# Patient Record
Sex: FEMALE | Race: WHITE | NOT HISPANIC OR LATINO | Employment: OTHER | URBAN - METROPOLITAN AREA
[De-identification: names, ages, dates, MRNs, and addresses within clinical notes are randomized per-mention and may not be internally consistent; named-entity substitution may affect disease eponyms.]

---

## 2018-02-12 ENCOUNTER — TRANSFERRED RECORDS (OUTPATIENT)
Dept: HEALTH INFORMATION MANAGEMENT | Facility: CLINIC | Age: 53
End: 2018-02-12

## 2018-02-19 ENCOUNTER — OFFICE VISIT (OUTPATIENT)
Dept: ORTHOPEDICS | Facility: CLINIC | Age: 53
End: 2018-02-19
Payer: COMMERCIAL

## 2018-02-19 VITALS
WEIGHT: 183.3 LBS | SYSTOLIC BLOOD PRESSURE: 152 MMHG | OXYGEN SATURATION: 99 % | DIASTOLIC BLOOD PRESSURE: 86 MMHG | HEIGHT: 68 IN | HEART RATE: 79 BPM | BODY MASS INDEX: 27.78 KG/M2

## 2018-02-19 DIAGNOSIS — M75.111 INCOMPLETE TEAR OF RIGHT ROTATOR CUFF: Primary | ICD-10-CM

## 2018-02-19 PROCEDURE — 99203 OFFICE O/P NEW LOW 30 MIN: CPT | Performed by: FAMILY MEDICINE

## 2018-02-19 ASSESSMENT — PAIN SCALES - GENERAL: PAINLEVEL: NO PAIN (0)

## 2018-02-19 NOTE — MR AVS SNAPSHOT
After Visit Summary   2018    aPmella Jaimes    MRN: [id number]           Patient Information     Date Of Birth          1965        Visit Information        Provider Department      2018 10:00 AM Nino Norris,  [facility]        Today's Diagnoses     Incomplete tear of right rotator cuff    -  1      Care Instructions    Thanks for coming today.  Ortho/Sports Medicine Clinic  [address] [place] [address]    To schedule future appointments in Ortho Clinic, you may call [phone].    To schedule ordered imaging by your provider:   Call Central Imaging [phone]    To schedule an injection ordered by your provider:  Call Central Imaging Injection scheduling line: [phone]  [employer] available online at:  [email or web address]    Please call if any further questions or concerns ([phone]).  Clinic hours 8 am to 5 pm.    Return to clinic (call) if symptoms worsen or fail to improve.            Follow-ups after your visit        Additional Services     PHYSICAL THERAPY REFERRAL       Please eval and treat for right rotator cuff tear                  Who to contact     If you have questions or need follow up information about today's clinic visit or your schedule please contact [facility] directly at [phone].  Normal or non-critical lab and imaging results will be communicated to you by MyChart, letter or phone within 4 business days after the clinic has received the results. If you do not hear from us within 7 days, please contact the clinic through "[employer]"hart or phone. If you have a critical or abnormal lab result, we will notify you by phone as soon as possible.  Submit refill requests through [employer] or call your pharmacy and they will forward the refill request to us. Please allow 3 business days for your refill to be completed.          Additional Information About Your Visit        MyChart Information   "   [employer] is an electronic gateway that provides easy, online access to your medical records. With [employer], you can request a clinic appointment, read your test results, renew a prescription or communicate with your care team.     To sign up for [employer] visit the website at [email or web address]   You will be asked to enter the access code listed below, as well as some personal information. Please follow the directions to create your username and password.     Your access code is: [email or web address]  Expires: 2018 10:31 AM     Your access code will  in 90 days. If you need help or a new code, please contact your [facility] or call [phone] for assistance.        Care EveryWhere ID     This is your Care EveryWhere ID. This could be used by other organizations to access your [place] medical records  [id number]        Your Vitals Were     Pulse Height Pulse Oximetry BMI (Body Mass Index)          79 1.72 m (5' 7.72\") 99% 28.1 kg/m2         Blood Pressure from Last 3 Encounters:   18 152/86    Weight from Last 3 Encounters:   18 83.1 kg (183 lb 4.8 oz)              We Performed the Following     PHYSICAL THERAPY REFERRAL        Primary Care Provider Office Phone # Fax #    Nino RISHABH Leal [phone]        [facility] [address]        Equal Access to Services     AZEB [facility] HospitalRENETTA : Hadii aad ku hadasho Soomaali, waaxda luqadaha, qaybta kaalmada adeegyada, micki medina haybipin brownarazelda saini . So [facility] [phone].    ATENCIÓN: Si habla español, tiene a garces disposición servicios gratuitos de asistencia lingüística. Jaciel al [phone].    We comply with applicable federal civil rights laws and [place] laws. We do not discriminate on the basis of race, color, national origin, age, disability, sex, sexual orientation, or gender identity.            Thank you!     Thank you for choosing [facility]  for your care. Our goal is always to provide you with excellent care. Hearing back from our patients is one way we can continue to improve our services. Please take a few minutes to complete the written survey that you may receive in the mail after your visit with us. Thank you!             Your Updated Medication List - Protect others around you: Learn how to safely use, store and throw away your medicines at www.disposemymeds.org.      Notice  As of 2/19/2018 10:31 AM    You have not been prescribed any medications.

## 2018-02-19 NOTE — LETTER
2/19/2018         RE: Pamella Jaimes  [address] [place] [address]        Dear Colleague,    Thank you for referring your patient, Pamella Jaimes, to the [facility]. Please see a copy of my visit note below.    CHIEF COMPLAINT:  Consult (right shoulder pain [id number]. pt states she was catching her grandson off the top bunk. )       HISTORY OF PRESENT ILLNESS  Ms. Jaimes is a pleasant 53 year old year old female who presents to clinic today with a right shoulder injury.    Pamella explains that she injured her shoulder in July while catching her grandson when he was jumping off of the top bunk.  Immediate right shoulder pain, starts at her right distal and lateral arm, radiates up to her anterior superior shoulder.  Over the past few months her pain has subsided quite a bit.  She does have persistent dull pain in her distal lateral and anterior arm, this is now tolerable to a good degree.    She was seen at [facility] by Dr. Mai, she had an MRI last week.  In follow up surgery was advised.  She would like to pursue other options.      Additional history: as documented    MEDICAL HISTORY  There is no problem list on file for this patient.      No current outpatient prescriptions on file.       Allergies   Allergen Reactions     Morphine      Penicillins Hives       History reviewed. No pertinent family history.    Additional medical/Social/Surgical histories reviewed in [facility] and updated as appropriate.     REVIEW OF SYSTEMS (2/19/2018)  CONSTITUTIONAL: Denies fever and weight loss  EYES: Denies acute vision changes  ENT: Denies hearing changes or difficulty swallowing  CARDIAC: Denies chest pain or edema  RESPIRATORY: Denies dyspnea, cough or wheeze  GASTROINTESTINAL: Denies abdominal pain, nausea, vomiting  MUSCULOSKELETAL: See HPI  SKIN: Denies any recent rash or lesion  NEUROLOGICAL: Denies numbness or focal weakness  PSYCHIATRIC: No history of psychiatric symptoms or  "problems  ENDOCRINE: Denies current diagnosis of diabetes  HEMATOLOGY: Denies episodes of easy bleeding      PHYSICAL EXAM  Vitals:    02/19/18 1007   BP: 152/86   Pulse: 79   SpO2: 99%   Weight: 83.1 kg (183 lb 4.8 oz)   Height: 1.72 m (5' 7.72\")     General  - normal appearance, in no obvious distress  CV  - normal radial pulse  Pulm  - normal respiratory pattern, non-labored  Musculoskeletal - right shoulder  - inspection: normal bone and joint alignment, no obvious deformity, no scapular winging, no AC step-off  - palpation: normal clavicle, non-tender AC, no tenderness over proximal biceps, RC insertion  - ROM: no pain with ROM in all directions  - strength: 5/5  strength, 5/5 in all shoulder planes  - special tests:  (-) Speed's  (-) Neer  (+) Hawkin's  (-) Stephanie's  (-) subscap lift-off  Neuro  - no sensory or motor deficit, grossly normal coordination, normal muscle tone  Skin  - no ecchymosis, erythema, warmth, or induration, no obvious rash  Psych  - interactive, appropriate, normal mood and affect           ASSESSMENT & PLAN  Ms. Jaimes is a 53 year old year old female who presents to clinic today with right shoulder pain.    I reviewed her records from [facility], including her MRI from February 12.  Her MR does reveal a full-thickness supraspinatus tear that is 2 x 2.5 cm and a medially dislocated biceps tendon.    I did discuss options with her, both surgical and non-surgical.  She's very interested in non-surgical options.  Given her level of improvement I do think it's reasonable to pursue these.    I'm referring her to physical therapy.    We should follow up in 6 weeks to reassess.    It was a pleasure seeing Pamella today.        Nino Norris DO, [facility]  Primary Care Sports Medicine      Again, thank you for allowing me to participate in the care of your patient.        Sincerely,        Nino Norris DO    "

## 2018-02-19 NOTE — PROGRESS NOTES
CHIEF COMPLAINT:  Consult (right shoulder pain [id number]. pt states she was catching her grandson off the top bunk. )       HISTORY OF PRESENT ILLNESS  Ms. Jaimes is a pleasant 53 year old year old female who presents to clinic today with a right shoulder injury.    Pamella explains that she injured her shoulder in July while catching her grandson when he was jumping off of the top bunk.  Immediate right shoulder pain, starts at her right distal and lateral arm, radiates up to her anterior superior shoulder.  Over the past few months her pain has subsided quite a bit.  She does have persistent dull pain in her distal lateral and anterior arm, this is now tolerable to a good degree.    She was seen at [facility] by Dr. Mai, she had an MRI last week.  In follow up surgery was advised.  She would like to pursue other options.      Additional history: as documented    MEDICAL HISTORY  There is no problem list on file for this patient.      No current outpatient prescriptions on file.       Allergies   Allergen Reactions     Morphine      Penicillins Hives       History reviewed. No pertinent family history.    Additional medical/Social/Surgical histories reviewed in [facility] and updated as appropriate.     REVIEW OF SYSTEMS (2/19/2018)  CONSTITUTIONAL: Denies fever and weight loss  EYES: Denies acute vision changes  ENT: Denies hearing changes or difficulty swallowing  CARDIAC: Denies chest pain or edema  RESPIRATORY: Denies dyspnea, cough or wheeze  GASTROINTESTINAL: Denies abdominal pain, nausea, vomiting  MUSCULOSKELETAL: See HPI  SKIN: Denies any recent rash or lesion  NEUROLOGICAL: Denies numbness or focal weakness  PSYCHIATRIC: No history of psychiatric symptoms or problems  ENDOCRINE: Denies current diagnosis of diabetes  HEMATOLOGY: Denies episodes of easy bleeding      PHYSICAL EXAM  Vitals:    02/19/18 1007   BP: 152/86   Pulse: 79   SpO2: 99%   Weight: 83.1 kg (183 lb 4.8 oz)   Height: 1.72 m (5'  "7.72\")     General  - normal appearance, in no obvious distress  CV  - normal radial pulse  Pulm  - normal respiratory pattern, non-labored  Musculoskeletal - right shoulder  - inspection: normal bone and joint alignment, no obvious deformity, no scapular winging, no AC step-off  - palpation: normal clavicle, non-tender AC, no tenderness over proximal biceps, RC insertion  - ROM: no pain with ROM in all directions  - strength: 5/5  strength, 5/5 in all shoulder planes  - special tests:  (-) Speed's  (-) Neer  (+) Hawkin's  (-) Stephanie's  (-) subscap lift-off  Neuro  - no sensory or motor deficit, grossly normal coordination, normal muscle tone  Skin  - no ecchymosis, erythema, warmth, or induration, no obvious rash  Psych  - interactive, appropriate, normal mood and affect           ASSESSMENT & PLAN  Ms. Jaimes is a 53 year old year old female who presents to clinic today with right shoulder pain.    I reviewed her records from [facility], including her MRI from February 12.  Her MR does reveal a full-thickness supraspinatus tear that is 2 x 2.5 cm and a medially dislocated biceps tendon.    I did discuss options with her, both surgical and non-surgical.  She's very interested in non-surgical options.  Given her level of improvement I do think it's reasonable to pursue these.    I'm referring her to physical therapy.    We should follow up in 6 weeks to reassess.    It was a pleasure seeing Pamella today.        Nino Norris DO, [facility]  Primary Care Sports Medicine    "

## 2018-02-19 NOTE — NURSING NOTE
"Pamella Jaimes's goals for this visit include: evaluate right shoulder pain and right wrist pain  She requests these members of her care team be copied on today's visit information: yes    PCP: Nino Leal    Referring Provider:  No referring provider defined for this encounter.    Chief Complaint   Patient presents with     Consult     right shoulder pain I5yiheix. pt states she was catching her grandson off the top bunk.        Initial /86  Pulse 79  Ht 1.72 m (5' 7.72\")  Wt 83.1 kg (183 lb 4.8 oz)  SpO2 99%  BMI 28.1 kg/m2 Estimated body mass index is 28.1 kg/(m^2) as calculated from the following:    Height as of this encounter: 1.72 m (5' 7.72\").    Weight as of this encounter: 83.1 kg (183 lb 4.8 oz).  Medication Reconciliation: complete    "

## 2018-02-19 NOTE — PATIENT INSTRUCTIONS
Thanks for coming today.  Ortho/Sports Medicine Clinic  [address] [place] [address]    To schedule future appointments in Ortho Clinic, you may call [phone].    To schedule ordered imaging by your provider:   Call Central Imaging [phone]    To schedule an injection ordered by your provider:  Call Central Imaging Injection scheduling line: [phone]  [employer] available online at:  [email or web address]    Please call if any further questions or concerns ([phone]).  Clinic hours 8 am to 5 pm.    Return to clinic (call) if symptoms worsen or fail to improve.

## 2018-02-26 ENCOUNTER — THERAPY VISIT (OUTPATIENT)
Dept: PHYSICAL THERAPY | Facility: CLINIC | Age: 53
End: 2018-02-26
Payer: COMMERCIAL

## 2018-02-26 DIAGNOSIS — G89.29 CHRONIC RIGHT SHOULDER PAIN: Primary | Chronic | ICD-10-CM

## 2018-02-26 DIAGNOSIS — M25.511 CHRONIC RIGHT SHOULDER PAIN: Primary | Chronic | ICD-10-CM

## 2018-02-26 PROCEDURE — 97112 NEUROMUSCULAR REEDUCATION: CPT | Mod: GP | Performed by: PHYSICAL THERAPIST

## 2018-02-26 PROCEDURE — 97161 PT EVAL LOW COMPLEX 20 MIN: CPT | Mod: GP | Performed by: PHYSICAL THERAPIST

## 2018-02-26 PROCEDURE — 97110 THERAPEUTIC EXERCISES: CPT | Mod: GP | Performed by: PHYSICAL THERAPIST

## 2018-02-26 NOTE — MR AVS SNAPSHOT
After Visit Summary   2/26/2018    Pamella Jaimes    MRN: [id number]           Patient Information     Date Of Birth          1965        Visit Information        Provider Department      2/26/2018 5:10 PM Rafi Joseph, PT [facility]        Today's Diagnoses     Chronic right shoulder pain    -  1       Follow-ups after your visit        Your next 10 appointments already scheduled     Mar 05, 2018  4:00 PM CST   LIAT Extremity with Sanjay Mcnulty PT   [facility] ([facility]  )    [address]  [place] [address]   [phone]            Mar 12, 2018  3:50 PM CDT   LIAT Extremity with Rafi Joseph PT   [facility] ([facility]  )    [address]  [place] [address]   [phone]            Apr 02, 2018 10:00 AM CDT   Return Visit with Nino Norris DO   [facility] ([facility])    [address] [place] [id number] [phone]              Who to contact     If you have questions or need follow up information about today's clinic visit or your schedule please contact [facility] directly at [phone].  Normal or non-critical lab and imaging results will be communicated to you by MyChart, letter or phone within 4 business days after the clinic has received the results. If you do not hear from us within 7 days, please contact the clinic through MyChart or phone. If you have a critical or abnormal lab result, we will notify you by phone as soon as possible.  Submit refill requests through [employer] or call your pharmacy and they will forward the refill request to us. Please allow 3 business days for your refill to be completed.          Additional Information About Your Visit        MyChart Information      "[employer] lets you send messages to your doctor, view your test results, renew your prescriptions, schedule appointments and more. To sign up, go to [email or web address] . Click on \"Log in\" on the left side of the screen, which will take you to the Welcome page. Then click on \"Sign up Now\" on the right side of the page.     You will be asked to enter the access code listed below, as well as some personal information. Please follow the directions to create your username and password.     Your access code is: [email or web address]  Expires: 2018 10:31 AM     Your access code will  in 90 days. If you need help or a new code, please call your [place] clinic or [phone].        Care EveryWhere ID     This is your Care EveryWhere ID. This could be used by other organizations to access your [place] medical records  [id number]         Blood Pressure from Last 3 Encounters:   18 152/86    Weight from Last 3 Encounters:   18 83.1 kg (183 lb 4.8 oz)              We Performed the Following     HC PT EVAL, LOW COMPLEXITY     LIAT INITIAL EVAL REPORT     NEUROMUSCULAR RE-EDUCATION     THERAPEUTIC EXERCISES        Primary Care Provider Office Phone # Fax #    Nino KEATING Elvis [phone]        [facility] [address] [place] [id number]        Equal Access to Services     LAURA RUSH : Hadii aad ku hadasho Soomaali, waaxda luqadaha, qaybta kaalmada adeegyada, micki saini . So [facility] [phone].    ATENCIÓN: Si habla español, tiene a garces disposición servicios gratuitos de asistencia lingüística. Jaciel al [phone].    We comply with applicable federal civil rights laws and [place] laws. We do not discriminate on the basis of race, color, national origin, age, disability, sex, sexual orientation, or gender identity.            Thank you!     Thank you for choosing [facility]  for your care. Our goal is " always to provide you with excellent care. Hearing back from our patients is one way we can continue to improve our services. Please take a few minutes to complete the written survey that you may receive in the mail after your visit with us. Thank you!             Your Updated Medication List - Protect others around you: Learn how to safely use, store and throw away your medicines at www.disposemymeds.org.      Notice  As of 2/26/2018  6:00 PM    You have not been prescribed any medications.

## 2018-02-26 NOTE — LETTER
[facility] ATHLETIC [facility] PHYSICAL THERAPY  [address]  [facility] [id number] [phone]    2018    Re: Pamella Jaimes   :   1965  MRN:  [id number]   REFERRING PHYSICIAN:   Nino Norris    [facility] ATHLETIC [facility] [place] PHYSICAL [facility]    Date of Initial Evaluation:  ***  Visits:  Rxs Used: 1  Reason for Referral:  Chronic right shoulder pain    EVALUATION SUMMARY    [facility] Athletic [facility] Initial Evaluation  Subjective:  Patient is a 53 year old female presenting with rehab right shoulder hpi. The history is provided by the patient. No  was used.   Pamella Jaimes is a 53 year old female with a right shoulder condition.  Condition occurred with:  Contact with another person (Caught her grandson).  Condition occurred: during recreation/sport.  This is a chronic condition  Pt with primary complaint of R shoulder ryan, states she was in a camper and her 2 year grandson was in the top bunk. She reached up to lift him down and he jumped down into her arms. Injury occurred in 2017. She waited to follow up with physician, followed up about 6 weeks ago and found out she had a complete tear of the supraspinatus tendon. Surgery was recommended to her and she desires to trial conservative treatment first. Pain is rated at 2/10 level. She notes she tends to tighten up and guard with the the upper traps and has upper trap and lower cervical pain. Pt presents with referral dated 18 for PT for evaluate and treat. .    Patient reports pain:  Anterior and lateral.  Radiates to:  Shoulder, cervical and upper arm.  Pain is described as aching (throbbing) and is intermittent and reported as 2/10.  Associated symptoms:  Loss of motion/stiffness and loss of strength. Pain is worse in the P.M..  Symptoms are exacerbated by using arm behind back and certain positions (Writing, computer work ) and relieved by nothing (Reaching  arm overhead helps).  Since onset symptoms are gradually improving.  Special tests:  MRI (supraspinatus tear, medial biceps tendon subluxation ).  Previous treatment includes other (Injecton into shoulder 6 weeks ago ).  There was significant improvement following previous treatment.  General health as reported by patient is excellent.  Pertinent medical history includes:  None.  Medical allergies: yes (see  EPIC).  Other surgeries include:  None reported.  Current medications:  None as reported by the patient.  Current occupation is Self employed computer work .  Patient is working in normal job without restrictions.  Primary job tasks include:  Prolonged sitting (computer work ).    Barriers include:  None as reported by the patient.    Red flags:  None as reported by the patient.                        Objective:  Standing Alignment:    Cervical/Thoracic:  Forward head  Shoulder/UE:  Rounded shoulders                                       Shoulder Evaluation:  ROM:  AROM:    Flexion:  Left:  157    Right:  160    Abduction:  Left: 162   Right:  160      External Rotation:  Left:  84    Right:  85            Extension/Internal Rotation:  Left:  T7    Right:  T9          Strength:    Flexion: Left:5/5   Pain:    Right: 5/5     Pain:     Abduction:  Left: 5/5  Pain:    Right: 4+/5     Pain:    Internal Rotation:  Left:5/5     Pain:    Right: 5/5     Pain:  External Rotation:   Left:5/5     Pain:   Right:4+/5     Pain:            Stability Testing:        Right shoulder stability negative testing:  Apprehension; Load and shift anterior and Load and shift posterior  Special Tests:  Special tests assessed shoulder: Cervical ROM full, repeated extension periphrealizes pain to forearm, SB R centralizing pain       Right shoulder negative for the following special tests:Labral; Impingement; Rotator cuff tear and Acrimioclavicular  Palpation:      Right shoulder tenderness present at: Levator; Rhomboids and Upper  Trap  Right shoulder tenderness not present at:Clavicle; Sternoclavicular; Acrimioclavicular; Biceps; Triceps; Supraspinatus; Infraspinatus; Teres Minor; Subscapularis; Deltoid or Bicipital Groove  Mobility Tests:  Mobility wnl shoulder: Medial scapular winging   Glenohumeral anterior right:  Normal  Glenohumeral posterior right:  Normal  Glenohumeral inferior right:  Normal      Scapulothoracic right:  Normal    Scapulohumeral rhythm right:  Hypermobile                                   General     ROS    Assessment/Plan:    Patient is a 53 year old female with right side shoulder complaints.    Patient has the following significant findings with corresponding treatment plan.                Diagnosis 1:  R shoulder pain,   Pain -  hot/cold therapy, manual therapy, self management, education, directional preference exercise and home program  Decreased strength - therapeutic exercise, therapeutic activities and home program  Inflammation - cold therapy, US and self management/home program  Impaired muscle performance - neuro re-education and home program  Decreased function - therapeutic activities and home program  Impaired posture - neuro re-education and home program    Therapy Evaluation Codes:   1) History comprised of:   Personal factors that impact the plan of care:      Profession.    Comorbidity factors that impact the plan of care are:      None.     Medications impacting care: None.  2) Examination of Body Systems comprised of:   Body structures and functions that impact the plan of care:      Cervical spine and Shoulder.   Activity limitations that impact the plan of care are:      Reading/Computer work and Working.  3) Clinical presentation characteristics are:   Stable/Uncomplicated.  4) Decision-Making    Low complexity using standardized patient assessment instrument and/or measureable assessment of functional outcome.  Cumulative Therapy Evaluation is: Low complexity.    Previous and current  functional limitations:  (See Goal Flow Sheet for this information)    Short term and Long term goals: (See Goal Flow Sheet for this information)     Communication ability:  Patient appears to be able to clearly communicate and understand verbal and written communication and follow directions correctly.  Treatment Explanation - The following has been discussed with the patient:   RX ordered/plan of care  Anticipated outcomes  Possible risks and side effects  This patient would benefit from PT intervention to resume normal activities.   Rehab potential is good.    Frequency:  2 X week, once daily  Duration:  for 2 weeks tapering to 1 X a week over 6 weeks  Discharge Plan:  Achieve all LTG.  Independent in home treatment program.  Reach maximal therapeutic benefit.    Please refer to the daily flowsheet for treatment today, total treatment time and time spent performing 1:1 timed codes.         Thank you for your referral.    INQUIRIES  Therapist:   [facility] [place] [facility]ICAL [facility]ERAPY  [address]  [facility] [address]  Phone: [phone]  Fax: [phone]
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
## 2018-03-12 ENCOUNTER — THERAPY VISIT (OUTPATIENT)
Dept: PHYSICAL THERAPY | Facility: CLINIC | Age: 53
End: 2018-03-12
Payer: COMMERCIAL

## 2018-03-12 DIAGNOSIS — M25.511 CHRONIC RIGHT SHOULDER PAIN: ICD-10-CM

## 2018-03-12 DIAGNOSIS — G89.29 CHRONIC RIGHT SHOULDER PAIN: ICD-10-CM

## 2018-03-12 PROCEDURE — 97110 THERAPEUTIC EXERCISES: CPT | Mod: GP | Performed by: PHYSICAL THERAPIST

## 2018-03-12 PROCEDURE — 97112 NEUROMUSCULAR REEDUCATION: CPT | Mod: GP | Performed by: PHYSICAL THERAPIST

## 2018-03-12 NOTE — MR AVS SNAPSHOT
"              After Visit Summary   3/12/2018    Pamella Jaimes    MRN: [id number]           Patient Information     Date Of Birth          1965        Visit Information        Provider Department      3/12/2018 3:50 PM Rafi Joseph PT [facility] Athletic [facility] Physical Therapy        Today's Diagnoses     Chronic right shoulder pain           Follow-ups after your visit        Your next 10 appointments already scheduled     Apr 02, 2018 10:00 AM CDT   Return Visit with Nino Norris DO   [facility]    [address] [place] [id number] [phone]              Who to contact     If you have questions or need follow up information about today's clinic visit or your schedule please contact [facility] ATHLETIC [facility] PHYSICAL THERAPY directly at [phone].  Normal or non-critical lab and imaging results will be communicated to you by [employer], letter or phone within 4 business days after the clinic has received the results. If you do not hear from us within 7 days, please contact the clinic through [employer] or phone. If you have a critical or abnormal lab result, we will notify you by phone as soon as possible.  Submit refill requests through [email or web address] or call your pharmacy and they will forward the refill request to us. Please allow 3 business days for your refill to be completed.          Additional Information About Your Visit        MyChart Information     [email or web address] lets you send messages to your doctor, view your test results, renew your prescriptions, schedule appointments and more. To sign up, go to [email or web address] . Click on \"Log in\" on the left side of the screen, which will take you to the Welcome page. Then click on \"Sign up Now\" on the right side of the page.     You will be asked to enter the access code listed below, as well as some personal information. Please follow the directions to create " your username and password.     Your access code is: [email or web address]  Expires: 2018 11:31 AM     Your access code will  in 90 days. If you need help or a new code, please call your [facility] or [phone].        Care EveryWhere ID     This is your Care EveryWhere ID. This could be used by other organizations to access your [facility] medical records  [id number]         Blood Pressure from Last 3 Encounters:   18 152/86    Weight from Last 3 Encounters:   18 83.1 kg (183 lb 4.8 oz)              We Performed the Following     NEUROMUSCULAR RE-EDUCATION     THERAPEUTIC EXERCISES        Primary Care Provider Office Phone # Fax #    Nino KEATING Elvis [phone]        [facility] [address] [facility] [address]        Equal Access to Services     [facility] Center[facility] : Hadii anat schwartz hadasho Soomaali, waaxda luqadaha, qaybta kaalmada adeegyada, micki saini . So [facility] [phone].    ATENCIÓN: Si habla español, tiene a garces disposición servicios gratuitos de asistencia lingüística. Jaciel al [phone].    We comply with applicable federal civil rights laws and [place] laws. We do not discriminate on the basis of race, color, national origin, age, disability, sex, sexual orientation, or gender identity.            Thank you!     Thank you for choosing [facility] PHYSICAL [facility]  for your care. Our goal is always to provide you with excellent care. Hearing back from our patients is one way we can continue to improve our services. Please take a few minutes to complete the written survey that you may receive in the mail after your visit with us. Thank you!             Your Updated Medication List - Protect others around you: Learn how to safely use, store and throw away your medicines at www.disposemymeds.org.      Notice  As of 3/12/2018  4:31 PM    You have not been prescribed any medications.

## 2018-10-30 PROBLEM — M25.511 CHRONIC RIGHT SHOULDER PAIN: Status: RESOLVED | Noted: 2018-02-26 | Resolved: 2018-10-30

## 2018-10-30 PROBLEM — G89.29 CHRONIC RIGHT SHOULDER PAIN: Status: RESOLVED | Noted: 2018-02-26 | Resolved: 2018-10-30

## 2018-10-30 NOTE — PROGRESS NOTES
Discharge Note    Progress reporting period is from initial eval to Mar 12, 2018.     Pamella failed to return for next follow up visit and current status is unknown.  Please see information below for last relevant information on current status.  Patient seen for 2 visits.  SUBJECTIVE  Subjective changes noted by patient:  Pt reports she is much improved, states she has no pain shooting down her arm any more, no pain in the upper back She is improved to the point she was able to paint her bathroom. She only had one incident of shoulder pain with reaching for her briefcase where she felt a twinge in the shoulder.   .  Current pain level is 0/10.     Previous pain level was   (2-6/10).   Changes in function:  Yes (See Goal flowsheet attached for changes in current functional level)  Adverse reaction to treatment or activity: None    OBJECTIVE  Changes noted in objective findings: Reviewed concept of centralization and preiphrealization, re instructed in proper posture.      ASSESSMENT/PLAN  Diagnosis: R shoulder pain, cuff tear    DIAGP:  The encounter diagnosis was Chronic right shoulder pain.  Updated problem list and treatment plan:   Pain - HEP  Impaired posture - HEP  STG/LTGs have been met or progress has been made towards goals:  Yes, please see goal flowsheet for most current information  Assessment of Progress: current status is unknown.    Last current status: Pt is progressing well   Self Management Plans:  HEP  I have re-evaluated this patient and find that the nature, scope, duration and intensity of the therapy is appropriate for the medical condition of the patient.  Pamella continues to require the following intervention to meet STG and LTG's:  HEP.    Recommendations:  Discharge with current home program.  Patient to follow up with MD as needed.    Please refer to the daily flowsheet for treatment today, total treatment time and time spent performing 1:1 timed codes.

## 2024-06-24 ENCOUNTER — PATIENT OUTREACH (OUTPATIENT)
Dept: ONCOLOGY | Facility: CLINIC | Age: 59
End: 2024-06-24
Payer: COMMERCIAL

## 2024-06-24 ENCOUNTER — TRANSCRIBE ORDERS (OUTPATIENT)
Dept: OTHER | Age: 59
End: 2024-06-24

## 2024-06-24 ENCOUNTER — PRE VISIT (OUTPATIENT)
Dept: ONCOLOGY | Facility: CLINIC | Age: 59
End: 2024-06-24
Payer: COMMERCIAL

## 2024-06-24 DIAGNOSIS — C71.9: Primary | ICD-10-CM

## 2024-06-24 NOTE — PROGRESS NOTES
New Patient Hematology / Oncology Nurse Navigator Note     Referral Date: 6/24/24    Referring provider: Self-referred, call from daughter Leanne     Referring Clinic/Organization: [facility] (Self Referred)     Referred to: Neuro Oncology    Requested provider (if applicable): First available - did not specify     Evaluation for : Glioblastoma, 2nd opinion from [facility]      Clinical History (per Nurse review of records provided):    6/21/24 Office Visit with Oncology at [facility] -- BOOKMARKED  Relevant brain imaging -- BOOKMARKED  5/28/24 Path:  Addendum    The purpose of this addendum is to incorporate the findings of an external consult report.  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       Please see the attached consultation report for additional details.     Addendum electronically signed by Wm Herndon MD on 6/10/2024 at  8:07 AM   Final Diagnosis    A, B.  Brain, right occipital parietal tumor, resection - Pending external consultation, addendum to follow.   5/28/24 Op Note: NAVIGATED RIGHT PARIETAL CRANIOTOMY FOR TUMOR  -- BOOKMARKED    Clinical Assessment / Barriers to Care (Per Nurse):  Pt lives in [place], [place]    Records Location: Care Everywhere     Records Needed:   Images from [facility] needs to be requested for consult     Additional testing needed prior to consult:   Of note, pt is scheduled with Neurology at [facility] 6/28, Neuro Onc/RadOnc at [facility] 7/19 (Dr. Burciaga)      Referral updates and Plan:   OUTGOING CALL to pt:  Introduced my role as nurse navigator with [facility] Hematology/Oncology dept and that we have recd the referral for 2nd opinion from [facility] from her daughter Leanne.   Pt confirms they are aware of the referral but would prefer I speak to her daughter. Provided verbal consent to discuss referral with her daughter Leanne who is helping with  her appointments.     OUTGOING CALL to pt's daughter Leanne:  Introduced my role as nurse navigator with [facility] Hematology/Oncology dept and that we have recd the self-referral to Neuro Oncology for 2nd opinion.   Pt confirms they are aware of the referral and ready to schedule  Provided contact information if future questions arise.     -Transferred pt to NPS line [phone] to schedule appt per scheduling instructions.        Arlene Lindsey, BSN, RN, PHN, OCN  Hematology/Oncology Nurse Navigator  [facility]  [phone]

## 2024-06-24 NOTE — TELEPHONE ENCOUNTER
Action    Action Taken 6/24/24  WT from NPS, spoke w/ pt's daughter, Leanne. Writer was advised:     Recent diagnosis, at [facility].  [facility], writer provided verbal authorization for [facility] records pull.  Pt recently met w/ Oncologist Dr. Neville Del Rosario 6/21/24, is set to have future appt at [facility].  Advised no history of Chemotherapy, Radiation treatments before.  Advised no Genetic Testing conducted.  9:34 AM      RECORDS STATUS - ALL OTHER DIAGNOSIS      RECORDS RECEIVED FROM: [facility]   DATE RECEIVED:    NOTES STATUS DETAILS   OFFICE NOTE from medical oncologist  - [facility] Dr. Neville Del Rosario: 6/21/24   DISCHARGE SUMMARY from hospital  - [facility] 5/27/24   DISCHARGE REPORT from the ER [facility]  6/18/24    [facility]  5/26/24   OPERATIVE REPORT  - [facility] 5/28/24: Craniotomy   MEDICATION LIST [facility]   LABS     PATHOLOGY REPORTS [facility], Reports in [facility], slides requested 6/24 5/28/24: [id number]   ANYTHING RELATED TO DIAGNOSIS [facility]/ 6/18/24   PATHOLOGY [employer] TRACKING   TRACKING #: [id number]    GENONOMIC TESTING     TYPE:     IMAGING (NEED IMAGES & REPORT)     CT SCANS PACS [facility]  6/21/24, 5/29/24, 5/26/24, 12/21/23, 4/29/22    [facility]  6/18/24   MRI PACS [facility]  5/28/24, 5/27/24, 4/14/23, 3/15/23   XRAYS PACS [facility]  4/12/24

## 2024-06-25 ENCOUNTER — ONCOLOGY VISIT (OUTPATIENT)
Dept: ONCOLOGY | Facility: CLINIC | Age: 59
End: 2024-06-25
Attending: PSYCHIATRY & NEUROLOGY
Payer: COMMERCIAL

## 2024-06-25 ENCOUNTER — PATIENT OUTREACH (OUTPATIENT)
Dept: ONCOLOGY | Facility: CLINIC | Age: 59
End: 2024-06-25

## 2024-06-25 VITALS
HEIGHT: 66 IN | WEIGHT: 206.6 LBS | BODY MASS INDEX: 33.2 KG/M2 | DIASTOLIC BLOOD PRESSURE: 74 MMHG | SYSTOLIC BLOOD PRESSURE: 121 MMHG | TEMPERATURE: 98.3 F | RESPIRATION RATE: 18 BRPM | HEART RATE: 82 BPM

## 2024-06-25 DIAGNOSIS — I82.452 DEEP VENOUS THROMBOSIS (DVT) OF LEFT PERONEAL VEIN, UNSPECIFIED CHRONICITY (H): ICD-10-CM

## 2024-06-25 DIAGNOSIS — M79.89 LEG SWELLING: ICD-10-CM

## 2024-06-25 DIAGNOSIS — C71.9 GLIOBLASTOMA (H): Primary | ICD-10-CM

## 2024-06-25 DIAGNOSIS — D61.810 ANTINEOPLASTIC CHEMOTHERAPY INDUCED PANCYTOPENIA (H): ICD-10-CM

## 2024-06-25 DIAGNOSIS — R45.89 DIFFICULTY COPING: ICD-10-CM

## 2024-06-25 DIAGNOSIS — H53.462 HOMONYMOUS HEMIANOPIA, LEFT: ICD-10-CM

## 2024-06-25 DIAGNOSIS — T45.1X5A CHEMOTHERAPY-INDUCED NAUSEA AND VOMITING: ICD-10-CM

## 2024-06-25 DIAGNOSIS — R11.2 CHEMOTHERAPY-INDUCED NAUSEA AND VOMITING: ICD-10-CM

## 2024-06-25 DIAGNOSIS — Z11.59 SCREENING FOR VIRAL DISEASE: ICD-10-CM

## 2024-06-25 DIAGNOSIS — Z91.89 HIGH RISK FOR CHEMOTHERAPY-INDUCED INFECTIOUS COMPLICATION: ICD-10-CM

## 2024-06-25 DIAGNOSIS — T45.1X5A ANTINEOPLASTIC CHEMOTHERAPY INDUCED PANCYTOPENIA (H): ICD-10-CM

## 2024-06-25 DIAGNOSIS — R45.89 DEPRESSED MOOD: ICD-10-CM

## 2024-06-25 LAB
ALBUMIN SERPL BCG-MCNC: 4.4 G/DL (ref 3.5–5.2)
ALP SERPL-CCNC: 86 U/L (ref 40–150)
ALT SERPL W P-5'-P-CCNC: 25 U/L (ref 0–50)
ANION GAP SERPL CALCULATED.3IONS-SCNC: 12 MMOL/L (ref 7–15)
AST SERPL W P-5'-P-CCNC: 30 U/L (ref 0–45)
BASOPHILS # BLD AUTO: 0 10E3/UL (ref 0–0.2)
BASOPHILS NFR BLD AUTO: 0 %
BILIRUB SERPL-MCNC: 0.3 MG/DL
BUN SERPL-MCNC: 21.5 MG/DL (ref 8–23)
CALCIUM SERPL-MCNC: 9.7 MG/DL (ref 8.8–10.2)
CHLORIDE SERPL-SCNC: 105 MMOL/L (ref 98–107)
CREAT SERPL-MCNC: 0.89 MG/DL (ref 0.51–0.95)
DEPRECATED HCO3 PLAS-SCNC: 24 MMOL/L (ref 22–29)
EGFRCR SERPLBLD CKD-EPI 2021: 73 ML/MIN/1.73M2
EOSINOPHIL # BLD AUTO: 0 10E3/UL (ref 0–0.7)
EOSINOPHIL NFR BLD AUTO: 0 %
ERYTHROCYTE [DISTWIDTH] IN BLOOD BY AUTOMATED COUNT: 13.9 % (ref 10–15)
GLUCOSE SERPL-MCNC: 119 MG/DL (ref 70–99)
HCT VFR BLD AUTO: 41.6 % (ref 35–47)
HGB BLD-MCNC: 13.8 G/DL (ref 11.7–15.7)
IMM GRANULOCYTES # BLD: 0 10E3/UL
IMM GRANULOCYTES NFR BLD: 0 %
LYMPHOCYTES # BLD AUTO: 1.6 10E3/UL (ref 0.8–5.3)
LYMPHOCYTES NFR BLD AUTO: 21 %
MCH RBC QN AUTO: 30.9 PG (ref 26.5–33)
MCHC RBC AUTO-ENTMCNC: 33.2 G/DL (ref 31.5–36.5)
MCV RBC AUTO: 93 FL (ref 78–100)
MONOCYTES # BLD AUTO: 0.4 10E3/UL (ref 0–1.3)
MONOCYTES NFR BLD AUTO: 6 %
NEUTROPHILS # BLD AUTO: 5.4 10E3/UL (ref 1.6–8.3)
NEUTROPHILS NFR BLD AUTO: 72 %
NRBC # BLD AUTO: 0 10E3/UL
NRBC BLD AUTO-RTO: 0 /100
PLATELET # BLD AUTO: 210 10E3/UL (ref 150–450)
POTASSIUM SERPL-SCNC: 4.6 MMOL/L (ref 3.4–5.3)
PROT SERPL-MCNC: 7 G/DL (ref 6.4–8.3)
RBC # BLD AUTO: 4.47 10E6/UL (ref 3.8–5.2)
SODIUM SERPL-SCNC: 141 MMOL/L (ref 135–145)
WBC # BLD AUTO: 7.4 10E3/UL (ref 4–11)

## 2024-06-25 PROCEDURE — 99417 PROLNG OP E/M EACH 15 MIN: CPT | Performed by: PSYCHIATRY & NEUROLOGY

## 2024-06-25 PROCEDURE — 82247 BILIRUBIN TOTAL: CPT | Performed by: PSYCHIATRY & NEUROLOGY

## 2024-06-25 PROCEDURE — 36415 COLL VENOUS BLD VENIPUNCTURE: CPT | Performed by: PSYCHIATRY & NEUROLOGY

## 2024-06-25 PROCEDURE — G2211 COMPLEX E/M VISIT ADD ON: HCPCS | Performed by: PSYCHIATRY & NEUROLOGY

## 2024-06-25 PROCEDURE — 99205 OFFICE O/P NEW HI 60 MIN: CPT | Performed by: PSYCHIATRY & NEUROLOGY

## 2024-06-25 PROCEDURE — G0463 HOSPITAL OUTPT CLINIC VISIT: HCPCS | Performed by: PSYCHIATRY & NEUROLOGY

## 2024-06-25 PROCEDURE — 86704 HEP B CORE ANTIBODY TOTAL: CPT | Performed by: PSYCHIATRY & NEUROLOGY

## 2024-06-25 PROCEDURE — 87340 HEPATITIS B SURFACE AG IA: CPT | Performed by: PSYCHIATRY & NEUROLOGY

## 2024-06-25 PROCEDURE — 85025 COMPLETE CBC W/AUTO DIFF WBC: CPT | Performed by: PSYCHIATRY & NEUROLOGY

## 2024-06-25 RX ORDER — DEXAMETHASONE 2 MG/1
TABLET ORAL
COMMUNITY
Start: 2024-05-30 | End: 2024-10-01

## 2024-06-25 ASSESSMENT — PAIN SCALES - GENERAL: PAINLEVEL: NO PAIN (0)

## 2024-06-25 NOTE — PROGRESS NOTES
NEURO-ONCOLOGY INITIAL VISIT  Jun 25, 2024    CHIEF COMPLAINT: Ms. 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 She is presenting to this initial clinic visit for evaluation and recommendations on treatment.     Accompanying her to this visit is Leanne (daughter).     HISTORY OF PRESENT ILLNESS  A summary of the patient s oncologic history is as follows;   -PRESENTATION: Neck pain, headache and head tremor.  -3/15/2023 MRI brain imaging with a mild asymmetric T2 hyperintense signal along right hippocampus and adjacent parahippocampal gyrus.    -PRESENTATION: Dizziness and nausea; semiology is concerning for temporal lobe auras. Slurred speech.  -5/27/2024 MR brain imaging with a right posterior temporo-occipital mass. Associated nodular peripheral enhancement and surrounding confluent T2 signal hyperintensity extending along the medial aspect of the right temporal lobe. Mass effect results in regional sulcal effacement, leftward midline shift, partial effacement the right lateral ventricle and mild right uncal herniation.   -5/28/2024 SURGERY: Craniotomy for mass resection at [facility].   No post-operative MR brain imaging performed.   Post-operative CT head imaging from 5/29 status post a right parietal craniotomy with excision of previously demonstrated paramedian parieto-occipital tumor. Decreased focal mass effect and slightly decreased mild midline shift. No significant hemorrhage or additional complicating feature.   PATHOLOGY: Glioblastoma, IDH1-R132H wildtype (CNS WHO Grade 4).    MGMT promoter unmethylated.   -6/25/2024 NEURO-ONC: Recommending chemoradiotherapy with concurrent temozolomide. U/S + for DVT; started anticoagulation.     Today in clinic;   -Pamella notes a visual field cut on the left since surgery.    Numbness about the scalp at the  "incision site. No other sensation changes.    No weakness.    No significant headaches.   -Denies any impairment in cognitive ability/ memory or difficulty in word finding.  -Noted infrequent periods of pending doom associated with nausea that resolved within 30 seconds.   -Periodic head tremors.   -On dexamethasone 2mg daily with plans to decrease to 1mg daily for 5 days.       MEDICATIONS   Current Outpatient Medications   Medication Sig Dispense Refill    dexAMETHasone (DECADRON) 2 MG tablet Take 1 tablet (2 mg) by mouth three times a day for 3 days, THEN 1 tablet (2 mg) twice a day for 30 days.       DRUG ALLERGIES   Allergies   Allergen Reactions    Morphine     Penicillins Hives       IMMUNIZATIONS   Immunization History   Administered Date(s) Administered    COVID-19 Monovalent 18+ (Moderna) 05/04/2021, 06/01/2021, 01/28/2022       PHYSICAL EXAMINATION  /74 (BP Location: Right arm, Patient Position: Sitting, Cuff Size: Adult Large)   Pulse 82   Temp 98.3  F (36.8  C) (Oral)   Resp 18   Ht 1.676 m (5' 6\")   Wt 93.7 kg (206 lb 9.6 oz)   BMI 33.35 kg/m    Wt Readings from Last 2 Encounters:   06/25/24 93.7 kg (206 lb 9.6 oz)   02/19/18 83.1 kg (183 lb 4.8 oz)      Ht Readings from Last 2 Encounters:   06/25/24 1.676 m (5' 6\")   02/19/18 1.72 m (5' 7.72\")     KPS: 90    -Generally well appearing.  -Respiratory: Normal breath sounds, no audible wheezing.   -Skin: No rashes. Healed head incision.  -Hematologic/ lymphatic: No abnormal bruising. No leg swelling.  -Psychiatric: Normal mood and affect. Pleasant, talkative.  -Neurologic:   MENTAL STATUS:     Alert, oriented to date; June 28th, 2024.    Recall: Intact    Speech fluent.   Comprehension intact to multi-step commands.   Good right-left orientation.     CRANIAL NERVES:     Pupils are equal, round.     Extraocular movements full, denies diplopia.     Homonymous hemianopsia, left-side.    Facial sensation intact to light touch.   Symmetric facial " movements.   Hearing intact.   No dysarthria.   MOTOR:    No pronation or drift.   Able to rise from a chair without use of arms.    Benign positional tremor of the head.   SENSATION: Intact to light touch throughout.  COORDINATION: Intact finger-nose with eyes closed.   GAIT:  Walks without assistance.   Good speed. Normal stride length and heel strike. Normal turns. Normal arm swing.   Able to toe walk. Able to tandem walk.       MEDICAL RECORDS  Personally reviewed hospitalization records from [facility], indicated for surgery.    LABS  Personally reviewed all available lab results; CBC, CMP, pathology.      IMAGING  Personally reviewed MR brain imaging from 2023 and 2024.    Imaging was shown to and results were reviewed with Pamella and Leanne.       IMPRESSION  On date of service, 96 minutes was spent in clinic and 34 minutes was spent preparing for the visit through extensive chart review and coordinating care for this high complexity visit. The following is in explanation for the recommendations used to define the plan.       Prior to Pamella's appointment today, I reviewed her imaging and pathology results.    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 Therefore, cancer-directed treatment strives to slow further growth and increase the time interval to recurrence. (With regard to Pamella's final diagnosis, pathology has been requested for our review. Important to note is that MR brain imaging from 3/2023 demonstrated a mild asymmetric T2 hyperintense signal in the right temporal lobe. Her cancer's IDH status was determined by IHC. As a result, this does raise question as to whether her cancer has a less common IDH mutation and in fact, her diagnosis could be that of a WHO grade 4 astrocytoma, IDH mutated. I have alerted neuro-pathology to this concern.)  Regardless, with regard to cancer-directed therapy, a multimodal treatment approach first involves attempting a maximum safe surgical resection. There was no post-operative MR brain imaging performed, so the extent of surgery has yet to be accurately determined.     Following surgery, standard of care is chemoradiotherapy with temozolomide dosed at 75 mg/m2 daily concomitantly with radiation therapy. The risks/ benefits of temozolomide were reviewed and the following common, anticipated side effects of this treatment were discussed as including, but not limited to, fatigue, nausea, and constipation. Concomitant radiation can result in increased cerebral edema and therefore, symptoms of malaise and fatigue generally worsen, but do eventually improve. The combination therapy can result in bone marrow suppression; leukopenia and thrombocytopenia. I placed a referral to Dr. Sanchez in radiation oncology and I have personally discussed Pamella's case with her.     Next, Pamella is in agreement with a referral to neuro-ophtho for thorough eye examination and baseline visual field testing to determine if there are any driving restrictions.      To evaluate leg swelling, will place order for U/S of the leg to be performed at [facility] in the near future. Pamella has a history of leg/ ankle trauma, but will conservatively rule out a DVT.     For depressed mood, which is completely understandable given the gravity of her recent cancer diagnosis, we discussed the risks/ side effects and benefits of starting Zoloft. In the setting of brain surgery and brain cancer, this can lead to an imbalance in neurotransmitters and thus, depress. Starting a selective serotonin reuptake inhibitor, like Zoloft, can restore the chemical balance and improve mood and energy. This class of medications can have side effects such as weight gain, sexual dysfunction, insomnia, headache, and nausea, however, these side effects are not experienced by  everyone. Pamella is open to starting this  medication. In addition, Pamella is open to seeing Dr. Mercedes for further assessment and management of her low mood plus to assist with coping and other family social matters.     PROBLEM LIST  Glioblastoma  Headaches  Homonymous hemianopsia, left-side  Benign essential tremor  Temporal lobe auras    PLAN  -CANCER-DIRECTED THERAPY-  Will initiate chemoradiotherapy with temozolomide 75mg/m2 (160mg).   -Additional temozolomide teaching performed by RN/ pharmacy staff.     -Initial labs ordered; CBC with differential, CMP, Hepatitis B serologies (If hepatitis B serologies reveal an active/ prior infection, will start prophylaxis with entecavir 0.5mg daily.)  -Will continue weekly CBC and repeat CMP monthly.    -Medications prescribed;        -Zofran 4mg (1 to 2 tabs qHS 30 minutes prior to chemotherapy and then PRN nausea).       -For lymphopenia, prophylactic antibiotics are recommended during concurrent treatment. Will start Sulfamethoxazole-trimethoprim (Bactrim) 800 mg-160 mg; 1 tab orally Monday, Wednesday, and Friday for pneumocystis prophylaxis. If thrombocytopenia becomes an issue, can change to Dapsone, Atovaquone, or Pentamidine.        -Docusate 100 mg; 1 cap orally 3 times a day (stool softener for constipation)       -Senna 8.6 m tabs orally at bedtime (laxative as needed for constipation)    -Radiation oncology referral to Dr. Sanchez.    -STEROIDS-  -Tapering off dexamethasone.    -SEIZURE MANAGEMENT-  -While this patient is at increased risk of having seizures, given the lack of seizure history, there is no indication to prescribe an antiepileptic at this time.   -However, there is concern for stereotyped events that seem consistent with a history of non-dominant temporal lobe auras.    Low threshold to start an antiepileptic medication.    -Quality of life/ MOOD/ FATIGUE-  -Depression.   -Starting Zoloft; 50mg daily for 2 weeks, then increase to 100mg daily.  Can further increase as needed.   -Referral to Dr. Mercedes in palliative care for further assessment and management of her low mood plus to assist with coping and other family social matters.     -VISUAL FIELD CUT-  -Left Homonymous hemianopsia.  -Referral to neuro-ophtho for thorough eye examination and baseline visual field testing to determine if there are any driving restrictions.      -LEG SWELLING-  -U/S of the legs to rule out DVT.    ADDENDUM: Testing + for DVT.    Needing life-long anticoagulation.    Starting Xarelto; 15 mg twice daily with food for 21 days followed by 20 mg once daily with food.     Return to clinic at midpoint of chemoradiotherapy with RADHA in about 5 weeks. At this appointment, can discuss the option of adding the Optune device to adjuvant chemotherapy.     In the meantime, Pamella knows to call the clinic with any concerns and she can be seen sooner if needed.     The longitudinal plan of care for the diagnosis(es)/condition(s) as documented were addressed during this visit. Due to the added complexity in care, I will continue to support Pamella in the subsequent management and with ongoing continuity of care.     Maribell Irizarry MD  Neuro-oncology

## 2024-06-25 NOTE — LETTER
"6/25/2024      Pamella Jaimes  [address] [address]      Dear Colleague,    Thank you for referring your patient, Pamella Jaimes, to the [facility]. Please see a copy of my visit note below.    Oncology Rooming Note    June 25, 2024 3:27 PM   Pamella Jaimes is a 59 year old female who presents for:    Chief Complaint   Patient presents with     Oncology Clinic Visit     Initial Vitals: /74 (BP Location: Right arm, Patient Position: Sitting, Cuff Size: Adult Large)   Pulse 82   Temp 98.3  F (36.8  C) (Oral)   Resp 18   Ht 1.676 m (5' 6\")   Wt 93.7 kg (206 lb 9.6 oz)   BMI 33.35 kg/m   Estimated body mass index is 33.35 kg/m  as calculated from the following:    Height as of this encounter: 1.676 m (5' 6\").    Weight as of this encounter: 93.7 kg (206 lb 9.6 oz). Body surface area is 2.09 meters squared.  No Pain (0) Comment: Data Unavailable   No LMP recorded.  Allergies reviewed: Yes  Medications reviewed: Yes    Medications: Medication refills not needed today.  Pharmacy name entered into [facility]: [id number] - [place], [place] - [address] [place] [address]    Frailty Screening:   Is the patient here for a new oncology consult visit in cancer care? 1. Yes. Over the past month, have you experienced difficulty or required a caregiver to assist with:   1. Balance, walking or general mobility (including any falls)? NO  2. Completion of self-care tasks such as bathing, dressing, toileting, grooming/hygiene?  NO  3. Concentration or memory that affects your daily life?  NO       Clinical concerns: no       Daria Sullivan CMA                NEURO-ONCOLOGY INITIAL VISIT  Jun 25, 2024    CHIEF COMPLAINT: Ms. 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 She is presenting to this initial clinic visit for evaluation and " recommendations on treatment.     Accompanying her to this visit is Leanne (daughter).     HISTORY OF PRESENT ILLNESS  A summary of the patient s oncologic history is as follows;   -PRESENTATION: Neck pain, headache and head tremor.  -3/15/2023 MRI brain imaging with a mild asymmetric T2 hyperintense signal along right hippocampus and adjacent parahippocampal gyrus.    -PRESENTATION: Dizziness and nausea; semiology is concerning for temporal lobe auras. Slurred speech.  -5/27/2024 MR brain imaging with a right posterior temporo-occipital mass. Associated nodular peripheral enhancement and surrounding confluent T2 signal hyperintensity extending along the medial aspect of the right temporal lobe. Mass effect results in regional sulcal effacement, leftward midline shift, partial effacement the right lateral ventricle and mild right uncal herniation.   -5/28/2024 SURGERY: Craniotomy for mass resection at [facility].   No post-operative MR brain imaging performed.   Post-operative CT head imaging from 5/29 status post a right parietal craniotomy with excision of previously demonstrated paramedian parieto-occipital tumor. Decreased focal mass effect and slightly decreased mild midline shift. No significant hemorrhage or additional complicating feature.   PATHOLOGY: Glioblastoma, IDH1-R132H wildtype (CNS WHO Grade 4).    MGMT promoter unmethylated.   -6/25/2024 NEURO-ONC: Recommending chemoradiotherapy with concurrent temozolomide.    Today in clinic;   -Pamella notes a visual field cut on the left since surgery.    Numbness about the scalp at the incision site. No other sensation changes.    No weakness.    No significant headaches.   -Denies any impairment in cognitive ability/ memory or difficulty in word finding.  -Noted infrequent periods of pending doom associated with nausea that resolved within 30 seconds.   -Periodic head tremors.   -On dexamethasone 2mg daily with plans to decrease to 1mg daily for 5  "days.       MEDICATIONS   Current Outpatient Medications   Medication Sig Dispense Refill     dexAMETHasone (DECADRON) 2 MG tablet Take 1 tablet (2 mg) by mouth three times a day for 3 days, THEN 1 tablet (2 mg) twice a day for 30 days.       DRUG ALLERGIES   Allergies   Allergen Reactions     Morphine      Penicillins Hives       IMMUNIZATIONS   Immunization History   Administered Date(s) Administered     COVID-19 Monovalent 18+ (Moderna) 05/04/2021, 06/01/2021, 01/28/2022       PHYSICAL EXAMINATION  /74 (BP Location: Right arm, Patient Position: Sitting, Cuff Size: Adult Large)   Pulse 82   Temp 98.3  F (36.8  C) (Oral)   Resp 18   Ht 1.676 m (5' 6\")   Wt 93.7 kg (206 lb 9.6 oz)   BMI 33.35 kg/m    Wt Readings from Last 2 Encounters:   06/25/24 93.7 kg (206 lb 9.6 oz)   02/19/18 83.1 kg (183 lb 4.8 oz)      Ht Readings from Last 2 Encounters:   06/25/24 1.676 m (5' 6\")   02/19/18 1.72 m (5' 7.72\")     KPS: 90    -Generally well appearing.  -Respiratory: Normal breath sounds, no audible wheezing.   -Skin: No rashes. Healed head incision.  -Hematologic/ lymphatic: No abnormal bruising. No leg swelling.  -Psychiatric: Normal mood and affect. Pleasant, talkative.  -Neurologic:   MENTAL STATUS:     Alert, oriented to date; June 28th, 2024.    Recall: Intact    Speech fluent.   Comprehension intact to multi-step commands.   Good right-left orientation.     CRANIAL NERVES:     Pupils are equal, round.     Extraocular movements full, denies diplopia.     Homonymous hemianopsia, left-side.    Facial sensation intact to light touch.   Symmetric facial movements.   Hearing intact.   No dysarthria.   MOTOR:    No pronation or drift.   Able to rise from a chair without use of arms.    Benign positional tremor of the head.   SENSATION: Intact to light touch throughout.  COORDINATION: Intact finger-nose with eyes closed.   GAIT:  Walks without assistance.   Good speed. Normal stride length and heel strike. Normal " turns. Normal arm swing.   Able to toe walk. Able to tandem walk.       MEDICAL RECORDS  Personally reviewed hospitalization records from [facility], indicated for surgery.    LABS  Personally reviewed all available lab results; CBC, CMP, pathology.      IMAGING  Personally reviewed MR brain imaging from 2023 and 2024.    Imaging was shown to and results were reviewed with Pamella and Leanne.       IMPRESSION  On date of service, 96 minutes was spent in clinic and 34 minutes was spent preparing for the visit through extensive chart review and coordinating care for this high complexity visit. The following is in explanation for the recommendations used to define the plan.       Prior to Pamella's appointment today, I reviewed her imaging and pathology results.    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 Therefore, cancer-directed treatment strives to slow further growth and increase the time interval to recurrence. (With regard to Pamella's final diagnosis, pathology has been requested for our review. Important to note is that MR brain imaging from 3/2023 demonstrated a mild asymmetric T2 hyperintense signal in the right temporal lobe. Her cancer's IDH status was determined by IHC. As a result, this does raise question as to whether her cancer has a less common IDH mutation and in fact, her diagnosis could be that of a WHO grade 4 astrocytoma, IDH mutated. I have alerted neuro-pathology to this concern.) Regardless, with regard to cancer-directed therapy, a multimodal treatment approach first involves attempting a maximum safe surgical resection. There was no post-operative MR brain imaging performed, so the extent of surgery has yet to be accurately determined.     Following surgery, standard of care is chemoradiotherapy with temozolomide dosed at 75 mg/m2 daily  concomitantly with radiation therapy. The risks/ benefits of temozolomide were reviewed and the following common, anticipated side effects of this treatment were discussed as including, but not limited to, fatigue, nausea, and constipation. Concomitant radiation can result in increased cerebral edema and therefore, symptoms of malaise and fatigue generally worsen, but do eventually improve. The combination therapy can result in bone marrow suppression; leukopenia and thrombocytopenia. I placed a referral to Dr. Sanchez in radiation oncology and I have personally discussed Pamella's case with her.     Next, Pamella is in agreement with a referral to neuro-ophtho for thorough eye examination and baseline visual field testing to determine if there are any driving restrictions.      To evaluate leg swelling, will place order for U/S of the leg to be performed at [facility] in the near future. Pamella has a history of leg/ ankle trauma, but will conservatively rule out a DVT.     For depressed mood, which is completely understandable given the gravity of her recent cancer diagnosis, we discussed the risks/ side effects and benefits of starting Zoloft. In the setting of brain surgery and brain cancer, this can lead to an imbalance in neurotransmitters and thus, depress. Starting a selective serotonin reuptake inhibitor, like Zoloft, can restore the chemical balance and improve mood and energy. This class of medications can have side effects such as weight gain, sexual dysfunction, insomnia, headache, and nausea, however, these side effects are not experienced by everyone. Pamella is open to starting this  medication. In addition, Pamella is open to seeing Dr. Mercedes for further assessment and management of her low mood plus to assist with coping and other family social matters.     PROBLEM LIST  Glioblastoma  Headaches  Homonymous hemianopsia, left-side  Benign essential tremor  Temporal lobe auras    PLAN  -CANCER-DIRECTED  THERAPY-  Will initiate chemoradiotherapy with temozolomide 75mg/m2 (160mg).   -Additional temozolomide teaching performed by RN/ pharmacy staff.     -Initial labs ordered; CBC with differential, CMP, Hepatitis B serologies (If hepatitis B serologies reveal an active/ prior infection, will start prophylaxis with entecavir 0.5mg daily.)  -Will continue weekly CBC and repeat CMP monthly.    -Medications prescribed;        -Zofran 4mg (1 to 2 tabs qHS 30 minutes prior to chemotherapy and then PRN nausea).       -For lymphopenia, prophylactic antibiotics are recommended during concurrent treatment. Will start Sulfamethoxazole-trimethoprim (Bactrim) 800 mg-160 mg; 1 tab orally Monday, Wednesday, and Friday for pneumocystis prophylaxis. If thrombocytopenia becomes an issue, can change to Dapsone, Atovaquone, or Pentamidine.        -Docusate 100 mg; 1 cap orally 3 times a day (stool softener for constipation)       -Senna 8.6 m tabs orally at bedtime (laxative as needed for constipation)    -Radiation oncology referral to Dr. Sanchez.    -STEROIDS-  -Tapering off dexamethasone.    -SEIZURE MANAGEMENT-  -While this patient is at increased risk of having seizures, given the lack of seizure history, there is no indication to prescribe an antiepileptic at this time.   -However, there is concern for stereotyped events that seem consistent with a history of non-dominant temporal lobe auras.    Low threshold to start an antiepileptic medication.    -Quality of life/ MOOD/ FATIGUE-  -Depression.   -Starting Zoloft; 50mg daily for 2 weeks, then increase to 100mg daily. Can further increase as needed.   -Referral to Dr. Mercedes in palliative care for further assessment and management of her low mood plus to assist with coping and other family social matters.     -VISUAL FIELD CUT-  -Left Homonymous hemianopsia.  -Referral to neuro-ophtho for thorough eye examination and baseline visual field testing to determine if there are any  driving restrictions.      -LEG SWELLING-  -U/S of the legs to rule out DVT.     Return to clinic at midpoint of chemoradiotherapy with RADHA in about 5 weeks. At this appointment, can discuss the option of adding the Optune device to adjuvant chemotherapy.     In the meantime, Pamella knows to call the clinic with any concerns and she can be seen sooner if needed.     The longitudinal plan of care for the diagnosis(es)/condition(s) as documented were addressed during this visit. Due to the added complexity in care, I will continue to support Pamella in the subsequent management and with ongoing continuity of care.     Maribell Irizarry MD  Neuro-oncology       Again, thank you for allowing me to participate in the care of your patient.        Sincerely,        Maribell Irizarry MD

## 2024-06-25 NOTE — PROGRESS NOTES
"Oncology Rooming Note    June 25, 2024 3:27 PM   Pamella Jaimes is a 59 year old female who presents for:    Chief Complaint   Patient presents with    Oncology Clinic Visit     Initial Vitals: /74 (BP Location: Right arm, Patient Position: Sitting, Cuff Size: Adult Large)   Pulse 82   Temp 98.3  F (36.8  C) (Oral)   Resp 18   Ht 1.676 m (5' 6\")   Wt 93.7 kg (206 lb 9.6 oz)   BMI 33.35 kg/m   Estimated body mass index is 33.35 kg/m  as calculated from the following:    Height as of this encounter: 1.676 m (5' 6\").    Weight as of this encounter: 93.7 kg (206 lb 9.6 oz). Body surface area is 2.09 meters squared.  No Pain (0) Comment: Data Unavailable   No LMP recorded.  Allergies reviewed: Yes  Medications reviewed: Yes    Medications: Medication refills not needed today.  Pharmacy name entered into [facility]: [id number] - [place], [place] - [address] [place] [address]    Frailty Screening:   Is the patient here for a new oncology consult visit in cancer care? 1. Yes. Over the past month, have you experienced difficulty or required a caregiver to assist with:   1. Balance, walking or general mobility (including any falls)? NO  2. Completion of self-care tasks such as bathing, dressing, toileting, grooming/hygiene?  NO  3. Concentration or memory that affects your daily life?  NO       Clinical concerns: no       Daria Sullivan CMA              "

## 2024-06-26 ENCOUNTER — TELEPHONE (OUTPATIENT)
Dept: PHARMACY | Facility: CLINIC | Age: 59
End: 2024-06-26

## 2024-06-26 DIAGNOSIS — C71.9 GLIOBLASTOMA (H): Primary | ICD-10-CM

## 2024-06-26 LAB
HBV CORE AB SERPL QL IA: NONREACTIVE
HBV SURFACE AG SERPL QL IA: NONREACTIVE

## 2024-06-26 RX ORDER — SENNA AND DOCUSATE SODIUM 50; 8.6 MG/1; MG/1
1 TABLET, FILM COATED ORAL 2 TIMES DAILY
Qty: 84 TABLET | Refills: 0 | Status: SHIPPED | OUTPATIENT
Start: 2024-07-07 | End: 2024-08-18

## 2024-06-26 RX ORDER — SULFAMETHOXAZOLE/TRIMETHOPRIM 800-160 MG
1 TABLET ORAL
Qty: 18 TABLET | Refills: 0 | Status: SHIPPED | OUTPATIENT
Start: 2024-07-08 | End: 2024-08-20

## 2024-06-26 RX ORDER — ONDANSETRON 4 MG/1
4 TABLET, FILM COATED ORAL AT BEDTIME
Qty: 30 TABLET | Refills: 3 | Status: SHIPPED | OUTPATIENT
Start: 2024-07-07 | End: 2024-07-30

## 2024-06-26 NOTE — ORAL ONC MGMT
"Oral Chemotherapy Monitoring Program    Lab Monitoring Plan  CBC weekly and CMP every 4 weeks.  Subjective/Objective:  Pamella Jaimes is a 59 year old female contacted by phone for an initial visit for oral chemotherapy education. I contacted Pamella's daughter Leanne to review Temozolomide dosing, possible side effects, and the need for lab monitoring. Discussed specifically about empty stomach administration (2hr after eating) at bedtime and to tae Ondansetron 30-60 mins prior to TMZ. Discussed the dosing and role of the supportive medications (Ondansetron, Bactrim, and Senna/Docusate).  Supportive medications sent to local [facility] pharmacy. Discussed lab monitoring weekly. Discussed to start TMZ the evening before the first RT treatment and to continue dosing every evening even if radiation is not happening that day like on weekends or holidays. RT consult on 7/11. All questions answered.       6/26/2024     2:00 PM   ORAL CHEMOTHERAPY   Assessment Type Chart Review;New Teach;Lab Monitoring;Initial Work up   Diagnosis Code Brain Cancer - Glioblastoma   Providers Dr. Irizarry   Clinic Name/Location [facility]   Drug Name Temodar (temozolomide)   Dose 160 mg   Current Schedule Daily   Cycle Details Continuous for 42 days during XRT   Any new drug interactions? No   Is the dose as ordered appropriate for the patient? Yes       Last PHQ-2 Score on record:       2/19/2018    10:08 AM   PHQ-2 ( 1999 [employer])   Q1: Little interest or pleasure in doing things 0   Q2: Feeling down, depressed or hopeless 0   PHQ-2 Score 0       Vitals:  BP:   BP Readings from Last 1 Encounters:   06/25/24 121/74     Wt Readings from Last 1 Encounters:   06/25/24 93.7 kg (206 lb 9.6 oz)     Estimated body surface area is 2.09 meters squared as calculated from the following:    Height as of 6/25/24: 1.676 m (5' 6\").    Weight as of 6/25/24: 93.7 kg (206 lb 9.6 oz).    Labs:  _  Result Component Current Result Ref Range   Sodium 141 (6/25/2024) " 135 - 145 mmol/L     _  Result Component Current Result Ref Range   Potassium 4.6 (6/25/2024) 3.4 - 5.3 mmol/L     _  Result Component Current Result Ref Range   Calcium 9.7 (6/25/2024) 8.8 - 10.2 mg/dL     No results found for Mag within last 30 days.     No results found for Phos within last 30 days.     _  Result Component Current Result Ref Range   Albumin 4.4 (6/25/2024) 3.5 - 5.2 g/dL     _  Result Component Current Result Ref Range   Urea Nitrogen 21.5 (6/25/2024) 8.0 - 23.0 mg/dL     _  Result Component Current Result Ref Range   Creatinine 0.89 (6/25/2024) 0.51 - 0.95 mg/dL     _  Result Component Current Result Ref Range   AST 30 (6/25/2024) 0 - 45 U/L     _  Result Component Current Result Ref Range   ALT 25 (6/25/2024) 0 - 50 U/L     _  Result Component Current Result Ref Range   Bilirubin Total 0.3 (6/25/2024) <=1.2 mg/dL     _  Result Component Current Result Ref Range   WBC Count 7.4 (6/25/2024) 4.0 - 11.0 10e3/uL     _  Result Component Current Result Ref Range   Hemoglobin 13.8 (6/25/2024) 11.7 - 15.7 g/dL     _  Result Component Current Result Ref Range   Platelet Count 210 (6/25/2024) 150 - 450 10e3/uL     No results found for ANC within last 30 days.     _  Result Component Current Result Ref Range   Absolute Neutrophils 5.4 (6/25/2024) 1.6 - 8.3 10e3/uL        Assessment:  Patient is appropriate to start therapy. Baseline labs and Hep B studies reviewed and ok to start TMZ + RT as planned.     Plan:  Basic chemotherapy teaching was reviewed with the patient's family including indication, start date of therapy, dose, administration, adverse effects, missed doses, food and drug interactions, monitoring, side effect management, office contact information, and safe handling. Written materials were provided and all questions answered.    Follow-Up:  6/27: check on access and release TMZ if able  7/11: RT consult (awaiting start date of regimen).      Juan C Flores PharmD  [facility] and  Oral Chemotherapy  [phone]  June 26, 2024

## 2024-06-26 NOTE — TELEPHONE ENCOUNTER
PA Initiation    Medication: TEMOZOLOMIDE 140 MG PO CAPS  Insurance Company: [employer] Clinical Review - Phone [phone] Fax [phone]  Pharmacy Filling the Rx: [facility] - [place], [place] - [facility] [address]  Filling Pharmacy Phone:    Filling Pharmacy Fax:    Start Date: 6/26/2024

## 2024-06-27 ENCOUNTER — TELEPHONE (OUTPATIENT)
Dept: PHARMACY | Facility: CLINIC | Age: 59
End: 2024-06-27
Payer: COMMERCIAL

## 2024-06-27 RX ORDER — TEMOZOLOMIDE 140 MG/1
140 CAPSULE ORAL AT BEDTIME
Qty: 42 CAPSULE | Refills: 0 | Status: SHIPPED | OUTPATIENT
Start: 2024-07-15 | End: 2024-10-01

## 2024-06-27 RX ORDER — TEMOZOLOMIDE 20 MG/1
20 CAPSULE ORAL AT BEDTIME
Qty: 42 CAPSULE | Refills: 0 | Status: SHIPPED | OUTPATIENT
Start: 2024-07-15 | End: 2024-10-01

## 2024-06-27 NOTE — TELEPHONE ENCOUNTER
PA Initiation    Medication: TEMOZOLOMIDE 20 MG PO CAPS  Insurance Company: [employer] - Phone [phone] Fax [phone]  Pharmacy Filling the Rx: [facility] - [place], [place] - [facility] [address]  Filling Pharmacy Phone:    Filling Pharmacy Fax:    Start Date: 6/27/2024

## 2024-06-27 NOTE — TELEPHONE ENCOUNTER
Prior Authorization Approval    Medication: TEMOZOLOMIDE 140 MG PO CAPS  Authorization Effective Date: 3/29/2024  Authorization Expiration Date: 6/27/2025  Approved Dose/Quantity: 30/30  Reference #:     Insurance Company: [employer] Clinical Review - Phone [phone] Fax [phone]  Expected CoPay: $ 0  CoPay Card Available:      Financial Assistance Needed: no  Which Pharmacy is filling the prescription: [facility] - [place] [address] [address]  Pharmacy Notified: yes   Patient Notified: yes

## 2024-06-27 NOTE — PROGRESS NOTES
" [facility]: Cancer Care Initial Note                                    Discussion with Patient:                                                      Intro to rncc role     Education concerns: cancer care service line, GBM standard of care     Goals          General     Other (pt-stated)      Notes - Note created  6/27/2024 12:33 PM by Irene Clayton RN     Goal Statement: I want to work toward achieving the life goals that are important to me during my cancer journey.  Date Goal set: 6/27/2024  Barriers: disease burden  Strengths: support, coping, motivation, health awareness, and involvement with care team  Date to Achieve By:  5+ years  Patient expressed understanding of goal: Yes  Action steps to achieve this goal:  I will attend a major life event. \"I want to see my babies graduate\"               Assessment:                                                      Initial  Current living arrangement:: I live in a private home with family  Advanced Care Plans/Directives on file:: No  Patient Reported Pain?: No    Plan of Care Education Review:   Assessment completed with:: Patient    Plan of Care Education   Yearly learning assessment completed?: Yes (see Education tab)  Diagnosis:: gbm  Does patient understand diagnosis?: Yes  Tx plan/regimen:: chemo radiation  Does patient understand treatment plan/regimen?: Yes  Preparing for treatment:: Reviewed treatment preparation information with patient (vascular access, day of chemo, visitor policy, what to bring, etc.)  Vascular access education provided for:: Peripheral IV  Side effect education:: Fatigue;Nausea/Vomiting;Lab value monitoring (anemia, neutropenia, thrombocytopenia)  Safety/self care at home reviewed with patient:: Yes  Coping - concerns/fears reviewed with patient:: Yes  Plan of Care:: Lab appointment;Treatment schedule;RADHA follow-up appointment  When to call provider:: Bleeding;Uncontrolled nausea/vomiting;Increased shortness of " breath;New/worsening pain;Shaking chills;Temperature >100.4F;Uncontrolled diarrhea/constipation  Reasons for deferring treatment reviewed with patient:: Yes    Evaluation of Learning  Patient Education Provided: Yes  Readiness:: Acceptance  Method:: Booklet/Handout  Response:: Demonstrates understanding           Intervention/Education provided during outreach:                                                       PHI form signed   Patient is set for radiation onc appt   Baseline labs are completed.  She is working on setting up appts and mychart with daughter later today  Follow up call in 1-2 weeks  Patient to follow up as scheduled at next appt  Patient to call/MyChart message with updates  Confirmed patient has clinic and triage numbers    Irene Clayton, RN, BSN  Specialty Care Coordinator  [facility]  [phone]

## 2024-06-27 NOTE — ORAL ONC MGMT
Oral Chemotherapy Monitoring Program     Placed call to patient's daughter Leanne in follow up of oral chemotherapy (Temozolomide). Informed Leanne that TMZ will come from [facility] pharmacy. Gave Leanne the [facility] phone number as well. Still waiting on RT start date with RT consult on 7/11. Leanne confirmed that they picked up Pamella's supportive medications (Senna/Docusate, Bactrim, and Ondansetron). Leanne mentioned that Pamella started the Sertraline as well. Leanne aware to not start TMZ or supportive medications until the evening prior to first RT treatment. Leanne requested another check in call on 7/11 to recap everything.     Follow Up:  7/11: RT consult and mini teach with Leanne (look for updates or ask for updates on RT start date)    Juan C Flores PharmD  [facility] and Oral Chemotherapy  [phone]  June 27, 2024

## 2024-06-28 ENCOUNTER — PATIENT OUTREACH (OUTPATIENT)
Dept: ONCOLOGY | Facility: CLINIC | Age: 59
End: 2024-06-28

## 2024-06-28 ENCOUNTER — ANCILLARY PROCEDURE (OUTPATIENT)
Dept: ULTRASOUND IMAGING | Facility: OTHER | Age: 59
End: 2024-06-28
Attending: PSYCHIATRY & NEUROLOGY
Payer: COMMERCIAL

## 2024-06-28 DIAGNOSIS — M79.89 LEG SWELLING: ICD-10-CM

## 2024-06-28 DIAGNOSIS — C71.9 GLIOBLASTOMA (H): Primary | ICD-10-CM

## 2024-06-28 DIAGNOSIS — C71.9 GLIOBLASTOMA (H): ICD-10-CM

## 2024-06-28 PROCEDURE — 93970 EXTREMITY STUDY: CPT | Mod: TC | Performed by: RADIOLOGY

## 2024-06-28 NOTE — TELEPHONE ENCOUNTER
Prior Authorization Approval    Medication: TEMOZOLOMIDE 20 MG PO CAPS  Authorization Effective Date: 3/30/2024  Authorization Expiration Date: 6/28/2025  Approved Dose/Quantity: 30/30  Reference #:     Insurance Company: [employer] - Phone [phone] Fax [phone]  Expected CoPay: $ 0  CoPay Card Available:      Financial Assistance Needed: no  Which Pharmacy is filling the prescription: [facility] - [place], [place] - [address]  Pharmacy Notified: yes  Patient Notified: yes

## 2024-06-28 NOTE — PROGRESS NOTES
Discussed results of BLE US + DVT left ext   Patient is aware of the medication dosing, side effects, and re: clot, when to go to the ER     Daughter and patient verbalize understanding.     Irene Clayton, RN, BSN  Specialty Care Coordinator  [facility]  [phone]

## 2024-07-01 ENCOUNTER — PATIENT OUTREACH (OUTPATIENT)
Dept: ONCOLOGY | Facility: CLINIC | Age: 59
End: 2024-07-01
Payer: COMMERCIAL

## 2024-07-01 DIAGNOSIS — C71.9 GLIOBLASTOMA (H): ICD-10-CM

## 2024-07-01 DIAGNOSIS — C71.9 GLIOBLASTOMA (H): Primary | ICD-10-CM

## 2024-07-01 RX ORDER — LEVETIRACETAM 500 MG/1
500 TABLET ORAL 2 TIMES DAILY
Qty: 60 TABLET | Refills: 1 | Status: SHIPPED | OUTPATIENT
Start: 2024-07-01 | End: 2024-07-26 | Stop reason: ALTCHOICE

## 2024-07-01 NOTE — PROGRESS NOTES
"[facility]: Cancer Care                                                                                          Patient reports having the \"weird feelings\" again this weekend. She mentioned these during her visit with Dr. Irizarry   They occurred yesterday 3x at the [place] parade and throughout the weekend. She notices they come on after she eats.   Bad taste, weird/sick feeling, shaky, dizzy. Need to sit down and it will resolve after a couple minutes.   Took last dose of 1mg dex yesterday  Discussed some causes of seizures  [facility]  Routing to provider       Irene Clayton, RN, BSN  Specialty Care Coordinator  [facility]  [phone]    "

## 2024-07-10 NOTE — TELEPHONE ENCOUNTER
MEDICAL RECORDS REQUEST   Radiation Oncology  [address] [place] [address]  Fax: [phone]        FUTURE VISIT INFORMATION                                                   Pamella Jaimes, : 1965 scheduled for future visit at [facility]    RECORDS REQUESTED FOR VISIT                                                     BRAIN STATUS DETAILS   OFFICE NOTE from neuro oncologist Epic 24: Dr. Maribell Irizarry   OFFICE NOTE from neuro/neuro surg [facility] 24: Dr. Brandan Connelly   ED/HOSP ADMISSION NOTES [facility] 24: [facility] ED  24: [facility]  24: [facility] ED   OPERATIVE/BIOPSY REPORTS [facility] 24: NAVIGATED RIGHT PARIETAL CRANIOTOMY FOR TUMOR    MEDICATION LIST Psychiatric    LABS     PATHOLOGY REPORTS Report in [facility] 24: [id number]   ANYTHING RELATED TO DIAGNOSIS Epic Most recent 24   IMAGING (NEED IMAGES & REPORT)     CT BRAIN/HEAD PACS 24-24   MRI BRAIN/HEAD PACS 24-03/15/23

## 2024-07-10 NOTE — PROGRESS NOTES
[facility]   RADIATION ONCOLOGY INITIAL CONSULTATION NOTE    Patient Name: Pamella Jaimes  MRN: [id number]  : 1965  Neuro-oncologist: Dr. Maribell Irizarry  Neurosurgeon: Dr. 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     Patient-identified Health Care Team:  Teammate/Relationship - Daughter Leanne     History Of Present Illness:     In 2023 Pamella Jaimes developed neck pain which progressed into right sided jaw pain and headaches. She also experienced episodic right eye blurry vision as well as intermittent numbness and weakness in her right arm which she attributed to a history of two torn rotator cuffs.     Pamella presented to the ED 3.15.23 due to a worsening headache on her right side which radiated down her lateral neck, accompanied by dizziness, blurry vision, and hand tremors. An MRI demonstrated a mild asymmetric T2 hyperintense signal along right hippocampus and adjacent parahippocampal gyrus, with post ictal edema or evolving hippocampal sclerosis.     On 23 Pamella attended a neurology visit due to a history of headaches, neck pain and head tremor. She was referred to PT and diagnosed with neck pain, tremor, and migraines.     Pamella presented to the ED 24 with dizziness, nausea, and concerns for slurred speech and tremor. A head CT showed a heterogeneous hypoattenuating intra-axial mass in the right temporo-occipital lobe measuring approximately 4.7 x 4.0 x 4.1 cm with associated mass effect with effacement of adjacent cerebral sulci and effacement of the occipital  and temporal horns of the right lateral ventricle. In addition to a 6 mm leftward midline shift at the septum pellucidum with possible mild uncal herniation. CTA angiogram demonstrated reduced perfusion associated with the right posterior temporo-occipital mass lesion, with no perfusion abnormalities elsewhere in the brain.     MRI's done 5.27.24 and 5.28.24 confirmed the right posterior temporo-occipital mass concerning for high-grade neoplasm such as glioblastoma, with mass effect resulting in regional sulcal effacement, 9 mm leftward midline shift, partial effacement the right lateral ventricle and mild right uncal herniation.      Surgical resection of the suspected glioblastoma was completed 5.28.24 via a navigated right parietal craniotomy. Post operatively she experienced a sensation of dimming of her left vision, as well as back pain, headaches, and pain behind her right eye.    Her first visit to an oncologist was 6.21.24 with Dr. Neville Del Rosario, who discussed with Pamella her prognosis and that further treatment would be with palliative intent, in addition to how the standard therapy would consist of adjuvant radiation with concurrent temozolomide followed by temozolomide with or without the use of alternating electric fields. They discussed a referral to [place] for a second opinion.     A CT on 6.24.24 showed resolution of postoperative extra-axial pneumocephalus in the interim, mild residual right occipital malacia, no recurrent mass or mass effect, and a 4 mm right to left midline shift (previously 6 mm).    Pamella attended a second oncology consultation, this time at the [facility] on 6.25.24 with Dr. Maribell Irizarry. They planned to start her on chemoradiotherapy with temozolomide 160 mg. Dr. 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 Pamella was placed on Zoloft for  depressed mood.     Today Ms. Jaimes joins us in clinic to discuss the role of radiation therapy in her care, she was joined by her daughter Leanne. Today she is experiencing right ear pain, eye pain, changes to vision in the left eye, and tremors. She has not seen a neuroophthalmologist for her symptoms, however, she did see an optometrist in June. She was diagnosed with a left calf DVT on 6.25.24 and was started on Xarelto. She began to experience pain, which has continued through till today, one week after starting the Xarelto. 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     Current systemic therapy: Adjuvant temozolomide beginning 7.15.24  Anti-epileptic: Keppra  Steroids: N/A  Dominant hand: right    Brain ROS:  Headaches- No, does experience a throbbing sensation around her incision with a tender scalp  Seizures- Denies  Nausea/vomiting-  Denies  Changes in cognition/behavior-  Denies  Speech changes-  Denies  Vision/hearing changes- left eye dimmed vision, when further questioned believes its the lateral portion of her left vision field   Gait symptoms or imbalance-  occasional issues with balance  Other focal neuro deficits-  Denies    Spine ROS:    Numbness/tingling-  just left calf pain from the DVT    Prior malignancy or chemotherapy: No    Radiation Oncology Pre-Treatment Evaluation:   Prior RT: No  Pacemaker: No  History of lupus, scleroderma, connective tissue disorders and collagen vascular disease: No  Child-bearing potential (Yes/No): No, menopause 2021  Intent of treatment: (currative/palliative):  curative  Side Effects of Radiation: We discussed in detail the management of treatment side effects    Past Medical History: Headaches, glioblastoma, DVT     Past Surgical History:   Past Surgical History:   Procedure Laterality Date    APPENDECTOMY      20 years ago    C/SECTION, LOW TRANSVERSE      x2    HEAD & NECK SURGERY  05/28/2024    head surgery at [facility] [place]       Allergies:   Allergies   Allergen Reactions    Morphine     Penicillins Hives       Medications:   Current Outpatient Medications   Medication Sig Dispense Refill    acetaminophen (TYLENOL) 500 MG tablet Take 500-1,000 mg by mouth      levETIRAcetam (KEPPRA) 500 MG tablet Take 1 tablet (500 mg) by mouth 2 times daily 60 tablet 1    Rivaroxaban ANTICOAGULANT 15 & 20 MG TBPK Starter Therapy Pack Take 15 mg by mouth 2 times daily (with meals) for 21 days, THEN 20 mg daily with food for 9 days. 51 each 0    dexAMETHasone (DECADRON) 2 MG tablet Take 1 tablet (2 mg) by mouth three times a day for 3 days, THEN 1 tablet (2 mg) twice a day for 30 days.      diclofenac (VOLTAREN) 75 MG EC tablet Take 75 mg by mouth (Patient not taking: Reported on 7/11/2024)      docusate sodium (COLACE) 100 MG capsule Take 100 mg by mouth (Patient not taking: Reported on 7/11/2024)      HYDROcodone-acetaminophen (NORCO) 5-325 MG tablet Take 1-2 tablets by mouth every 6 hours as needed for pain (Patient not taking: Reported on 7/11/2024)      methocarbamol (ROBAXIN) 500 MG tablet Take 500 mg by mouth (Patient not taking: Reported on 7/11/2024)      ondansetron (ZOFRAN) 4 MG tablet Take 1 tablet (4 mg) by mouth at bedtime Take 30-60 mins before each dose of temozolomide. Can take every 8 hours if needed. (Patient not taking: Reported on 7/11/2024) 30 tablet 3    oxyCODONE (ROXICODONE) 5 MG tablet Take 2.5-5 mg by mouth (Patient not taking: Reported on 7/11/2024)      senna-docusate (SENOKOT-S/PERICOLACE) 8.6-50 MG tablet Take 1 tablet by mouth 2 times daily as needed (Patient not taking: Reported on 7/11/2024)      SENNA-docusate sodium (SENNA S) 8.6-50 MG tablet Take 1 tablet by mouth 2 times daily for 42 days . May increase to 2 tablets twice daily. Hold this medication if any diarrhea occurs. (Patient not taking: Reported on 7/11/2024) 84 tablet 0    sertraline (ZOLOFT) 100 MG tablet Take 100 mg by mouth daily (Patient  not taking: Reported on 7/11/2024)      sertraline (ZOLOFT) 50 MG tablet Take 1 tablet (50 mg) by mouth daily for 14 days, THEN 2 tablets (100 mg) daily for 21 days. Please call for a refill. (Patient not taking: Reported on 7/11/2024) 56 tablet 0    sulfamethoxazole-trimethoprim (BACTRIM DS) 800-160 MG tablet Take 1 tablet by mouth Every Mon, Wed, Fri Morning Begin with the start of radiation therapy. (Patient not taking: Reported on 7/11/2024) 18 tablet 0    [START ON 7/15/2024] temozolomide (TEMODAR) 140 MG capsule Take 1 capsule (140 mg) by mouth at bedtime with 1 other temozolomide prescription for 160 mg total Daily during radiation. Take a dose of ondansetron 30-60 min before temozolomide. Take on empty stomach. (Patient not taking: Reported on 7/11/2024) 42 capsule 0    [START ON 7/15/2024] temozolomide (TEMODAR) 20 MG capsule Take 1 capsule (20 mg) by mouth at bedtime with 1 other temozolomide prescription for 160 mg total Daily during radiation. Take a dose of ondansetron 30-60 min before temozolomide. Take on empty stomach. (Patient not taking: Reported on 7/11/2024) 42 capsule 0     No current facility-administered medications for this visit.       Family History:   Family History   Problem Relation Age of Onset    Breast Cancer Mother     Bladder Cancer Brother         primary was prostate, possibly spread to bladder    Prostate Cancer Brother     Breast Cancer Maternal Grandmother     Brain Cancer Maternal Aunt        Social History:   Social History     Socioeconomic History    Marital status:      Spouse name: Not on file    Number of children: 2    Years of education: Not on file    Highest education level: Not on file   Occupational History    Not on file   Tobacco Use    Smoking status: Light Smoker     Types: Cigarettes    Smokeless tobacco: Never    Tobacco comments:     Haven't smoked for 3 months   Vaping Use    Vaping status: Never Used   Substance and Sexual Activity    Alcohol use:  Not Currently    Drug use: Not Currently    Sexual activity: Not on file   Other Topics Concern    Not on file   Social History Narrative    Not on file     Social Determinants of Health     Financial Resource Strain: Not on file   Food Insecurity: No Food Insecurity (5/27/2024)    Received from [facility]     Hunger Vital Sign     Worried About Running Out of Food in the Last Year: Never true     Ran Out of Food in the Last Year: Never true   Transportation Needs: No Transportation Needs (5/27/2024)    Received from [facility]     PRAPARE - Transportation     Lack of Transportation (Medical): No     Lack of Transportation (Non-Medical): No   Physical Activity: Not on file   Stress: Not on file   Social Connections: Not on file   Interpersonal Safety: Not At Risk (5/27/2024)    Received from [facility]     Humiliation, Afraid, Rape, and Kick questionnaire     Fear of Current or Ex-Partner: No     Emotionally Abused: No     Physically Abused: No     Sexually Abused: No   Housing Stability: Low Risk  (5/27/2024)    Received from [facility]     Housing Stability Vital Sign     Unable to Pay for Housing in the Last Year: No     Number of Times Moved in the Last Year: 1     Homeless in the Last Year: No       Review of Systems: A complete 12 system review was negative except as noted in the HPI above.     Karnofsky Performance Status: 80    ECOG Performance Status: 1    Vital Signs:  /65 (Patient Position: Sitting, Cuff Size: Adult Regular)   Pulse 96   Resp 18   Wt 95.1 kg (209 lb 9.6 oz)   SpO2 96%   BMI 33.83 kg/m  .    Physical Exam:    General: patient well appearing  Extremities: No acute findings, slight swelling of left calf from DVT, no erythema, pain to palpation of left calf and posterior knee  Neuro:   MS: appropriately interactive, normal affect, speech fluent  CN: II,III -- right pupil reactive to light, left pupil less reactive, vision field cut on  left hand side of patient   III,IV,VI -- EOMI, no ptosis;   V -- sensation intact to LT/PP; masseter function intact  VII -- no facial weakness/droop;   IX,X -- voice normal   XI -- trapezii 5/5 strength bilaterally;   XII -- tongue protrudes midline, no atrophy or fasciculation.  Motor: Normal tone, no tremor.   0-No movement. 1-Muscle contrac. 2-Moves on bed. 3-Antigrav. 4-Mildly weak. 5-Full.               Delt     Bicep   Tricep   Shoulder Abduction  Ilopso  Hams  Quad   Ankle Flexion    R  5  5  5  4 - pain   5  5  5  5    L  5  5  5  5  5  5  5  5        Coordination: Rapid alternating movements intact.   Gait: Natural gait intact. Difficulty with tandem gait.        Pathology:   Glioblastoma, IDH-wildtype (CNS WHO Grade 4) & MGMT promoter methylation negative; see comment.     COMMENT   The resection specimen demonstrates a hypercellular and   infiltrating astrocytoma with brisk mitotic activity (6   mitoses in a single high-power field), microvascular   proliferation, and pseudopalisading necrosis, supporting a   morphologic glioblastoma. This glioblastoma is positive for   OLIG2, negative for IDH1-R132H, essentially excluding the   most common IDH1 mutation, shows retained ATRX expression,   and has focal overexpression of p53 (all   immunohistochemical studies performed on block A1 at [facility]). Although other, less common IDH1/ IDH2 mutations   are not entirely excluded, the likelihood that these may be   present in patients older than 54 years has been found to   be very low. Therefore, tissue is not being submitted for   IDH1/ IDH2 sequencing at this time.       Last CBC with Diff:  WBC Count   Date Value Ref Range Status   06/25/2024 7.4 4.0 - 11.0 10e3/uL Final     RBC Count   Date Value Ref Range Status   06/25/2024 4.47 3.80 - 5.20 10e6/uL Final     Hemoglobin   Date Value Ref Range Status   06/25/2024 13.8 11.7 - 15.7 g/dL Final     Hematocrit   Date Value Ref Range Status   06/25/2024 41.6  35.0 - 47.0 % Final     MCV   Date Value Ref Range Status   06/25/2024 93 78 - 100 fL Final     MCH   Date Value Ref Range Status   06/25/2024 30.9 26.5 - 33.0 pg Final     MCHC   Date Value Ref Range Status   06/25/2024 33.2 31.5 - 36.5 g/dL Final     RDW   Date Value Ref Range Status   06/25/2024 13.9 10.0 - 15.0 % Final     Platelet Count   Date Value Ref Range Status   06/25/2024 210 150 - 450 10e3/uL Final     % Neutrophils   Date Value Ref Range Status   06/25/2024 72 % Final     % Lymphocytes   Date Value Ref Range Status   06/25/2024 21 % Final     % Monocytes   Date Value Ref Range Status   06/25/2024 6 % Final     % Eosinophils   Date Value Ref Range Status   06/25/2024 0 % Final     % Basophils   Date Value Ref Range Status   06/25/2024 0 % Final        Last Comprehensive Metabolic Panel:  Sodium   Date Value Ref Range Status   06/25/2024 141 135 - 145 mmol/L Final     Comment:     Reference intervals for this test were updated on 09/26/2023 to more accurately reflect our healthy population. There may be differences in the flagging of prior results with similar values performed with this method. Interpretation of those prior results can be made in the context of the updated reference intervals.      Potassium   Date Value Ref Range Status   06/25/2024 4.6 3.4 - 5.3 mmol/L Final     Comment:     Specimen slightly hemolyzed. The reported potassium value may be falsely elevated. Analysis of a non-hemolyzed specimen (i.e. re-draw) may result in a lower potassium value.     Chloride   Date Value Ref Range Status   06/25/2024 105 98 - 107 mmol/L Final     Carbon Dioxide (CO2)   Date Value Ref Range Status   06/25/2024 24 22 - 29 mmol/L Final     Anion Gap   Date Value Ref Range Status   06/25/2024 12 7 - 15 mmol/L Final     Glucose   Date Value Ref Range Status   06/25/2024 119 (H) 70 - 99 mg/dL Final     Urea Nitrogen   Date Value Ref Range Status   06/25/2024 21.5 8.0 - 23.0 mg/dL Final     Creatinine   Date  Value Ref Range Status   06/25/2024 0.89 0.51 - 0.95 mg/dL Final     GFR Estimate   Date Value Ref Range Status   06/25/2024 73 >60 mL/min/1.73m2 Final     Comment:     eGFR calculated using 2021 CKD-EPI equation.     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     Plan:   We recommend treatment with radiation therapy. Pamella met with neuro-oncology to discuss systemic therapy, and we will defer further discussion of details of this aspect of their management to the Neuro-oncology team.     We had a detailed discussion with Pamella Jaimes regarding the role of radiation therapy for the treatment of glioblastoma.We recommend partial brain radiation to a total dose of approximately 6000 cGy in 30 fractions over 6 weeks. We discussed the risk, benefits, goals and alternatives of radiotherapy.     Logistics and timing of the radiation therapy were also discussed. Following CT simulation and planning the treatments will occur.  Pamella Jaimes would like to proceed with radiotherapy at [facility]. We have scheduled them for CT-based simulation today.     Referrals to neurophthalmology and genetic counseling were made. We will also connect with Dr. Irizarry's team about her leg pain since starting anticoagulation.     All details of this visit were reviewed by Dr. Sanchez.    ------------------------------------------------------------------------------    KATILN Franz  4th Year Medical Student  [employer]     A medical student participated in the care of this patient and in the preparation of this note. I have verified and edited this note. I personally performed key elements of the  physical exam and medical decision making for this patient.  I agree with the assessment and plan of care as documented in the note above.    The overall time I spent on direct patient care including self review of records, labs, and radiographic studies, as well as, direct face-to-face interaction with the patient and coordination of care with other providers was 90 minutes on the day of the encounter.     Erna Sanchez MD, PhD     Department of Radiation Oncology  [facility]

## 2024-07-11 ENCOUNTER — OFFICE VISIT (OUTPATIENT)
Dept: RADIATION ONCOLOGY | Facility: CLINIC | Age: 59
End: 2024-07-11
Attending: PSYCHIATRY & NEUROLOGY
Payer: COMMERCIAL

## 2024-07-11 ENCOUNTER — PRE VISIT (OUTPATIENT)
Dept: RADIATION ONCOLOGY | Facility: CLINIC | Age: 59
End: 2024-07-11
Payer: COMMERCIAL

## 2024-07-11 VITALS
WEIGHT: 209.6 LBS | SYSTOLIC BLOOD PRESSURE: 126 MMHG | RESPIRATION RATE: 18 BRPM | DIASTOLIC BLOOD PRESSURE: 65 MMHG | BODY MASS INDEX: 33.83 KG/M2 | HEART RATE: 96 BPM | OXYGEN SATURATION: 96 %

## 2024-07-11 DIAGNOSIS — C71.9 GLIOBLASTOMA (H): ICD-10-CM

## 2024-07-11 DIAGNOSIS — C71.9 GLIOBLASTOMA (H): Primary | ICD-10-CM

## 2024-07-11 PROCEDURE — 77470 SPECIAL RADIATION TREATMENT: CPT | Mod: 26 | Performed by: STUDENT IN AN ORGANIZED HEALTH CARE EDUCATION/TRAINING PROGRAM

## 2024-07-11 PROCEDURE — 77334 RADIATION TREATMENT AID(S): CPT | Mod: 26 | Performed by: STUDENT IN AN ORGANIZED HEALTH CARE EDUCATION/TRAINING PROGRAM

## 2024-07-11 PROCEDURE — 99417 PROLNG OP E/M EACH 15 MIN: CPT | Performed by: STUDENT IN AN ORGANIZED HEALTH CARE EDUCATION/TRAINING PROGRAM

## 2024-07-11 PROCEDURE — G0463 HOSPITAL OUTPT CLINIC VISIT: HCPCS | Mod: 25 | Performed by: STUDENT IN AN ORGANIZED HEALTH CARE EDUCATION/TRAINING PROGRAM

## 2024-07-11 PROCEDURE — 77334 RADIATION TREATMENT AID(S): CPT | Performed by: STUDENT IN AN ORGANIZED HEALTH CARE EDUCATION/TRAINING PROGRAM

## 2024-07-11 PROCEDURE — 77470 SPECIAL RADIATION TREATMENT: CPT | Performed by: STUDENT IN AN ORGANIZED HEALTH CARE EDUCATION/TRAINING PROGRAM

## 2024-07-11 PROCEDURE — 99205 OFFICE O/P NEW HI 60 MIN: CPT | Mod: 25 | Performed by: STUDENT IN AN ORGANIZED HEALTH CARE EDUCATION/TRAINING PROGRAM

## 2024-07-11 RX ORDER — ACETAMINOPHEN 500 MG
500-1000 TABLET ORAL
COMMUNITY

## 2024-07-11 RX ORDER — METHOCARBAMOL 500 MG/1
500 TABLET, FILM COATED ORAL
COMMUNITY
Start: 2024-05-30 | End: 2024-10-01

## 2024-07-11 RX ORDER — HYDROCODONE BITARTRATE AND ACETAMINOPHEN 5; 325 MG/1; MG/1
1-2 TABLET ORAL EVERY 6 HOURS PRN
COMMUNITY
Start: 2023-12-22 | End: 2024-08-22

## 2024-07-11 RX ORDER — AMOXICILLIN 250 MG
1 CAPSULE ORAL 2 TIMES DAILY PRN
COMMUNITY
Start: 2024-05-30 | End: 2024-10-01

## 2024-07-11 RX ORDER — DICLOFENAC SODIUM 75 MG/1
75 TABLET, DELAYED RELEASE ORAL
COMMUNITY
Start: 2024-06-22 | End: 2024-10-01

## 2024-07-11 RX ORDER — SERTRALINE HYDROCHLORIDE 100 MG/1
100 TABLET, FILM COATED ORAL DAILY
COMMUNITY
Start: 2024-06-26 | End: 2024-10-01

## 2024-07-11 RX ORDER — OXYCODONE HYDROCHLORIDE 5 MG/1
2.5-5 TABLET ORAL
COMMUNITY
Start: 2024-05-30 | End: 2024-08-22

## 2024-07-11 RX ORDER — DOCUSATE SODIUM 100 MG/1
100 CAPSULE, LIQUID FILLED ORAL
COMMUNITY
Start: 2023-12-22 | End: 2024-10-01

## 2024-07-11 ASSESSMENT — PAIN SCALES - GENERAL: PAINLEVEL: NO PAIN (0)

## 2024-07-11 NOTE — LETTER
7/11/2024      Pamella Jaimes  [address] [place] [address]      Dear Colleague,    Thank you for referring your patient, Pamella Jaimes, to the [facility] RADIATION ONCOLOGY. Please see a copy of my visit note below.    Radiation Therapy Patient Education    Person involved with teaching: Daughter    Patient educational needs for self management of treatment-related side effects assessment completed.  EPIC Patient Ed tab contains Patient Learning Assessment    Education Materials Given  Simulation hand out    Educational Topics Discussed  Side effects expected, Skin care, Activity, and When to call MD/RN    Response To Teaching  Verbalizes understanding    GYN Only  Vaginal Dilator-given and educated: N/A    Referrals sent: None    Chemotherapy?  Yes: [facility] with Juan C, team updated on start date      Again, thank you for allowing me to participate in the care of your patient.        Sincerely,        Erna Sanchez MD PhD   Reordered Zithromax.

## 2024-07-11 NOTE — PROGRESS NOTES
"Considerations for radiation treatment   Pregnancy status: Female with documented history of menopause    Implanted Cardiac Devices: No   Any previous radiation therapy: No    Oncology Rooming Note    July 11, 2024 2:05 PM   Pamella Jaimes is a 59 year old female who presents for:    Chief Complaint   Patient presents with    Oncology Clinic Visit     New Radiation Therapy Consult     Initial Vitals: /65 (Patient Position: Sitting, Cuff Size: Adult Regular)   Pulse 96   Resp 18   Wt 95.1 kg (209 lb 9.6 oz)   SpO2 96%   BMI 33.83 kg/m   Estimated body mass index is 33.83 kg/m  as calculated from the following:    Height as of 6/25/24: 1.676 m (5' 6\").    Weight as of this encounter: 95.1 kg (209 lb 9.6 oz). Body surface area is 2.1 meters squared.  No Pain (0) Comment: Data Unavailable   No LMP recorded.  Allergies reviewed: Yes  Medications reviewed: Yes    Medications: Medication refills not needed today.  Pharmacy name entered into [facility]:    [id number] - [place] - [address] [place] [address] [facility] [id number]  [place] - [address]  [facility] - [place], [place] - [address]    Frailty Screening:   Is the patient here for a new oncology consult visit in cancer care? 2. No      Clinical concerns: Problems with L leg. Some head pain, eyes hurt, L eye problems and R ear problems. Post surgery \"head throbs at times\". Experienced drowsiness with sertraline and didn't take last night, encouraged to reach out to ordering provider to let them know and see about any dose adjustment.  Dr Sanchez was notified.      Maribell Almeida RN            "
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
## 2024-07-11 NOTE — LETTER
2024      Pamella Jaimes  [address] [facility] [address]      Dear Colleague,    Thank you for referring your patient, Pamella Jaimes, to the [facility] RADIATION ONCOLOGY. Please see a copy of my visit note below.        [facility]   RADIATION ONCOLOGY INITIAL CONSULTATION NOTE    Patient Name: Pamella Jaimes  MRN: [id number]  : 1965  Neuro-oncologist: Dr. Maribell Irizarry  Neurosurgeon: Dr. 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     Patient-identified Health Care Team:  Teammate/Relationship - Daughter Leanne     History Of Present Illness:     In 2023 Pamella Jamies developed neck pain which progressed into right sided jaw pain and headaches. She also experienced episodic right eye blurry vision as well as intermittent numbness and weakness in her right arm which she attributed to a history of two torn rotator cuffs.     Pamella presented to the ED 3.15.23 due to a worsening headache on her right side which radiated down her lateral neck, accompanied by dizziness, blurry vision, and hand tremors. An MRI demonstrated a mild asymmetric T2 hyperintense signal along right hippocampus and adjacent parahippocampal gyrus, with post ictal edema or evolving hippocampal sclerosis.     On 23 Pamella attended a neurology visit due to a history of headaches, neck pain and head tremor. She was referred to PT and diagnosed with neck pain, tremor, and migraines.     Pamella presented to the ED 24 with dizziness, nausea, and concerns for slurred speech and tremor. A  head CT showed a heterogeneous hypoattenuating intra-axial mass in the right temporo-occipital lobe measuring approximately 4.7 x 4.0 x 4.1 cm with associated mass effect with effacement of adjacent cerebral sulci and effacement of the occipital and temporal horns of the right lateral ventricle. In addition to a 6 mm leftward midline shift at the septum pellucidum with possible mild uncal herniation. CTA angiogram demonstrated reduced perfusion associated with the right posterior temporo-occipital mass lesion, with no perfusion abnormalities elsewhere in the brain.     MRI's done 5.27.24 and 5.28.24 confirmed the right posterior temporo-occipital mass concerning for high-grade neoplasm such as glioblastoma, with mass effect resulting in regional sulcal effacement, 9 mm leftward midline shift, partial effacement the right lateral ventricle and mild right uncal herniation.      Surgical resection of the suspected glioblastoma was completed 5.28.24 via a navigated right parietal craniotomy. Post operatively she experienced a sensation of dimming of her left vision, as well as back pain, headaches, and pain behind her right eye.    Her first visit to an oncologist was 6.21.24 with Dr. Neville Del Rosario, who discussed with Pamella her prognosis and that further treatment would be with palliative intent, in addition to how the standard therapy would consist of adjuvant radiation with concurrent temozolomide followed by temozolomide with or without the use of alternating electric fields. They discussed a referral to [facility] for a second opinion.     A CT on 6.24.24 showed resolution of postoperative extra-axial pneumocephalus in the interim, mild residual right occipital malacia, no recurrent mass or mass effect, and a 4 mm right to left midline shift (previously 6 mm).    Pamella attended a second oncology consultation, this time at the [facility] on 6.25.24 with Dr. Maribell Irizarry. They planned to start her on  chemoradiotherapy with temozolomide 160 mg. Dr. 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 Pamella was placed on Zoloft for depressed mood.     Today Ms. Jaimes joins us in clinic to discuss the role of radiation therapy in her care, she was joined by her daughter Leanne. Today she is experiencing right ear pain, eye pain, changes to vision in the left eye, and tremors. She has not seen a neuroophthalmologist for her symptoms, however, she did see an optometrist in June. She was diagnosed with a left calf DVT on 6.25.24 and was started on Xarelto. She began to experience pain, which has continued through till today, one week after starting the Xarelto. 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     Current systemic therapy: Adjuvant temozolomide beginning 7.15.24  Anti-epileptic: Keppra  Steroids: N/A  Dominant hand: right    Brain ROS:  Headaches- No, does experience a throbbing sensation around her incision with a tender scalp  Seizures- Denies  Nausea/vomiting-  Denies  Changes in cognition/behavior-  Denies  Speech changes-  Denies  Vision/hearing changes- left eye dimmed vision, when further questioned believes its the lateral portion of her left vision field   Gait symptoms or imbalance-  occasional issues with balance  Other focal neuro deficits-  Denies    Spine ROS:    Numbness/tingling-  just left calf pain from the DVT    Prior malignancy or chemotherapy: No    Radiation Oncology Pre-Treatment Evaluation:   Prior RT: No  Pacemaker: No  History of lupus, scleroderma, connective tissue disorders and collagen vascular disease: No  Child-bearing potential (Yes/No): No, menopause 2021  Intent of treatment: (currative/palliative):  curative  Side Effects of Radiation: We discussed in detail the management of treatment side effects    Past Medical History:  Headaches, glioblastoma, DVT     Past Surgical History:   Past Surgical History:   Procedure Laterality Date     APPENDECTOMY      20 years ago     C/SECTION, LOW TRANSVERSE      x2     HEAD & NECK SURGERY  05/28/2024    head surgery at [facility] in [facility]       Allergies:   Allergies   Allergen Reactions     Morphine      Penicillins Hives       Medications:   Current Outpatient Medications   Medication Sig Dispense Refill     acetaminophen (TYLENOL) 500 MG tablet Take 500-1,000 mg by mouth       levETIRAcetam (KEPPRA) 500 MG tablet Take 1 tablet (500 mg) by mouth 2 times daily 60 tablet 1     Rivaroxaban ANTICOAGULANT 15 & 20 MG TBPK Starter Therapy Pack Take 15 mg by mouth 2 times daily (with meals) for 21 days, THEN 20 mg daily with food for 9 days. 51 each 0     dexAMETHasone (DECADRON) 2 MG tablet Take 1 tablet (2 mg) by mouth three times a day for 3 days, THEN 1 tablet (2 mg) twice a day for 30 days.       diclofenac (VOLTAREN) 75 MG EC tablet Take 75 mg by mouth (Patient not taking: Reported on 7/11/2024)       docusate sodium (COLACE) 100 MG capsule Take 100 mg by mouth (Patient not taking: Reported on 7/11/2024)       HYDROcodone-acetaminophen (NORCO) 5-325 MG tablet Take 1-2 tablets by mouth every 6 hours as needed for pain (Patient not taking: Reported on 7/11/2024)       methocarbamol (ROBAXIN) 500 MG tablet Take 500 mg by mouth (Patient not taking: Reported on 7/11/2024)       ondansetron (ZOFRAN) 4 MG tablet Take 1 tablet (4 mg) by mouth at bedtime Take 30-60 mins before each dose of temozolomide. Can take every 8 hours if needed. (Patient not taking: Reported on 7/11/2024) 30 tablet 3     oxyCODONE (ROXICODONE) 5 MG tablet Take 2.5-5 mg by mouth (Patient not taking: Reported on 7/11/2024)       senna-docusate (SENOKOT-S/PERICOLACE) 8.6-50 MG tablet Take 1 tablet by mouth 2 times daily as needed (Patient not taking: Reported on 7/11/2024)       SENNA-docusate sodium (SENNA S) 8.6-50 MG  tablet Take 1 tablet by mouth 2 times daily for 42 days . May increase to 2 tablets twice daily. Hold this medication if any diarrhea occurs. (Patient not taking: Reported on 7/11/2024) 84 tablet 0     sertraline (ZOLOFT) 100 MG tablet Take 100 mg by mouth daily (Patient not taking: Reported on 7/11/2024)       sertraline (ZOLOFT) 50 MG tablet Take 1 tablet (50 mg) by mouth daily for 14 days, THEN 2 tablets (100 mg) daily for 21 days. Please call for a refill. (Patient not taking: Reported on 7/11/2024) 56 tablet 0     sulfamethoxazole-trimethoprim (BACTRIM DS) 800-160 MG tablet Take 1 tablet by mouth Every Mon, Wed, Fri Morning Begin with the start of radiation therapy. (Patient not taking: Reported on 7/11/2024) 18 tablet 0     [START ON 7/15/2024] temozolomide (TEMODAR) 140 MG capsule Take 1 capsule (140 mg) by mouth at bedtime with 1 other temozolomide prescription for 160 mg total Daily during radiation. Take a dose of ondansetron 30-60 min before temozolomide. Take on empty stomach. (Patient not taking: Reported on 7/11/2024) 42 capsule 0     [START ON 7/15/2024] temozolomide (TEMODAR) 20 MG capsule Take 1 capsule (20 mg) by mouth at bedtime with 1 other temozolomide prescription for 160 mg total Daily during radiation. Take a dose of ondansetron 30-60 min before temozolomide. Take on empty stomach. (Patient not taking: Reported on 7/11/2024) 42 capsule 0     No current facility-administered medications for this visit.       Family History:   Family History   Problem Relation Age of Onset     Breast Cancer Mother      Bladder Cancer Brother         primary was prostate, possibly spread to bladder     Prostate Cancer Brother      Breast Cancer Maternal Grandmother      Brain Cancer Maternal Aunt        Social History:   Social History     Socioeconomic History     Marital status:      Spouse name: Not on file     Number of children: 2     Years of education: Not on file     Highest education level: Not  on file   Occupational History     Not on file   Tobacco Use     Smoking status: Light Smoker     Types: Cigarettes     Smokeless tobacco: Never     Tobacco comments:     Haven't smoked for 3 months   Vaping Use     Vaping status: Never Used   Substance and Sexual Activity     Alcohol use: Not Currently     Drug use: Not Currently     Sexual activity: Not on file   Other Topics Concern     Not on file   Social History Narrative     Not on file     Social Determinants of Health     Financial Resource Strain: Not on file   Food Insecurity: No Food Insecurity (5/27/2024)    Received from [facility]     Hunger Vital Sign      Worried About Running Out of Food in the Last Year: Never true      Ran Out of Food in the Last Year: Never true   Transportation Needs: No Transportation Needs (5/27/2024)    Received from [facility]     PRAPARE - Transportation      Lack of Transportation (Medical): No      Lack of Transportation (Non-Medical): No   Physical Activity: Not on file   Stress: Not on file   Social Connections: Not on file   Interpersonal Safety: Not At Risk (5/27/2024)    Received from [facility]     Humiliation, Afraid, Rape, and Kick questionnaire      Fear of Current or Ex-Partner: No      Emotionally Abused: No      Physically Abused: No      Sexually Abused: No   Housing Stability: Low Risk  (5/27/2024)    Received from [facility]     Housing Stability Vital Sign      Unable to Pay for Housing in the Last Year: No      Number of Times Moved in the Last Year: 1      Homeless in the Last Year: No       Review of Systems: A complete 12 system review was negative except as noted in the HPI above.     Karnofsky Performance Status: 80    ECOG Performance Status: 1    Vital Signs:  /65 (Patient Position: Sitting, Cuff Size: Adult Regular)   Pulse 96   Resp 18   Wt 95.1 kg (209 lb 9.6 oz)   SpO2 96%   BMI 33.83 kg/m  .    Physical Exam:    General: patient well  appearing  Extremities: No acute findings, slight swelling of left calf from DVT, no erythema, pain to palpation of left calf and posterior knee  Neuro:   MS: appropriately interactive, normal affect, speech fluent  CN: II,III -- right pupil reactive to light, left pupil less reactive, vision field cut on left hand side of patient   III,IV,VI -- EOMI, no ptosis;   V -- sensation intact to LT/PP; masseter function intact  VII -- no facial weakness/droop;   IX,X -- voice normal   XI -- trapezii 5/5 strength bilaterally;   XII -- tongue protrudes midline, no atrophy or fasciculation.  Motor: Normal tone, no tremor.   0-No movement. 1-Muscle contrac. 2-Moves on bed. 3-Antigrav. 4-Mildly weak. 5-Full.               Delt     Bicep   Tricep   Shoulder Abduction  Ilopso  Hams  Quad   Ankle Flexion    R  5  5  5  4 - pain   5  5  5  5    L  5  5  5  5  5  5  5  5        Coordination: Rapid alternating movements intact.   Gait: Natural gait intact. Difficulty with tandem gait.        Pathology:   Glioblastoma, IDH-wildtype (CNS WHO Grade 4) & MGMT promoter methylation negative; see comment.     COMMENT   The resection specimen demonstrates a hypercellular and   infiltrating astrocytoma with brisk mitotic activity (6   mitoses in a single high-power field), microvascular   proliferation, and pseudopalisading necrosis, supporting a   morphologic glioblastoma. This glioblastoma is positive for   OLIG2, negative for IDH1-R132H, essentially excluding the   most common IDH1 mutation, shows retained ATRX expression,   and has focal overexpression of p53 (all   immunohistochemical studies performed on block A1 at [facility]). Although other, less common IDH1/ IDH2 mutations   are not entirely excluded, the likelihood that these may be   present in patients older than 54 years has been found to   be very low. Therefore, tissue is not being submitted for   IDH1/ IDH2 sequencing at this time.       Last CBC with Diff:  WBC Count    Date Value Ref Range Status   06/25/2024 7.4 4.0 - 11.0 10e3/uL Final     RBC Count   Date Value Ref Range Status   06/25/2024 4.47 3.80 - 5.20 10e6/uL Final     Hemoglobin   Date Value Ref Range Status   06/25/2024 13.8 11.7 - 15.7 g/dL Final     Hematocrit   Date Value Ref Range Status   06/25/2024 41.6 35.0 - 47.0 % Final     MCV   Date Value Ref Range Status   06/25/2024 93 78 - 100 fL Final     MCH   Date Value Ref Range Status   06/25/2024 30.9 26.5 - 33.0 pg Final     MCHC   Date Value Ref Range Status   06/25/2024 33.2 31.5 - 36.5 g/dL Final     RDW   Date Value Ref Range Status   06/25/2024 13.9 10.0 - 15.0 % Final     Platelet Count   Date Value Ref Range Status   06/25/2024 210 150 - 450 10e3/uL Final     % Neutrophils   Date Value Ref Range Status   06/25/2024 72 % Final     % Lymphocytes   Date Value Ref Range Status   06/25/2024 21 % Final     % Monocytes   Date Value Ref Range Status   06/25/2024 6 % Final     % Eosinophils   Date Value Ref Range Status   06/25/2024 0 % Final     % Basophils   Date Value Ref Range Status   06/25/2024 0 % Final        Last Comprehensive Metabolic Panel:  Sodium   Date Value Ref Range Status   06/25/2024 141 135 - 145 mmol/L Final     Comment:     Reference intervals for this test were updated on 09/26/2023 to more accurately reflect our healthy population. There may be differences in the flagging of prior results with similar values performed with this method. Interpretation of those prior results can be made in the context of the updated reference intervals.      Potassium   Date Value Ref Range Status   06/25/2024 4.6 3.4 - 5.3 mmol/L Final     Comment:     Specimen slightly hemolyzed. The reported potassium value may be falsely elevated. Analysis of a non-hemolyzed specimen (i.e. re-draw) may result in a lower potassium value.     Chloride   Date Value Ref Range Status   06/25/2024 105 98 - 107 mmol/L Final     Carbon Dioxide (CO2)   Date Value Ref Range Status    06/25/2024 24 22 - 29 mmol/L Final     Anion Gap   Date Value Ref Range Status   06/25/2024 12 7 - 15 mmol/L Final     Glucose   Date Value Ref Range Status   06/25/2024 119 (H) 70 - 99 mg/dL Final     Urea Nitrogen   Date Value Ref Range Status   06/25/2024 21.5 8.0 - 23.0 mg/dL Final     Creatinine   Date Value Ref Range Status   06/25/2024 0.89 0.51 - 0.95 mg/dL Final     GFR Estimate   Date Value Ref Range Status   06/25/2024 73 >60 mL/min/1.73m2 Final     Comment:     eGFR calculated using 2021 CKD-EPI equation.     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     Plan:   We recommend treatment with radiation therapy. Pamella met with neuro-oncology to discuss systemic therapy, and we will defer further discussion of details of this aspect of their management to the Neuro-oncology team.     We had a detailed discussion with Pamella Jaimes regarding the role of radiation therapy for the treatment of glioblastoma.We recommend partial brain radiation to a total dose of approximately 6000 cGy in 30 fractions over 6 weeks. We discussed the risk, benefits, goals and alternatives of radiotherapy.     Logistics and timing of the radiation therapy were also discussed. Following CT simulation and planning the treatments will occur.  Pamella Jaimes would like to proceed with radiotherapy at [facility]. We have scheduled them for CT-based simulation today.     Referrals to neurophthalmology and genetic counseling were made. We will also connect with Dr. Irizarry's team about her leg pain since starting anticoagulation.     All details of this visit were reviewed by   "Daniel.    ------------------------------------------------------------------------------    Hugo Velazquez [facility]  4th Year Medical Student  [employer]     A medical student participated in the care of this patient and in the preparation of this note. I have verified and edited this note. I personally performed key elements of the physical exam and medical decision making for this patient.  I agree with the assessment and plan of care as documented in the note above.    The overall time I spent on direct patient care including self review of records, labs, and radiographic studies, as well as, direct face-to-face interaction with the patient and coordination of care with other providers was 90 minutes on the day of the encounter.     Erna Sanchez MD, PhD     Department of Radiation Oncology  [facility]            Considerations for radiation treatment   Pregnancy status: Female with documented history of menopause    Implanted Cardiac Devices: No   Any previous radiation therapy: No    Oncology Rooming Note    July 11, 2024 2:05 PM   Pamella Jaimes is a 59 year old female who presents for:    Chief Complaint   Patient presents with     Oncology Clinic Visit     New Radiation Therapy Consult     Initial Vitals: /65 (Patient Position: Sitting, Cuff Size: Adult Regular)   Pulse 96   Resp 18   Wt 95.1 kg (209 lb 9.6 oz)   SpO2 96%   BMI 33.83 kg/m   Estimated body mass index is 33.83 kg/m  as calculated from the following:    Height as of 6/25/24: 1.676 m (5' 6\").    Weight as of this encounter: 95.1 kg (209 lb 9.6 oz). Body surface area is 2.1 meters squared.  No Pain (0) Comment: Data Unavailable   No LMP recorded.  Allergies reviewed: Yes  Medications reviewed: Yes    Medications: Medication refills not needed today.  Pharmacy name entered into [employer] (Management & Data Services):    [id number] - [place], [place] - [address] [place] [address] " "STORE #[id number] - [place], [place] - [address]  [facility] - [place], [place] - [address]    Frailty Screening:   Is the patient here for a new oncology consult visit in cancer care? 2. No      Clinical concerns: Problems with L leg. Some head pain, eyes hurt, L eye problems and R ear problems. Post surgery \"head throbs at times\". Experienced drowsiness with sertraline and didn't take last night, encouraged to reach out to ordering provider to let them know and see about any dose adjustment.  Dr Sanchez was notified.      Maribell Almeida RN              Again, thank you for allowing me to participate in the care of your patient.        Sincerely,        Erna Sanchez MD PhD  "

## 2024-07-11 NOTE — PROGRESS NOTES
Radiation Therapy Patient Education    Person involved with teaching: Daughter    Patient educational needs for self management of treatment-related side effects assessment completed.  [facility] Patient Ed tab contains Patient Learning Assessment    Education Materials Given  Simulation hand out    Educational Topics Discussed  Side effects expected, Skin care, Activity, and When to call MD/RN    Response To Teaching  Verbalizes understanding    GYN Only  Vaginal Dilator-given and educated: N/A    Referrals sent: None    Chemotherapy?  Yes: TMZ with Juan C, team updated on start date

## 2024-07-12 ENCOUNTER — HOSPITAL ENCOUNTER (OUTPATIENT)
Dept: MRI IMAGING | Facility: CLINIC | Age: 59
Discharge: HOME | End: 2024-07-12
Attending: STUDENT IN AN ORGANIZED HEALTH CARE EDUCATION/TRAINING PROGRAM | Admitting: STUDENT IN AN ORGANIZED HEALTH CARE EDUCATION/TRAINING PROGRAM
Payer: COMMERCIAL

## 2024-07-12 DIAGNOSIS — C71.9 GLIOBLASTOMA (H): ICD-10-CM

## 2024-07-12 PROCEDURE — 70553 MRI BRAIN STEM W/O & W/DYE: CPT | Mod: 26 | Performed by: RADIOLOGY

## 2024-07-12 PROCEDURE — 999N000128 HC STATISTIC PERIPHERAL IV START W/O US GUIDANCE

## 2024-07-12 PROCEDURE — 70553 MRI BRAIN STEM W/O & W/DYE: CPT

## 2024-07-12 PROCEDURE — A9585 GADOBUTROL INJECTION: HCPCS | Performed by: STUDENT IN AN ORGANIZED HEALTH CARE EDUCATION/TRAINING PROGRAM

## 2024-07-12 PROCEDURE — 255N000002 HC RX 255 OP 636: Performed by: STUDENT IN AN ORGANIZED HEALTH CARE EDUCATION/TRAINING PROGRAM

## 2024-07-12 RX ORDER — GADOBUTROL 604.72 MG/ML
10 INJECTION INTRAVENOUS ONCE
Status: COMPLETED | OUTPATIENT
Start: 2024-07-12 | End: 2024-07-12

## 2024-07-12 RX ORDER — LEVETIRACETAM 500 MG/1
500 TABLET ORAL 2 TIMES DAILY
Qty: 180 TABLET | OUTPATIENT
Start: 2024-07-12

## 2024-07-12 RX ADMIN — GADOBUTROL 10 ML: 604.72 INJECTION INTRAVENOUS at 12:28

## 2024-07-15 DIAGNOSIS — C71.9 GLIOBLASTOMA (H): Primary | ICD-10-CM

## 2024-07-18 DIAGNOSIS — C71.9 GLIOBLASTOMA (H): Primary | ICD-10-CM

## 2024-07-18 RX ORDER — TEMOZOLOMIDE 20 MG/1
20 CAPSULE ORAL AT BEDTIME
Qty: 13 CAPSULE | Refills: 0 | Status: SHIPPED | OUTPATIENT
Start: 2024-07-29 | End: 2024-10-01

## 2024-07-18 RX ORDER — TEMOZOLOMIDE 140 MG/1
140 CAPSULE ORAL AT BEDTIME
Qty: 13 CAPSULE | Refills: 0 | Status: SHIPPED | OUTPATIENT
Start: 2024-07-29 | End: 2024-10-01

## 2024-07-18 NOTE — ORAL ONC MGMT
Oral Chemotherapy Monitoring Program     Placed call to patient's daughter Leanne in follow up of oral chemotherapy (Temozolomide). We recapped the TMZ + RT regimen and discussed dosing of supportive medications (Senna/Docusate, Bactrim, and Ondansetron) as well. All questions answered. TMZ starting 7/22 and RT starting 7/23. Last RT on 9/3, last TMZ on 9/2 evening. Weekly labs CBC and CMP every 4 weeks. Also released remainder refill of TMZ to [facility]. Phone numbers given to Leanne for us in clinic and to [facility] pharmacy.     Follow Up:  7/29: 1 week assessment and review for weekly labs    Juan C Flores PharmD  [facility] Oral Chemotherapy  [phone]  July 18, 2024

## 2024-07-19 ENCOUNTER — TELEPHONE (OUTPATIENT)
Dept: ONCOLOGY | Facility: CLINIC | Age: 59
End: 2024-07-19
Payer: COMMERCIAL

## 2024-07-19 DIAGNOSIS — I82.5Y2 CHRONIC DEEP VEIN THROMBOSIS (DVT) OF PROXIMAL VEIN OF LEFT LOWER EXTREMITY (H): Primary | ICD-10-CM

## 2024-07-19 NOTE — TELEPHONE ENCOUNTER
Oncology Nurse Triage    Situation:   Pamella reporting the following symptoms: increasing pain in left leg     Background:   Treating Provider:   Dr Irizarry     Date of last office visit: 6/25/2024    Recent Treatments:TMZ starting 7/22 and RT starting 7/23     Assessment:     Pt is calling with concerns regarding increasing pain in her left leg. She was diagnosed with DVT in her left leg on 6/28/2024 and has been taking xarelto as directed. She just completed BID dosing yesterday and has transitioned to xarelto 20 mg daily dosing today. Pt states that she will need refill sent to [facility] for the xarelto 20 mg daily as she only has 8 tablets left.     States that she was at the [facility] yesterday walking around and when she returned home she noticed increasing pain in her left leg on her shin, ankle, and all around her knee. Describes this as a throbbing and aching pain. Rates pain at 5/10. Denies any redness, warmth, or swelling on the left leg.    When pt got home from the [facility] last night, she elevated her leg and took tylenol 1000 mg. She also used heating pad.   Pt denies any pain this morning.  Also denies any chest pain, shortness of breath, wheezing, fever, weakness, or other urgent symptoms.  Pt is concerned that the pain was worse yesterday and would like Dr Irizarry to review and advise.    Pt also reports that she was never contacted to schedule appt with neuro-ophthalmology as was discussed at her last appt. Writer provided pt with phone number to call to schedule this appt.    Recommendations:     Will route to Dr Irizarry for recommendations and refill on xarelto.    Reviewed emergent symptoms that would warrant evaluation in  ER with pt.    Patient verbalized understanding and agreement with plan.  Patient was instructed to call the clinic with any questions, concerns, or worsening symptoms.  Ok to leave message if unable to reach pt.   Kim Kraft RN on 7/19/2024 at 10:27 AM

## 2024-07-19 NOTE — TELEPHONE ENCOUNTER
"Juan C, Maribell Sidhu MD  You; Ratna Hernandez APRN CNP; Erna Sanchez MD PhD; Irene Clayton RN1 hour ago (1:38 PM)       Uriel Alvarez,    Her pain sounds consistent with medial tibial stress syndrome or \"shin splints\".  Please discuss with her the recommendations in the up to date article;  https://www.K[employer].NileGuide/contents/shin-splints-the-basics?search=shin%20splints&source=search_result&selectedTitle=2%7E16&usage_type=default&display_rank=2    Please have her report back if supportive measures are not helpful.    Refill sent.    Thank you,  Maribell Irizarry MD     Writer attempted to reach pt, but she did not answer.  Per pt request, left detailed message for pt with recommendations per Dr Irizarry.   Advised pt to contact the clinic if symptoms do not improve or if she develops any urgent symptoms that would warrant further evaluation.    Kim Kraft RN on 7/19/2024 at 3:12 PM   "

## 2024-07-22 ENCOUNTER — NURSE TRIAGE (OUTPATIENT)
Dept: FAMILY MEDICINE | Facility: CLINIC | Age: 59
End: 2024-07-22

## 2024-07-22 ENCOUNTER — TELEPHONE (OUTPATIENT)
Dept: ONCOLOGY | Facility: CLINIC | Age: 59
End: 2024-07-22
Payer: COMMERCIAL

## 2024-07-22 NOTE — TELEPHONE ENCOUNTER
Nurse Triage SBAR    Is this a 2nd Level Triage? NO    Situation: Patient reports she has had L leg pain the past few days. Recent blood clot in June and being treated with Xarelto, has not missed any doses. She denies and redness or warmth but reports her calf, knee and ankle hurt. She denies SOB or chest pain.     Background: On Xarelto, Hx of blood clots to L leg    Assessment: She is asking if she should wear compression stockings to help with pain? She states the pain worsens at night when she tried to elevate it or get comfortable. No increased edema. She is active during the day and says walking up stairs is hard.    Protocol Recommended Disposition:   See in Office Within 3 Days    Recommendation: Sending to PCP for review.     Routed to provider    Does the patient meet one of the following criteria for ADS visit consideration? No    VESTA Pierre, RN        Reason for Disposition   MODERATE pain (e.g., interferes with normal activities, limping) and present > 3 days    Additional Information   Negative: Looks like a broken bone or dislocated joint (e.g., crooked or deformed)   Negative: Sounds like a life-threatening emergency to the triager   Negative: Followed a hip injury   Negative: Followed a knee injury   Negative: Followed an ankle or foot injury   Negative: Back pain radiating (shooting) into leg(s)   Negative: Foot pain is main symptom   Negative: Ankle pain is main symptom   Negative: Knee pain is main symptom   Negative: Leg swelling is main symptom   Negative: Chest pain   Negative: Difficulty breathing   Negative: Entire foot is cool or blue in comparison to other side   Negative: Unable to walk   Negative: Fever and red area (or area very tender to touch)   Negative: Swollen joint and fever   Negative: Thigh or calf pain in only one leg and present > 1 hour   Negative: Thigh, calf, or ankle swelling in only one leg   Negative: Thigh, calf, or ankle swelling in both legs, but one side is  definitely more swollen   Negative: History of prior 'blood clot' in leg or lungs (i.e., deep vein thrombosis, pulmonary embolism)   Negative: History of inherited increased risk of blood clots (e.g., factor 5 Leiden, antithrombin 3, protein C or protein S deficiency, prothrombin mutation)   Negative: Major surgery in past month   Negative: Hip or leg fracture (broken bone) in past month (or had cast on leg or ankle in past month)   Negative: Illness requiring prolonged bedrest in past month (e.g., immobilization, long hospital stay)   Negative: Long-distance travel in past month (e.g., car, bus, train, plane; with trip lasting 6 or more hours)   Negative: Cancer treatment in past six months (or has cancer now)   Negative: Patient sounds very sick or weak to the triager   Negative: SEVERE pain (e.g., excruciating, unable to do any normal activities)   Negative: Cast on leg or ankle and now has increasing pain   Negative: Red area or streak > 2 inches (or 5 cm)   Negative: Painful rash with multiple small blisters grouped together (i.e., dermatomal distribution or 'band' or 'stripe')   Negative: Looks like a boil, infected sore, deep ulcer, or other infected rash (spreading redness, pus)   Negative: Localized rash is very painful (no fever)   Negative: Numbness in a leg or foot (i.e., loss of sensation)   Negative: Localized pain, redness or hard lump along vein   Negative: Patient wants to be seen    Protocols used: Leg Pain-A-OH

## 2024-07-22 NOTE — TELEPHONE ENCOUNTER
Oncology Nurse Triage    Situation:   Pamella reporting the following symptoms: pain, left lower leg    Background:   Treating Provider:   Dr. Irizarry    Date of last office visit: 6/25/24    Recent Treatments: dx with DVT on 6/28/24. On Xarelto    Assessment:     Onset: For the past few days, patient has noticed increased discomfort to her left lower leg. Feels and achy, throbbing pain on her knee, back of the leg and ankle area. Pain is currently rated at 8/10. It improves with ice, tylenol (1000 mg, usually takes once at bedtime), and rest. Sx are better in the morning and worst at night.     Denies any CP, SOB, swelling, redness, or fever. Area is not hot to touch. She also has occasional tingling to her left foot. No trouble with walking. She has not missed any of her Xarelto doses. No other sx or concerns at this time.    Patient would like to know if this is to be expected or if she needs further eval?    Recommendations:     Discussed alarm sx that would require immediate care, understanding verbalized. She will proceed to the ED if she develops any CP, SOB, severe swelling, or severe pain. She will closely monitor sx and call back with any changes.     Routing to care team for further review and recommendations.     Alina Zazueta RN, BSN, PHN, OCN  [facility]

## 2024-07-23 ENCOUNTER — HOSPITAL ENCOUNTER (OUTPATIENT)
Dept: RESEARCH | Facility: CLINIC | Age: 59
Discharge: HOME | End: 2024-07-23
Attending: STUDENT IN AN ORGANIZED HEALTH CARE EDUCATION/TRAINING PROGRAM | Admitting: STUDENT IN AN ORGANIZED HEALTH CARE EDUCATION/TRAINING PROGRAM
Payer: COMMERCIAL

## 2024-07-23 ENCOUNTER — APPOINTMENT (OUTPATIENT)
Dept: RADIATION ONCOLOGY | Facility: CLINIC | Age: 59
End: 2024-07-23
Attending: PSYCHIATRY & NEUROLOGY
Payer: COMMERCIAL

## 2024-07-23 DIAGNOSIS — R11.2 CHEMOTHERAPY-INDUCED NAUSEA AND VOMITING: ICD-10-CM

## 2024-07-23 DIAGNOSIS — T45.1X5A CHEMOTHERAPY-INDUCED NAUSEA AND VOMITING: ICD-10-CM

## 2024-07-23 DIAGNOSIS — T45.1X5A ANTINEOPLASTIC CHEMOTHERAPY INDUCED PANCYTOPENIA (H): ICD-10-CM

## 2024-07-23 DIAGNOSIS — D61.810 ANTINEOPLASTIC CHEMOTHERAPY INDUCED PANCYTOPENIA (H): ICD-10-CM

## 2024-07-23 LAB
ALBUMIN SERPL BCG-MCNC: 4.3 G/DL (ref 3.5–5.2)
ALP SERPL-CCNC: 84 U/L (ref 40–150)
ALT SERPL W P-5'-P-CCNC: 24 U/L (ref 0–50)
ANION GAP SERPL CALCULATED.3IONS-SCNC: 10 MMOL/L (ref 7–15)
AST SERPL W P-5'-P-CCNC: 25 U/L (ref 0–45)
BASOPHILS # BLD AUTO: 0 10E3/UL (ref 0–0.2)
BASOPHILS NFR BLD AUTO: 1 %
BILIRUB SERPL-MCNC: 0.3 MG/DL
BUN SERPL-MCNC: 11.1 MG/DL (ref 8–23)
CALCIUM SERPL-MCNC: 9.2 MG/DL (ref 8.8–10.4)
CHLORIDE SERPL-SCNC: 108 MMOL/L (ref 98–107)
CREAT SERPL-MCNC: 0.75 MG/DL (ref 0.51–0.95)
EGFRCR SERPLBLD CKD-EPI 2021: 90 ML/MIN/1.73M2
EOSINOPHIL # BLD AUTO: 0.1 10E3/UL (ref 0–0.7)
EOSINOPHIL NFR BLD AUTO: 2 %
ERYTHROCYTE [DISTWIDTH] IN BLOOD BY AUTOMATED COUNT: 13.2 % (ref 10–15)
GLUCOSE SERPL-MCNC: 108 MG/DL (ref 70–99)
HCO3 SERPL-SCNC: 23 MMOL/L (ref 22–29)
HCT VFR BLD AUTO: 40.4 % (ref 35–47)
HGB BLD-MCNC: 13.5 G/DL (ref 11.7–15.7)
IMM GRANULOCYTES # BLD: 0 10E3/UL
IMM GRANULOCYTES NFR BLD: 0 %
LYMPHOCYTES # BLD AUTO: 2.3 10E3/UL (ref 0.8–5.3)
LYMPHOCYTES NFR BLD AUTO: 46 %
MCH RBC QN AUTO: 30.9 PG (ref 26.5–33)
MCHC RBC AUTO-ENTMCNC: 33.4 G/DL (ref 31.5–36.5)
MCV RBC AUTO: 92 FL (ref 78–100)
MONOCYTES # BLD AUTO: 0.5 10E3/UL (ref 0–1.3)
MONOCYTES NFR BLD AUTO: 9 %
NEUTROPHILS # BLD AUTO: 2.1 10E3/UL (ref 1.6–8.3)
NEUTROPHILS NFR BLD AUTO: 42 %
NRBC # BLD AUTO: 0 10E3/UL
NRBC BLD AUTO-RTO: 0 /100
PLATELET # BLD AUTO: 228 10E3/UL (ref 150–450)
POTASSIUM SERPL-SCNC: 4.1 MMOL/L (ref 3.4–5.3)
PROT SERPL-MCNC: 6.7 G/DL (ref 6.4–8.3)
RBC # BLD AUTO: 4.37 10E6/UL (ref 3.8–5.2)
SODIUM SERPL-SCNC: 141 MMOL/L (ref 135–145)
WBC # BLD AUTO: 5 10E3/UL (ref 4–11)

## 2024-07-23 PROCEDURE — 77386 HC IMRT TREATMENT DELIVERY, COMPLEX: CPT | Performed by: RADIOLOGY

## 2024-07-23 PROCEDURE — 80053 COMPREHEN METABOLIC PANEL: CPT

## 2024-07-23 PROCEDURE — 85025 COMPLETE CBC W/AUTO DIFF WBC: CPT

## 2024-07-23 PROCEDURE — 510N000009 HC RESEARCH FACILITY, PER 15 MIN

## 2024-07-23 PROCEDURE — 36415 COLL VENOUS BLD VENIPUNCTURE: CPT

## 2024-07-23 PROCEDURE — 510N000017 HC CRU PATIENT CARE, PER 15 MIN

## 2024-07-23 NOTE — ADDENDUM NOTE
Encounter addended by: Fabi Amanda RN on: 7/23/2024 10:39 AM   Actions taken: Charge Capture section accepted

## 2024-07-24 ENCOUNTER — DOCUMENTATION ONLY (OUTPATIENT)
Dept: ANTICOAGULATION | Facility: CLINIC | Age: 59
End: 2024-07-24
Payer: COMMERCIAL

## 2024-07-24 ENCOUNTER — TELEPHONE (OUTPATIENT)
Dept: ONCOLOGY | Facility: CLINIC | Age: 59
End: 2024-07-24
Payer: COMMERCIAL

## 2024-07-24 ENCOUNTER — APPOINTMENT (OUTPATIENT)
Dept: RADIATION ONCOLOGY | Facility: CLINIC | Age: 59
End: 2024-07-24
Attending: STUDENT IN AN ORGANIZED HEALTH CARE EDUCATION/TRAINING PROGRAM
Payer: COMMERCIAL

## 2024-07-24 PROCEDURE — 77014 PR CT GUIDE FOR PLACEMENT RADIATION THERAPY FIELDS: CPT | Mod: 26 | Performed by: STUDENT IN AN ORGANIZED HEALTH CARE EDUCATION/TRAINING PROGRAM

## 2024-07-24 PROCEDURE — 77386 HC IMRT TREATMENT DELIVERY, COMPLEX: CPT | Performed by: STUDENT IN AN ORGANIZED HEALTH CARE EDUCATION/TRAINING PROGRAM

## 2024-07-24 NOTE — TELEPHONE ENCOUNTER
Called patient and discussed message below, understanding verbalized. She will monitor sx and call back if she develops any new or worsening sx.     Alina Zazueta RN, BSN, PHN, OCN  [facility]    She has a virtual visit available tomorrow.  Melatonin is fine, I would recommend starting at 3mg by mouth taken 2 HOURS prior to bedtime, then increase by 3mg weekly as needed for insomnia/ sleep disturbance to a max dose of 12mg.    Thanks,  Maribell Irizarry MD  Neuro-oncology  7/24/2024

## 2024-07-24 NOTE — TELEPHONE ENCOUNTER
"Oncology Nurse Triage    Situation:   Pamella reporting the following symptoms: stomach uneasiness and chills.    Background:   Treating Provider:   Dr. Irizarry    Date of last office visit: 6/25/24    Recent Treatments: Keppra, Temozolomide, radiation therapy.    Assessment:     Onset: For the last two days she has been feeling some \"uneasiness\" in her stomach and chills. Sx are mild and intermittent. They last for a few seconds then resolve. Patient states sx feel similar to precursor sx prior to seizures but much milder.  Denies any fever, n/v/c/d. No pain. Per patient, has not had seizure activity. Has been taking Keppra 500 mg BID. No missed doses. No missed doses on Temozolomide. She has been having some trouble sleeping.    She also reports she developed all over itching a few nights ago. This self resolved. No breathing difficulties or swelling. She wonders if this may be related to the Bactrim? Has not had another episode since.      Wondering if ok to take Melatonin 3 mg at bedtime if she is having a hard time sleeping? She would also like to take a multivitamin.      Recommendations:     Discussed alarm sx with patient that would require immediate evaluation. Patient verbalized understanding. She will monitor sx closely and call back if she develops any new or worsening sx.     Routing to care team for further review and recommendations.     Alina Zazueta, RN, BSN, PHN, OCN  [facility]          "

## 2024-07-24 NOTE — PROGRESS NOTES
Anticoagulant Therapeutic Duplication    Duplicate orders identified: same medication but different dose, form, frequency or route    Duplicate orders appropriate for Rxs on file for rivaroxaban start therapy pack and rivaroxaban maintenance dose to follow     Active anticoagulant: rivaroxaban (Xarelto)    Plan made per [facility] anticoagulation protocol.    Errol Goodwin RN  7/24/2024

## 2024-07-25 ENCOUNTER — OFFICE VISIT (OUTPATIENT)
Dept: RADIATION ONCOLOGY | Facility: CLINIC | Age: 59
End: 2024-07-25
Attending: PSYCHIATRY & NEUROLOGY
Payer: COMMERCIAL

## 2024-07-25 ENCOUNTER — PATIENT OUTREACH (OUTPATIENT)
Dept: ONCOLOGY | Facility: CLINIC | Age: 59
End: 2024-07-25

## 2024-07-25 ENCOUNTER — APPOINTMENT (OUTPATIENT)
Dept: RADIATION ONCOLOGY | Facility: CLINIC | Age: 59
End: 2024-07-25
Attending: STUDENT IN AN ORGANIZED HEALTH CARE EDUCATION/TRAINING PROGRAM
Payer: COMMERCIAL

## 2024-07-25 VITALS
WEIGHT: 210.6 LBS | DIASTOLIC BLOOD PRESSURE: 77 MMHG | TEMPERATURE: 98.8 F | BODY MASS INDEX: 33.99 KG/M2 | HEART RATE: 79 BPM | SYSTOLIC BLOOD PRESSURE: 119 MMHG | OXYGEN SATURATION: 99 %

## 2024-07-25 DIAGNOSIS — C71.9 GLIOBLASTOMA (H): Primary | ICD-10-CM

## 2024-07-25 PROCEDURE — 77386 HC IMRT TREATMENT DELIVERY, COMPLEX: CPT | Performed by: RADIOLOGY

## 2024-07-25 RX ORDER — LANOLIN ALCOHOL/MO/W.PET/CERES
3 CREAM (GRAM) TOPICAL
COMMUNITY
End: 2024-10-03

## 2024-07-25 ASSESSMENT — PAIN SCALES - GENERAL: PAINLEVEL: NO PAIN (0)

## 2024-07-25 NOTE — PROGRESS NOTES
"During OTV visit pt had complaints of brief \"goosebumps, chills, stomach\" that affects the R side mainly, but also the left side at times. She reports that it had initially gone away with the keppra, but has \"come back once radiation started\". Pt is on the Temodar and Bactrim DS. She reports taking a zofran this am before coming in and she still had an episode in clinic with my Nurse visit portion. Per the notes, it sounded like maybe the NP from Dr Lopes team was going to see her virtually today.    Dr Alvarez notified who will see pt in office today. Nurse for Dr Lopes team contacted,    Pt scheduled for follow up with Nuro Onc NP for tomorrow. Updated Dr Alvarez.    Maribell Almeida, RN  Radiation Oncology  "

## 2024-07-25 NOTE — PROGRESS NOTES
[facility] PHYSICIANS  SPECIALIZING IN BREAKTHROUGHS  Radiation Oncology    On Treatment Visit Note      Pamella Jaimes      Date: 2024   MRN: [id number]   : 1965  Diagnosis: Glioblastoma      Reason for Visit:  On Radiation Treatment Visit     Treatment Summary to Date  Treatment Site: RtOcc Current Dose: 600 cGy Fractions: 3/30           ED Visit/Hospital Admission: none    Treatment Breaks: none      Subjective:   Continues to experience multiple seizure-like/auras on right side of body, feels like goosebumps, nausea, discomfort.  No headaches.  Had a single reaction to Bactrim with body itching without rash, this was not present on subsequent doses of Bactrim.    Objective:   /77 (Patient Position: Sitting, Cuff Size: Adult Regular)   Pulse 79   Temp 98.8  F (37.1  C) (Oral)   Wt 95.5 kg (210 lb 9.6 oz)   SpO2 99%   BMI 33.99 kg/m    Gen: Appears well, in no acute distress, noticeable head tremor  Skin: No erythema    Labs:  CBC RESULTS:   Recent Labs   Lab Test 24  0824   WBC 5.0   RBC 4.37   HGB 13.5   HCT 40.4   MCV 92   MCH 30.9   MCHC 33.4   RDW 13.2        ELECTROLYTES:  Recent Labs   Lab Test 24  0824      POTASSIUM 4.1   CHLORIDE 108*   SUHAIL 9.2   CO2 23   BUN 11.1   CR 0.75   *       Assessment:    Tolerating radiation therapy well.  All questions and concerns addressed.  We discussed that her radiation plan puts her at high risk for late hearing complications because of the extension of the tumor to the temporal lobe, her cochlea just on the right side is close to the area we are treating and she will likely have hearing issues related to RT in the future ~months to years. Patient understanding.   Regarding intermittent nausea/aura, nausea is well-controlled with Zofran.  Our nursing team has reached out to Dr. Portillo's team regarding Bactrim and ongoing auras despite continued Keppra.    Toxicities:  Alopecia: Grade 0: No  toxicity  Fatigue: Grade 0: No toxicity  Headache: Grade 0: No toxicity  Nausea: Grade 0: No toxicity  Pain: Grade 0: No toxicity  Dermatitis: Grade 0: No toxicity    Plan:   Continue current therapy.    2.  Contacted Dr. Portillo's team, who will have video visit follow-up tomorrow morning.    Mosaiq chart and setup information reviewed  MVCT/IGRT images checked     Dillan Alvarez MD  PGY-4 Radiation Oncology  Department of Radiation Oncology  [facility] [place]  Phone: [phone]    I saw the patient with the resident.  I agree with the resident's note and plan of care.      AJ Mendoza M.D.  Department of Radiation Oncology  [facility]     Please do not send letter to referring physician.

## 2024-07-25 NOTE — PROGRESS NOTES
Writer received referral to Cancer Risk Management/Genetic Counseling.    Referred for:   Personal history of glioblastoma and family history of multiple cancers     Referred By    Provider Department Location Phone   Erna Sanchez MD PhD p Radiation Oncology [facility] [phone]       Referral reviewed for appropriate plan, and sent to New Patient Scheduling ([phone]) for completion.    Chapis Rasheed RN, BSN  Oncology New Patient Nurse Navigator   [facility]

## 2024-07-25 NOTE — LETTER
2024      Pamella Jaimes  [address] [place] [address]      Dear Colleague,    Thank you for referring your patient, Pamella Jaimes, to the [facility] RADIATION ONCOLOGY. Please see a copy of my visit note below.    [facility] PHYSICIANS  SPECIALIZING IN BREAKTHROUGHS  Radiation Oncology    On Treatment Visit Note      Pamella Jaimes      Date: 2024   MRN: [id number]   : 1965  Diagnosis: Glioblastoma      Reason for Visit:  On Radiation Treatment Visit     Treatment Summary to Date  Treatment Site: RtOcc Current Dose: 600 cGy Fractions: 3/30           ED Visit/Hospital Admission: none    Treatment Breaks: none      Subjective:   Continues to experience multiple seizure-like/auras on right side of body, feels like goosebumps, nausea, discomfort.  No headaches.  Had a single reaction to Bactrim with body itching without rash, this was not present on subsequent doses of Bactrim.    Objective:   /77 (Patient Position: Sitting, Cuff Size: Adult Regular)   Pulse 79   Temp 98.8  F (37.1  C) (Oral)   Wt 95.5 kg (210 lb 9.6 oz)   SpO2 99%   BMI 33.99 kg/m    Gen: Appears well, in no acute distress, noticeable head tremor  Skin: No erythema    Labs:  CBC RESULTS:   Recent Labs   Lab Test 24  0824   WBC 5.0   RBC 4.37   HGB 13.5   HCT 40.4   MCV 92   MCH 30.9   MCHC 33.4   RDW 13.2        ELECTROLYTES:  Recent Labs   Lab Test 24  0824      POTASSIUM 4.1   CHLORIDE 108*   SUHAIL 9.2   CO2 23   BUN 11.1   CR 0.75   *       Assessment:    Tolerating radiation therapy well.  All questions and concerns addressed.  We discussed that her radiation plan puts her at high risk for late hearing complications because of the extension of the tumor to the temporal lobe, her cochlea just on the right side is close to the area we are treating and she will likely have hearing issues related to RT in the future ~months to years. Patient  "understanding.   Regarding intermittent nausea/aura, nausea is well-controlled with Zofran.  Our nursing team has reached out to Dr. Portillo's team regarding Bactrim and ongoing auras despite continued Keppra.    Toxicities:  Alopecia: Grade 0: No toxicity  Fatigue: Grade 0: No toxicity  Headache: Grade 0: No toxicity  Nausea: Grade 0: No toxicity  Pain: Grade 0: No toxicity  Dermatitis: Grade 0: No toxicity    Plan:   Continue current therapy.    2.  Contacted Dr. Portillo's team, who will have video visit follow-up tomorrow morning.    Mosaiq chart and setup information reviewed  MVCT/IGRT images checked     Dillan Alvarez MD  PGY-4 Radiation Oncology  Department of Radiation Oncology  [facility]  Phone: [phone]      Please do not send letter to referring physician.        During OTV visit pt had complaints of brief \"goosebumps, chills, stomach\" that affects the R side mainly, but also the left side at times. She reports that it had initially gone away with the keppra, but has \"come back once radiation started\". Pt is on the Temodar and Bactrim DS. She reports taking a zofran this am before coming in and she still had an episode in clinic with my Nurse visit portion. Per the notes, it sounded like maybe the NP from Dr Lopes team was going to see her virtually today.    Dr Alvarez notified who will see pt in office today. Nurse for Dr Lopes team contacted,    Pt scheduled for follow up with Nuro Onc NP for tomorrow. Updated Dr Alvarez.    Maribell Almeida RN  Radiation Oncology      Again, thank you for allowing me to participate in the care of your patient.        Sincerely,        Martir Mendoza MD  "

## 2024-07-26 ENCOUNTER — APPOINTMENT (OUTPATIENT)
Dept: RADIATION ONCOLOGY | Facility: CLINIC | Age: 59
End: 2024-07-26
Attending: STUDENT IN AN ORGANIZED HEALTH CARE EDUCATION/TRAINING PROGRAM
Payer: COMMERCIAL

## 2024-07-26 ENCOUNTER — VIRTUAL VISIT (OUTPATIENT)
Dept: ONCOLOGY | Facility: CLINIC | Age: 59
End: 2024-07-26
Attending: NURSE PRACTITIONER
Payer: COMMERCIAL

## 2024-07-26 ENCOUNTER — PATIENT OUTREACH (OUTPATIENT)
Dept: ONCOLOGY | Facility: CLINIC | Age: 59
End: 2024-07-26
Payer: COMMERCIAL

## 2024-07-26 VITALS — WEIGHT: 203 LBS | BODY MASS INDEX: 31.86 KG/M2 | HEIGHT: 67 IN

## 2024-07-26 DIAGNOSIS — C71.9 GLIOBLASTOMA (H): Primary | ICD-10-CM

## 2024-07-26 PROCEDURE — G2211 COMPLEX E/M VISIT ADD ON: HCPCS | Mod: 95 | Performed by: NURSE PRACTITIONER

## 2024-07-26 PROCEDURE — 77386 HC IMRT TREATMENT DELIVERY, COMPLEX: CPT | Performed by: STUDENT IN AN ORGANIZED HEALTH CARE EDUCATION/TRAINING PROGRAM

## 2024-07-26 PROCEDURE — 99214 OFFICE O/P EST MOD 30 MIN: CPT | Mod: 95 | Performed by: NURSE PRACTITIONER

## 2024-07-26 PROCEDURE — 77014 PR CT GUIDE FOR PLACEMENT RADIATION THERAPY FIELDS: CPT | Mod: 26 | Performed by: RADIOLOGY

## 2024-07-26 RX ORDER — LEVETIRACETAM 750 MG/1
750 TABLET ORAL 2 TIMES DAILY
Qty: 60 TABLET | Refills: 1 | Status: SHIPPED | OUTPATIENT
Start: 2024-07-26 | End: 2024-08-19

## 2024-07-26 ASSESSMENT — PAIN SCALES - GENERAL: PAINLEVEL: NO PAIN (0)

## 2024-07-26 NOTE — LETTER
7/26/2024      Pamella Jaimes  [address] [facility] [address]      Dear Colleague,    Thank you for referring your patient, Pamella Jaimes, to the [facility]. Please see a copy of my visit note below.    Virtual Visit Details    Type of service:  Video Visit   Video Start Time: 11:11 AM  Video End Time: 11:30 AM    Originating Location (pt. Location): Home  Distant Location (provider location):  On-site  Platform used for Video Visit: [facility]        NEURO-ONCOLOGY VISIT  Jul 26, 2024    CHIEF COMPLAINT: Ms. 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 She is presenting to this initial clinic visit for evaluation and recommendations on treatment.     She is unaccompanied on this virtual visit.     HISTORY OF PRESENT ILLNESS  A summary of the patient s oncologic history is as follows;   -PRESENTATION: Neck pain, headache and head tremor.  -3/15/2023 MRI brain imaging with a mild asymmetric T2 hyperintense signal along right hippocampus and adjacent parahippocampal gyrus.    -PRESENTATION: Dizziness and nausea; semiology is concerning for temporal lobe auras. Slurred speech.  -5/27/2024 MR brain imaging with a right posterior temporo-occipital mass. Associated nodular peripheral enhancement and surrounding confluent T2 signal hyperintensity extending along the medial aspect of the right temporal lobe. Mass effect results in regional sulcal effacement, leftward midline shift, partial effacement the right lateral ventricle and mild right uncal herniation.   -5/28/2024 SURGERY: Craniotomy for mass resection at [facility].   No post-operative MR brain imaging performed.   Post-operative CT head imaging from 5/29 status post a right parietal craniotomy with excision of previously demonstrated paramedian parieto-occipital tumor.  Decreased focal mass effect and slightly decreased mild midline shift. No significant hemorrhage or additional complicating feature.   PATHOLOGY: Glioblastoma, IDH1-R132H wildtype (CNS WHO Grade 4).    MGMT promoter unmethylated.   -6/25/2024 NEURO-ONC: Recommending chemoradiotherapy with concurrent temozolomide. U/S + for DVT; started anticoagulation.   -7/26/2024 NEURO-ONC: She has started chemoradiotherapy with concurrent temozolomide. Acute visit for concerns; increased Keppra to 750 mg BID.     Today in clinic;   -Goose bumps up her arms, legs and neck and stomach gurgling/ nausea - back now all day long,  Not as bad anymore since starting Keppra but more frequent since starting radiation.  No falls, no LOC, no other neuro changes.  -Dr Irizarry had told her that these were seizures  -She is tolerating the Keppra without any issues.  -First day she took Bactrim she was itchy but that went away when she took it by itself and she has not had any further issues.  No rashes or skin changes.   -Denies nausea or constipation with temozolomide.   -Denies fatigue right now, although she was really tired the first night but she has not slept the day before. Staying busy with her grandchildren.  -No unusual bleeding or bruising.        MEDICATIONS   Current Outpatient Medications   Medication Sig Dispense Refill     acetaminophen (TYLENOL) 500 MG tablet Take 500-1,000 mg by mouth       dexAMETHasone (DECADRON) 2 MG tablet Take 1 tablet (2 mg) by mouth three times a day for 3 days, THEN 1 tablet (2 mg) twice a day for 30 days.       diclofenac (VOLTAREN) 75 MG EC tablet Take 75 mg by mouth (Patient not taking: Reported on 7/11/2024)       docusate sodium (COLACE) 100 MG capsule Take 100 mg by mouth (Patient not taking: Reported on 7/11/2024)       HYDROcodone-acetaminophen (NORCO) 5-325 MG tablet Take 1-2 tablets by mouth every 6 hours as needed for pain (Patient not taking: Reported on 7/11/2024)       levETIRAcetam (KEPPRA)  500 MG tablet Take 1 tablet (500 mg) by mouth 2 times daily 60 tablet 1     melatonin 3 MG tablet Take 3 mg by mouth nightly as needed for sleep       methocarbamol (ROBAXIN) 500 MG tablet Take 500 mg by mouth (Patient not taking: Reported on 7/11/2024)       ondansetron (ZOFRAN) 4 MG tablet Take 1 tablet (4 mg) by mouth at bedtime Take 30-60 mins before each dose of temozolomide. Can take every 8 hours if needed. 30 tablet 3     oxyCODONE (ROXICODONE) 5 MG tablet Take 2.5-5 mg by mouth (Patient not taking: Reported on 7/11/2024)       rivaroxaban ANTICOAGULANT (XARELTO) 20 MG TABS tablet Take 1 tablet (20 mg) by mouth daily (with dinner) 30 tablet 11     senna-docusate (SENOKOT-S/PERICOLACE) 8.6-50 MG tablet Take 1 tablet by mouth 2 times daily as needed (Patient not taking: Reported on 7/11/2024)       SENNA-docusate sodium (SENNA S) 8.6-50 MG tablet Take 1 tablet by mouth 2 times daily for 42 days . May increase to 2 tablets twice daily. Hold this medication if any diarrhea occurs. (Patient not taking: Reported on 7/11/2024) 84 tablet 0     sertraline (ZOLOFT) 100 MG tablet Take 100 mg by mouth daily (Patient not taking: Reported on 7/11/2024)       sertraline (ZOLOFT) 50 MG tablet Take 1 tablet (50 mg) by mouth daily for 14 days, THEN 2 tablets (100 mg) daily for 21 days. Please call for a refill. (Patient not taking: Reported on 7/11/2024) 56 tablet 0     sulfamethoxazole-trimethoprim (BACTRIM DS) 800-160 MG tablet Take 1 tablet by mouth Every Mon, Wed, Fri Morning Begin with the start of radiation therapy. 18 tablet 0     [START ON 7/29/2024] temozolomide (TEMODAR) 140 MG capsule Take 1 capsule (140 mg) by mouth at bedtime for 13 days with 1 other temozolomide prescription for 160 mg total Daily during radiation. Take a dose of ondansetron 30-60 min before temozolomide. Take on empty stomach. 13 capsule 0     temozolomide (TEMODAR) 140 MG capsule Take 1 capsule (140 mg) by mouth at bedtime with 1 other  "temozolomide prescription for 160 mg total Daily during radiation. Take a dose of ondansetron 30-60 min before temozolomide. Take on empty stomach. (Patient not taking: Reported on 7/11/2024) 42 capsule 0     [START ON 7/29/2024] temozolomide (TEMODAR) 20 MG capsule Take 1 capsule (20 mg) by mouth at bedtime for 13 days with 1 other temozolomide prescription for 160 mg total Daily during radiation. Take a dose of ondansetron 30-60 min before temozolomide. Take on empty stomach. 13 capsule 0     temozolomide (TEMODAR) 20 MG capsule Take 1 capsule (20 mg) by mouth at bedtime with 1 other temozolomide prescription for 160 mg total Daily during radiation. Take a dose of ondansetron 30-60 min before temozolomide. Take on empty stomach. (Patient not taking: Reported on 7/11/2024) 42 capsule 0     DRUG ALLERGIES   Allergies   Allergen Reactions     Morphine      Penicillins Hives       IMMUNIZATIONS   Immunization History   Administered Date(s) Administered     COVID-19 Monovalent 18+ (Moderna) 05/04/2021, 06/01/2021, 01/28/2022       PHYSICAL EXAMINATION  There were no vitals taken for this visit.  Wt Readings from Last 2 Encounters:   07/25/24 95.5 kg (210 lb 9.6 oz)   07/11/24 95.1 kg (209 lb 9.6 oz)      Ht Readings from Last 2 Encounters:   06/25/24 1.676 m (5' 6\")   02/19/18 1.72 m (5' 7.72\")     KPS: 90    -Generally well appearing.      MEDICAL RECORDS  Personally reviewed oncology notes, rad onc notes, [employer] messages.     LABS  No new labs for this acute visit.     IMAGING  No new imaging to review today.      IMPRESSION  As noted above, this visit today was an acute visit for symptom management.  She has been having an increased amount of seizure activity, and we discussed increasing her Keppra.  We will start with an increase to 750 mg twice daily to make sure she is tolerating this well.  She understands that we could increase further if needed.  She does already have follow-up scheduled with me on 8/8 at her " midpoint of chemoradiotherapy, but we can certainly see or talk to her sooner if needed.    Her leg pain has improved, and we discussed that this should gradually improve as her body slowly absorbs the clot.  We discussed that the anticoagulant will keep her from building up any more clot and she states understanding.    PROBLEM LIST  Glioblastoma  Headaches  Homonymous hemianopsia, left-side  Benign essential tremor  Temporal lobe auras    PLAN  -CANCER-DIRECTED THERAPY-  Continue chemoradiotherapy with temozolomide 75mg/m2 (160mg).   Continue weekly labs.    -Medications prescribed;        -Zofran 4mg (1 to 2 tabs qHS 30 minutes prior to chemotherapy and then PRN nausea).       -For lymphopenia, prophylactic antibiotics are recommended during concurrent treatment. Will start Sulfamethoxazole-trimethoprim (Bactrim) 800 mg-160 mg; 1 tab orally Monday, Wednesday, and Friday for pneumocystis prophylaxis. If thrombocytopenia becomes an issue, can change to Dapsone, Atovaquone, or Pentamidine.        -Docusate 100 mg; 1 cap orally 3 times a day (stool softener for constipation)       -Senna 8.6 m tabs orally at bedtime (laxative as needed for constipation)    -STEROIDS-  -Tapering off dexamethasone.    -SEIZURE MANAGEMENT-  -As above, she has been having stereotyped events that seem consistent with a history of non-dominant temporal lobe auras seizures and she was started on Keppra 500 mg twice daily at her previous visit.  We will increase this today to 750 mg twice daily.  She has follow-up with me as above on , but will call sooner with any concerns.    -Quality of life/ MOOD/ FATIGUE-  -Depression.   -Continue Zoloft.  -Referral to Dr. Mercedes in palliative care for further assessment and management of her low mood plus to assist with coping and other family social matters.     -VISUAL FIELD CUT-  -Left Homonymous hemianopsia.  -Referral to neuro-ophtho for thorough eye examination and baseline visual field  testing to determine if there are any driving restrictions.      -LE DVT-  - Xarelto; 15 mg twice daily with food for 21 days followed by 20 mg once daily with food.     Return to clinic at midpoint of chemoradiotherapy as scheduled with me in about 2 weeks. At this appointment, can discuss the option of adding the Optune device to adjuvant chemotherapy.     In the meantime, Pamella knows to call the clinic with any concerns and she can be seen sooner if needed.     The longitudinal plan of care for the diagnosis(es)/condition(s) as documented were addressed during this visit. Due to the added complexity in care, I will continue to support Pamella in the subsequent management and with ongoing continuity of care.     FATEMEH Silvestre  Neuro-oncology       Again, thank you for allowing me to participate in the care of your patient.        Sincerely,        FATEMEH Silvestre CNP

## 2024-07-26 NOTE — PROGRESS NOTES
Virtual Visit Details    Type of service:  Video Visit   Video Start Time: 11:11 AM  Video End Time: 11:30 AM    Originating Location (pt. Location): Home  Distant Location (provider location):  On-site  Platform used for Video Visit: [facility]        NEURO-ONCOLOGY VISIT  Jul 26, 2024    CHIEF COMPLAINT: Ms. 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 She is presenting to this initial clinic visit for evaluation and recommendations on treatment.     She is unaccompanied on this virtual visit.     HISTORY OF PRESENT ILLNESS  A summary of the patient s oncologic history is as follows;   -PRESENTATION: Neck pain, headache and head tremor.  -3/15/2023 MRI brain imaging with a mild asymmetric T2 hyperintense signal along right hippocampus and adjacent parahippocampal gyrus.    -PRESENTATION: Dizziness and nausea; semiology is concerning for temporal lobe auras. Slurred speech.  -5/27/2024 MR brain imaging with a right posterior temporo-occipital mass. Associated nodular peripheral enhancement and surrounding confluent T2 signal hyperintensity extending along the medial aspect of the right temporal lobe. Mass effect results in regional sulcal effacement, leftward midline shift, partial effacement the right lateral ventricle and mild right uncal herniation.   -5/28/2024 SURGERY: Craniotomy for mass resection at [facility].   No post-operative MR brain imaging performed.   Post-operative CT head imaging from 5/29 status post a right parietal craniotomy with excision of previously demonstrated paramedian parieto-occipital tumor. Decreased focal mass effect and slightly decreased mild midline shift. No significant hemorrhage or additional complicating feature.   PATHOLOGY: Glioblastoma, IDH1-R132H wildtype (CNS WHO Grade 4).    MGMT promoter unmethylated.   -6/25/2024  NEURO-ONC: Recommending chemoradiotherapy with concurrent temozolomide. U/S + for DVT; started anticoagulation.   -7/26/2024 NEURO-ONC: She has started chemoradiotherapy with concurrent temozolomide. Acute visit for concerns; increased Keppra to 750 mg BID.     Today in clinic;   -Goose bumps up her arms, legs and neck and stomach gurgling/ nausea - back now all day long,  Not as bad anymore since starting Keppra but more frequent since starting radiation.  No falls, no LOC, no other neuro changes.  -Dr Irizarry had told her that these were seizures  -She is tolerating the Keppra without any issues.  -First day she took Bactrim she was itchy but that went away when she took it by itself and she has not had any further issues.  No rashes or skin changes.   -Denies nausea or constipation with temozolomide.   -Denies fatigue right now, although she was really tired the first night but she has not slept the day before. Staying busy with her grandchildren.  -No unusual bleeding or bruising.        MEDICATIONS   Current Outpatient Medications   Medication Sig Dispense Refill    acetaminophen (TYLENOL) 500 MG tablet Take 500-1,000 mg by mouth      dexAMETHasone (DECADRON) 2 MG tablet Take 1 tablet (2 mg) by mouth three times a day for 3 days, THEN 1 tablet (2 mg) twice a day for 30 days.      diclofenac (VOLTAREN) 75 MG EC tablet Take 75 mg by mouth (Patient not taking: Reported on 7/11/2024)      docusate sodium (COLACE) 100 MG capsule Take 100 mg by mouth (Patient not taking: Reported on 7/11/2024)      HYDROcodone-acetaminophen (NORCO) 5-325 MG tablet Take 1-2 tablets by mouth every 6 hours as needed for pain (Patient not taking: Reported on 7/11/2024)      levETIRAcetam (KEPPRA) 500 MG tablet Take 1 tablet (500 mg) by mouth 2 times daily 60 tablet 1    melatonin 3 MG tablet Take 3 mg by mouth nightly as needed for sleep      methocarbamol (ROBAXIN) 500 MG tablet Take 500 mg by mouth (Patient not taking: Reported on  7/11/2024)      ondansetron (ZOFRAN) 4 MG tablet Take 1 tablet (4 mg) by mouth at bedtime Take 30-60 mins before each dose of temozolomide. Can take every 8 hours if needed. 30 tablet 3    oxyCODONE (ROXICODONE) 5 MG tablet Take 2.5-5 mg by mouth (Patient not taking: Reported on 7/11/2024)      rivaroxaban ANTICOAGULANT (XARELTO) 20 MG TABS tablet Take 1 tablet (20 mg) by mouth daily (with dinner) 30 tablet 11    senna-docusate (SENOKOT-S/PERICOLACE) 8.6-50 MG tablet Take 1 tablet by mouth 2 times daily as needed (Patient not taking: Reported on 7/11/2024)      SENNA-docusate sodium (SENNA S) 8.6-50 MG tablet Take 1 tablet by mouth 2 times daily for 42 days . May increase to 2 tablets twice daily. Hold this medication if any diarrhea occurs. (Patient not taking: Reported on 7/11/2024) 84 tablet 0    sertraline (ZOLOFT) 100 MG tablet Take 100 mg by mouth daily (Patient not taking: Reported on 7/11/2024)      sertraline (ZOLOFT) 50 MG tablet Take 1 tablet (50 mg) by mouth daily for 14 days, THEN 2 tablets (100 mg) daily for 21 days. Please call for a refill. (Patient not taking: Reported on 7/11/2024) 56 tablet 0    sulfamethoxazole-trimethoprim (BACTRIM DS) 800-160 MG tablet Take 1 tablet by mouth Every Mon, Wed, Fri Morning Begin with the start of radiation therapy. 18 tablet 0    [START ON 7/29/2024] temozolomide (TEMODAR) 140 MG capsule Take 1 capsule (140 mg) by mouth at bedtime for 13 days with 1 other temozolomide prescription for 160 mg total Daily during radiation. Take a dose of ondansetron 30-60 min before temozolomide. Take on empty stomach. 13 capsule 0    temozolomide (TEMODAR) 140 MG capsule Take 1 capsule (140 mg) by mouth at bedtime with 1 other temozolomide prescription for 160 mg total Daily during radiation. Take a dose of ondansetron 30-60 min before temozolomide. Take on empty stomach. (Patient not taking: Reported on 7/11/2024) 42 capsule 0    [START ON 7/29/2024] temozolomide (TEMODAR) 20 MG  "capsule Take 1 capsule (20 mg) by mouth at bedtime for 13 days with 1 other temozolomide prescription for 160 mg total Daily during radiation. Take a dose of ondansetron 30-60 min before temozolomide. Take on empty stomach. 13 capsule 0    temozolomide (TEMODAR) 20 MG capsule Take 1 capsule (20 mg) by mouth at bedtime with 1 other temozolomide prescription for 160 mg total Daily during radiation. Take a dose of ondansetron 30-60 min before temozolomide. Take on empty stomach. (Patient not taking: Reported on 7/11/2024) 42 capsule 0     DRUG ALLERGIES   Allergies   Allergen Reactions    Morphine     Penicillins Hives       IMMUNIZATIONS   Immunization History   Administered Date(s) Administered    COVID-19 Monovalent 18+ (Moderna) 05/04/2021, 06/01/2021, 01/28/2022       PHYSICAL EXAMINATION  There were no vitals taken for this visit.  Wt Readings from Last 2 Encounters:   07/25/24 95.5 kg (210 lb 9.6 oz)   07/11/24 95.1 kg (209 lb 9.6 oz)      Ht Readings from Last 2 Encounters:   06/25/24 1.676 m (5' 6\")   02/19/18 1.72 m (5' 7.72\")     KPS: 90    -Generally well appearing.      MEDICAL RECORDS  Personally reviewed oncology notes, rad onc notes, [employer] messages.     LABS  No new labs for this acute visit.     IMAGING  No new imaging to review today.      IMPRESSION  As noted above, this visit today was an acute visit for symptom management.  She has been having an increased amount of seizure activity, and we discussed increasing her Keppra.  We will start with an increase to 750 mg twice daily to make sure she is tolerating this well.  She understands that we could increase further if needed.  She does already have follow-up scheduled with me on 8/8 at her midpoint of chemoradiotherapy, but we can certainly see or talk to her sooner if needed.    Her leg pain has improved, and we discussed that this should gradually improve as her body slowly absorbs the clot.  We discussed that the anticoagulant will keep her " from building up any more clot and she states understanding.    PROBLEM LIST  Glioblastoma  Headaches  Homonymous hemianopsia, left-side  Benign essential tremor  Temporal lobe auras    PLAN  -CANCER-DIRECTED THERAPY-  Continue chemoradiotherapy with temozolomide 75mg/m2 (160mg).   Continue weekly labs.    -Medications prescribed;        -Zofran 4mg (1 to 2 tabs qHS 30 minutes prior to chemotherapy and then PRN nausea).       -For lymphopenia, prophylactic antibiotics are recommended during concurrent treatment. Will start Sulfamethoxazole-trimethoprim (Bactrim) 800 mg-160 mg; 1 tab orally Monday, Wednesday, and Friday for pneumocystis prophylaxis. If thrombocytopenia becomes an issue, can change to Dapsone, Atovaquone, or Pentamidine.        -Docusate 100 mg; 1 cap orally 3 times a day (stool softener for constipation)       -Senna 8.6 m tabs orally at bedtime (laxative as needed for constipation)    -STEROIDS-  -Tapering off dexamethasone.    -SEIZURE MANAGEMENT-  -As above, she has been having stereotyped events that seem consistent with a history of non-dominant temporal lobe auras seizures and she was started on Keppra 500 mg twice daily at her previous visit.  We will increase this today to 750 mg twice daily.  She has follow-up with me as above on , but will call sooner with any concerns.    -Quality of life/ MOOD/ FATIGUE-  -Depression.   -Continue Zoloft.  -Referral to Dr. Mercedes in palliative care for further assessment and management of her low mood plus to assist with coping and other family social matters.     -VISUAL FIELD CUT-  -Left Homonymous hemianopsia.  -Referral to neuro-ophtho for thorough eye examination and baseline visual field testing to determine if there are any driving restrictions.      -LE DVT-  - Xarelto; 15 mg twice daily with food for 21 days followed by 20 mg once daily with food.     Return to clinic at midpoint of chemoradiotherapy as scheduled with me in about 2 weeks. At  this appointment, can discuss the option of adding the Optune device to adjuvant chemotherapy.     In the meantime, Pamella knows to call the clinic with any concerns and she can be seen sooner if needed.     The longitudinal plan of care for the diagnosis(es)/condition(s) as documented were addressed during this visit. Due to the added complexity in care, I will continue to support Pamella in the subsequent management and with ongoing continuity of care.     Ratna Hernandez, APRN  Neuro-oncology

## 2024-07-26 NOTE — PROGRESS NOTES
Call made to Pamella about recent med change recommendation for Ratna and Dr. Irizarry   Increase Keppra to 750mg BID - slow increase d/t intolerability when she started prior   She will get her rx filled when she is low on her current rx. She will take 1.5 tabs tonight   Instructed her to call if she has any side effects from the drug, including personality changes, anxiety, anger. . Etc.     Irene Clayton, RN, BSN  Specialty Care Coordinator  [facility]  [phone]

## 2024-07-26 NOTE — NURSING NOTE
Current patient location: [address] [place] [id number]    Is the patient currently in the state of [place]? YES    Visit mode:VIDEO    If the visit is dropped, the patient can be reconnected by: VIDEO VISIT: Text to cell phone:   Telephone Information:   Mobile [phone]       Will anyone else be joining the visit? NO  (If patient encounters technical issues they should call [phone])    How would you like to obtain your AVS? MyChart    Are changes needed to the allergy or medication list? Pt stated no changes to allergies and Pt stated no med changes    Are refills needed on medications prescribed by this physician? NO    Reason for visit: JUDE CONWAY

## 2024-07-26 NOTE — LETTER
7/26/2024      Pamella Jaimes  [address] [facility] [place] [address]      Dear Colleague,    Thank you for referring your patient, Pamella Jaimes, to the [facility]. Please see a copy of my visit note below.    Virtual Visit Details    Type of service:  Video Visit   Video Start Time: 11:11 AM  Video End Time: 11:30 AM    Originating Location (pt. Location): Home  Distant Location (provider location):  On-site  Platform used for Video Visit: [facility]        NEURO-ONCOLOGY VISIT  Jul 26, 2024    CHIEF COMPLAINT: Ms. 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 She is presenting to this initial clinic visit for evaluation and recommendations on treatment.     She is unaccompanied on this virtual visit.     HISTORY OF PRESENT ILLNESS  A summary of the patient s oncologic history is as follows;   -PRESENTATION: Neck pain, headache and head tremor.  -3/15/2023 MRI brain imaging with a mild asymmetric T2 hyperintense signal along right hippocampus and adjacent parahippocampal gyrus.    -PRESENTATION: Dizziness and nausea; semiology is concerning for temporal lobe auras. Slurred speech.  -5/27/2024 MR brain imaging with a right posterior temporo-occipital mass. Associated nodular peripheral enhancement and surrounding confluent T2 signal hyperintensity extending along the medial aspect of the right temporal lobe. Mass effect results in regional sulcal effacement, leftward midline shift, partial effacement the right lateral ventricle and mild right uncal herniation.   -5/28/2024 SURGERY: Craniotomy for mass resection at [facility].   No post-operative MR brain imaging performed.   Post-operative CT head imaging from 5/29 status post a right parietal craniotomy with excision of previously demonstrated paramedian parieto-occipital tumor.  Decreased focal mass effect and slightly decreased mild midline shift. No significant hemorrhage or additional complicating feature.   PATHOLOGY: Glioblastoma, IDH1-R132H wildtype (CNS WHO Grade 4).    MGMT promoter unmethylated.   -6/25/2024 NEURO-ONC: Recommending chemoradiotherapy with concurrent temozolomide. U/S + for DVT; started anticoagulation.   -7/26/2024 NEURO-ONC: She has started chemoradiotherapy with concurrent temozolomide. Acute visit for concerns; increased Keppra to 750 mg BID.     Today in clinic;   -Goose bumps up her arms, legs and neck and stomach gurgling/ nausea - back now all day long,  Not as bad anymore since starting Keppra but more frequent since starting radiation.  No falls, no LOC, no other neuro changes.  -Dr Irizarry had told her that these were seizures  -She is tolerating the Keppra without any issues.  -First day she took Bactrim she was itchy but that went away when she took it by itself and she has not had any further issues.  No rashes or skin changes.   -Denies nausea or constipation with temozolomide.   -Denies fatigue right now, although she was really tired the first night but she has not slept the day before. Staying busy with her grandchildren.  -No unusual bleeding or bruising.        MEDICATIONS   Current Outpatient Medications   Medication Sig Dispense Refill     acetaminophen (TYLENOL) 500 MG tablet Take 500-1,000 mg by mouth       dexAMETHasone (DECADRON) 2 MG tablet Take 1 tablet (2 mg) by mouth three times a day for 3 days, THEN 1 tablet (2 mg) twice a day for 30 days.       diclofenac (VOLTAREN) 75 MG EC tablet Take 75 mg by mouth (Patient not taking: Reported on 7/11/2024)       docusate sodium (COLACE) 100 MG capsule Take 100 mg by mouth (Patient not taking: Reported on 7/11/2024)       HYDROcodone-acetaminophen (NORCO) 5-325 MG tablet Take 1-2 tablets by mouth every 6 hours as needed for pain (Patient not taking: Reported on 7/11/2024)       levETIRAcetam (KEPPRA)  500 MG tablet Take 1 tablet (500 mg) by mouth 2 times daily 60 tablet 1     melatonin 3 MG tablet Take 3 mg by mouth nightly as needed for sleep       methocarbamol (ROBAXIN) 500 MG tablet Take 500 mg by mouth (Patient not taking: Reported on 7/11/2024)       ondansetron (ZOFRAN) 4 MG tablet Take 1 tablet (4 mg) by mouth at bedtime Take 30-60 mins before each dose of temozolomide. Can take every 8 hours if needed. 30 tablet 3     oxyCODONE (ROXICODONE) 5 MG tablet Take 2.5-5 mg by mouth (Patient not taking: Reported on 7/11/2024)       rivaroxaban ANTICOAGULANT (XARELTO) 20 MG TABS tablet Take 1 tablet (20 mg) by mouth daily (with dinner) 30 tablet 11     senna-docusate (SENOKOT-S/PERICOLACE) 8.6-50 MG tablet Take 1 tablet by mouth 2 times daily as needed (Patient not taking: Reported on 7/11/2024)       SENNA-docusate sodium (SENNA S) 8.6-50 MG tablet Take 1 tablet by mouth 2 times daily for 42 days . May increase to 2 tablets twice daily. Hold this medication if any diarrhea occurs. (Patient not taking: Reported on 7/11/2024) 84 tablet 0     sertraline (ZOLOFT) 100 MG tablet Take 100 mg by mouth daily (Patient not taking: Reported on 7/11/2024)       sertraline (ZOLOFT) 50 MG tablet Take 1 tablet (50 mg) by mouth daily for 14 days, THEN 2 tablets (100 mg) daily for 21 days. Please call for a refill. (Patient not taking: Reported on 7/11/2024) 56 tablet 0     sulfamethoxazole-trimethoprim (BACTRIM DS) 800-160 MG tablet Take 1 tablet by mouth Every Mon, Wed, Fri Morning Begin with the start of radiation therapy. 18 tablet 0     [START ON 7/29/2024] temozolomide (TEMODAR) 140 MG capsule Take 1 capsule (140 mg) by mouth at bedtime for 13 days with 1 other temozolomide prescription for 160 mg total Daily during radiation. Take a dose of ondansetron 30-60 min before temozolomide. Take on empty stomach. 13 capsule 0     temozolomide (TEMODAR) 140 MG capsule Take 1 capsule (140 mg) by mouth at bedtime with 1 other  "temozolomide prescription for 160 mg total Daily during radiation. Take a dose of ondansetron 30-60 min before temozolomide. Take on empty stomach. (Patient not taking: Reported on 7/11/2024) 42 capsule 0     [START ON 7/29/2024] temozolomide (TEMODAR) 20 MG capsule Take 1 capsule (20 mg) by mouth at bedtime for 13 days with 1 other temozolomide prescription for 160 mg total Daily during radiation. Take a dose of ondansetron 30-60 min before temozolomide. Take on empty stomach. 13 capsule 0     temozolomide (TEMODAR) 20 MG capsule Take 1 capsule (20 mg) by mouth at bedtime with 1 other temozolomide prescription for 160 mg total Daily during radiation. Take a dose of ondansetron 30-60 min before temozolomide. Take on empty stomach. (Patient not taking: Reported on 7/11/2024) 42 capsule 0     DRUG ALLERGIES   Allergies   Allergen Reactions     Morphine      Penicillins Hives       IMMUNIZATIONS   Immunization History   Administered Date(s) Administered     COVID-19 Monovalent 18+ (Moderna) 05/04/2021, 06/01/2021, 01/28/2022       PHYSICAL EXAMINATION  There were no vitals taken for this visit.  Wt Readings from Last 2 Encounters:   07/25/24 95.5 kg (210 lb 9.6 oz)   07/11/24 95.1 kg (209 lb 9.6 oz)      Ht Readings from Last 2 Encounters:   06/25/24 1.676 m (5' 6\")   02/19/18 1.72 m (5' 7.72\")     KPS: 90    -Generally well appearing.      MEDICAL RECORDS  Personally reviewed oncology notes, rad onc notes, [employer] messages.     LABS  No new labs for this acute visit.     IMAGING  No new imaging to review today.      IMPRESSION  As noted above, this visit today was an acute visit for symptom management.  She has been having an increased amount of seizure activity, and we discussed increasing her Keppra.  We will start with an increase to 750 mg twice daily to make sure she is tolerating this well.  She understands that we could increase further if needed.  She does already have follow-up scheduled with me on 8/8 at her " midpoint of chemoradiotherapy, but we can certainly see or talk to her sooner if needed.    Her leg pain has improved, and we discussed that this should gradually improve as her body slowly absorbs the clot.  We discussed that the anticoagulant will keep her from building up any more clot and she states understanding.    PROBLEM LIST  Glioblastoma  Headaches  Homonymous hemianopsia, left-side  Benign essential tremor  Temporal lobe auras    PLAN  -CANCER-DIRECTED THERAPY-  Continue chemoradiotherapy with temozolomide 75mg/m2 (160mg).   Continue weekly labs.    -Medications prescribed;        -Zofran 4mg (1 to 2 tabs qHS 30 minutes prior to chemotherapy and then PRN nausea).       -For lymphopenia, prophylactic antibiotics are recommended during concurrent treatment. Will start Sulfamethoxazole-trimethoprim (Bactrim) 800 mg-160 mg; 1 tab orally Monday, Wednesday, and Friday for pneumocystis prophylaxis. If thrombocytopenia becomes an issue, can change to Dapsone, Atovaquone, or Pentamidine.        -Docusate 100 mg; 1 cap orally 3 times a day (stool softener for constipation)       -Senna 8.6 m tabs orally at bedtime (laxative as needed for constipation)    -STEROIDS-  -Tapering off dexamethasone.    -SEIZURE MANAGEMENT-  -As above, she has been having stereotyped events that seem consistent with a history of non-dominant temporal lobe auras seizures and she was started on Keppra 500 mg twice daily at her previous visit.  We will increase this today to 750 mg twice daily.  She has follow-up with me as above on , but will call sooner with any concerns.    -Quality of life/ MOOD/ FATIGUE-  -Depression.   -Continue Zoloft.  -Referral to Dr. Mercedes in palliative care for further assessment and management of her low mood plus to assist with coping and other family social matters.     -VISUAL FIELD CUT-  -Left Homonymous hemianopsia.  -Referral to neuro-ophtho for thorough eye examination and baseline visual field  testing to determine if there are any driving restrictions.      -LE DVT-  - Xarelto; 15 mg twice daily with food for 21 days followed by 20 mg once daily with food.     Return to clinic at midpoint of chemoradiotherapy as scheduled with me in about 2 weeks. At this appointment, can discuss the option of adding the Optune device to adjuvant chemotherapy.     In the meantime, Pamella knows to call the clinic with any concerns and she can be seen sooner if needed.     The longitudinal plan of care for the diagnosis(es)/condition(s) as documented were addressed during this visit. Due to the added complexity in care, I will continue to support Pamella in the subsequent management and with ongoing continuity of care.     FATEMEH Silvestre  Neuro-oncology       Again, thank you for allowing me to participate in the care of your patient.        Sincerely,        FATEMEH Silvestre CNP

## 2024-07-29 ENCOUNTER — APPOINTMENT (OUTPATIENT)
Dept: RADIATION ONCOLOGY | Facility: CLINIC | Age: 59
End: 2024-07-29
Attending: STUDENT IN AN ORGANIZED HEALTH CARE EDUCATION/TRAINING PROGRAM
Payer: COMMERCIAL

## 2024-07-29 ENCOUNTER — DOCUMENTATION ONLY (OUTPATIENT)
Dept: PHARMACY | Facility: CLINIC | Age: 59
End: 2024-07-29
Payer: COMMERCIAL

## 2024-07-29 ENCOUNTER — PATIENT OUTREACH (OUTPATIENT)
Dept: CARE COORDINATION | Facility: CLINIC | Age: 59
End: 2024-07-29
Payer: COMMERCIAL

## 2024-07-29 PROCEDURE — 77336 RADIATION PHYSICS CONSULT: CPT | Performed by: STUDENT IN AN ORGANIZED HEALTH CARE EDUCATION/TRAINING PROGRAM

## 2024-07-29 PROCEDURE — 77386 HC IMRT TREATMENT DELIVERY, COMPLEX: CPT | Performed by: STUDENT IN AN ORGANIZED HEALTH CARE EDUCATION/TRAINING PROGRAM

## 2024-07-29 PROCEDURE — 77014 PR CT GUIDE FOR PLACEMENT RADIATION THERAPY FIELDS: CPT | Mod: 26 | Performed by: STUDENT IN AN ORGANIZED HEALTH CARE EDUCATION/TRAINING PROGRAM

## 2024-07-29 PROCEDURE — 77427 RADIATION TX MANAGEMENT X5: CPT | Mod: GC | Performed by: RADIOLOGY

## 2024-07-29 NOTE — PROGRESS NOTES
Social Work - Distress Screen Intervention  [facility]    Identified Concern and Score from Distress Screenin. How concerned are you about your ability to eat? 3     2. How concerned are you about unintended weight loss or your current weight? 1     3. How concerned are you about feeling depressed or very sad?  (!) 8     4. How concerned are you about feeling anxious or very scared?  7     5. Do you struggle with the loss of meaning and anirudh in your life?  Not at all     6. How concerned are you about work and home life issues that may be affected by your cancer?  5     7. How concerned are you about knowing what resources are available to help you?  1     8. Do you currently have what you would describe as Nondenominational or spiritual struggles? Not at all     9. If you want to be contacted by one of our professionals, I can send a message to them right now.  No data recorded     Date of Distress Screen: 24  Data: At time of last visit, patient scored positive on distress screening.  outreached to patient today to follow up on elevated distress and introduce psychosocial services and support.  Intervention/Education provided:  contacted patient by phone to discuss distress screening results.     Family:   Pamella reports that she is  (  1yr ago from cancer) and she resides with her eldest daughter (Leanne) and her 3 grandsons (by her youngest daughter) ages 3, 5 & 8. She has 50-50 custody with the oldest's grandparents on the other side, and 100% custody of the younger two.    Pamella reports that her grandsons are aware that she is receiving treatment, but they do not know that she has cancer. Pamella was receptive to conversation with this clinician about support for children including [facility] [place] and [facility].     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 Pamella with informed questions today about eligibility of accessing her own SSDI support.     Pamella receptive to this clinician sending [email or web address] message with mono support opportunities as well as available connection through [employer]'s Christina Ozuna for assistance with SSDI process.     Emotional Health:   Pamella acknowledges that she misses driving the most, and being able to support the kids getting out of the house during the summer. Due to caregiving responsibilities, she has a harder time prioritizing care appointments for herself. Pamella is receptive to a connection with palliative care physician and considering additional medication as zoloft made her \"super sleepy\" and unable to provide care to her grandchildren, so she reported that she stopped this medication in full.     Pamella endorses concerns about her daughter's coping with Pamella's increasing care needs (having been a caregiver for her  at end-of-life), and feels strongly about not wanting Leanne to be in this role.    Follow-up Required:   1) This clinician will follow-up with Pamella by phone in 2 weeks for emotional check-in and support. Goal: to engage in more discussion surrounding future planning for grandchildren and formalizing plan for mental health support.   2) Ongoing collaboration with multidisciplinary care team. This clinician to outreach to pall care team and explore assistance with scheduling.     MEHDI Gonzalez, LICSW, OSW-C  Clinical - Adult Oncology  Phone: [phone]  She/Her/Hers  [facility]: Nohemy FERRO  8am-4:30pm  [facility]: TRISH Kellogg F 8am-4:30pm   Support Groups at [facility]: Social Work Services for Cancer Patients ([email or web address])      "

## 2024-07-29 NOTE — PROGRESS NOTES
Oral Chemotherapy Monitoring Program    Subjective/Objective:  Pamella Jaimes is a 59 year old female contacted by phone for a follow-up visit for oral chemotherapy. Pamella said that Temodar therapy is going very well. She stated a start date of 7/22, is adherent to the medication regiment, and has not missed any doses in the past week. She is not experiencing any symptoms of constipation, fatigue, or N/V. Pamella did state a concern for fatigue during second week of the cycle. I talked about how she can try and stay active during the day to fight fatigue, as well as said it was OK to take vitamin B12 and vitamin C if she feels like it.         6/26/2024     2:00 PM 6/27/2024     3:00 PM 7/18/2024     9:00 AM 7/29/2024    11:00 AM   ORAL CHEMOTHERAPY   Assessment Type Chart Review;New Teach;Lab Monitoring;Initial Work up Chart Review;Refill;Incoming phone call Chart Review;Incoming phone call;New Teach;Refill Initial Follow up   Diagnosis Code Brain Cancer - Glioblastoma Brain Cancer - Glioblastoma Brain Cancer - Glioblastoma Brain Cancer - Glioblastoma   Providers Dr. Juan C Irizarry   Clinic Name/Location [facility]   Drug Name Temodar (temozolomide) Temodar (temozolomide) Temodar (temozolomide) Temodar (temozolomide)   Dose 160 mg 160 mg 160 mg 160 mg   Current Schedule Daily Daily Daily Daily   Cycle Details Continuous for 42 days during XRT Continuous for 42 days during XRT Continuous for 42 days during XRT Continuous for 42 days during XRT   Start Date of Last Cycle   7/22/2024 7/22/2024   Adherence Assessment    Adherent   Adverse Effects    No AE identified during assessment   Any new drug interactions? No No No No   Is the dose as ordered appropriate for the patient? Yes Yes Yes Yes       Last PHQ-2 Score on record:       2/19/2018    10:08 AM   PHQ-2 ( 1999 Pf[employer])   Q1: Little interest or pleasure in doing things 0   Q2: Feeling down, depressed or hopeless 0  "  PHQ-2 Score 0       Vitals:  BP:   BP Readings from Last 1 Encounters:   07/25/24 119/77     Wt Readings from Last 1 Encounters:   07/26/24 92.1 kg (203 lb)     Estimated body surface area is 2.09 meters squared as calculated from the following:    Height as of 7/26/24: 1.702 m (5' 7\").    Weight as of 7/26/24: 92.1 kg (203 lb).    Labs:  _  Result Component Current Result Ref Range   Sodium 141 (7/23/2024) 135 - 145 mmol/L     _  Result Component Current Result Ref Range   Potassium 4.1 (7/23/2024) 3.4 - 5.3 mmol/L     _  Result Component Current Result Ref Range   Calcium 9.2 (7/23/2024) 8.8 - 10.4 mg/dL     No results found for Mag within last 30 days.     No results found for Phos within last 30 days.     _  Result Component Current Result Ref Range   Albumin 4.3 (7/23/2024) 3.5 - 5.2 g/dL     _  Result Component Current Result Ref Range   Urea Nitrogen 11.1 (7/23/2024) 8.0 - 23.0 mg/dL     _  Result Component Current Result Ref Range   Creatinine 0.75 (7/23/2024) 0.51 - 0.95 mg/dL     _  Result Component Current Result Ref Range   AST 25 (7/23/2024) 0 - 45 U/L     _  Result Component Current Result Ref Range   ALT 24 (7/23/2024) 0 - 50 U/L     _  Result Component Current Result Ref Range   Bilirubin Total 0.3 (7/23/2024) <=1.2 mg/dL     _  Result Component Current Result Ref Range   WBC Count 5.0 (7/23/2024) 4.0 - 11.0 10e3/uL     _  Result Component Current Result Ref Range   Hemoglobin 13.5 (7/23/2024) 11.7 - 15.7 g/dL     _  Result Component Current Result Ref Range   Platelet Count 228 (7/23/2024) 150 - 450 10e3/uL     No results found for ANC within last 30 days.     _  Result Component Current Result Ref Range   Absolute Neutrophils 2.1 (7/23/2024) 1.6 - 8.3 10e3/uL          Assessment/Plan:  OK to continue Temodar therapy. Continue to monitor with weekly CBC and CMP monthly.     Follow-Up:  7/31: Labs  8/8: Ratna    Refill Due:  9/2    Rafi Triana  Pharmacy Intern   [facility] "   [phone]

## 2024-07-30 ENCOUNTER — MYC REFILL (OUTPATIENT)
Dept: ONCOLOGY | Facility: CLINIC | Age: 59
End: 2024-07-30

## 2024-07-30 ENCOUNTER — APPOINTMENT (OUTPATIENT)
Dept: RADIATION ONCOLOGY | Facility: CLINIC | Age: 59
End: 2024-07-30
Attending: STUDENT IN AN ORGANIZED HEALTH CARE EDUCATION/TRAINING PROGRAM
Payer: COMMERCIAL

## 2024-07-30 DIAGNOSIS — C71.9 GLIOBLASTOMA (H): ICD-10-CM

## 2024-07-30 PROCEDURE — 77386 HC IMRT TREATMENT DELIVERY, COMPLEX: CPT | Performed by: STUDENT IN AN ORGANIZED HEALTH CARE EDUCATION/TRAINING PROGRAM

## 2024-07-30 PROCEDURE — 77014 PR CT GUIDE FOR PLACEMENT RADIATION THERAPY FIELDS: CPT | Mod: 26 | Performed by: STUDENT IN AN ORGANIZED HEALTH CARE EDUCATION/TRAINING PROGRAM

## 2024-07-31 ENCOUNTER — ALLIED HEALTH/NURSE VISIT (OUTPATIENT)
Dept: RADIATION ONCOLOGY | Facility: CLINIC | Age: 59
End: 2024-07-31
Attending: STUDENT IN AN ORGANIZED HEALTH CARE EDUCATION/TRAINING PROGRAM
Payer: COMMERCIAL

## 2024-07-31 ENCOUNTER — DOCUMENTATION ONLY (OUTPATIENT)
Dept: PHARMACY | Facility: CLINIC | Age: 59
End: 2024-07-31

## 2024-07-31 ENCOUNTER — APPOINTMENT (OUTPATIENT)
Dept: LAB | Facility: CLINIC | Age: 59
End: 2024-07-31
Payer: COMMERCIAL

## 2024-07-31 DIAGNOSIS — D61.810 ANTINEOPLASTIC CHEMOTHERAPY INDUCED PANCYTOPENIA (H): ICD-10-CM

## 2024-07-31 DIAGNOSIS — T45.1X5A CHEMOTHERAPY-INDUCED NAUSEA AND VOMITING: ICD-10-CM

## 2024-07-31 DIAGNOSIS — T45.1X5A ANTINEOPLASTIC CHEMOTHERAPY INDUCED PANCYTOPENIA (H): ICD-10-CM

## 2024-07-31 DIAGNOSIS — R11.2 CHEMOTHERAPY-INDUCED NAUSEA AND VOMITING: ICD-10-CM

## 2024-07-31 LAB
ALBUMIN SERPL BCG-MCNC: 4.3 G/DL (ref 3.5–5.2)
ALP SERPL-CCNC: 82 U/L (ref 40–150)
ALT SERPL W P-5'-P-CCNC: 13 U/L (ref 0–50)
ANION GAP SERPL CALCULATED.3IONS-SCNC: 11 MMOL/L (ref 7–15)
AST SERPL W P-5'-P-CCNC: 18 U/L (ref 0–45)
BASOPHILS # BLD AUTO: 0.1 10E3/UL (ref 0–0.2)
BASOPHILS NFR BLD AUTO: 1 %
BILIRUB SERPL-MCNC: 0.3 MG/DL
BUN SERPL-MCNC: 14.7 MG/DL (ref 8–23)
CALCIUM SERPL-MCNC: 9.5 MG/DL (ref 8.8–10.4)
CHLORIDE SERPL-SCNC: 107 MMOL/L (ref 98–107)
CREAT SERPL-MCNC: 0.8 MG/DL (ref 0.51–0.95)
EGFRCR SERPLBLD CKD-EPI 2021: 83 ML/MIN/1.73M2
EOSINOPHIL # BLD AUTO: 0.3 10E3/UL (ref 0–0.7)
EOSINOPHIL NFR BLD AUTO: 5 %
ERYTHROCYTE [DISTWIDTH] IN BLOOD BY AUTOMATED COUNT: 13.4 % (ref 10–15)
GLUCOSE SERPL-MCNC: 93 MG/DL (ref 70–99)
HCO3 SERPL-SCNC: 24 MMOL/L (ref 22–29)
HCT VFR BLD AUTO: 41.2 % (ref 35–47)
HGB BLD-MCNC: 13.2 G/DL (ref 11.7–15.7)
IMM GRANULOCYTES # BLD: 0 10E3/UL
IMM GRANULOCYTES NFR BLD: 0 %
LYMPHOCYTES # BLD AUTO: 2.3 10E3/UL (ref 0.8–5.3)
LYMPHOCYTES NFR BLD AUTO: 40 %
MCH RBC QN AUTO: 30.5 PG (ref 26.5–33)
MCHC RBC AUTO-ENTMCNC: 32 G/DL (ref 31.5–36.5)
MCV RBC AUTO: 95 FL (ref 78–100)
MONOCYTES # BLD AUTO: 0.5 10E3/UL (ref 0–1.3)
MONOCYTES NFR BLD AUTO: 9 %
NEUTROPHILS # BLD AUTO: 2.5 10E3/UL (ref 1.6–8.3)
NEUTROPHILS NFR BLD AUTO: 45 %
NRBC # BLD AUTO: 0 10E3/UL
NRBC BLD AUTO-RTO: 0 /100
PLATELET # BLD AUTO: 268 10E3/UL (ref 150–450)
POTASSIUM SERPL-SCNC: 4.6 MMOL/L (ref 3.4–5.3)
PROT SERPL-MCNC: 6.6 G/DL (ref 6.4–8.3)
RBC # BLD AUTO: 4.33 10E6/UL (ref 3.8–5.2)
SODIUM SERPL-SCNC: 142 MMOL/L (ref 135–145)
WBC # BLD AUTO: 5.6 10E3/UL (ref 4–11)

## 2024-07-31 PROCEDURE — 36415 COLL VENOUS BLD VENIPUNCTURE: CPT

## 2024-07-31 PROCEDURE — 85025 COMPLETE CBC W/AUTO DIFF WBC: CPT

## 2024-07-31 PROCEDURE — 80053 COMPREHEN METABOLIC PANEL: CPT

## 2024-07-31 PROCEDURE — 77014 PR CT GUIDE FOR PLACEMENT RADIATION THERAPY FIELDS: CPT | Mod: 26 | Performed by: STUDENT IN AN ORGANIZED HEALTH CARE EDUCATION/TRAINING PROGRAM

## 2024-07-31 PROCEDURE — 77386 HC IMRT TREATMENT DELIVERY, COMPLEX: CPT | Performed by: STUDENT IN AN ORGANIZED HEALTH CARE EDUCATION/TRAINING PROGRAM

## 2024-07-31 NOTE — PROGRESS NOTES
Oral Chemotherapy Monitoring Program  Lab Follow Up    Reviewed lab results from 7/31/24.        6/26/2024     2:00 PM 6/27/2024     3:00 PM 7/18/2024     9:00 AM 7/29/2024    11:00 AM 7/31/2024     9:00 AM   ORAL CHEMOTHERAPY   Assessment Type Chart Review;New Teach;Lab Monitoring;Initial Work up Chart Review;Refill;Incoming phone call Chart Review;Incoming phone call;New Teach;Refill Initial Follow up Lab Monitoring   Diagnosis Code Brain Cancer - Glioblastoma Brain Cancer - Glioblastoma Brain Cancer - Glioblastoma Brain Cancer - Glioblastoma Brain Cancer - Glioblastoma   Providers Dr. Juan C Irizarry   Clinic Name/Location [facility]   Drug Name Temodar (temozolomide) Temodar (temozolomide) Temodar (temozolomide) Temodar (temozolomide) Temodar (temozolomide)   Dose 160 mg 160 mg 160 mg 160 mg 160 mg   Current Schedule Daily Daily Daily Daily Daily   Cycle Details Continuous for 42 days during XRT Continuous for 42 days during XRT Continuous for 42 days during XRT Continuous for 42 days during XRT Continuous for 42 days during XRT   Start Date of Last Cycle   7/22/2024 7/22/2024 7/22/2024   Adherence Assessment    Adherent    Adverse Effects    No AE identified during assessment No AE identified during assessment   Any new drug interactions? No No No No    Is the dose as ordered appropriate for the patient? Yes Yes Yes Yes Yes       Labs:  _  Result Component Current Result Ref Range   Sodium 142 (7/31/2024) 135 - 145 mmol/L     _  Result Component Current Result Ref Range   Potassium 4.6 (7/31/2024) 3.4 - 5.3 mmol/L     _  Result Component Current Result Ref Range   Calcium 9.5 (7/31/2024) 8.8 - 10.4 mg/dL     No results found for Mag within last 30 days.     No results found for Phos within last 30 days.     _  Result Component Current Result Ref Range   Albumin 4.3 (7/31/2024) 3.5 - 5.2 g/dL     _  Result Component Current Result Ref Range   Urea  Nitrogen 14.7 (7/31/2024) 8.0 - 23.0 mg/dL     _  Result Component Current Result Ref Range   Creatinine 0.80 (7/31/2024) 0.51 - 0.95 mg/dL     _  Result Component Current Result Ref Range   AST 18 (7/31/2024) 0 - 45 U/L     _  Result Component Current Result Ref Range   ALT 13 (7/31/2024) 0 - 50 U/L     _  Result Component Current Result Ref Range   Bilirubin Total 0.3 (7/31/2024) <=1.2 mg/dL     _  Result Component Current Result Ref Range   WBC Count 5.6 (7/31/2024) 4.0 - 11.0 10e3/uL     _  Result Component Current Result Ref Range   Hemoglobin 13.2 (7/31/2024) 11.7 - 15.7 g/dL     _  Result Component Current Result Ref Range   Platelet Count 268 (7/31/2024) 150 - 450 10e3/uL     No results found for ANC within last 30 days.     _  Result Component Current Result Ref Range   Absolute Neutrophils 2.5 (7/31/2024) 1.6 - 8.3 10e3/uL        Assessment  No concerning lab values today.     Plan   Continue Temodar   Weekly labs during radiation     Dickson Atwood  Pharmacy Intern   [facility]   [phone]

## 2024-08-01 ENCOUNTER — OFFICE VISIT (OUTPATIENT)
Dept: RADIATION ONCOLOGY | Facility: CLINIC | Age: 59
End: 2024-08-01
Attending: STUDENT IN AN ORGANIZED HEALTH CARE EDUCATION/TRAINING PROGRAM
Payer: COMMERCIAL

## 2024-08-01 VITALS
HEART RATE: 65 BPM | WEIGHT: 214.5 LBS | OXYGEN SATURATION: 99 % | TEMPERATURE: 97.7 F | BODY MASS INDEX: 33.6 KG/M2 | SYSTOLIC BLOOD PRESSURE: 109 MMHG | DIASTOLIC BLOOD PRESSURE: 67 MMHG

## 2024-08-01 DIAGNOSIS — C71.9 GLIOBLASTOMA (H): Primary | ICD-10-CM

## 2024-08-01 PROCEDURE — 77386 HC IMRT TREATMENT DELIVERY, COMPLEX: CPT | Performed by: STUDENT IN AN ORGANIZED HEALTH CARE EDUCATION/TRAINING PROGRAM

## 2024-08-01 RX ORDER — ONDANSETRON 4 MG/1
4 TABLET, FILM COATED ORAL AT BEDTIME
Qty: 30 TABLET | Refills: 3 | Status: SHIPPED | OUTPATIENT
Start: 2024-08-01

## 2024-08-01 ASSESSMENT — PAIN SCALES - GENERAL: PAINLEVEL: NO PAIN (0)

## 2024-08-01 NOTE — LETTER
2024      Pamella Jaimes  [address] [place] [facility] [address]      Dear Colleague,    Thank you for referring your patient, Pamella Jaimes, to the [facility] RADIATION ONCOLOGY. Please see a copy of my visit note below.    [facility]  SPECIALIZING IN BREAKTHROUGHS  Radiation Oncology    On Treatment Visit Note      Pamella Jaimes      Date: Aug 1, 2024   MRN: [id number]   : 1965  Diagnosis: Glioblastoma      Reason for Visit:  On Radiation Treatment Visit     Treatment Summary to Date  Treatment Site: RtOcc Current Dose: 1600/6000 cGy Fractions:          Subjective:   Pamella feels well without concerns today.     Objective:   /67 (BP Location: Left arm, Patient Position: Sitting, Cuff Size: Adult Regular)   Pulse 65   Temp 97.7  F (36.5  C) (Oral)   Wt 97.3 kg (214 lb 8 oz)   SpO2 99%   BMI 33.60 kg/m    Gen: Appears well, in no acute distress  Skin: No erythema    Labs:  CBC RESULTS:   Recent Labs   Lab Test 24  0831   WBC 5.6   RBC 4.33   HGB 13.2   HCT 41.2   MCV 95   MCH 30.5   MCHC 32.0   RDW 13.4        ELECTROLYTES:  Recent Labs   Lab Test 24  0831      POTASSIUM 4.6   CHLORIDE 107   SUHAIL 9.5   CO2 24   BUN 14.7   CR 0.80   GLC 93       Assessment:    Tolerating radiation therapy well.  All questions and concerns addressed.      Plan:   Continue current therapy.        Mosaiq chart and setup information reviewed  Daily imaging  reviewed    Medication Review  Med list reviewed with patient?: Yes  Medication changes: keppra increased last week      Erna Sanchez MD, PhD     Department of Radiation Oncology  [facility]        Again, thank you for allowing me to participate in the care of your patient.        Sincerely,        Erna Sanchez MD PhD

## 2024-08-02 ENCOUNTER — APPOINTMENT (OUTPATIENT)
Dept: RADIATION ONCOLOGY | Facility: CLINIC | Age: 59
End: 2024-08-02
Attending: STUDENT IN AN ORGANIZED HEALTH CARE EDUCATION/TRAINING PROGRAM
Payer: COMMERCIAL

## 2024-08-02 PROCEDURE — 77014 PR CT GUIDE FOR PLACEMENT RADIATION THERAPY FIELDS: CPT | Mod: 26 | Performed by: STUDENT IN AN ORGANIZED HEALTH CARE EDUCATION/TRAINING PROGRAM

## 2024-08-02 PROCEDURE — 77386 HC IMRT TREATMENT DELIVERY, COMPLEX: CPT | Performed by: STUDENT IN AN ORGANIZED HEALTH CARE EDUCATION/TRAINING PROGRAM

## 2024-08-05 ENCOUNTER — APPOINTMENT (OUTPATIENT)
Dept: RADIATION ONCOLOGY | Facility: CLINIC | Age: 59
End: 2024-08-05
Attending: STUDENT IN AN ORGANIZED HEALTH CARE EDUCATION/TRAINING PROGRAM
Payer: COMMERCIAL

## 2024-08-05 PROCEDURE — 77386 HC IMRT TREATMENT DELIVERY, COMPLEX: CPT | Performed by: STUDENT IN AN ORGANIZED HEALTH CARE EDUCATION/TRAINING PROGRAM

## 2024-08-05 PROCEDURE — 77014 PR CT GUIDE FOR PLACEMENT RADIATION THERAPY FIELDS: CPT | Mod: 26 | Performed by: STUDENT IN AN ORGANIZED HEALTH CARE EDUCATION/TRAINING PROGRAM

## 2024-08-05 PROCEDURE — 77427 RADIATION TX MANAGEMENT X5: CPT | Performed by: STUDENT IN AN ORGANIZED HEALTH CARE EDUCATION/TRAINING PROGRAM

## 2024-08-05 PROCEDURE — 77336 RADIATION PHYSICS CONSULT: CPT | Performed by: STUDENT IN AN ORGANIZED HEALTH CARE EDUCATION/TRAINING PROGRAM

## 2024-08-06 ENCOUNTER — APPOINTMENT (OUTPATIENT)
Dept: RADIATION ONCOLOGY | Facility: CLINIC | Age: 59
End: 2024-08-06
Attending: STUDENT IN AN ORGANIZED HEALTH CARE EDUCATION/TRAINING PROGRAM
Payer: COMMERCIAL

## 2024-08-06 PROCEDURE — 77014 PR CT GUIDE FOR PLACEMENT RADIATION THERAPY FIELDS: CPT | Mod: 26 | Performed by: STUDENT IN AN ORGANIZED HEALTH CARE EDUCATION/TRAINING PROGRAM

## 2024-08-06 PROCEDURE — 77386 HC IMRT TREATMENT DELIVERY, COMPLEX: CPT | Performed by: STUDENT IN AN ORGANIZED HEALTH CARE EDUCATION/TRAINING PROGRAM

## 2024-08-07 ENCOUNTER — APPOINTMENT (OUTPATIENT)
Dept: LAB | Facility: CLINIC | Age: 59
End: 2024-08-07
Attending: STUDENT IN AN ORGANIZED HEALTH CARE EDUCATION/TRAINING PROGRAM
Payer: COMMERCIAL

## 2024-08-07 ENCOUNTER — ALLIED HEALTH/NURSE VISIT (OUTPATIENT)
Dept: RADIATION ONCOLOGY | Facility: CLINIC | Age: 59
End: 2024-08-07
Attending: STUDENT IN AN ORGANIZED HEALTH CARE EDUCATION/TRAINING PROGRAM
Payer: COMMERCIAL

## 2024-08-07 DIAGNOSIS — T45.1X5A ANTINEOPLASTIC CHEMOTHERAPY INDUCED PANCYTOPENIA (H): ICD-10-CM

## 2024-08-07 DIAGNOSIS — T45.1X5A CHEMOTHERAPY-INDUCED NAUSEA AND VOMITING: ICD-10-CM

## 2024-08-07 DIAGNOSIS — R11.2 CHEMOTHERAPY-INDUCED NAUSEA AND VOMITING: ICD-10-CM

## 2024-08-07 DIAGNOSIS — D61.810 ANTINEOPLASTIC CHEMOTHERAPY INDUCED PANCYTOPENIA (H): ICD-10-CM

## 2024-08-07 LAB
ALBUMIN SERPL BCG-MCNC: 4.1 G/DL (ref 3.5–5.2)
ALP SERPL-CCNC: 71 U/L (ref 40–150)
ALT SERPL W P-5'-P-CCNC: 17 U/L (ref 0–50)
ANION GAP SERPL CALCULATED.3IONS-SCNC: 9 MMOL/L (ref 7–15)
AST SERPL W P-5'-P-CCNC: 22 U/L (ref 0–45)
BASOPHILS # BLD AUTO: 0.1 10E3/UL (ref 0–0.2)
BASOPHILS NFR BLD AUTO: 1 %
BILIRUB SERPL-MCNC: 0.4 MG/DL
BUN SERPL-MCNC: 12.2 MG/DL (ref 8–23)
CALCIUM SERPL-MCNC: 9.2 MG/DL (ref 8.8–10.4)
CHLORIDE SERPL-SCNC: 108 MMOL/L (ref 98–107)
CREAT SERPL-MCNC: 0.75 MG/DL (ref 0.51–0.95)
EGFRCR SERPLBLD CKD-EPI 2021: 90 ML/MIN/1.73M2
EOSINOPHIL # BLD AUTO: 0.3 10E3/UL (ref 0–0.7)
EOSINOPHIL NFR BLD AUTO: 7 %
ERYTHROCYTE [DISTWIDTH] IN BLOOD BY AUTOMATED COUNT: 13.3 % (ref 10–15)
GLUCOSE SERPL-MCNC: 95 MG/DL (ref 70–99)
HCO3 SERPL-SCNC: 24 MMOL/L (ref 22–29)
HCT VFR BLD AUTO: 41.2 % (ref 35–47)
HGB BLD-MCNC: 13.4 G/DL (ref 11.7–15.7)
IMM GRANULOCYTES # BLD: 0 10E3/UL
IMM GRANULOCYTES NFR BLD: 0 %
LYMPHOCYTES # BLD AUTO: 1.6 10E3/UL (ref 0.8–5.3)
LYMPHOCYTES NFR BLD AUTO: 38 %
MCH RBC QN AUTO: 30.9 PG (ref 26.5–33)
MCHC RBC AUTO-ENTMCNC: 32.5 G/DL (ref 31.5–36.5)
MCV RBC AUTO: 95 FL (ref 78–100)
MONOCYTES # BLD AUTO: 0.4 10E3/UL (ref 0–1.3)
MONOCYTES NFR BLD AUTO: 10 %
NEUTROPHILS # BLD AUTO: 1.9 10E3/UL (ref 1.6–8.3)
NEUTROPHILS NFR BLD AUTO: 44 %
NRBC # BLD AUTO: 0 10E3/UL
NRBC BLD AUTO-RTO: 0 /100
PLATELET # BLD AUTO: 256 10E3/UL (ref 150–450)
POTASSIUM SERPL-SCNC: 4 MMOL/L (ref 3.4–5.3)
PROT SERPL-MCNC: 6.5 G/DL (ref 6.4–8.3)
RBC # BLD AUTO: 4.33 10E6/UL (ref 3.8–5.2)
SODIUM SERPL-SCNC: 141 MMOL/L (ref 135–145)
WBC # BLD AUTO: 4.3 10E3/UL (ref 4–11)

## 2024-08-07 PROCEDURE — 36415 COLL VENOUS BLD VENIPUNCTURE: CPT

## 2024-08-07 PROCEDURE — 85041 AUTOMATED RBC COUNT: CPT

## 2024-08-07 PROCEDURE — 77014 PR CT GUIDE FOR PLACEMENT RADIATION THERAPY FIELDS: CPT | Mod: 26 | Performed by: STUDENT IN AN ORGANIZED HEALTH CARE EDUCATION/TRAINING PROGRAM

## 2024-08-07 PROCEDURE — 80053 COMPREHEN METABOLIC PANEL: CPT

## 2024-08-07 PROCEDURE — 77386 HC IMRT TREATMENT DELIVERY, COMPLEX: CPT | Performed by: STUDENT IN AN ORGANIZED HEALTH CARE EDUCATION/TRAINING PROGRAM

## 2024-08-07 NOTE — PROGRESS NOTES
[facility] PHYSICIANS  SPECIALIZING IN BREAKTHROUGHS  Radiation Oncology    On Treatment Visit Note      Pamella Jaimes      Date: Aug 1, 2024   MRN: [id number]   : 1965  Diagnosis: Glioblastoma      Reason for Visit:  On Radiation Treatment Visit     Treatment Summary to Date  Treatment Site: RtOcc Current Dose: 1600/6000 cGy Fractions:          Subjective:   Pamella feels well without concerns today.     Objective:   /67 (BP Location: Left arm, Patient Position: Sitting, Cuff Size: Adult Regular)   Pulse 65   Temp 97.7  F (36.5  C) (Oral)   Wt 97.3 kg (214 lb 8 oz)   SpO2 99%   BMI 33.60 kg/m    Gen: Appears well, in no acute distress  Skin: No erythema    Labs:  CBC RESULTS:   Recent Labs   Lab Test 24  0831   WBC 5.6   RBC 4.33   HGB 13.2   HCT 41.2   MCV 95   MCH 30.5   MCHC 32.0   RDW 13.4        ELECTROLYTES:  Recent Labs   Lab Test 24  0831      POTASSIUM 4.6   CHLORIDE 107   SUHAIL 9.5   CO2 24   BUN 14.7   CR 0.80   GLC 93       Assessment:    Tolerating radiation therapy well.  All questions and concerns addressed.      Plan:   Continue current therapy.        Mosaiq chart and setup information reviewed  Daily imaging  reviewed    Medication Review  Med list reviewed with patient?: Yes  Medication changes: keppra increased last week      Erna Sanchez MD, PhD     Department of Radiation Oncology  [facility]

## 2024-08-08 ENCOUNTER — OFFICE VISIT (OUTPATIENT)
Dept: RADIATION ONCOLOGY | Facility: CLINIC | Age: 59
End: 2024-08-08
Attending: STUDENT IN AN ORGANIZED HEALTH CARE EDUCATION/TRAINING PROGRAM
Payer: COMMERCIAL

## 2024-08-08 ENCOUNTER — DOCUMENTATION ONLY (OUTPATIENT)
Dept: PHARMACY | Facility: CLINIC | Age: 59
End: 2024-08-08
Payer: COMMERCIAL

## 2024-08-08 VITALS
BODY MASS INDEX: 34 KG/M2 | OXYGEN SATURATION: 99 % | HEART RATE: 69 BPM | DIASTOLIC BLOOD PRESSURE: 70 MMHG | WEIGHT: 217.1 LBS | SYSTOLIC BLOOD PRESSURE: 114 MMHG

## 2024-08-08 DIAGNOSIS — C71.9 GLIOBLASTOMA (H): Primary | ICD-10-CM

## 2024-08-08 PROCEDURE — 77386 HC IMRT TREATMENT DELIVERY, COMPLEX: CPT | Performed by: STUDENT IN AN ORGANIZED HEALTH CARE EDUCATION/TRAINING PROGRAM

## 2024-08-08 ASSESSMENT — PAIN SCALES - GENERAL: PAINLEVEL: NO PAIN (0)

## 2024-08-08 NOTE — PROGRESS NOTES
Oral Chemotherapy Monitoring Program  Lab Follow Up    Reviewed lab results from 8/7/24.        6/26/2024     2:00 PM 6/27/2024     3:00 PM 7/18/2024     9:00 AM 7/29/2024    11:00 AM 7/31/2024     9:00 AM 8/8/2024     3:00 PM   ORAL CHEMOTHERAPY   Assessment Type Chart Review;New Teach;Lab Monitoring;Initial Work up Chart Review;Refill;Incoming phone call Chart Review;Incoming phone call;New Teach;Refill Initial Follow up Lab Monitoring Lab Monitoring   Diagnosis Code Brain Cancer - Glioblastoma Brain Cancer - Glioblastoma Brain Cancer - Glioblastoma Brain Cancer - Glioblastoma Brain Cancer - Glioblastoma Brain Cancer - Glioblastoma   Providers Dr. Juan C Irizarry   Clinic Name/Location [facility]   Drug Name Temodar (temozolomide) Temodar (temozolomide) Temodar (temozolomide) Temodar (temozolomide) Temodar (temozolomide) Temodar (temozolomide)   Dose 160 mg 160 mg 160 mg 160 mg 160 mg 160 mg   Current Schedule Daily Daily Daily Daily Daily Daily   Cycle Details Continuous for 42 days during XRT Continuous for 42 days during XRT Continuous for 42 days during XRT Continuous for 42 days during XRT Continuous for 42 days during XRT Continuous for 42 days during XRT   Start Date of Last Cycle   7/22/2024 7/22/2024 7/22/2024 7/22/2024   Adherence Assessment    Adherent     Adverse Effects    No AE identified during assessment No AE identified during assessment    Any new drug interactions? No No No No     Is the dose as ordered appropriate for the patient? Yes Yes Yes Yes Yes        Labs:  _  Result Component Current Result Ref Range   Sodium 141 (8/7/2024) 135 - 145 mmol/L     _  Result Component Current Result Ref Range   Potassium 4.0 (8/7/2024) 3.4 - 5.3 mmol/L     _  Result Component Current Result Ref Range   Calcium 9.2 (8/7/2024) 8.8 - 10.4 mg/dL     No results found for Mag within last 30 days.     No results found for Phos  within last 30 days.     _  Result Component Current Result Ref Range   Albumin 4.1 (8/7/2024) 3.5 - 5.2 g/dL     _  Result Component Current Result Ref Range   Urea Nitrogen 12.2 (8/7/2024) 8.0 - 23.0 mg/dL     _  Result Component Current Result Ref Range   Creatinine 0.75 (8/7/2024) 0.51 - 0.95 mg/dL     _  Result Component Current Result Ref Range   AST 22 (8/7/2024) 0 - 45 U/L     _  Result Component Current Result Ref Range   ALT 17 (8/7/2024) 0 - 50 U/L     _  Result Component Current Result Ref Range   Bilirubin Total 0.4 (8/7/2024) <=1.2 mg/dL     _  Result Component Current Result Ref Range   WBC Count 4.3 (8/7/2024) 4.0 - 11.0 10e3/uL     _  Result Component Current Result Ref Range   Hemoglobin 13.4 (8/7/2024) 11.7 - 15.7 g/dL     _  Result Component Current Result Ref Range   Platelet Count 256 (8/7/2024) 150 - 450 10e3/uL     No results found for ANC within last 30 days.     _  Result Component Current Result Ref Range   Absolute Neutrophils 1.9 (8/7/2024) 1.6 - 8.3 10e3/uL        Assessment & Plan:  No concerning abnormalities. Proceed with chemoradiation as planned.     Questions answered to patient's satisfaction.    Follow-Up:  1 week with labs and look for provider follow-up midway (was cancelled 8/8 due to conflict)    Venessa Yarbrough PharmD  August 8, 2024

## 2024-08-08 NOTE — PROGRESS NOTES
[facility] PHYSICIANS  SPECIALIZING IN BREAKTHROUGHS  Radiation Oncology    On Treatment Visit Note      Pamella Jaimes      Date: Aug 8, 2024   MRN: [id number]   : 1965  Diagnosis: Glioblastoma      Reason for Visit:  On Radiation Treatment Visit     Treatment Summary to Date  Treatment Site: RtOcc Current Dose: 2600/6000 cGy Fractions:          Subjective:   Pamella reports feeling pain in her forehead, similar to sinus pressure, associated with light sensitivity worse in the evenings.  She thinks that more screen time at night worsens this feeling.  She was started on sertraline by Dr. Irizarry, which she self discontinued, stating that she felt really fatigued and did not like the feeling.    Objective:   /70 (BP Location: Left arm, Patient Position: Sitting, Cuff Size: Adult Large)   Pulse 69   Wt 98.5 kg (217 lb 1.6 oz)   SpO2 99%   BMI 34.00 kg/m    Gen: Appears well, in no acute distress  Skin: No erythema      Labs:  CBC RESULTS:   Recent Labs   Lab Test 24  0804   WBC 4.3   RBC 4.33   HGB 13.4   HCT 41.2   MCV 95   MCH 30.9   MCHC 32.5   RDW 13.3        ELECTROLYTES:  Recent Labs   Lab Test 24  0804      POTASSIUM 4.0   CHLORIDE 108*   SUHAIL 9.2   CO2 24   BUN 12.2   CR 0.75   GLC 95         Assessment:    Tolerating radiation therapy well.  All questions and concerns addressed.    Toxicity: None    Plan:   Continue current therapy.        Mosaiq chart and setup information reviewed  Daily imaging  reviewed    Medication Review  Med list reviewed with patient?: Yes  Medication changes: same keppra dose     Dillan Alvarez MD  PGY-4 Radiation Oncology  Department of Radiation Oncology  [facility]  Phone: [phone]    A medical resident participated in the care of this patient and in the preparation of this note. I have verified and edited this note. I personally performed key elements of the physical exam and medical decision making  for this patient.  I agree with the assessment and plan of care as documented in the note above.    Erna Sanchez MD, PhD     Department of Radiation Oncology  [facility]

## 2024-08-08 NOTE — LETTER
2024      Pamella Jaimes  [address] [address]      Dear Colleague,    Thank you for referring your patient, Pamella Jaimes, to the [facility] RADIATION ONCOLOGY. Please see a copy of my visit note below.    [facility] PHYSICIANS  SPECIALIZING IN BREAKTHROUGHS  Radiation Oncology    On Treatment Visit Note      Pamella Jaimes      Date: Aug 8, 2024   MRN: [id number]   : 1965  Diagnosis: Glioblastoma      Reason for Visit:  On Radiation Treatment Visit     Treatment Summary to Date  Treatment Site: RtOcc Current Dose: 2600/6000 cGy Fractions:          Subjective:   Pamella reports feeling pain in her forehead, similar to sinus pressure, associated with light sensitivity worse in the evenings.  She thinks that more screen time at night worsens this feeling.  She was started on sertraline by Dr. Irizarry, which she self discontinued, stating that she felt really fatigued and did not like the feeling.    Objective:   /70 (BP Location: Left arm, Patient Position: Sitting, Cuff Size: Adult Large)   Pulse 69   Wt 98.5 kg (217 lb 1.6 oz)   SpO2 99%   BMI 34.00 kg/m    Gen: Appears well, in no acute distress  Skin: No erythema      Labs:  CBC RESULTS:   Recent Labs   Lab Test 24  0804   WBC 4.3   RBC 4.33   HGB 13.4   HCT 41.2   MCV 95   MCH 30.9   MCHC 32.5   RDW 13.3        ELECTROLYTES:  Recent Labs   Lab Test 24  0804      POTASSIUM 4.0   CHLORIDE 108*   SUHAIL 9.2   CO2 24   BUN 12.2   CR 0.75   GLC 95         Assessment:    Tolerating radiation therapy well.  All questions and concerns addressed.    Toxicity: None    Plan:   Continue current therapy.        Mosaiq chart and setup information reviewed  Daily imaging  reviewed    Medication Review  Med list reviewed with patient?: Yes  Medication changes: same keppra dose     Dillan Alvarez MD  PGY-4 Radiation Oncology  Department of Radiation Oncology  [facility]  Phone: [phone]    A medical resident participated in the care of this patient and in the preparation of this note. I have verified and edited this note. I personally performed key elements of the physical exam and medical decision making for this patient.  I agree with the assessment and plan of care as documented in the note above.    Erna Sanchez MD, PhD     Department of Radiation Oncology  [facility]        Again, thank you for allowing me to participate in the care of your patient.        Sincerely,        Erna Sanchez MD PhD

## 2024-08-09 ENCOUNTER — APPOINTMENT (OUTPATIENT)
Dept: RADIATION ONCOLOGY | Facility: CLINIC | Age: 59
End: 2024-08-09
Attending: STUDENT IN AN ORGANIZED HEALTH CARE EDUCATION/TRAINING PROGRAM
Payer: COMMERCIAL

## 2024-08-09 PROCEDURE — 77386 HC IMRT TREATMENT DELIVERY, COMPLEX: CPT | Performed by: STUDENT IN AN ORGANIZED HEALTH CARE EDUCATION/TRAINING PROGRAM

## 2024-08-09 PROCEDURE — 77014 PR CT GUIDE FOR PLACEMENT RADIATION THERAPY FIELDS: CPT | Mod: 26 | Performed by: STUDENT IN AN ORGANIZED HEALTH CARE EDUCATION/TRAINING PROGRAM

## 2024-08-10 ENCOUNTER — HEALTH MAINTENANCE LETTER (OUTPATIENT)
Age: 59
End: 2024-08-10

## 2024-08-12 ENCOUNTER — APPOINTMENT (OUTPATIENT)
Dept: RADIATION ONCOLOGY | Facility: CLINIC | Age: 59
End: 2024-08-12
Attending: STUDENT IN AN ORGANIZED HEALTH CARE EDUCATION/TRAINING PROGRAM
Payer: COMMERCIAL

## 2024-08-12 PROCEDURE — 77386 HC IMRT TREATMENT DELIVERY, COMPLEX: CPT | Performed by: STUDENT IN AN ORGANIZED HEALTH CARE EDUCATION/TRAINING PROGRAM

## 2024-08-12 PROCEDURE — 77336 RADIATION PHYSICS CONSULT: CPT | Performed by: STUDENT IN AN ORGANIZED HEALTH CARE EDUCATION/TRAINING PROGRAM

## 2024-08-12 PROCEDURE — 77014 PR CT GUIDE FOR PLACEMENT RADIATION THERAPY FIELDS: CPT | Mod: 26 | Performed by: STUDENT IN AN ORGANIZED HEALTH CARE EDUCATION/TRAINING PROGRAM

## 2024-08-12 PROCEDURE — 77427 RADIATION TX MANAGEMENT X5: CPT | Mod: GC | Performed by: STUDENT IN AN ORGANIZED HEALTH CARE EDUCATION/TRAINING PROGRAM

## 2024-08-13 ENCOUNTER — APPOINTMENT (OUTPATIENT)
Dept: RADIATION ONCOLOGY | Facility: CLINIC | Age: 59
End: 2024-08-13
Attending: STUDENT IN AN ORGANIZED HEALTH CARE EDUCATION/TRAINING PROGRAM
Payer: COMMERCIAL

## 2024-08-13 ENCOUNTER — MYC REFILL (OUTPATIENT)
Dept: ONCOLOGY | Facility: CLINIC | Age: 59
End: 2024-08-13

## 2024-08-13 DIAGNOSIS — C71.9 GLIOBLASTOMA (H): ICD-10-CM

## 2024-08-13 PROCEDURE — 77386 HC IMRT TREATMENT DELIVERY, COMPLEX: CPT | Performed by: STUDENT IN AN ORGANIZED HEALTH CARE EDUCATION/TRAINING PROGRAM

## 2024-08-13 PROCEDURE — 77014 PR CT GUIDE FOR PLACEMENT RADIATION THERAPY FIELDS: CPT | Mod: 26 | Performed by: STUDENT IN AN ORGANIZED HEALTH CARE EDUCATION/TRAINING PROGRAM

## 2024-08-14 DIAGNOSIS — C71.9 GLIOBLASTOMA (H): Primary | ICD-10-CM

## 2024-08-14 RX ORDER — ONDANSETRON 4 MG/1
4 TABLET, FILM COATED ORAL AT BEDTIME
Qty: 30 TABLET | Refills: 3 | OUTPATIENT
Start: 2024-08-14

## 2024-08-14 NOTE — TELEPHONE ENCOUNTER
Medication:ondansetron  Last prescribing provider:Dr Irizarry  Last clinic visit date: 7/26/2024 Ratna Hernandez  Recommendations for requested medication:for  nausea/vomiting  Any other pertinent information:  Last filled on 8/1/2024 with 3 refills.   REFUSED pt should have refills on file.

## 2024-08-16 ENCOUNTER — APPOINTMENT (OUTPATIENT)
Dept: RADIATION ONCOLOGY | Facility: CLINIC | Age: 59
End: 2024-08-16
Attending: STUDENT IN AN ORGANIZED HEALTH CARE EDUCATION/TRAINING PROGRAM
Payer: COMMERCIAL

## 2024-08-16 ENCOUNTER — TELEPHONE (OUTPATIENT)
Dept: PHARMACY | Facility: CLINIC | Age: 59
End: 2024-08-16

## 2024-08-16 ENCOUNTER — HOSPITAL ENCOUNTER (OUTPATIENT)
Dept: RESEARCH | Facility: CLINIC | Age: 59
Discharge: HOME | End: 2024-08-16
Attending: STUDENT IN AN ORGANIZED HEALTH CARE EDUCATION/TRAINING PROGRAM | Admitting: STUDENT IN AN ORGANIZED HEALTH CARE EDUCATION/TRAINING PROGRAM
Payer: COMMERCIAL

## 2024-08-16 ENCOUNTER — DOCUMENTATION ONLY (OUTPATIENT)
Dept: PHARMACY | Facility: CLINIC | Age: 59
End: 2024-08-16

## 2024-08-16 DIAGNOSIS — D61.810 ANTINEOPLASTIC CHEMOTHERAPY INDUCED PANCYTOPENIA (H): ICD-10-CM

## 2024-08-16 DIAGNOSIS — R11.2 CHEMOTHERAPY-INDUCED NAUSEA AND VOMITING: ICD-10-CM

## 2024-08-16 DIAGNOSIS — T45.1X5A CHEMOTHERAPY-INDUCED NAUSEA AND VOMITING: ICD-10-CM

## 2024-08-16 DIAGNOSIS — T45.1X5A ANTINEOPLASTIC CHEMOTHERAPY INDUCED PANCYTOPENIA (H): ICD-10-CM

## 2024-08-16 LAB
ALBUMIN SERPL BCG-MCNC: 4.3 G/DL (ref 3.5–5.2)
ALP SERPL-CCNC: 73 U/L (ref 40–150)
ALT SERPL W P-5'-P-CCNC: 24 U/L (ref 0–50)
ANION GAP SERPL CALCULATED.3IONS-SCNC: 11 MMOL/L (ref 7–15)
AST SERPL W P-5'-P-CCNC: 26 U/L (ref 0–45)
BASOPHILS # BLD AUTO: 0.1 10E3/UL (ref 0–0.2)
BASOPHILS NFR BLD AUTO: 1 %
BILIRUB SERPL-MCNC: 0.5 MG/DL
BUN SERPL-MCNC: 8.8 MG/DL (ref 8–23)
CALCIUM SERPL-MCNC: 9.1 MG/DL (ref 8.8–10.4)
CHLORIDE SERPL-SCNC: 106 MMOL/L (ref 98–107)
CREAT SERPL-MCNC: 0.76 MG/DL (ref 0.51–0.95)
EGFRCR SERPLBLD CKD-EPI 2021: 89 ML/MIN/1.73M2
EOSINOPHIL # BLD AUTO: 0.3 10E3/UL (ref 0–0.7)
EOSINOPHIL NFR BLD AUTO: 9 %
ERYTHROCYTE [DISTWIDTH] IN BLOOD BY AUTOMATED COUNT: 13.1 % (ref 10–15)
GLUCOSE SERPL-MCNC: 85 MG/DL (ref 70–99)
HCO3 SERPL-SCNC: 23 MMOL/L (ref 22–29)
HCT VFR BLD AUTO: 40.8 % (ref 35–47)
HGB BLD-MCNC: 13.4 G/DL (ref 11.7–15.7)
IMM GRANULOCYTES # BLD: 0 10E3/UL
IMM GRANULOCYTES NFR BLD: 0 %
LYMPHOCYTES # BLD AUTO: 1.6 10E3/UL (ref 0.8–5.3)
LYMPHOCYTES NFR BLD AUTO: 41 %
MCH RBC QN AUTO: 30.9 PG (ref 26.5–33)
MCHC RBC AUTO-ENTMCNC: 32.8 G/DL (ref 31.5–36.5)
MCV RBC AUTO: 94 FL (ref 78–100)
MONOCYTES # BLD AUTO: 0.4 10E3/UL (ref 0–1.3)
MONOCYTES NFR BLD AUTO: 10 %
NEUTROPHILS # BLD AUTO: 1.5 10E3/UL (ref 1.6–8.3)
NEUTROPHILS NFR BLD AUTO: 39 %
NRBC # BLD AUTO: 0 10E3/UL
NRBC BLD AUTO-RTO: 0 /100
PLATELET # BLD AUTO: 288 10E3/UL (ref 150–450)
POTASSIUM SERPL-SCNC: 3.8 MMOL/L (ref 3.4–5.3)
PROT SERPL-MCNC: 6.8 G/DL (ref 6.4–8.3)
RBC # BLD AUTO: 4.33 10E6/UL (ref 3.8–5.2)
SODIUM SERPL-SCNC: 140 MMOL/L (ref 135–145)
WBC # BLD AUTO: 3.8 10E3/UL (ref 4–11)

## 2024-08-16 PROCEDURE — 80053 COMPREHEN METABOLIC PANEL: CPT

## 2024-08-16 PROCEDURE — 36415 COLL VENOUS BLD VENIPUNCTURE: CPT

## 2024-08-16 PROCEDURE — 999N000129 HC STATISTIC PICC/MID LINE PROC CANCELLED SUBQ WORKUP

## 2024-08-16 PROCEDURE — 85025 COMPLETE CBC W/AUTO DIFF WBC: CPT

## 2024-08-16 PROCEDURE — 510N000009 HC RESEARCH FACILITY, PER 15 MIN

## 2024-08-16 PROCEDURE — 510N000017 HC CRU PATIENT CARE, PER 15 MIN

## 2024-08-16 NOTE — TELEPHONE ENCOUNTER
PA Initiation    Medication: ONDANSETRON HCL 8 MG PO TABS  Insurance Company: [employer] Clinical Review - Phone [phone] Fax [phone]  Pharmacy Filling the Rx: [id number] - [place] - [address]  Filling Pharmacy Phone:    Filling Pharmacy Fax:    Start Date: 8/16/2024

## 2024-08-16 NOTE — PROGRESS NOTES
Oral Chemotherapy Monitoring Program  Lab Follow Up    Reviewed lab results from 8/16/24.        6/26/2024     2:00 PM 6/27/2024     3:00 PM 7/18/2024     9:00 AM 7/29/2024    11:00 AM 7/31/2024     9:00 AM 8/8/2024     3:00 PM 8/16/2024    11:00 AM   ORAL CHEMOTHERAPY   Assessment Type Chart Review;New Teach;Lab Monitoring;Initial Work up Chart Review;Refill;Incoming phone call Chart Review;Incoming phone call;New Teach;Refill Initial Follow up Lab Monitoring Lab Monitoring Lab Monitoring   Diagnosis Code Brain Cancer - Glioblastoma Brain Cancer - Glioblastoma Brain Cancer - Glioblastoma Brain Cancer - Glioblastoma Brain Cancer - Glioblastoma Brain Cancer - Glioblastoma Brain Cancer - Glioblastoma   Providers Dr. Juan C Irizarry   Clinic Name/Location [facility]   Drug Name Temodar (temozolomide) Temodar (temozolomide) Temodar (temozolomide) Temodar (temozolomide) Temodar (temozolomide) Temodar (temozolomide) Temodar (temozolomide)   Dose 160 mg 160 mg 160 mg 160 mg 160 mg 160 mg 160 mg   Current Schedule Daily Daily Daily Daily Daily Daily Daily   Cycle Details Continuous for 42 days during XRT Continuous for 42 days during XRT Continuous for 42 days during XRT Continuous for 42 days during XRT Continuous for 42 days during XRT Continuous for 42 days during XRT Continuous for 42 days during XRT   Start Date of Last Cycle   7/22/2024 7/22/2024 7/22/2024 7/22/2024 7/22/2024   Adherence Assessment    Adherent      Adverse Effects    No AE identified during assessment No AE identified during assessment  Neutropenia   Neutropenia       Grade 1   Pharmacist Intervention(neutropenia)       No   Any new drug interactions? No No No No      Is the dose as ordered appropriate for the patient? Yes Yes Yes Yes Yes         Labs:  _  Result Component Current Result Ref Range   Sodium 140 (8/16/2024) 135 - 145 mmol/L      _  Result Component Current Result Ref Range   Potassium 3.8 (8/16/2024) 3.4 - 5.3 mmol/L     _  Result Component Current Result Ref Range   Calcium 9.1 (8/16/2024) 8.8 - 10.4 mg/dL     No results found for Mag within last 30 days.     No results found for Phos within last 30 days.     _  Result Component Current Result Ref Range   Albumin 4.3 (8/16/2024) 3.5 - 5.2 g/dL     _  Result Component Current Result Ref Range   Urea Nitrogen 8.8 (8/16/2024) 8.0 - 23.0 mg/dL     _  Result Component Current Result Ref Range   Creatinine 0.76 (8/16/2024) 0.51 - 0.95 mg/dL     _  Result Component Current Result Ref Range   AST 26 (8/16/2024) 0 - 45 U/L     _  Result Component Current Result Ref Range   ALT 24 (8/16/2024) 0 - 50 U/L     _  Result Component Current Result Ref Range   Bilirubin Total 0.5 (8/16/2024) <=1.2 mg/dL     _  Result Component Current Result Ref Range   WBC Count 3.8 (L) (8/16/2024) 4.0 - 11.0 10e3/uL     _  Result Component Current Result Ref Range   Hemoglobin 13.4 (8/16/2024) 11.7 - 15.7 g/dL     _  Result Component Current Result Ref Range   Platelet Count 288 (8/16/2024) 150 - 450 10e3/uL     No results found for ANC within last 30 days.     _  Result Component Current Result Ref Range   Absolute Neutrophils 1.5 (L) (8/16/2024) 1.6 - 8.3 10e3/uL        Assessment  Patient has grade 1 neutropenia. No other concerning labs today.     Plan   Continue Temodar.   Weekly labs on 8/21    [facility]  Pharmacy Intern   [facility]   [phone]

## 2024-08-19 ENCOUNTER — MYC REFILL (OUTPATIENT)
Dept: ONCOLOGY | Facility: CLINIC | Age: 59
End: 2024-08-19
Payer: COMMERCIAL

## 2024-08-19 ENCOUNTER — PATIENT OUTREACH (OUTPATIENT)
Dept: CARE COORDINATION | Facility: CLINIC | Age: 59
End: 2024-08-19
Payer: COMMERCIAL

## 2024-08-19 ENCOUNTER — APPOINTMENT (OUTPATIENT)
Dept: RADIATION ONCOLOGY | Facility: CLINIC | Age: 59
End: 2024-08-19
Attending: STUDENT IN AN ORGANIZED HEALTH CARE EDUCATION/TRAINING PROGRAM
Payer: COMMERCIAL

## 2024-08-19 DIAGNOSIS — C71.9 GLIOBLASTOMA (H): ICD-10-CM

## 2024-08-19 PROCEDURE — 77386 HC IMRT TREATMENT DELIVERY, COMPLEX: CPT | Performed by: STUDENT IN AN ORGANIZED HEALTH CARE EDUCATION/TRAINING PROGRAM

## 2024-08-19 PROCEDURE — 77014 PR CT GUIDE FOR PLACEMENT RADIATION THERAPY FIELDS: CPT | Mod: 26 | Performed by: RADIOLOGY

## 2024-08-19 RX ORDER — LEVETIRACETAM 750 MG/1
750 TABLET ORAL 2 TIMES DAILY
Qty: 60 TABLET | Refills: 1 | Status: SHIPPED | OUTPATIENT
Start: 2024-08-19

## 2024-08-19 RX ORDER — SULFAMETHOXAZOLE/TRIMETHOPRIM 800-160 MG
1 TABLET ORAL
Qty: 18 TABLET | Refills: 0 | OUTPATIENT
Start: 2024-08-19

## 2024-08-19 NOTE — TELEPHONE ENCOUNTER
Prior Authorization Approval    Medication: ONDANSETRON HCL 8 MG PO TABS  Authorization Effective Date: 5/17/2024  Authorization Expiration Date: 8/16/2025  Approved Dose/Quantity: 30/10 day  Reference #:     Insurance Company: [employer] Clinical Review - Phone [phone] Fax [phone]  Expected CoPay: $    CoPay Card Available:      Financial Assistance Needed: no  Which Pharmacy is filling the prescription: [id number] - [place], [place] - [address]  Pharmacy Notified: yes, I requested the pharmacy to back bill the date of service she paid for out of pocket a few days ago. They currently have the ondansetron ready.  Patient Notified: yes

## 2024-08-19 NOTE — PROGRESS NOTES
Social Work - Follow-Up  [facility]    Data/Intervention:  Patient Name: Pamella Jaimes Goes By: Pamella    /Age: 1965 (59 year old)    Reason for Follow-Up:  This clinician called Pamella for planned follow-up    Intervention/Education/Resources Provided:  Engaged with Pamella regarding the following:     Emotional Health:  Pamella reports that she is interested in accessing Zoloft medication, and wondering if a different dose might be better fit for her given fatigue. SW to send message to medical team for their insight.    Child Support:  Pamella reports that she gets a great deal of anirudh in providing care for children, although is anxious about getting all 3 kids to different locations before 9am with the school year start. Engaged with Pamella in conversation about engaging neighbors/other grandparents/daughter around support for kid drop-offs, especially as she is unable to drive.     Nausea Meds:   Pamella updated that Odansetron medication has been approved by insurance. Michelle outreached to pharmacy to verify medication is prepared for pickup.     Assessment/Plan:  1) This clinician will plan for psychosocial check-in in 2 weeks. Pamella knows to outreach prior in case of concern or need.   2) Ongoing collaboration with multidisciplinary care team.     Previously provided patient/family with writer's contact information and availability.      MEHDI Gonzalez, LICSW, OSW-C  Clinical - Adult Oncology  Phone: [phone]  She/Her/Hers  [facility]: Nohemy FERRO  8am-4:30pm  [facility]: TRISH Kellogg F 8am-4:30pm   Support Groups at [facility]: Social Work Services for Cancer Patients ([email or web address])

## 2024-08-20 ENCOUNTER — OFFICE VISIT (OUTPATIENT)
Dept: RADIATION ONCOLOGY | Facility: CLINIC | Age: 59
End: 2024-08-20
Attending: STUDENT IN AN ORGANIZED HEALTH CARE EDUCATION/TRAINING PROGRAM
Payer: COMMERCIAL

## 2024-08-20 VITALS
SYSTOLIC BLOOD PRESSURE: 117 MMHG | DIASTOLIC BLOOD PRESSURE: 80 MMHG | HEART RATE: 64 BPM | WEIGHT: 217 LBS | BODY MASS INDEX: 33.99 KG/M2

## 2024-08-20 DIAGNOSIS — C71.9 GLIOBLASTOMA (H): ICD-10-CM

## 2024-08-20 DIAGNOSIS — C71.9 GLIOBLASTOMA (H): Primary | ICD-10-CM

## 2024-08-20 PROCEDURE — 77014 PR CT GUIDE FOR PLACEMENT RADIATION THERAPY FIELDS: CPT | Mod: 26 | Performed by: RADIOLOGY

## 2024-08-20 PROCEDURE — 77386 HC IMRT TREATMENT DELIVERY, COMPLEX: CPT | Performed by: RADIOLOGY

## 2024-08-20 RX ORDER — TEMOZOLOMIDE 20 MG/1
20 CAPSULE ORAL AT BEDTIME
Qty: 2 CAPSULE | Refills: 0 | Status: SHIPPED | OUTPATIENT
Start: 2024-08-26 | End: 2024-10-01

## 2024-08-20 RX ORDER — SULFAMETHOXAZOLE/TRIMETHOPRIM 800-160 MG
1 TABLET ORAL
Qty: 6 TABLET | Refills: 0 | Status: SHIPPED | OUTPATIENT
Start: 2024-08-21 | End: 2024-09-03

## 2024-08-20 RX ORDER — TEMOZOLOMIDE 140 MG/1
140 CAPSULE ORAL AT BEDTIME
Qty: 2 CAPSULE | Refills: 0 | Status: SHIPPED | OUTPATIENT
Start: 2024-08-26 | End: 2024-10-01

## 2024-08-20 NOTE — LETTER
2024      Pamella Jaimes  [address] [address]      Dear Colleague,    Thank you for referring your patient, Pamella Jaimes, to the [facility] RADIATION ONCOLOGY. Please see a copy of my visit note below.    [facility] PHYSICIANS  SPECIALIZING IN BREAKTHROUGHS  Radiation Oncology    On Treatment Visit Note      Pamella Jaimes      Date: Aug 20, 2024   MRN: [id number]   : 1965  Diagnosis: Glioblastoma      Reason for Visit:  On Radiation Treatment Visit     Treatment Summary to Date  Treatment Site: RtOcc Current Dose: 3800/6000 cGy Fractions:          Subjective:   Pamella continues to experience light sensitivity and right eye strain that she attributes to left sided visual deficits that she has experienced since surgery. She has an appointment with Dr. Dodson in Ophthalmology this week. She asks about a handicapped parking permit and the use of a wig given hair loss in the treatment field.    Objective:   /80   Pulse 64   Wt 98.4 kg (217 lb)   BMI 33.99 kg/m    Gen: Appears well, in no acute distress  Skin: No erythema      Labs:  CBC RESULTS:   Recent Labs   Lab Test 24  0804   WBC 4.3   RBC 4.33   HGB 13.4   HCT 41.2   MCV 95   MCH 30.9   MCHC 32.5   RDW 13.3        ELECTROLYTES:  Recent Labs   Lab Test 24  0804      POTASSIUM 4.0   CHLORIDE 108*   SUHAIL 9.2   CO2 24   BUN 12.2   CR 0.75   GLC 95         Assessment:    Tolerating radiation therapy well.  All questions and concerns addressed.    Toxicity: None    Plan:   Continue current therapy.        Mosaiq chart and setup information reviewed  Daily imaging  reviewed    Medication Review  Med list reviewed with patient?: Yes  Medication changes: same keppra dose     Fabi Horn MD, PhD     Department of Radiation Oncology  [facility]      Covering for primary radiation oncologist Dr. Erna Sanchez      Again,  thank you for allowing me to participate in the care of your patient.        Sincerely,        Fabi Horn

## 2024-08-20 NOTE — PROGRESS NOTES
[facility] PHYSICIANS  SPECIALIZING IN BREAKTHROUGHS  Radiation Oncology    On Treatment Visit Note      Pamella Jaimes      Date: Aug 20, 2024   MRN: [id number]   : 1965  Diagnosis: Glioblastoma      Reason for Visit:  On Radiation Treatment Visit     Treatment Summary to Date  Treatment Site: RtOcc Current Dose: 3800/6000 cGy Fractions:          Subjective:   Pamella continues to experience light sensitivity and right eye strain that she attributes to left sided visual deficits that she has experienced since surgery. She has an appointment with Dr. Dodson in Ophthalmology this week. She asks about a handicapped parking permit and the use of a wig given hair loss in the treatment field.    Objective:   /80   Pulse 64   Wt 98.4 kg (217 lb)   BMI 33.99 kg/m    Gen: Appears well, in no acute distress  Skin: No erythema      Labs:  CBC RESULTS:   Recent Labs   Lab Test 24  0804   WBC 4.3   RBC 4.33   HGB 13.4   HCT 41.2   MCV 95   MCH 30.9   MCHC 32.5   RDW 13.3        ELECTROLYTES:  Recent Labs   Lab Test 24  0804      POTASSIUM 4.0   CHLORIDE 108*   SUHAIL 9.2   CO2 24   BUN 12.2   CR 0.75   GLC 95         Assessment:    Tolerating radiation therapy well.  All questions and concerns addressed.    Toxicity: None    Plan:   Continue current therapy.        Mosaiq chart and setup information reviewed  Daily imaging  reviewed    Medication Review  Med list reviewed with patient?: Yes  Medication changes: same keppra dose     Fabi Horn MD, PhD     Department of Radiation Oncology  [facility]      Covering for primary radiation oncologist Dr. Erna Sanchez

## 2024-08-21 ENCOUNTER — APPOINTMENT (OUTPATIENT)
Dept: RADIATION ONCOLOGY | Facility: CLINIC | Age: 59
End: 2024-08-21
Attending: STUDENT IN AN ORGANIZED HEALTH CARE EDUCATION/TRAINING PROGRAM
Payer: COMMERCIAL

## 2024-08-21 PROCEDURE — 77427 RADIATION TX MANAGEMENT X5: CPT | Performed by: RADIOLOGY

## 2024-08-21 PROCEDURE — 77014 PR CT GUIDE FOR PLACEMENT RADIATION THERAPY FIELDS: CPT | Mod: 26 | Performed by: RADIOLOGY

## 2024-08-21 PROCEDURE — 77336 RADIATION PHYSICS CONSULT: CPT | Performed by: STUDENT IN AN ORGANIZED HEALTH CARE EDUCATION/TRAINING PROGRAM

## 2024-08-21 PROCEDURE — 77386 HC IMRT TREATMENT DELIVERY, COMPLEX: CPT | Performed by: STUDENT IN AN ORGANIZED HEALTH CARE EDUCATION/TRAINING PROGRAM

## 2024-08-22 ENCOUNTER — APPOINTMENT (OUTPATIENT)
Dept: RADIATION ONCOLOGY | Facility: CLINIC | Age: 59
End: 2024-08-22
Attending: STUDENT IN AN ORGANIZED HEALTH CARE EDUCATION/TRAINING PROGRAM
Payer: COMMERCIAL

## 2024-08-22 ENCOUNTER — LAB (OUTPATIENT)
Dept: LAB | Facility: CLINIC | Age: 59
End: 2024-08-22
Attending: PSYCHIATRY & NEUROLOGY
Payer: COMMERCIAL

## 2024-08-22 ENCOUNTER — OFFICE VISIT (OUTPATIENT)
Dept: OPHTHALMOLOGY | Facility: CLINIC | Age: 59
End: 2024-08-22
Attending: PSYCHIATRY & NEUROLOGY
Payer: COMMERCIAL

## 2024-08-22 VITALS
DIASTOLIC BLOOD PRESSURE: 74 MMHG | HEART RATE: 69 BPM | BODY MASS INDEX: 33.92 KG/M2 | OXYGEN SATURATION: 97 % | WEIGHT: 216.6 LBS | SYSTOLIC BLOOD PRESSURE: 113 MMHG

## 2024-08-22 DIAGNOSIS — H53.10 SUBJECTIVE VISUAL DISTURBANCE: Primary | ICD-10-CM

## 2024-08-22 DIAGNOSIS — H53.462 HOMONYMOUS HEMIANOPIA, LEFT: ICD-10-CM

## 2024-08-22 DIAGNOSIS — C71.9 GLIOBLASTOMA (H): ICD-10-CM

## 2024-08-22 DIAGNOSIS — D61.810 ANTINEOPLASTIC CHEMOTHERAPY INDUCED PANCYTOPENIA (H): ICD-10-CM

## 2024-08-22 DIAGNOSIS — T45.1X5A CHEMOTHERAPY-INDUCED NAUSEA AND VOMITING: ICD-10-CM

## 2024-08-22 DIAGNOSIS — R11.2 CHEMOTHERAPY-INDUCED NAUSEA AND VOMITING: ICD-10-CM

## 2024-08-22 DIAGNOSIS — C71.9 GLIOBLASTOMA (H): Primary | ICD-10-CM

## 2024-08-22 DIAGNOSIS — T45.1X5A ANTINEOPLASTIC CHEMOTHERAPY INDUCED PANCYTOPENIA (H): ICD-10-CM

## 2024-08-22 LAB
ALBUMIN SERPL BCG-MCNC: 4.4 G/DL (ref 3.5–5.2)
ALP SERPL-CCNC: 72 U/L (ref 40–150)
ALT SERPL W P-5'-P-CCNC: 19 U/L (ref 0–50)
ANION GAP SERPL CALCULATED.3IONS-SCNC: 12 MMOL/L (ref 7–15)
AST SERPL W P-5'-P-CCNC: 28 U/L (ref 0–45)
BASOPHILS # BLD AUTO: 0 10E3/UL (ref 0–0.2)
BASOPHILS NFR BLD AUTO: 1 %
BILIRUB SERPL-MCNC: 0.5 MG/DL
BUN SERPL-MCNC: 10.8 MG/DL (ref 8–23)
CALCIUM SERPL-MCNC: 9.1 MG/DL (ref 8.8–10.4)
CHLORIDE SERPL-SCNC: 109 MMOL/L (ref 98–107)
CREAT SERPL-MCNC: 0.79 MG/DL (ref 0.51–0.95)
EGFRCR SERPLBLD CKD-EPI 2021: 85 ML/MIN/1.73M2
EOSINOPHIL # BLD AUTO: 0.3 10E3/UL (ref 0–0.7)
EOSINOPHIL NFR BLD AUTO: 7 %
ERYTHROCYTE [DISTWIDTH] IN BLOOD BY AUTOMATED COUNT: 13.1 % (ref 10–15)
GLUCOSE SERPL-MCNC: 101 MG/DL (ref 70–99)
HCO3 SERPL-SCNC: 22 MMOL/L (ref 22–29)
HCT VFR BLD AUTO: 41.2 % (ref 35–47)
HGB BLD-MCNC: 13.3 G/DL (ref 11.7–15.7)
IMM GRANULOCYTES # BLD: 0 10E3/UL
IMM GRANULOCYTES NFR BLD: 0 %
LYMPHOCYTES # BLD AUTO: 1.4 10E3/UL (ref 0.8–5.3)
LYMPHOCYTES NFR BLD AUTO: 37 %
MCH RBC QN AUTO: 30.6 PG (ref 26.5–33)
MCHC RBC AUTO-ENTMCNC: 32.3 G/DL (ref 31.5–36.5)
MCV RBC AUTO: 95 FL (ref 78–100)
MONOCYTES # BLD AUTO: 0.4 10E3/UL (ref 0–1.3)
MONOCYTES NFR BLD AUTO: 11 %
NEUTROPHILS # BLD AUTO: 1.7 10E3/UL (ref 1.6–8.3)
NEUTROPHILS NFR BLD AUTO: 44 %
NRBC # BLD AUTO: 0 10E3/UL
NRBC BLD AUTO-RTO: 0 /100
PLATELET # BLD AUTO: 202 10E3/UL (ref 150–450)
POTASSIUM SERPL-SCNC: 4 MMOL/L (ref 3.4–5.3)
PROT SERPL-MCNC: 6.9 G/DL (ref 6.4–8.3)
RBC # BLD AUTO: 4.35 10E6/UL (ref 3.8–5.2)
SODIUM SERPL-SCNC: 143 MMOL/L (ref 135–145)
WBC # BLD AUTO: 3.7 10E3/UL (ref 4–11)

## 2024-08-22 PROCEDURE — 99203 OFFICE O/P NEW LOW 30 MIN: CPT | Mod: GC | Performed by: OPHTHALMOLOGY

## 2024-08-22 PROCEDURE — 92081 LIMITED VISUAL FIELD XM: CPT | Performed by: OPHTHALMOLOGY

## 2024-08-22 PROCEDURE — 84155 ASSAY OF PROTEIN SERUM: CPT

## 2024-08-22 PROCEDURE — G0463 HOSPITAL OUTPT CLINIC VISIT: HCPCS | Performed by: OPHTHALMOLOGY

## 2024-08-22 PROCEDURE — 77386 HC IMRT TREATMENT DELIVERY, COMPLEX: CPT | Performed by: RADIOLOGY

## 2024-08-22 PROCEDURE — 77014 PR CT GUIDE FOR PLACEMENT RADIATION THERAPY FIELDS: CPT | Mod: 26 | Performed by: RADIOLOGY

## 2024-08-22 PROCEDURE — 85025 COMPLETE CBC W/AUTO DIFF WBC: CPT

## 2024-08-22 PROCEDURE — 36415 COLL VENOUS BLD VENIPUNCTURE: CPT

## 2024-08-22 ASSESSMENT — REFRACTION_WEARINGRX
OD_AXIS: 002
OD_CYLINDER: +1.00
SPECS_TYPE: SVL
OS_AXIS: 166
OD_SPHERE: -0.50
OS_CYLINDER: +0.50
OS_SPHERE: PLANO

## 2024-08-22 ASSESSMENT — VISUAL ACUITY
OS_CC+: -1
OD_SC: 20/25
OS_CC: 20/25
OD_CC: 20/20
OS_SC+: -1
OD_CC+: -2
METHOD: SNELLEN - LINEAR
CORRECTION_TYPE: GLASSES
OS_SC: 20/25

## 2024-08-22 ASSESSMENT — CONF VISUAL FIELD
OS_SUPERIOR_TEMPORAL_RESTRICTION: 1
OS_INFERIOR_TEMPORAL_RESTRICTION: 1
METHOD: COUNTING FINGERS
OD_SUPERIOR_NASAL_RESTRICTION: 3

## 2024-08-22 ASSESSMENT — CUP TO DISC RATIO
OD_RATIO: 0.5
OS_RATIO: 0.3

## 2024-08-22 ASSESSMENT — EXTERNAL EXAM - RIGHT EYE: OD_EXAM: NORMAL

## 2024-08-22 ASSESSMENT — TONOMETRY
IOP_METHOD: ICARE
OS_IOP_MMHG: 21
OD_IOP_MMHG: 27
OD_IOP_MMHG: 25
OS_IOP_MMHG: 25
IOP_METHOD: TONOPEN

## 2024-08-22 ASSESSMENT — PAIN SCALES - GENERAL: PAINLEVEL: NO PAIN (0)

## 2024-08-22 ASSESSMENT — SLIT LAMP EXAM - LIDS
COMMENTS: NORMAL
COMMENTS: NORMAL

## 2024-08-22 ASSESSMENT — EXTERNAL EXAM - LEFT EYE: OS_EXAM: NORMAL

## 2024-08-22 NOTE — LETTER
2024     RE:  :  MRN: Pamella Jaimes  1965  [id number]     Dear Dr. Sanchez:     Thank you for asking me to see your very pleasant patient,Pamella Jaimes, in neuro-ophthalmic consultation.  I would like to thank you for sending your records and I have summarized them in the history of present illness.   My assessment and plan are below.  For further details, please see my attached clinic note.      Pamella Jaimes is a 59 year old female with the following diagnoses:   1. Glioblastoma (H)    2. Homonymous hemianopia, left       Patient was sent for consultation by Dr. Sanchez for a thorough eye exam and visual field testing.    HPI: Pamella is a 59-year-old female referred for homonymous hemianopia left side related to glioblastoma.  She first developed neck pain which progressed to her right sided jaw pain.  She also has some tremor, went to see a neurologist on 2023, had an MRI done mild asymmetric T2 hyperintense signals along the right hippocampus and adjacent to parahippocampal gyrus.  Patient was diagnosed with migraines and referred for physical therapy.    On 2024 she developed dizziness and nausea with tremors and slurred speech, went to the emergency department, was diagnosed with a possible seizure had an MRI done on on the MRI right posterior temporal occipital mass concerning for high-grade glioblastoma was seen.    On 2024, she had a right sided partial occipital craniotomy with resection of a glioma greater than 5 cm.    Procedure performed:     1.  Right-sided parietal occipital craniotomy and resection of glioma greater than 5 cm     2.  Frameless stereotactic navigation with [employer]    After surgery she had chemoradiotherapy, her last session of radiotherapy is going to be 9/15/2024.    After her surgery she developed left-sided visual field loss, she is not think that her visual field loss is progressive, but in some light settings she has more problems seen.      The  patient is presenting with an  acute illness that potentially poses a threat to life or bodily function (vision).    Independent historians:  Patient/daughter    Review of outside testin2024 MRI brain w/o contrast  FINDINGS:    High-resolution postcontrast T1 imaging of the brain with overlying scalp fiducial markers in place.     Centrally necrotic, peripherally enhancing irregularly marginated intra-axial mass centered at the right occipital lobe with slight extension to the posterior temporal lobe medially measures 5 cm AP by 4 cm transverse by 5 cm in craniocaudal dimension, stable. Mass effect with anterior displacement of the partially effaced atrium/occipital horn of the right lateral ventricle, in association with 9 mm right to left midline shift, unchanged. Partial effacement of the right ambient cistern and adjacent vasogenic edema are stable.     2024   MRI Brain perfusion w/o & w contrast  IMPRESSION:    In this patient with right temporal occipital glioblastoma, status  post surgical resection,  enhancing lesion in the parasaggital right  occipital lobe with elevated relative cerebral blood volume, likely  represents residual tumor. There is nodular enhancement extending  superiorly and inferiorly which may represent extension of the tumor,  versus postsurgical change. Increased T2 signal within the mesial  right temporal lobe is nonspecific and could be related to extension  of disease versus postsurgical change. Attention is recommended on  follow-up imaging.      I have personally reviewed the examination and initial interpretation  and I agree with the findings.    My interpretation performed today of outside testing: I have independently reviewed MRI Brain performed 24 & 24 . No abnormal FLAIR hyperintensity or enhancement in the orbits. There is an enhancing irregular mass intra-axial centered in the right occipital lobe with extension into the posterior temporal lobe.   On MRI done on 7/12/2024 there are postsurgical changes, and also enhancing lesion in the right occipital lobe.    Review of outside clinical notes:   6/25/24 Visit with Dr. Irizarry, neuro-oncologist,     Pamella and Leanne are acutely aware that she has been diagnosed with a glioblastoma/ WHO grade 4 astrocytome, which is a type of primary brain cancer for which there is currently no cure. Therefore, cancer-directed treatment strives to slow further growth and increase the time interval to recurrence. (With regard to Pamella's final diagnosis, pathology has been requested for our review. Important to note is that MR brain imaging from 3/2023 demonstrated a mild asymmetric T2 hyperintense signal in the right temporal lobe. Her cancer's IDH status was determined by IHC. As a result, this does raise question as to whether her cancer has a less common IDH mutation and in fact, her diagnosis could be that of a WHO grade 4 astrocytoma, IDH mutated. I have alerted neuro-pathology to this concern.) Regardless, with regard to cancer-directed therapy, a multimodal treatment approach first involves attempting a maximum safe surgical resection. There was no post-operative MR brain imaging performed, so the extent of surgery has yet to be accurately determined.      Following surgery, standard of care is chemoradiotherapy with temozolomide dosed at 75 mg/m2 daily concomitantly with radiation therapy. The risks/ benefits of temozolomide were reviewed and the following common, anticipated side effects of this treatment were discussed as including, but not limited to, fatigue, nausea, and constipation. Concomitant radiation can result in increased cerebral edema and therefore, symptoms of malaise and fatigue generally worsen, but do eventually improve. The combination therapy can result in bone marrow suppression; leukopenia and thrombocytopenia. I placed a referral to Dr. Sanchez in radiation oncology and I have personally discussed  Pamella's case with her.     7/11/24 Visit with Dr. 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      Plan:   We recommend treatment with radiation therapy. Pamella met with neuro-oncology to discuss systemic therapy, and we will defer further discussion of details of this aspect of their management to the Neuro-oncology team.      We had a detailed discussion with Pamella Jaimes regarding the role of radiation therapy for the treatment of glioblastoma.We recommend partial brain radiation to a total dose of approximately 6000 cGy in 30 fractions over 6 weeks. We discussed the risk, benefits, goals and alternatives of radiotherapy.      Logistics and timing of the radiation therapy were also discussed. Following CT simulation and planning the treatments will occur.  Pamella Jaimes would like to proceed with radiotherapy at [facility]. We have scheduled them for CT-based simulation today.      Referrals to neurophthalmology and genetic counseling were made. We will also connect with Dr. Irizarry's team about her leg pain since starting anticoagulation.       Next, Pamella is in agreement with a referral to neuro-ophtho for thorough eye examination and baseline visual field testing to determine if there are any driving restrictions.      Past medical history:  Patient Active Problem List   Diagnosis    Glioblastoma (H)     Medications:   acetaminophen  diclofenac  docusate sodium  HYDROcodone-acetaminophen  levETIRAcetam  melatonin  methocarbamol  ondansetron  oxyCODONE  rivaroxaban ANTICOAGULANT Tabs  senna-docusate  sertraline  sulfamethoxazole-trimethoprim  temozolomide    Family history / social history: Patient's family history includes Bladder Cancer in her brother; Brain Cancer in her maternal aunt; Breast Cancer in her maternal grandmother and mother; Prostate Cancer in her brother.      Patient  reports that she has quit smoking. Her smoking use included cigarettes. She has never used smokeless tobacco. She reports that she does not currently use alcohol. She reports that she does not currently use drugs.     Exam:Visual acuity 20/20 -2 right eye 20/25 -2 left eye.  Color vision 10/11 right eye and 10/11 left eye.  Pupils round and reactive  Intraocular pressure 25 right eye and 21 left eye.  Anterior segment exam map dot finger print dystrophy both eyes, anterior stromal round lesions on cornea right eye, numellar keratitis.  Fundus exam mild optic disc pallor right eye, asymmetric cupping.  Strabismus exam  none    Tests ordered and interpreted today: Octopus visual field driving    Discussion of management / interpretation with another provider:  None    Assessment/Plan: It is my impression that patient has patient has a right homonymous hemianopia, more severe on the right eye, her vision is 20/25 in both eyes, no APD's, on funduscopy she has asymmetric cup-to-disc ratios 0.5 in the right eye and 0.3 in the left eye.  Her symptoms are likely due to the glioblastoma and its treatment.   Her total degree of visual field today is 94 degrees on driving visual field.  I did complete a driving vision reports for her.   We discussed Peli prisms and she would like to try this. Fresnel prisms were placed on the LEFT lens of her spectacles.      Again, thank you for allowing me to participate in the care of your patient.      Sincerely,    Madan Dodson MD  Professor  Director of Neuro-Ophthalmology  [employer] [facility] [employer] Endowed Chair  Departments of Ophthalmology, Neurology, and Neurosurgery  [facility] [address] [place]  [address]  T - [phone]  F - [phone]  GLORY [email or web address]      CC: Maribell Irizarry MD  [address] [facility] [address]  Via In Basket     Erna Sanchez MD PhD  [address] [facility] [address]  Via In  Basket    DX = homonymous hemianopia, peli prism

## 2024-08-22 NOTE — NURSING NOTE
Chief Complaints and History of Present Illnesses   Patient presents with    Consult For     Glioblastoma Homonymous hemianopia referred by Maribell Irizarry MD     Chief Complaint(s) and History of Present Illness(es)       Consult For              Laterality: both eyes    Onset: 3 months ago    Quality: missing vision and dim vision    Course: stable    Associated symptoms: eye pain and photophobia.  Negative for double vision and headache    Treatments tried: artificial tears    Pain scale: 0/10    Comments: Glioblastoma Homonymous hemianopia referred by Maribell Irizarry MD              Comments    She states that after her mass was removed she has a area of vision that is missing in her left eye.  She gets a intermittent ache in her right eye or in the temple area since the procedure on May 28.      Evangelina Morgan, COT 9:30 AM  August 22, 2024

## 2024-08-22 NOTE — LETTER
2024      Pamella Jaimes  [address] [address]      Dear Colleague,    Thank you for referring your patient, Pamella Jaimes, to the [facility] RADIATION ONCOLOGY. Please see a copy of my visit note below.    [facility]  SPECIALIZING IN BREAKTHROUGHS  Radiation Oncology    On Treatment Visit Note      Pamella Jaimes      Date: Aug 22, 2024   MRN: [id number]   : 1965  Diagnosis: Glioblastoma      Reason for Visit:  On Radiation Treatment Visit     Treatment Summary to Date  Treatment Site: RtOcc Current Dose:  4200/6000 cGy Fractions:           Subjective:   Pamella has continued light sensitivity and right eye strain. She also has existing right headache associated with irritation on the scalp, described as a low grade ache. She also has begun to lose hair and reports right ear fullness. She has an appointment with Dr. Dodson in Ophthalmology today at 0930.  Objective:   /74 (BP Location: Right arm, Patient Position: Sitting)   Pulse 69   Wt 98.2 kg (216 lb 9.6 oz)   SpO2 97%   BMI 33.92 kg/m    Gen: Appears well, in no acute distress  Skin: hair thinning  Neuro: PERRLA, EOMI    Labs:  CBC RESULTS:   Recent Labs   Lab Test 24  0839   WBC 3.8*   RBC 4.33   HGB 13.4   HCT 40.8   MCV 94   MCH 30.9   MCHC 32.8   RDW 13.1        ELECTROLYTES:  Recent Labs   Lab Test 24  0839      POTASSIUM 3.8   CHLORIDE 106   SUHAIL 9.1   CO2 23   BUN 8.8   CR 0.76   GLC 85     Assessment:    Tolerating radiation therapy well.  All questions and concerns addressed.    Toxicity:   Grade 1 alopecia  Grade 1 dermatitis    Plan:   Continue current therapy.        Mosaiq chart and setup information reviewed  Daily imaging  reviewed    Medication Review  Med list reviewed with patient?: Yes  Medication changes: same keppra dose     Dillan Alvarez MD  PGY-4 Radiation Oncology  Department of Radiation Oncology  [facility],  [place]  Phone: [phone]    I saw the patient with the resident.  I agree with the resident's note and plan of care.      AJ Mendoza M.D.  Department of Radiation Oncology  [facility]     Covering for primary radiation oncologist Dr. Erna Sanchez      Again, thank you for allowing me to participate in the care of your patient.        Sincerely,        Martir Mendoza MD

## 2024-08-22 NOTE — PROGRESS NOTES
Pamella Jaimes is a 59 year old female with the following diagnoses:   1. Glioblastoma (H)    2. Homonymous hemianopia, left         Patient was sent for consultation by Dr. Sanchez for a thorough eye exam and visual field testing.    HPI:    Pamella is a 59-year-old female referred for homonymous hemianopia left side related to glioblastoma.  She first developed neck pain which progressed to her right sided jaw pain.  She also has some tremor, went to see a neurologist on 2023, had an MRI done mild asymmetric T2 hyperintense signals along the right hippocampus and adjacent to parahippocampal gyrus.  Patient was diagnosed with migraines and referred for physical therapy.    On 2024 she developed dizziness and nausea with tremors and slurred speech, went to the emergency department, was diagnosed with a possible seizure had an MRI done on on the MRI right posterior temporal occipital mass concerning for high-grade glioblastoma was seen.    On 2024, she had a right sided partial occipital craniotomy with resection of a glioma greater than 5 cm.    Procedure performed:     1.  Right-sided parietal occipital craniotomy and resection of glioma greater than 5 cm     2.  Frameless stereotactic navigation with [employer]    After surgery she had chemoradiotherapy, her last session of radiotherapy is going to be 9/15/2024.    After her surgery she developed left-sided visual field loss, she is not think that her visual field loss is progressive, but in some light settings she has more problems seen.        The patient is presenting with an  acute illness that potentially poses a threat to life or bodily function (vision).    Independent historians:  Patient/daughter    Review of outside testin2024 MRI brain w/o contrast  FINDINGS:    High-resolution postcontrast T1 imaging of the brain with overlying scalp fiducial markers in place.     Centrally necrotic, peripherally enhancing irregularly marginated  intra-axial mass centered at the right occipital lobe with slight extension to the posterior temporal lobe medially measures 5 cm AP by 4 cm transverse by 5 cm in craniocaudal dimension, stable. Mass effect with anterior displacement of the partially effaced atrium/occipital horn of the right lateral ventricle, in association with 9 mm right to left midline shift, unchanged. Partial effacement of the right ambient cistern and adjacent vasogenic edema are stable.     7/12/2024   MRI Brain perfusion w/o & w contrast  IMPRESSION:    In this patient with right temporal occipital glioblastoma, status  post surgical resection,  enhancing lesion in the parasaggital right  occipital lobe with elevated relative cerebral blood volume, likely  represents residual tumor. There is nodular enhancement extending  superiorly and inferiorly which may represent extension of the tumor,  versus postsurgical change. Increased T2 signal within the mesial  right temporal lobe is nonspecific and could be related to extension  of disease versus postsurgical change. Attention is recommended on  follow-up imaging.      I have personally reviewed the examination and initial interpretation  and I agree with the findings.    My interpretation performed today of outside testing:  I have independently reviewed MRI Brain performed 5/28/24 & 7/12/24 . No abnormal FLAIR hyperintensity or enhancement in the orbits. There is an enhancing irregular mass intra-axial centered in the right occipital lobe with extension into the posterior temporal lobe.  On MRI done on 7/12/2024 there are postsurgical changes, and also enhancing lesion in the right occipital lobe.      Review of outside clinical notes:     6/25/24 Visit with Dr. Irizarry, neuro-oncologist,     Perla are acutely aware that she has been diagnosed with a glioblastoma/ WHO grade 4 astrocytome, which is a type of primary brain cancer for which there is currently no cure. Therefore,  cancer-directed treatment strives to slow further growth and increase the time interval to recurrence. (With regard to Pamella's final diagnosis, pathology has been requested for our review. Important to note is that MR brain imaging from 3/2023 demonstrated a mild asymmetric T2 hyperintense signal in the right temporal lobe. Her cancer's IDH status was determined by IHC. As a result, this does raise question as to whether her cancer has a less common IDH mutation and in fact, her diagnosis could be that of a WHO grade 4 astrocytoma, IDH mutated. I have alerted neuro-pathology to this concern.) Regardless, with regard to cancer-directed therapy, a multimodal treatment approach first involves attempting a maximum safe surgical resection. There was no post-operative MR brain imaging performed, so the extent of surgery has yet to be accurately determined.      Following surgery, standard of care is chemoradiotherapy with temozolomide dosed at 75 mg/m2 daily concomitantly with radiation therapy. The risks/ benefits of temozolomide were reviewed and the following common, anticipated side effects of this treatment were discussed as including, but not limited to, fatigue, nausea, and constipation. Concomitant radiation can result in increased cerebral edema and therefore, symptoms of malaise and fatigue generally worsen, but do eventually improve. The combination therapy can result in bone marrow suppression; leukopenia and thrombocytopenia. I placed a referral to Dr. Sanchez in radiation oncology and I have personally discussed Pamella's case with her.     7/11/24 Visit with Dr. 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      Plan:   We recommend treatment with radiation therapy. Pamella met with neuro-oncology to discuss systemic therapy, and we will defer further discussion of details of  this aspect of their management to the Neuro-oncology team.      We had a detailed discussion with Pamella Jaimes regarding the role of radiation therapy for the treatment of glioblastoma.We recommend partial brain radiation to a total dose of approximately 6000 cGy in 30 fractions over 6 weeks. We discussed the risk, benefits, goals and alternatives of radiotherapy.      Logistics and timing of the radiation therapy were also discussed. Following CT simulation and planning the treatments will occur.  Pamella Jaimes would like to proceed with radiotherapy at [facility]. We have scheduled them for CT-based simulation today.      Referrals to neurophthalmology and genetic counseling were made. We will also connect with Dr. Irizarry's team about her leg pain since starting anticoagulation.         Next, Pamella is in agreement with a referral to neuro-ophtho for thorough eye examination and baseline visual field testing to determine if there are any driving restrictions.      Past medical history:    Patient Active Problem List   Diagnosis    Glioblastoma (H)         Medications:   acetaminophen  diclofenac  docusate sodium  HYDROcodone-acetaminophen  levETIRAcetam  melatonin  methocarbamol  ondansetron  oxyCODONE  rivaroxaban ANTICOAGULANT Tabs  senna-docusate  sertraline  sulfamethoxazole-trimethoprim  temozolomide      Family history / social history:  Patient's family history includes Bladder Cancer in her brother; Brain Cancer in her maternal aunt; Breast Cancer in her maternal grandmother and mother; Prostate Cancer in her brother.     Patient  reports that she has quit smoking. Her smoking use included cigarettes. She has never used smokeless tobacco. She reports that she does not currently use alcohol. She reports that she does not currently use drugs.       Exam:  Visual acuity 20/20 -2 right eye 20/25 -2 left eye.  Color vision 10/11 right eye and 10/11 left eye.  Pupils round and reactive  Intraocular pressure 25 right  eye and 21 left eye.  Anterior segment exam map dot finger print dystrophy both eyes, anterior stromal round lesions on cornea right eye, numellar keratitis.  Fundus exam mild optic disc pallor right eye, asymmetric cupping.  Strabismus exam  none    Tests ordered and interpreted today:    Octopus visual field driving      Discussion of management / interpretation with another provider:   None    Assessment/Plan:   It is my impression that patient has patient has a right homonymous hemianopia, more severe on the right eye, her vision is 20/25 in both eyes, no APD's, on funduscopy she has asymmetric cup-to-disc ratios 0.5 in the right eye and 0.3 in the left eye.  Her symptoms are likely due to the glioblastoma and its treatment.   Her total degree of visual field today is 94 degrees on driving visual field.  I did complete a driving vision reports for her.   We discussed Peli prisms and she would like to try this. Fresnel prisms were placed on the LEFT lens of her spectacles.                  Attending Physician Attestation:  Complete documentation of historical and exam elements from today's encounter can be found in the full encounter summary report (not reduplicated in this progress note).  I personally obtained the chief complaint(s) and history of present illness.  I confirmed and edited as necessary the review of systems, past medical/surgical history, family history, social history, and examination findings as documented by others; and I examined the patient myself.  I personally reviewed the relevant tests, images, and reports as documented above.  I formulated and edited as necessary the assessment and plan and discussed the findings and management plan with the patient and family. I personally reviewed the ophthalmic test(s) associated with this encounter, agree with the interpretation(s) as documented by the resident/fellow, and have edited the corresponding report(s) as necessary.  - Madan Dodson  MD Markel Amaya MD   Fellow, Neuro-Ophthalmology     Precharting:  Samira Resendiz, MS4

## 2024-08-22 NOTE — PROGRESS NOTES
[facility] PHYSICIANS  SPECIALIZING IN BREAKTHROUGHS  Radiation Oncology    On Treatment Visit Note      Pamella Jaimes      Date: Aug 22, 2024   MRN: [id number]   : 1965  Diagnosis: Glioblastoma      Reason for Visit:  On Radiation Treatment Visit     Treatment Summary to Date  Treatment Site: RtOcc Current Dose:  4200/6000 cGy Fractions:           Subjective:   Pamella has continued light sensitivity and right eye strain. She also has existing right headache associated with irritation on the scalp, described as a low grade ache. She also has begun to lose hair and reports right ear fullness. She has an appointment with Dr. Dodson in Ophthalmology today at 0930.  Objective:   /74 (BP Location: Right arm, Patient Position: Sitting)   Pulse 69   Wt 98.2 kg (216 lb 9.6 oz)   SpO2 97%   BMI 33.92 kg/m    Gen: Appears well, in no acute distress  Skin: hair thinning  Neuro: PERRLA, EOMI    Labs:  CBC RESULTS:   Recent Labs   Lab Test 24  0839   WBC 3.8*   RBC 4.33   HGB 13.4   HCT 40.8   MCV 94   MCH 30.9   MCHC 32.8   RDW 13.1        ELECTROLYTES:  Recent Labs   Lab Test 24  0839      POTASSIUM 3.8   CHLORIDE 106   SUHAIL 9.1   CO2 23   BUN 8.8   CR 0.76   GLC 85     Assessment:    Tolerating radiation therapy well.  All questions and concerns addressed.    Toxicity:   Grade 1 alopecia  Grade 1 dermatitis    Plan:   Continue current therapy.        Mosaiq chart and setup information reviewed  Daily imaging  reviewed    Medication Review  Med list reviewed with patient?: Yes  Medication changes: same keppra dose     Dillan Alvarez MD  PGY-4 Radiation Oncology  Department of Radiation Oncology  [facility]  Phone: [phone]    I saw the patient with the resident.  I agree with the resident's note and plan of care.      AJ Mendoza M.D.  Department of Radiation Oncology  [facility]     Covering for primary radiation  oncologist Dr. Erna Sanchez

## 2024-08-23 ENCOUNTER — DOCUMENTATION ONLY (OUTPATIENT)
Dept: ONCOLOGY | Facility: CLINIC | Age: 59
End: 2024-08-23
Payer: COMMERCIAL

## 2024-08-23 NOTE — PROGRESS NOTES
Oral Chemotherapy Monitoring Program  Lab Follow Up     Patient currently on concurrent Temozolomide and RT.  Reviewed lab results from today.     Lab Results   Component Value Date    WBC 3.7 08/22/2024     Lab Results   Component Value Date    RBC 4.35 08/22/2024     Lab Results   Component Value Date    HGB 13.3 08/22/2024     Lab Results   Component Value Date    HCT 41.2 08/22/2024     Lab Results   Component Value Date    MCV 95 08/22/2024     Lab Results   Component Value Date    MCH 30.6 08/22/2024     Lab Results   Component Value Date    MCHC 32.3 08/22/2024     Lab Results   Component Value Date    RDW 13.1 08/22/2024     Lab Results   Component Value Date     08/22/2024       Last Comprehensive Metabolic Panel:  Sodium   Date Value Ref Range Status   08/22/2024 143 135 - 145 mmol/L Final     Potassium   Date Value Ref Range Status   08/22/2024 4.0 3.4 - 5.3 mmol/L Final     Chloride   Date Value Ref Range Status   08/22/2024 109 (H) 98 - 107 mmol/L Final     Carbon Dioxide (CO2)   Date Value Ref Range Status   08/22/2024 22 22 - 29 mmol/L Final     Anion Gap   Date Value Ref Range Status   08/22/2024 12 7 - 15 mmol/L Final     Glucose   Date Value Ref Range Status   08/22/2024 101 (H) 70 - 99 mg/dL Final     Urea Nitrogen   Date Value Ref Range Status   08/22/2024 10.8 8.0 - 23.0 mg/dL Final     Creatinine   Date Value Ref Range Status   08/22/2024 0.79 0.51 - 0.95 mg/dL Final     GFR Estimate   Date Value Ref Range Status   08/22/2024 85 >60 mL/min/1.73m2 Final     Comment:     eGFR calculated using 2021 CKD-EPI equation.     Calcium   Date Value Ref Range Status   08/22/2024 9.1 8.8 - 10.4 mg/dL Final     Comment:     Reference intervals for this test were updated on 7/16/2024 to reflect our healthy population more accurately. There may be differences in the flagging of prior results with similar values performed with this method. Those prior results can be interpreted in the context of the  updated reference intervals.     Bilirubin Total   Date Value Ref Range Status   08/22/2024 0.5 <=1.2 mg/dL Final     Alkaline Phosphatase   Date Value Ref Range Status   08/22/2024 72 40 - 150 U/L Final     ALT   Date Value Ref Range Status   08/22/2024 19 0 - 50 U/L Final     AST   Date Value Ref Range Status   08/22/2024 28 0 - 45 U/L Final     Assessment & Plan:  CMP and CBC reviewed, no new concerns. Ok to continue concurrent TMZ and RT therapy as planned. Last RT now planned 9/5 d/t RT machine down time. Extra TMZ supply sent again 8/20. Continue weekly CBC and monthly CMP lab monitoring.      Follow-Up:  8/28: lab appt  9/3: lab appt  9/5: last RT & Ratna appt  10/1: MRI, lab, and Dr. Irizarry appt    Juan C Flores PharmD  [facility] Oral [facility]otherapy  [phone]

## 2024-08-26 ENCOUNTER — APPOINTMENT (OUTPATIENT)
Dept: RADIATION ONCOLOGY | Facility: CLINIC | Age: 59
End: 2024-08-26
Attending: STUDENT IN AN ORGANIZED HEALTH CARE EDUCATION/TRAINING PROGRAM
Payer: COMMERCIAL

## 2024-08-26 PROCEDURE — 77014 PR CT GUIDE FOR PLACEMENT RADIATION THERAPY FIELDS: CPT | Mod: 26 | Performed by: STUDENT IN AN ORGANIZED HEALTH CARE EDUCATION/TRAINING PROGRAM

## 2024-08-26 PROCEDURE — 77386 HC IMRT TREATMENT DELIVERY, COMPLEX: CPT | Performed by: STUDENT IN AN ORGANIZED HEALTH CARE EDUCATION/TRAINING PROGRAM

## 2024-08-27 ENCOUNTER — APPOINTMENT (OUTPATIENT)
Dept: RADIATION ONCOLOGY | Facility: CLINIC | Age: 59
End: 2024-08-27
Attending: STUDENT IN AN ORGANIZED HEALTH CARE EDUCATION/TRAINING PROGRAM
Payer: COMMERCIAL

## 2024-08-27 DIAGNOSIS — C71.9 GLIOBLASTOMA (H): Primary | ICD-10-CM

## 2024-08-27 PROCEDURE — 77386 HC IMRT TREATMENT DELIVERY, COMPLEX: CPT | Performed by: STUDENT IN AN ORGANIZED HEALTH CARE EDUCATION/TRAINING PROGRAM

## 2024-08-27 PROCEDURE — 77014 PR CT GUIDE FOR PLACEMENT RADIATION THERAPY FIELDS: CPT | Mod: 26 | Performed by: STUDENT IN AN ORGANIZED HEALTH CARE EDUCATION/TRAINING PROGRAM

## 2024-08-27 RX ORDER — TEMOZOLOMIDE 20 MG/1
20 CAPSULE ORAL AT BEDTIME
Qty: 1 CAPSULE | Refills: 0 | Status: SHIPPED | OUTPATIENT
Start: 2024-09-05 | End: 2024-10-01

## 2024-08-28 ENCOUNTER — LAB (OUTPATIENT)
Dept: LAB | Facility: CLINIC | Age: 59
End: 2024-08-28
Payer: COMMERCIAL

## 2024-08-28 ENCOUNTER — DOCUMENTATION ONLY (OUTPATIENT)
Dept: ONCOLOGY | Facility: CLINIC | Age: 59
End: 2024-08-28

## 2024-08-28 ENCOUNTER — APPOINTMENT (OUTPATIENT)
Dept: RADIATION ONCOLOGY | Facility: CLINIC | Age: 59
End: 2024-08-28
Attending: STUDENT IN AN ORGANIZED HEALTH CARE EDUCATION/TRAINING PROGRAM
Payer: COMMERCIAL

## 2024-08-28 DIAGNOSIS — T45.1X5A CHEMOTHERAPY-INDUCED NAUSEA AND VOMITING: ICD-10-CM

## 2024-08-28 DIAGNOSIS — R11.2 CHEMOTHERAPY-INDUCED NAUSEA AND VOMITING: ICD-10-CM

## 2024-08-28 DIAGNOSIS — D61.810 ANTINEOPLASTIC CHEMOTHERAPY INDUCED PANCYTOPENIA (H): ICD-10-CM

## 2024-08-28 DIAGNOSIS — T45.1X5A ANTINEOPLASTIC CHEMOTHERAPY INDUCED PANCYTOPENIA (H): ICD-10-CM

## 2024-08-28 LAB
ALBUMIN SERPL BCG-MCNC: 4.3 G/DL (ref 3.5–5.2)
ALP SERPL-CCNC: 67 U/L (ref 40–150)
ALT SERPL W P-5'-P-CCNC: 15 U/L (ref 0–50)
ANION GAP SERPL CALCULATED.3IONS-SCNC: 11 MMOL/L (ref 7–15)
AST SERPL W P-5'-P-CCNC: 24 U/L (ref 0–45)
BASOPHILS # BLD AUTO: 0 10E3/UL (ref 0–0.2)
BASOPHILS NFR BLD AUTO: 1 %
BILIRUB SERPL-MCNC: 0.4 MG/DL
BUN SERPL-MCNC: 10.6 MG/DL (ref 8–23)
CALCIUM SERPL-MCNC: 9 MG/DL (ref 8.8–10.4)
CHLORIDE SERPL-SCNC: 108 MMOL/L (ref 98–107)
CREAT SERPL-MCNC: 0.76 MG/DL (ref 0.51–0.95)
EGFRCR SERPLBLD CKD-EPI 2021: 89 ML/MIN/1.73M2
EOSINOPHIL # BLD AUTO: 0.3 10E3/UL (ref 0–0.7)
EOSINOPHIL NFR BLD AUTO: 9 %
ERYTHROCYTE [DISTWIDTH] IN BLOOD BY AUTOMATED COUNT: 12.9 % (ref 10–15)
GLUCOSE SERPL-MCNC: 96 MG/DL (ref 70–99)
HCO3 SERPL-SCNC: 22 MMOL/L (ref 22–29)
HCT VFR BLD AUTO: 41.5 % (ref 35–47)
HGB BLD-MCNC: 13.5 G/DL (ref 11.7–15.7)
IMM GRANULOCYTES # BLD: 0 10E3/UL
IMM GRANULOCYTES NFR BLD: 0 %
LYMPHOCYTES # BLD AUTO: 1.2 10E3/UL (ref 0.8–5.3)
LYMPHOCYTES NFR BLD AUTO: 35 %
MCH RBC QN AUTO: 30.8 PG (ref 26.5–33)
MCHC RBC AUTO-ENTMCNC: 32.5 G/DL (ref 31.5–36.5)
MCV RBC AUTO: 95 FL (ref 78–100)
MONOCYTES # BLD AUTO: 0.5 10E3/UL (ref 0–1.3)
MONOCYTES NFR BLD AUTO: 15 %
NEUTROPHILS # BLD AUTO: 1.4 10E3/UL (ref 1.6–8.3)
NEUTROPHILS NFR BLD AUTO: 40 %
NRBC # BLD AUTO: 0 10E3/UL
NRBC BLD AUTO-RTO: 0 /100
PLATELET # BLD AUTO: 231 10E3/UL (ref 150–450)
POTASSIUM SERPL-SCNC: 3.9 MMOL/L (ref 3.4–5.3)
PROT SERPL-MCNC: 6.6 G/DL (ref 6.4–8.3)
RBC # BLD AUTO: 4.39 10E6/UL (ref 3.8–5.2)
SODIUM SERPL-SCNC: 141 MMOL/L (ref 135–145)
WBC # BLD AUTO: 3.5 10E3/UL (ref 4–11)

## 2024-08-28 PROCEDURE — 77386 HC IMRT TREATMENT DELIVERY, COMPLEX: CPT | Performed by: STUDENT IN AN ORGANIZED HEALTH CARE EDUCATION/TRAINING PROGRAM

## 2024-08-28 PROCEDURE — 77336 RADIATION PHYSICS CONSULT: CPT | Performed by: STUDENT IN AN ORGANIZED HEALTH CARE EDUCATION/TRAINING PROGRAM

## 2024-08-28 PROCEDURE — 82040 ASSAY OF SERUM ALBUMIN: CPT

## 2024-08-28 PROCEDURE — 85025 COMPLETE CBC W/AUTO DIFF WBC: CPT

## 2024-08-28 PROCEDURE — 36415 COLL VENOUS BLD VENIPUNCTURE: CPT

## 2024-08-28 PROCEDURE — 77014 PR CT GUIDE FOR PLACEMENT RADIATION THERAPY FIELDS: CPT | Mod: 26 | Performed by: STUDENT IN AN ORGANIZED HEALTH CARE EDUCATION/TRAINING PROGRAM

## 2024-08-28 NOTE — PROGRESS NOTES
Oral Chemotherapy Monitoring Program     Placed call to patient's daughter Leanne in follow up of oral chemotherapy (Temozolomide). Discussed with Leanne about lab results today for her mom Pamella today results show ANC = 1.4 (grade 2). Generally consider holding TMZ when ANC below 1.5. However, ok to continue with TMZ and RT as planned per Dr. Irizarry as long as signs symptoms of infection are not present. Discussed monitoring for fever, chills, new cough or infection symptoms to call clinic. Concurrent TMZ & RT scheduled through 9/6, with last RT on 9/6 and last TMZ 9/5 pm. Leanne understanding of the plan.     Follow Up:  9/5: Ratna appt with labs from 9/4 or get on 9/5 if not drawn on 9/4.     Juan C Flores PharmD  [facility] Oral [facility]otherapy  [phone]  August 28, 2024

## 2024-08-29 ENCOUNTER — OFFICE VISIT (OUTPATIENT)
Dept: RADIATION ONCOLOGY | Facility: CLINIC | Age: 59
End: 2024-08-29
Attending: STUDENT IN AN ORGANIZED HEALTH CARE EDUCATION/TRAINING PROGRAM
Payer: COMMERCIAL

## 2024-08-29 VITALS
OXYGEN SATURATION: 96 % | SYSTOLIC BLOOD PRESSURE: 103 MMHG | BODY MASS INDEX: 33.8 KG/M2 | DIASTOLIC BLOOD PRESSURE: 68 MMHG | HEART RATE: 61 BPM | WEIGHT: 215.8 LBS

## 2024-08-29 DIAGNOSIS — C71.9 GLIOBLASTOMA (H): Primary | ICD-10-CM

## 2024-08-29 PROCEDURE — 77386 HC IMRT TREATMENT DELIVERY, COMPLEX: CPT | Performed by: STUDENT IN AN ORGANIZED HEALTH CARE EDUCATION/TRAINING PROGRAM

## 2024-08-29 ASSESSMENT — PAIN SCALES - GENERAL: PAINLEVEL: NO PAIN (0)

## 2024-08-29 NOTE — LETTER
2024      Pamella Jaimes  [address] [facility] [address]      Dear Colleague,    Thank you for referring your patient, Pamella Jaimes, to the [facility] RADIATION ONCOLOGY. Please see a copy of my visit note below.    [facility] PHYSICIANS  SPECIALIZING IN BREAKTHROUGHS  Radiation Oncology    On Treatment Visit Note      Pamella Jaimes      Date: Aug 29, 2024   MRN: [id number]   : 1965  Diagnosis: Glioblastoma      Reason for Visit:  On Radiation Treatment Visit     Treatment Summary to Date  Treatment Site: Rt Occ Current Dose: 5000/6000 cGy Fractions:            Subjective:   Pamella is doing well. She is experiencing mild fatigue. She has a feeling of fulness near her right ear without pain.     Objective:   /68 (BP Location: Left arm, Patient Position: Sitting, Cuff Size: Adult Regular)   Pulse 61   Wt 97.9 kg (215 lb 12.8 oz)   SpO2 96%   BMI 33.80 kg/m    Gen: Appears well, in no acute distress  Skin: No erythema    Labs:  CBC RESULTS:   Recent Labs   Lab Test 24  0847   WBC 3.5*   RBC 4.39   HGB 13.5   HCT 41.5   MCV 95   MCH 30.8   MCHC 32.5   RDW 12.9        ELECTROLYTES:  Recent Labs   Lab Test 24  0847      POTASSIUM 3.9   CHLORIDE 108*   SUHAIL 9.0   CO2 22   BUN 10.6   CR 0.76   GLC 96       Assessment:    Tolerating radiation therapy well.  All questions and concerns addressed.    Toxicities:  Grade 1 fatigue     Plan:   Continue current therapy.        Mosaiq chart and setup information reviewed  Daily imaging reviewed         Erna Sanchez MD, PhD     Department of Radiation Oncology  [facility]        Again, thank you for allowing me to participate in the care of your patient.        Sincerely,        Erna Sanchez MD PhD

## 2024-08-30 ENCOUNTER — APPOINTMENT (OUTPATIENT)
Dept: RADIATION ONCOLOGY | Facility: CLINIC | Age: 59
End: 2024-08-30
Attending: STUDENT IN AN ORGANIZED HEALTH CARE EDUCATION/TRAINING PROGRAM
Payer: COMMERCIAL

## 2024-08-30 PROCEDURE — 77386 HC IMRT TREATMENT DELIVERY, COMPLEX: CPT | Performed by: STUDENT IN AN ORGANIZED HEALTH CARE EDUCATION/TRAINING PROGRAM

## 2024-08-30 PROCEDURE — 77014 PR CT GUIDE FOR PLACEMENT RADIATION THERAPY FIELDS: CPT | Mod: 26 | Performed by: STUDENT IN AN ORGANIZED HEALTH CARE EDUCATION/TRAINING PROGRAM

## 2024-09-03 ENCOUNTER — HOSPITAL ENCOUNTER (OUTPATIENT)
Dept: RESEARCH | Facility: CLINIC | Age: 59
Discharge: HOME | End: 2024-09-03
Attending: STUDENT IN AN ORGANIZED HEALTH CARE EDUCATION/TRAINING PROGRAM | Admitting: STUDENT IN AN ORGANIZED HEALTH CARE EDUCATION/TRAINING PROGRAM
Payer: COMMERCIAL

## 2024-09-03 ENCOUNTER — APPOINTMENT (OUTPATIENT)
Dept: RADIATION ONCOLOGY | Facility: CLINIC | Age: 59
End: 2024-09-03
Attending: STUDENT IN AN ORGANIZED HEALTH CARE EDUCATION/TRAINING PROGRAM
Payer: COMMERCIAL

## 2024-09-03 DIAGNOSIS — T45.1X5A ANTINEOPLASTIC CHEMOTHERAPY INDUCED PANCYTOPENIA (H): ICD-10-CM

## 2024-09-03 DIAGNOSIS — R11.2 CHEMOTHERAPY-INDUCED NAUSEA AND VOMITING: ICD-10-CM

## 2024-09-03 DIAGNOSIS — T45.1X5A CHEMOTHERAPY-INDUCED NAUSEA AND VOMITING: ICD-10-CM

## 2024-09-03 DIAGNOSIS — C71.9 GLIOBLASTOMA (H): ICD-10-CM

## 2024-09-03 DIAGNOSIS — D61.810 ANTINEOPLASTIC CHEMOTHERAPY INDUCED PANCYTOPENIA (H): ICD-10-CM

## 2024-09-03 LAB
ALBUMIN SERPL BCG-MCNC: 4.2 G/DL (ref 3.5–5.2)
ALP SERPL-CCNC: 68 U/L (ref 40–150)
ALT SERPL W P-5'-P-CCNC: 18 U/L (ref 0–50)
ANION GAP SERPL CALCULATED.3IONS-SCNC: 10 MMOL/L (ref 7–15)
AST SERPL W P-5'-P-CCNC: 24 U/L (ref 0–45)
BASOPHILS # BLD AUTO: 0 10E3/UL (ref 0–0.2)
BASOPHILS NFR BLD AUTO: 1 %
BILIRUB SERPL-MCNC: 0.5 MG/DL
BUN SERPL-MCNC: 11.6 MG/DL (ref 8–23)
CALCIUM SERPL-MCNC: 8.9 MG/DL (ref 8.8–10.4)
CHLORIDE SERPL-SCNC: 104 MMOL/L (ref 98–107)
CREAT SERPL-MCNC: 0.82 MG/DL (ref 0.51–0.95)
EGFRCR SERPLBLD CKD-EPI 2021: 81 ML/MIN/1.73M2
EOSINOPHIL # BLD AUTO: 0.3 10E3/UL (ref 0–0.7)
EOSINOPHIL NFR BLD AUTO: 10 %
ERYTHROCYTE [DISTWIDTH] IN BLOOD BY AUTOMATED COUNT: 12.6 % (ref 10–15)
GLUCOSE SERPL-MCNC: 100 MG/DL (ref 70–99)
HCO3 SERPL-SCNC: 25 MMOL/L (ref 22–29)
HCT VFR BLD AUTO: 39.5 % (ref 35–47)
HGB BLD-MCNC: 13 G/DL (ref 11.7–15.7)
IMM GRANULOCYTES # BLD: 0 10E3/UL
IMM GRANULOCYTES NFR BLD: 0 %
LYMPHOCYTES # BLD AUTO: 0.8 10E3/UL (ref 0.8–5.3)
LYMPHOCYTES NFR BLD AUTO: 28 %
MCH RBC QN AUTO: 30.8 PG (ref 26.5–33)
MCHC RBC AUTO-ENTMCNC: 32.9 G/DL (ref 31.5–36.5)
MCV RBC AUTO: 94 FL (ref 78–100)
MONOCYTES # BLD AUTO: 0.4 10E3/UL (ref 0–1.3)
MONOCYTES NFR BLD AUTO: 14 %
NEUTROPHILS # BLD AUTO: 1.4 10E3/UL (ref 1.6–8.3)
NEUTROPHILS NFR BLD AUTO: 47 %
NRBC # BLD AUTO: 0 10E3/UL
NRBC BLD AUTO-RTO: 0 /100
PLATELET # BLD AUTO: 209 10E3/UL (ref 150–450)
POTASSIUM SERPL-SCNC: 3.9 MMOL/L (ref 3.4–5.3)
PROT SERPL-MCNC: 6.5 G/DL (ref 6.4–8.3)
RBC # BLD AUTO: 4.22 10E6/UL (ref 3.8–5.2)
SODIUM SERPL-SCNC: 139 MMOL/L (ref 135–145)
WBC # BLD AUTO: 3 10E3/UL (ref 4–11)

## 2024-09-03 PROCEDURE — 999N000129 HC STATISTIC PICC/MID LINE PROC CANCELLED SUBQ WORKUP

## 2024-09-03 PROCEDURE — 510N000017 HC CRU PATIENT CARE, PER 15 MIN

## 2024-09-03 PROCEDURE — 77386 HC IMRT TREATMENT DELIVERY, COMPLEX: CPT | Performed by: STUDENT IN AN ORGANIZED HEALTH CARE EDUCATION/TRAINING PROGRAM

## 2024-09-03 PROCEDURE — 85025 COMPLETE CBC W/AUTO DIFF WBC: CPT

## 2024-09-03 PROCEDURE — 36415 COLL VENOUS BLD VENIPUNCTURE: CPT

## 2024-09-03 PROCEDURE — 36000 PLACE NEEDLE IN VEIN: CPT

## 2024-09-03 PROCEDURE — 82040 ASSAY OF SERUM ALBUMIN: CPT

## 2024-09-03 PROCEDURE — 510N000009 HC RESEARCH FACILITY, PER 15 MIN

## 2024-09-03 RX ORDER — SULFAMETHOXAZOLE/TRIMETHOPRIM 800-160 MG
1 TABLET ORAL
Qty: 2 TABLET | Refills: 0 | Status: SHIPPED | OUTPATIENT
Start: 2024-09-04 | End: 2024-09-27

## 2024-09-03 NOTE — ADDENDUM NOTE
Encounter addended by: Jackelyn Lofton RN on: 9/3/2024 12:39 PM   Actions taken: Charge Capture section accepted

## 2024-09-03 NOTE — PROGRESS NOTES
[facility] PHYSICIANS  SPECIALIZING IN BREAKTHROUGHS  Radiation Oncology    On Treatment Visit Note      Pamella Jaimes      Date: Aug 29, 2024   MRN: [id number]   : 1965  Diagnosis: Glioblastoma      Reason for Visit:  On Radiation Treatment Visit     Treatment Summary to Date  Treatment Site: Rt Occ Current Dose: 5000/6000 cGy Fractions:            Subjective:   Pamella is doing well. She is experiencing mild fatigue. She has a feeling of fulness near her right ear without pain.     Objective:   /68 (BP Location: Left arm, Patient Position: Sitting, Cuff Size: Adult Regular)   Pulse 61   Wt 97.9 kg (215 lb 12.8 oz)   SpO2 96%   BMI 33.80 kg/m    Gen: Appears well, in no acute distress  Skin: No erythema    Labs:  CBC RESULTS:   Recent Labs   Lab Test 24  0847   WBC 3.5*   RBC 4.39   HGB 13.5   HCT 41.5   MCV 95   MCH 30.8   MCHC 32.5   RDW 12.9        ELECTROLYTES:  Recent Labs   Lab Test 24  0847      POTASSIUM 3.9   CHLORIDE 108*   SUHAIL 9.0   CO2 22   BUN 10.6   CR 0.76   GLC 96       Assessment:    Tolerating radiation therapy well.  All questions and concerns addressed.    Toxicities:  Grade 1 fatigue     Plan:   Continue current therapy.        Mosaiq chart and setup information reviewed  Daily imaging reviewed         Erna Sanchez MD, PhD     Department of Radiation Oncology  [facility]

## 2024-09-04 ENCOUNTER — APPOINTMENT (OUTPATIENT)
Dept: RADIATION ONCOLOGY | Facility: CLINIC | Age: 59
End: 2024-09-04
Attending: STUDENT IN AN ORGANIZED HEALTH CARE EDUCATION/TRAINING PROGRAM
Payer: COMMERCIAL

## 2024-09-04 PROCEDURE — 77386 HC IMRT TREATMENT DELIVERY, COMPLEX: CPT | Performed by: STUDENT IN AN ORGANIZED HEALTH CARE EDUCATION/TRAINING PROGRAM

## 2024-09-04 PROCEDURE — 77014 PR CT GUIDE FOR PLACEMENT RADIATION THERAPY FIELDS: CPT | Mod: 26 | Performed by: STUDENT IN AN ORGANIZED HEALTH CARE EDUCATION/TRAINING PROGRAM

## 2024-09-05 ENCOUNTER — VIRTUAL VISIT (OUTPATIENT)
Dept: ONCOLOGY | Facility: CLINIC | Age: 59
End: 2024-09-05
Attending: NURSE PRACTITIONER
Payer: COMMERCIAL

## 2024-09-05 ENCOUNTER — OFFICE VISIT (OUTPATIENT)
Dept: RADIATION ONCOLOGY | Facility: CLINIC | Age: 59
End: 2024-09-05
Attending: STUDENT IN AN ORGANIZED HEALTH CARE EDUCATION/TRAINING PROGRAM
Payer: COMMERCIAL

## 2024-09-05 VITALS
BODY MASS INDEX: 34.1 KG/M2 | HEART RATE: 70 BPM | WEIGHT: 217.7 LBS | RESPIRATION RATE: 18 BRPM | SYSTOLIC BLOOD PRESSURE: 109 MMHG | DIASTOLIC BLOOD PRESSURE: 74 MMHG | OXYGEN SATURATION: 97 %

## 2024-09-05 DIAGNOSIS — C71.9 GLIOBLASTOMA (H): Primary | ICD-10-CM

## 2024-09-05 PROCEDURE — 99214 OFFICE O/P EST MOD 30 MIN: CPT | Mod: 95 | Performed by: NURSE PRACTITIONER

## 2024-09-05 PROCEDURE — 77336 RADIATION PHYSICS CONSULT: CPT | Performed by: STUDENT IN AN ORGANIZED HEALTH CARE EDUCATION/TRAINING PROGRAM

## 2024-09-05 PROCEDURE — 77386 HC IMRT TREATMENT DELIVERY, COMPLEX: CPT | Performed by: STUDENT IN AN ORGANIZED HEALTH CARE EDUCATION/TRAINING PROGRAM

## 2024-09-05 PROCEDURE — G2211 COMPLEX E/M VISIT ADD ON: HCPCS | Mod: 95 | Performed by: NURSE PRACTITIONER

## 2024-09-05 ASSESSMENT — PAIN SCALES - GENERAL
PAINLEVEL: NO PAIN (0)
PAINLEVEL: NO PAIN (0)

## 2024-09-05 NOTE — NURSING NOTE
Current patient location: [address] [place] [id number]    Is the patient currently in the state of [place]? YES    Visit mode:VIDEO    If the visit is dropped, the patient can be reconnected by: VIDEO VISIT: Text to cell phone:   Telephone Information:   Mobile [phone]       Will anyone else be joining the visit? Leanne-Pt's Daughter   (If patient encounters technical issues they should call [phone]    How would you like to obtain your AVS? MyChart    Are changes needed to the allergy or medication list? Pt declined med review and Pt stated no med changes    Are refills needed on medications prescribed by this physician? NO    Rooming Documentation:  Unable to complete questionnaire(s) due to time      Reason for visit: JUDE CARRASCOF

## 2024-09-05 NOTE — LETTER
9/5/2024      Pamella Jaimes  [address] [facility] [address]      Dear Colleague,    Thank you for referring your patient, Pamella Jaimes, to the [facility] CANCER CLINIC. Please see a copy of my visit note below.    Virtual Visit Details    Type of service:  Video Visit   Video Start Time: 10:15 AM  Video End Time: 10:23 AM    Originating Location (pt. Location): Home  Distant Location (provider location):  Off-site  Platform used for Video Visit: [facility]      NEURO-ONCOLOGY VISIT  Sep 5, 2024    CHIEF COMPLAINT: Ms. 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 She is presenting to this initial clinic visit for evaluation and recommendations on treatment.     She is unaccompanied on this virtual visit.     HISTORY OF PRESENT ILLNESS  A summary of the patient s oncologic history is as follows;   -PRESENTATION: Neck pain, headache and head tremor.  -3/15/2023 MRI brain imaging with a mild asymmetric T2 hyperintense signal along right hippocampus and adjacent parahippocampal gyrus.    -PRESENTATION: Dizziness and nausea; semiology is concerning for temporal lobe auras. Slurred speech.  -5/27/2024 MR brain imaging with a right posterior temporo-occipital mass. Associated nodular peripheral enhancement and surrounding confluent T2 signal hyperintensity extending along the medial aspect of the right temporal lobe. Mass effect results in regional sulcal effacement, leftward midline shift, partial effacement the right lateral ventricle and mild right uncal herniation.   -5/28/2024 SURGERY: Craniotomy for mass resection at [facility].   No post-operative MR brain imaging performed.   Post-operative CT head imaging from 5/29 status post a right parietal craniotomy with excision of previously demonstrated paramedian parieto-occipital tumor.  Decreased focal mass effect and slightly decreased mild midline shift. No significant hemorrhage or additional complicating feature.   PATHOLOGY: Glioblastoma, IDH1-R132H wildtype (CNS WHO Grade 4).    MGMT promoter unmethylated.   -6/25/2024 NEURO-ONC: Recommending chemoradiotherapy with concurrent temozolomide. U/S + for DVT; started anticoagulation.   -7/26/2024 NEURO-ONC: She has started chemoradiotherapy with concurrent temozolomide. Acute visit for concerns; increased Keppra to 750 mg BID.   -9/5/2024 NEURO-ONC: She is scheduled to complete chemoradiotherapy with concurrent temozolomide tomorrow.    Today;   -Last couple weeks more fatigued and she has been, but overall is feeling well.  -No constipation.  -Last week or so feeling blah;  Bought Muscle Milk to see if that helps.  She is not feeling nauseated.  Her appetite is good.  -She has not noted any seizure activity since increasing her Keppra.  -No unusual bleeding or bruising.        MEDICATIONS   Current Outpatient Medications   Medication Sig Dispense Refill     acetaminophen (TYLENOL) 500 MG tablet Take 500-1,000 mg by mouth       dexAMETHasone (DECADRON) 2 MG tablet Take 1 tablet (2 mg) by mouth three times a day for 3 days, THEN 1 tablet (2 mg) twice a day for 30 days.       diclofenac (VOLTAREN) 75 MG EC tablet Take 75 mg by mouth (Patient not taking: Reported on 7/11/2024)       docusate sodium (COLACE) 100 MG capsule Take 100 mg by mouth       levETIRAcetam (KEPPRA) 750 MG tablet Take 1 tablet (750 mg) by mouth 2 times daily 60 tablet 1     melatonin 3 MG tablet Take 3 mg by mouth nightly as needed for sleep       methocarbamol (ROBAXIN) 500 MG tablet Take 500 mg by mouth (Patient not taking: Reported on 7/11/2024)       ondansetron (ZOFRAN) 4 MG tablet Take 1 tablet (4 mg) by mouth at bedtime Take 30-60 mins before each dose of temozolomide. Can take every 8 hours if needed. 30 tablet 3     rivaroxaban ANTICOAGULANT (XARELTO) 20 MG TABS tablet  Take 1 tablet (20 mg) by mouth daily (with dinner) 30 tablet 11     senna-docusate (SENOKOT-S/PERICOLACE) 8.6-50 MG tablet Take 1 tablet by mouth 2 times daily as needed (Patient not taking: Reported on 7/11/2024)       sertraline (ZOLOFT) 100 MG tablet Take 100 mg by mouth daily (Patient not taking: Reported on 7/11/2024)       sertraline (ZOLOFT) 50 MG tablet Take 1 tablet (50 mg) by mouth daily for 14 days, THEN 2 tablets (100 mg) daily for 21 days. Please call for a refill. (Patient not taking: Reported on 7/11/2024) 56 tablet 0     sulfamethoxazole-trimethoprim (BACTRIM DS) 800-160 MG tablet Take 1 tablet by mouth Every Mon, Wed, Fri Morning. Begin with the start of radiation therapy. 2 tablet 0     temozolomide (TEMODAR) 140 MG capsule Take 1 capsule (140 mg) by mouth at bedtime for 2 days with 1 other temozolomide prescription for 160 mg total Daily during radiation. Take a dose of ondansetron 30-60 min before temozolomide. Take on empty stomach. 2 capsule 0     temozolomide (TEMODAR) 140 MG capsule Take 1 capsule (140 mg) by mouth at bedtime for 13 days with 1 other temozolomide prescription for 160 mg total Daily during radiation. Take a dose of ondansetron 30-60 min before temozolomide. Take on empty stomach. 13 capsule 0     temozolomide (TEMODAR) 140 MG capsule Take 1 capsule (140 mg) by mouth at bedtime with 1 other temozolomide prescription for 160 mg total Daily during radiation. Take a dose of ondansetron 30-60 min before temozolomide. Take on empty stomach. 42 capsule 0     temozolomide (TEMODAR) 20 MG capsule Take 1 capsule (20 mg) by mouth at bedtime for 1 day Daily during radiation. Take a dose of ondansetron 30-60 min before temozolomide. Take on empty stomach. 1 capsule 0     temozolomide (TEMODAR) 20 MG capsule Take 1 capsule (20 mg) by mouth at bedtime for 2 days with 1 other temozolomide prescription for 160 mg total Daily during radiation. Take a dose of ondansetron 30-60 min before  "temozolomide. Take on empty stomach. 2 capsule 0     temozolomide (TEMODAR) 20 MG capsule Take 1 capsule (20 mg) by mouth at bedtime for 13 days with 1 other temozolomide prescription for 160 mg total Daily during radiation. Take a dose of ondansetron 30-60 min before temozolomide. Take on empty stomach. 13 capsule 0     temozolomide (TEMODAR) 20 MG capsule Take 1 capsule (20 mg) by mouth at bedtime with 1 other temozolomide prescription for 160 mg total Daily during radiation. Take a dose of ondansetron 30-60 min before temozolomide. Take on empty stomach. (Patient not taking: Reported on 7/11/2024) 42 capsule 0     DRUG ALLERGIES   Allergies   Allergen Reactions     Morphine      Penicillins Hives       IMMUNIZATIONS   Immunization History   Administered Date(s) Administered     COVID-19 Monovalent 18+ (Moderna) 05/04/2021, 06/01/2021, 01/28/2022       PHYSICAL EXAMINATION  There were no vitals taken for this visit.  Wt Readings from Last 2 Encounters:   08/29/24 97.9 kg (215 lb 12.8 oz)   08/22/24 98.2 kg (216 lb 9.6 oz)      Ht Readings from Last 2 Encounters:   07/26/24 1.702 m (5' 7\")   06/25/24 1.676 m (5' 6\")     KPS: 90    -Generally well appearing.  -Speaking in full, fluid sentences.      MEDICAL RECORDS  Personally reviewed oncology notes, rad onc notes, MyChart messages, ophthalmology note.    LABS  Labs reviewed.  Total WBC from 9/3 is 3.0.  Her ANC is 1.4.  Otherwise CBC is within normal limits.    IMAGING  No new imaging to review today.      IMPRESSION  As noted above, she is tolerating chemoradiotherapy well overall.  In the last 2 weeks, she has felt more fatigued, which we discussed is not unusual.  Seizure activity since increasing her dose of Keppra.  Not experiencing any constipation or nausea we review her lab for treatment.  We reviewed next steps, which include a break from treatment over the next 4 weeks or so, and then returning as scheduled in early October with labs, MRI, and to see  " Juan C.    Today we also introduced and discussed the role of Optune. We discussed what it is, the mechanism of action, potential benefit as well as side effects and management, importance of compliance and some of the limiting factors to compliance such as needing to maintain a shaved head and wear the device for at least 18 hours per day. I also provided the patient with the name of the [employer] website (https://talk.FanKave.cartmi) for further reference. After our discussion, the patient would like to consider this and look at the website.        PROBLEM LIST  Glioblastoma  Headaches  Homonymous hemianopsia, left-side  Benign essential tremor  Temporal lobe auras    PLAN  -CANCER-DIRECTED THERAPY-  Continue chemoradiotherapy with temozolomide 75mg/m2 (160mg).  Her last day is tomorrow.    -Medications prescribed;        -Zofran 4mg (1 to 2 tabs qHS 30 minutes prior to chemotherapy and then PRN nausea).       -For lymphopenia, prophylactic antibiotics are recommended during concurrent treatment. Will start Sulfamethoxazole-trimethoprim (Bactrim) 800 mg-160 mg; 1 tab orally Monday, Wednesday, and Friday for pneumocystis prophylaxis. If thrombocytopenia becomes an issue, can change to Dapsone, Atovaquone, or Pentamidine.        -Docusate 100 mg; 1 cap orally 3 times a day (stool softener for constipation)       -Senna 8.6 m tabs orally at bedtime (laxative as needed for constipation)    -STEROIDS-  -She has successfully tapered off dexamethasone.    -SEIZURE MANAGEMENT-  -She had been having stereotyped events that seem consistent with a history of non-dominant temporal lobe auras seizures and she was started on Keppra 500 mg twice daily, increased at her last visit to 750 mg twice daily.  She reports today that these episodes have resolved.    -Quality of life/ MOOD/ FATIGUE-  -Depression.   -Continue Zoloft.    -VISUAL FIELD CUT-  -Right Homonymous hemianopsia.  -Referral to neuro-ophtho for thorough eye examination  and baseline visual field testing to determine if there are any driving restrictions, which she completed on 8/22 and this note is reviewed.  They did discuss Peli prisms and Fresnel prisms were placed on the left lens of her glasses per note.     -LE DVT-  - Xarelto; continue as directed.     Return to clinic in ~4 weeks as scheduled with labs, MRI and to see Dr. Irizarry.    In the meantime, Pamella knows to call the clinic with any concerns and she can be seen sooner if needed.     The longitudinal plan of care for the diagnosis(es)/condition(s) as documented were addressed during this visit. Due to the added complexity in care, I will continue to support Pamella in the subsequent management and with ongoing continuity of care. 29 minutes spent on the date of the encounter doing chart review, review of test results, interpretation of tests, patient visit, and documentation.      FATEMEH Silvestre  Neuro-oncology       Again, thank you for allowing me to participate in the care of your patient.        Sincerely,        FATEMEH Silvestre CNP

## 2024-09-05 NOTE — PROGRESS NOTES
Virtual Visit Details    Type of service:  Video Visit   Video Start Time: 10:15 AM  Video End Time: 10:23 AM    Originating Location (pt. Location): Home  Distant Location (provider location):  Off-site  Platform used for Video Visit: [facility]      NEURO-ONCOLOGY VISIT  Sep 5, 2024    CHIEF COMPLAINT: Ms. 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 She is presenting to this initial clinic visit for evaluation and recommendations on treatment.     She is unaccompanied on this virtual visit.     HISTORY OF PRESENT ILLNESS  A summary of the patient s oncologic history is as follows;   -PRESENTATION: Neck pain, headache and head tremor.  -3/15/2023 MRI brain imaging with a mild asymmetric T2 hyperintense signal along right hippocampus and adjacent parahippocampal gyrus.    -PRESENTATION: Dizziness and nausea; semiology is concerning for temporal lobe auras. Slurred speech.  -5/27/2024 MR brain imaging with a right posterior temporo-occipital mass. Associated nodular peripheral enhancement and surrounding confluent T2 signal hyperintensity extending along the medial aspect of the right temporal lobe. Mass effect results in regional sulcal effacement, leftward midline shift, partial effacement the right lateral ventricle and mild right uncal herniation.   -5/28/2024 SURGERY: Craniotomy for mass resection at [facility].   No post-operative MR brain imaging performed.   Post-operative CT head imaging from 5/29 status post a right parietal craniotomy with excision of previously demonstrated paramedian parieto-occipital tumor. Decreased focal mass effect and slightly decreased mild midline shift. No significant hemorrhage or additional complicating feature.   PATHOLOGY: Glioblastoma, IDH1-R132H wildtype (CNS WHO Grade 4).    MGMT promoter unmethylated.   -6/25/2024  NEURO-ONC: Recommending chemoradiotherapy with concurrent temozolomide. U/S + for DVT; started anticoagulation.   -7/26/2024 NEURO-ONC: She has started chemoradiotherapy with concurrent temozolomide. Acute visit for concerns; increased Keppra to 750 mg BID.   -9/5/2024 NEURO-ONC: She is scheduled to complete chemoradiotherapy with concurrent temozolomide tomorrow.    Today;   -Last couple weeks more fatigued and she has been, but overall is feeling well.  -No constipation.  -Last week or so feeling blah;  Bought Muscle Milk to see if that helps.  She is not feeling nauseated.  Her appetite is good.  -She has not noted any seizure activity since increasing her Keppra.  -No unusual bleeding or bruising.        MEDICATIONS   Current Outpatient Medications   Medication Sig Dispense Refill    acetaminophen (TYLENOL) 500 MG tablet Take 500-1,000 mg by mouth      dexAMETHasone (DECADRON) 2 MG tablet Take 1 tablet (2 mg) by mouth three times a day for 3 days, THEN 1 tablet (2 mg) twice a day for 30 days.      diclofenac (VOLTAREN) 75 MG EC tablet Take 75 mg by mouth (Patient not taking: Reported on 7/11/2024)      docusate sodium (COLACE) 100 MG capsule Take 100 mg by mouth      levETIRAcetam (KEPPRA) 750 MG tablet Take 1 tablet (750 mg) by mouth 2 times daily 60 tablet 1    melatonin 3 MG tablet Take 3 mg by mouth nightly as needed for sleep      methocarbamol (ROBAXIN) 500 MG tablet Take 500 mg by mouth (Patient not taking: Reported on 7/11/2024)      ondansetron (ZOFRAN) 4 MG tablet Take 1 tablet (4 mg) by mouth at bedtime Take 30-60 mins before each dose of temozolomide. Can take every 8 hours if needed. 30 tablet 3    rivaroxaban ANTICOAGULANT (XARELTO) 20 MG TABS tablet Take 1 tablet (20 mg) by mouth daily (with dinner) 30 tablet 11    senna-docusate (SENOKOT-S/PERICOLACE) 8.6-50 MG tablet Take 1 tablet by mouth 2 times daily as needed (Patient not taking: Reported on 7/11/2024)      sertraline (ZOLOFT) 100 MG tablet  Take 100 mg by mouth daily (Patient not taking: Reported on 7/11/2024)      sertraline (ZOLOFT) 50 MG tablet Take 1 tablet (50 mg) by mouth daily for 14 days, THEN 2 tablets (100 mg) daily for 21 days. Please call for a refill. (Patient not taking: Reported on 7/11/2024) 56 tablet 0    sulfamethoxazole-trimethoprim (BACTRIM DS) 800-160 MG tablet Take 1 tablet by mouth Every Mon, Wed, Fri Morning. Begin with the start of radiation therapy. 2 tablet 0    temozolomide (TEMODAR) 140 MG capsule Take 1 capsule (140 mg) by mouth at bedtime for 2 days with 1 other temozolomide prescription for 160 mg total Daily during radiation. Take a dose of ondansetron 30-60 min before temozolomide. Take on empty stomach. 2 capsule 0    temozolomide (TEMODAR) 140 MG capsule Take 1 capsule (140 mg) by mouth at bedtime for 13 days with 1 other temozolomide prescription for 160 mg total Daily during radiation. Take a dose of ondansetron 30-60 min before temozolomide. Take on empty stomach. 13 capsule 0    temozolomide (TEMODAR) 140 MG capsule Take 1 capsule (140 mg) by mouth at bedtime with 1 other temozolomide prescription for 160 mg total Daily during radiation. Take a dose of ondansetron 30-60 min before temozolomide. Take on empty stomach. 42 capsule 0    temozolomide (TEMODAR) 20 MG capsule Take 1 capsule (20 mg) by mouth at bedtime for 1 day Daily during radiation. Take a dose of ondansetron 30-60 min before temozolomide. Take on empty stomach. 1 capsule 0    temozolomide (TEMODAR) 20 MG capsule Take 1 capsule (20 mg) by mouth at bedtime for 2 days with 1 other temozolomide prescription for 160 mg total Daily during radiation. Take a dose of ondansetron 30-60 min before temozolomide. Take on empty stomach. 2 capsule 0    temozolomide (TEMODAR) 20 MG capsule Take 1 capsule (20 mg) by mouth at bedtime for 13 days with 1 other temozolomide prescription for 160 mg total Daily during radiation. Take a dose of ondansetron 30-60 min before  "temozolomide. Take on empty stomach. 13 capsule 0    temozolomide (TEMODAR) 20 MG capsule Take 1 capsule (20 mg) by mouth at bedtime with 1 other temozolomide prescription for 160 mg total Daily during radiation. Take a dose of ondansetron 30-60 min before temozolomide. Take on empty stomach. (Patient not taking: Reported on 7/11/2024) 42 capsule 0     DRUG ALLERGIES   Allergies   Allergen Reactions    Morphine     Penicillins Hives       IMMUNIZATIONS   Immunization History   Administered Date(s) Administered    COVID-19 Monovalent 18+ (Moderna) 05/04/2021, 06/01/2021, 01/28/2022       PHYSICAL EXAMINATION  There were no vitals taken for this visit.  Wt Readings from Last 2 Encounters:   08/29/24 97.9 kg (215 lb 12.8 oz)   08/22/24 98.2 kg (216 lb 9.6 oz)      Ht Readings from Last 2 Encounters:   07/26/24 1.702 m (5' 7\")   06/25/24 1.676 m (5' 6\")     KPS: 90    -Generally well appearing.  -Speaking in full, fluid sentences.      MEDICAL RECORDS  Personally reviewed oncology notes, rad onc notes, MyChart messages, ophthalmology note.    LABS  Labs reviewed.  Total WBC from 9/3 is 3.0.  Her ANC is 1.4.  Otherwise CBC is within normal limits.    IMAGING  No new imaging to review today.      IMPRESSION  As noted above, she is tolerating chemoradiotherapy well overall.  In the last 2 weeks, she has felt more fatigued, which we discussed is not unusual.  Seizure activity since increasing her dose of Keppra.  Not experiencing any constipation or nausea we review her lab for treatment.  We reviewed next steps, which include a break from treatment over the next 4 weeks or so, and then returning as scheduled in early October with labs, MRI, and to see Dr. Irizarry.    Today we also introduced and discussed the role of Optune. We discussed what it is, the mechanism of action, potential benefit as well as side effects and management, importance of compliance and some of the limiting factors to compliance such as needing to " maintain a shaved head and wear the device for at least 18 hours per day. I also provided the patient with the name of the [employer] website (https://talk.[employer]) for further reference. After our discussion, the patient would like to consider this and look at the website.        PROBLEM LIST  Glioblastoma  Headaches  Homonymous hemianopsia, left-side  Benign essential tremor  Temporal lobe auras    PLAN  -CANCER-DIRECTED THERAPY-  Continue chemoradiotherapy with temozolomide 75mg/m2 (160mg).  Her last day is tomorrow.    -Medications prescribed;        -Zofran 4mg (1 to 2 tabs qHS 30 minutes prior to chemotherapy and then PRN nausea).       -For lymphopenia, prophylactic antibiotics are recommended during concurrent treatment. Will start Sulfamethoxazole-trimethoprim (Bactrim) 800 mg-160 mg; 1 tab orally Monday, Wednesday, and Friday for pneumocystis prophylaxis. If thrombocytopenia becomes an issue, can change to Dapsone, Atovaquone, or Pentamidine.        -Docusate 100 mg; 1 cap orally 3 times a day (stool softener for constipation)       -Senna 8.6 m tabs orally at bedtime (laxative as needed for constipation)    -STEROIDS-  -She has successfully tapered off dexamethasone.    -SEIZURE MANAGEMENT-  -She had been having stereotyped events that seem consistent with a history of non-dominant temporal lobe auras seizures and she was started on Keppra 500 mg twice daily, increased at her last visit to 750 mg twice daily.  She reports today that these episodes have resolved.    -Quality of life/ MOOD/ FATIGUE-  -Depression.   -Continue Zoloft.    -VISUAL FIELD CUT-  -Right Homonymous hemianopsia.  -Referral to neuro-ophtho for thorough eye examination and baseline visual field testing to determine if there are any driving restrictions, which she completed on  and this note is reviewed.  They did discuss Peli prisms and Fresnel prisms were placed on the left lens of her glasses per note.     -LE DVT-  - Xarelto;  continue as directed.     Return to clinic in ~4 weeks as scheduled with labs, MRI and to see Dr. Irizarry.    In the meantime, Pamella knows to call the clinic with any concerns and she can be seen sooner if needed.     The longitudinal plan of care for the diagnosis(es)/condition(s) as documented were addressed during this visit. Due to the added complexity in care, I will continue to support Pamella in the subsequent management and with ongoing continuity of care. 29 minutes spent on the date of the encounter doing chart review, review of test results, interpretation of tests, patient visit, and documentation.      FATEMEH Silvestre  Neuro-oncology

## 2024-09-05 NOTE — LETTER
2024      Pamella Jaimes  [address] [facility] [facility] [place] [address]      Dear Colleague,    Thank you for referring your patient, Pamella Jaimes, to the [facility] RADIATION ONCOLOGY. Please see a copy of my visit note below.    [facility]  SPECIALIZING IN BREAKTHROUGHS  Radiation Oncology    On Treatment Visit Note      Pamella Jaimes      Date: Sep 5, 2024   MRN: [id number]   : 1965  Diagnosis: Glioblastoma      Reason for Visit:  On Radiation Treatment Visit     Treatment Summary to Date  Treatment Site: Rt Occ Current Dose: 5800/6000 cGy Fractions:            Subjective:   Pamella feels well. She is fatigued and has noticed hair loss. She notices crust in both eyes this week.     Nursing ROS:   Brain Site: Rt Occ  Concurrent Therapy: Yes (Temodar)  Today's Dose: 5800  Today's Fraction/Total Fraction Brain:   Ocular/Visual - other : 0: Normal (itchy and watery, both eyes, was right and now also left- still some goopy in am.)  Middle ear/hearin: Normal (denies problems)  Tinnitus: 0: No (denies)  Depressed level of consciousness: 0: Normal  Neuropathy - motor: 0: Normal (denies dizziness)  Speech Impairment (e.g. Dysphasia or aphasia): 0: Normal  Seizures: 0: None (Denies)  Urinary Incontinence: 0: None  Bowel Incontinence: 0: None  Insomnia: 0: Normal     Objective:   /74 (BP Location: Right arm, Patient Position: Sitting, Cuff Size: Adult Regular)   Pulse 70   Resp 18   Wt 98.7 kg (217 lb 11.2 oz)   SpO2 97%   BMI 34.10 kg/m    Gen: Appears well, in no acute distress  Skin: No erythema, alopecia right posterior scalp     Labs:  CBC RESULTS:   Recent Labs   Lab Test 24  1130   WBC 3.1*   RBC 4.54   HGB 14.1   HCT 43.7   MCV 96   MCH 31.1   MCHC 32.3   RDW 12.6        ELECTROLYTES:  Recent Labs   Lab Test 24  1130      POTASSIUM 4.6   CHLORIDE 106   SUHAIL 9.2   CO2 23   BUN 12.4   CR 0.81   *       Assessment:     Tolerating radiation therapy well.  All questions and concerns addressed.    Toxicities:  Fatigue Grade 1  Alopecia Grade 2     Plan:   Continue current therapy.   Follow up in 4 weeks with us after next MRI brain with Dr. Irizarry    Contact optho if progressive eye irritation       Mosaiq chart and setup information reviewed  Daily imaging reviewed       Erna Sanchez MD, PhD     Department of Radiation Oncology  [facility]        Again, thank you for allowing me to participate in the care of your patient.        Sincerely,        Erna Sanchez MD PhD

## 2024-09-06 ENCOUNTER — APPOINTMENT (OUTPATIENT)
Dept: RADIATION ONCOLOGY | Facility: CLINIC | Age: 59
End: 2024-09-06
Attending: STUDENT IN AN ORGANIZED HEALTH CARE EDUCATION/TRAINING PROGRAM
Payer: COMMERCIAL

## 2024-09-06 ENCOUNTER — LAB (OUTPATIENT)
Dept: LAB | Facility: CLINIC | Age: 59
End: 2024-09-06
Payer: COMMERCIAL

## 2024-09-06 ENCOUNTER — PATIENT OUTREACH (OUTPATIENT)
Dept: CARE COORDINATION | Facility: CLINIC | Age: 59
End: 2024-09-06
Payer: COMMERCIAL

## 2024-09-06 ENCOUNTER — DOCUMENTATION ONLY (OUTPATIENT)
Dept: PHARMACY | Facility: CLINIC | Age: 59
End: 2024-09-06

## 2024-09-06 DIAGNOSIS — T45.1X5A ANTINEOPLASTIC CHEMOTHERAPY INDUCED PANCYTOPENIA (H): ICD-10-CM

## 2024-09-06 DIAGNOSIS — T45.1X5A CHEMOTHERAPY-INDUCED NAUSEA AND VOMITING: ICD-10-CM

## 2024-09-06 DIAGNOSIS — D61.810 ANTINEOPLASTIC CHEMOTHERAPY INDUCED PANCYTOPENIA (H): ICD-10-CM

## 2024-09-06 DIAGNOSIS — R11.2 CHEMOTHERAPY-INDUCED NAUSEA AND VOMITING: ICD-10-CM

## 2024-09-06 LAB
ALBUMIN SERPL BCG-MCNC: 4.3 G/DL (ref 3.5–5.2)
ALP SERPL-CCNC: 71 U/L (ref 40–150)
ALT SERPL W P-5'-P-CCNC: 14 U/L (ref 0–50)
ANION GAP SERPL CALCULATED.3IONS-SCNC: 11 MMOL/L (ref 7–15)
AST SERPL W P-5'-P-CCNC: 25 U/L (ref 0–45)
BASOPHILS # BLD AUTO: 0 10E3/UL (ref 0–0.2)
BASOPHILS NFR BLD AUTO: 1 %
BILIRUB SERPL-MCNC: 0.4 MG/DL
BUN SERPL-MCNC: 12.4 MG/DL (ref 8–23)
CALCIUM SERPL-MCNC: 9.2 MG/DL (ref 8.8–10.4)
CHLORIDE SERPL-SCNC: 106 MMOL/L (ref 98–107)
CREAT SERPL-MCNC: 0.81 MG/DL (ref 0.51–0.95)
EGFRCR SERPLBLD CKD-EPI 2021: 82 ML/MIN/1.73M2
EOSINOPHIL # BLD AUTO: 0.2 10E3/UL (ref 0–0.7)
EOSINOPHIL NFR BLD AUTO: 8 %
ERYTHROCYTE [DISTWIDTH] IN BLOOD BY AUTOMATED COUNT: 12.6 % (ref 10–15)
GLUCOSE SERPL-MCNC: 100 MG/DL (ref 70–99)
HCO3 SERPL-SCNC: 23 MMOL/L (ref 22–29)
HCT VFR BLD AUTO: 43.7 % (ref 35–47)
HGB BLD-MCNC: 14.1 G/DL (ref 11.7–15.7)
IMM GRANULOCYTES # BLD: 0 10E3/UL
IMM GRANULOCYTES NFR BLD: 0 %
LYMPHOCYTES # BLD AUTO: 0.9 10E3/UL (ref 0.8–5.3)
LYMPHOCYTES NFR BLD AUTO: 28 %
MCH RBC QN AUTO: 31.1 PG (ref 26.5–33)
MCHC RBC AUTO-ENTMCNC: 32.3 G/DL (ref 31.5–36.5)
MCV RBC AUTO: 96 FL (ref 78–100)
MONOCYTES # BLD AUTO: 0.4 10E3/UL (ref 0–1.3)
MONOCYTES NFR BLD AUTO: 14 %
NEUTROPHILS # BLD AUTO: 1.6 10E3/UL (ref 1.6–8.3)
NEUTROPHILS NFR BLD AUTO: 49 %
NRBC # BLD AUTO: 0 10E3/UL
NRBC BLD AUTO-RTO: 0 /100
PLATELET # BLD AUTO: 209 10E3/UL (ref 150–450)
POTASSIUM SERPL-SCNC: 4.6 MMOL/L (ref 3.4–5.3)
PROT SERPL-MCNC: 6.9 G/DL (ref 6.4–8.3)
RBC # BLD AUTO: 4.54 10E6/UL (ref 3.8–5.2)
SODIUM SERPL-SCNC: 140 MMOL/L (ref 135–145)
WBC # BLD AUTO: 3.1 10E3/UL (ref 4–11)

## 2024-09-06 PROCEDURE — 77427 RADIATION TX MANAGEMENT X5: CPT | Performed by: STUDENT IN AN ORGANIZED HEALTH CARE EDUCATION/TRAINING PROGRAM

## 2024-09-06 PROCEDURE — 85025 COMPLETE CBC W/AUTO DIFF WBC: CPT

## 2024-09-06 PROCEDURE — 77014 PR CT GUIDE FOR PLACEMENT RADIATION THERAPY FIELDS: CPT | Mod: 26 | Performed by: RADIOLOGY

## 2024-09-06 PROCEDURE — 82040 ASSAY OF SERUM ALBUMIN: CPT

## 2024-09-06 PROCEDURE — 36415 COLL VENOUS BLD VENIPUNCTURE: CPT

## 2024-09-06 PROCEDURE — 77386 HC IMRT TREATMENT DELIVERY, COMPLEX: CPT | Performed by: STUDENT IN AN ORGANIZED HEALTH CARE EDUCATION/TRAINING PROGRAM

## 2024-09-06 NOTE — PROGRESS NOTES
Social Work - Follow-Up  [facility]    Data/Intervention:  Patient Name: Pamella Jaimes Goes By: Pamella    /Age: 1965 (59 year old)    Reason for Follow-Up:  This clinician called Pamella for planned psychosocial check-in and support    Intervention/Education/Resources Provided:  Pamella endorses that she is feeling encouraged by today being last day of radiation therapy. Looking forward to celebrating with her grandchildren this evening by bowling.     Denies concerns or needs. Appreciative of check-in.     Assessment/Plan:  This clinician will outreach in 1 month.     Previously provided patient/family with writer's contact information and availability.    Natalee Bonilla, MEHDI, LICSW, OSW-C  Clinical - Adult Oncology  Phone: [phone]  She/Her/Hers  [facility]: Nohemy FERRO  8am-4:30pm  [facility]: TRISH Kellogg F 8am-4:30pm   Support Groups at [facility]: Social Work Services for Cancer Patients ([email or web address])

## 2024-09-06 NOTE — PROGRESS NOTES
Oral Chemotherapy Monitoring Program  Lab Follow Up    Reviewed lab results from 9/6/24.        7/31/2024     9:00 AM 8/8/2024     3:00 PM 8/16/2024    11:00 AM 8/23/2024    10:00 AM 8/27/2024     1:00 PM 8/28/2024     4:00 PM 9/6/2024     1:00 PM   ORAL CHEMOTHERAPY   Assessment Type Lab Monitoring Lab Monitoring Lab Monitoring Lab Monitoring Refill;Chart Review Chart Review;Lab Monitoring;Incoming phone call Lab Monitoring   Diagnosis Code Brain Cancer - Glioblastoma Brain Cancer - Glioblastoma Brain Cancer - Glioblastoma Brain Cancer - Glioblastoma Brain Cancer - Glioblastoma Brain Cancer - Glioblastoma Brain Cancer - Glioblastoma   Providers Dr. Juan C Irizarry   Clinic Name/Location [facility]   Drug Name Temodar (temozolomide) Temodar (temozolomide) Temodar (temozolomide) Temodar (temozolomide) Temodar (temozolomide) Temodar (temozolomide) Temodar (temozolomide)   Dose 160 mg 160 mg 160 mg 160 mg 160 mg 160 mg 160 mg   Current Schedule Daily Daily Daily Daily Daily Daily Daily   Cycle Details Continuous for 42 days during XRT Continuous for 42 days during XRT Continuous for 42 days during XRT Continuous for 42 days during XRT Continuous for 42 days during XRT Continuous for 42 days during XRT Continuous for 42 days during XRT   Start Date of Last Cycle 7/22/2024 7/22/2024 7/22/2024 7/22/2024 7/22/2024 7/22/2024 7/22/2024   Adverse Effects No AE identified during assessment  Neutropenia No AE identified during assessment No AE identified during assessment Neutropenia    Neutropenia   Grade 1   Grade 2    Pharmacist Intervention(neutropenia)   No   Yes    Intervention(s)      Referral to oncology provider    Any new drug interactions?    No No No    Is the dose as ordered appropriate for the patient? Yes   Yes Yes Yes        Labs:  _  Result Component Current Result Ref Range   Sodium 140 (9/6/2024) 135 - 145  mmol/L     _  Result Component Current Result Ref Range   Potassium 4.6 (9/6/2024) 3.4 - 5.3 mmol/L     _  Result Component Current Result Ref Range   Calcium 9.2 (9/6/2024) 8.8 - 10.4 mg/dL     No results found for Mag within last 30 days.     No results found for Phos within last 30 days.     _  Result Component Current Result Ref Range   Albumin 4.3 (9/6/2024) 3.5 - 5.2 g/dL     _  Result Component Current Result Ref Range   Urea Nitrogen 12.4 (9/6/2024) 8.0 - 23.0 mg/dL     _  Result Component Current Result Ref Range   Creatinine 0.81 (9/6/2024) 0.51 - 0.95 mg/dL     _  Result Component Current Result Ref Range   AST 25 (9/6/2024) 0 - 45 U/L     _  Result Component Current Result Ref Range   ALT 14 (9/6/2024) 0 - 50 U/L     _  Result Component Current Result Ref Range   Bilirubin Total 0.4 (9/6/2024) <=1.2 mg/dL     _  Result Component Current Result Ref Range   WBC Count 3.1 (L) (9/6/2024) 4.0 - 11.0 10e3/uL     _  Result Component Current Result Ref Range   Hemoglobin 14.1 (9/6/2024) 11.7 - 15.7 g/dL     _  Result Component Current Result Ref Range   Platelet Count 209 (9/6/2024) 150 - 450 10e3/uL     No results found for ANC within last 30 days.     _  Result Component Current Result Ref Range   Absolute Neutrophils 1.6 (9/6/2024) 1.6 - 8.3 10e3/uL        Assessment & Plan:  No concerning abnormalities. ANC is stable. Patient is done with radiation after today. Last dose of tmz night of 9/5.    Questions answered to patient's satisfaction.    Follow-Up:  1 month with labs, imaging and provider appt prior to adjuvant start    Venessa Yarbrough PharmD  September 6, 2024

## 2024-09-09 ENCOUNTER — MYC MEDICAL ADVICE (OUTPATIENT)
Dept: OPHTHALMOLOGY | Facility: CLINIC | Age: 59
End: 2024-09-09
Payer: COMMERCIAL

## 2024-09-09 NOTE — PROGRESS NOTES
[facility]  SPECIALIZING IN BREAKTHROUGHS  Radiation Oncology    On Treatment Visit Note      Pamella Jaimes      Date: Sep 5, 2024   MRN: [id number]   : 1965  Diagnosis: Glioblastoma      Reason for Visit:  On Radiation Treatment Visit     Treatment Summary to Date  Treatment Site: Rt Occ Current Dose: 5800/6000 cGy Fractions:            Subjective:   Pamella feels well. She is fatigued and has noticed hair loss. She notices crust in both eyes this week.     Nursing ROS:   Brain Site: Rt Occ  Concurrent Therapy: Yes (Temodar)  Today's Dose: 5800  Today's Fraction/Total Fraction Brain:   Ocular/Visual - other : 0: Normal (itchy and watery, both eyes, was right and now also left- still some goopy in am.)  Middle ear/hearin: Normal (denies problems)  Tinnitus: 0: No (denies)  Depressed level of consciousness: 0: Normal  Neuropathy - motor: 0: Normal (denies dizziness)  Speech Impairment (e.g. Dysphasia or aphasia): 0: Normal  Seizures: 0: None (Denies)  Urinary Incontinence: 0: None  Bowel Incontinence: 0: None  Insomnia: 0: Normal     Objective:   /74 (BP Location: Right arm, Patient Position: Sitting, Cuff Size: Adult Regular)   Pulse 70   Resp 18   Wt 98.7 kg (217 lb 11.2 oz)   SpO2 97%   BMI 34.10 kg/m    Gen: Appears well, in no acute distress  Skin: No erythema, alopecia right posterior scalp     Labs:  CBC RESULTS:   Recent Labs   Lab Test 24  1130   WBC 3.1*   RBC 4.54   HGB 14.1   HCT 43.7   MCV 96   MCH 31.1   MCHC 32.3   RDW 12.6        ELECTROLYTES:  Recent Labs   Lab Test 24  1130      POTASSIUM 4.6   CHLORIDE 106   SUHAIL 9.2   CO2 23   BUN 12.4   CR 0.81   *       Assessment:    Tolerating radiation therapy well.  All questions and concerns addressed.    Toxicities:  Fatigue Grade 1  Alopecia Grade 2     Plan:   Continue current therapy.   Follow up in 4 weeks with us after next MRI brain with Dr. Juan C High optho  if progressive eye irritation       Mosaiq chart and setup information reviewed  Daily imaging reviewed       Erna Sanchez MD, PhD     Department of Radiation Oncology  [facility]

## 2024-09-09 NOTE — TELEPHONE ENCOUNTER
Spoke to pt at 1015    Itching, some redness and mattering in AM for about 3 weeks or more.    Offered evaluation today with Dr. Valle and pt unable--    Pt lives in [place] and relies on other/family for transportation.    Reviewed over the counter lubricating eye drops-- PF refresh plus that patient has and encouraged frequent use and may use warm compresses 2/day.    Reviewed for itching-- cool compresses can help and reviewed there are allergy eye drops over the counter.    Reviewed would recommend eye exam if having continued symptoms and may come here or see local eye provider.    Pt will try more tears/warm compresses and call Wednesday/Thursday if still having symptoms and would like to come in-- able to work in with Dr. Corbin/Tori on Friday.    Giovanni Washington RN 10:19 AM 09/09/24

## 2024-09-23 ENCOUNTER — TELEPHONE (OUTPATIENT)
Dept: ONCOLOGY | Facility: CLINIC | Age: 59
End: 2024-09-23
Payer: COMMERCIAL

## 2024-09-23 NOTE — TELEPHONE ENCOUNTER
Juan C, Maribell Sidhu MD  You; Ratna Hernandez APRN CNP; Irene Clayton RN3 minutes ago (12:30 PM)       These events could represent seizure auras.    Her recent subtle illness and fatigued from being physically active are the likely cause of the increase in frequency of these events. Therefore, I recommend monitoring for now in addition to getting much rest.    Next, please have her avoid reasons for breakthrough seizures;  -Sleep deprivation.  -Alcohol use.  -Illness/ infection.  -Not taking seizure medications as prescribed.    Finally, have her call us with new/ worsening infectious symptoms and/ or a further increase in seizure/ aura frequency.    Maribell Irizarry MD  Neuro-oncology  9/23/2024     Pt was notified with recommendations per Dr Irizarry, and pt agrees with plan. Pt states that she has not had any further episodes since previous phone conversation today.   Patient verbalized understanding and agreement with plan.  Patient was instructed to call the clinic with any questions, concerns, or worsening symptoms.  Kim Kraft RN on 9/23/2024 at 12:41 PM

## 2024-09-23 NOTE — TELEPHONE ENCOUNTER
"Oncology Nurse Triage    Situation:   Pamella reporting the following symptoms: increase in seizure-like activity     Background:   Treating Provider:   Dr Irizarry     Date of last office visit: 9/5/2024 with Ratna Hernandez NP     Recent Treatments:finished chemoradiation with temodar on 9/6/2024    Assessment:     Pt is calling.   States that she has noticed increase in seizure-like activity that started yesterday.  Pt states that these episodes involve getting goose bumps and feeling really chilly. She will also have some nausea and upset stomach. States that Dr Irizarry has told her in the past that these are seizures.   Pt was at the [facility] yesterday and noticed 4-5 of these episodes. Each episode only lasts for a few minutes. She has also had 2 episodes today so far.   Denies loss of consciousness or any tremors. Also denies headaches, vision changes, numbness/tingling, weakness, loss of control of bowel or bladder, fever/chills, chest pain, shortness of breath, or other urgent symptoms.  States that she did not have any diarrhea after these episodes yesterday, but she did have diarrhea one time today so far.   States that she is getting over a \"head cold\" that she developed 1.5 weeks ago. The cold has moved into her chest and she has a cough that is productive at times, but overall she is feeling better.   She has been sleeping well and eating and drinking as usual.  Pt states that she normally takes keppra 750 mg at 8 am and 8 pm. She has not missed any doses recently, but states that she did take a few doses late last week, sometimes up to 3 hours late.       Recommendations:     Will route to care team for review and recommendations.  Reviewed emergent symptoms that would warrant return call to clinic or evaluation in ER.  OK to leave message if unable to reach patient.   Patient verbalized understanding and agreement with plan.  Patient was instructed to call the clinic with any questions, " concerns, or worsening symptoms.  Kim Kraft RN on 9/23/2024 at 11:49 AM

## 2024-09-27 NOTE — PROGRESS NOTES
NEURO-ONCOLOGY VISIT  Oct 1, 2024    CHIEF COMPLAINT: Ms. 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 Pamella completed standard chemoradiotherapy on 9/6/2024. Post-radiation imaging demonstrated no new concerns with an initial positive response to treatment.     The plan is to initiate adjuvant-dosed temozolomide.     Pamella is presenting in follow-up today and she is accompanied by Leanne (daughter).      HISTORY OF PRESENT ILLNESS  -Pamella states that she can become dehydrated and this can trigger a nauseated-like feeling.    Her appetite is otherwise good. No lingering nausea or constipation.  -Energy is improving. [place] and had [place] time[place]   -She has not noted any seizure activity and remains on Keppra, but called in on 9/23 to report potential seizure auras. Last aura was about 1 week ago.   -No new weakness, changes in sensation, nor changes in vision.    No headaches.   -No unusual bleeding or bruising.        MEDICATIONS   Current Outpatient Medications   Medication Sig Dispense Refill    acetaminophen (TYLENOL) 500 MG tablet Take 500-1,000 mg by mouth.      ondansetron (ZOFRAN) 4 MG tablet Take 1 tablet (4 mg) by mouth at bedtime Take 30-60 mins before each dose of temozolomide. Can take every 8 hours if needed. 30 tablet 3    rivaroxaban ANTICOAGULANT (XARELTO) 20 MG TABS tablet Take 1 tablet (20 mg) by mouth daily (with dinner) 30 tablet 11    levETIRAcetam (KEPPRA) 750 MG tablet Take 1 tablet (750 mg) by mouth 2 times daily 60 tablet 1    melatonin 3 MG tablet Take 3 mg by mouth nightly as needed for sleep (Patient not taking: Reported on 9/5/2024)      [START ON 10/4/2024] temozolomide (TEMODAR) 140 MG capsule Take 1 capsule (140 mg) by mouth daily for 5 days with 1 other temozolomide prescription for 320 mg total Take ondansetron  30-60 min before temozolomide. Take at bedtime on an empty stomach. 5 capsule 0    [START ON 10/4/2024] Temozolomide (TEMODAR) 180 MG capsule Take 1 capsule (180 mg) by mouth daily for 5 days with 1 other Temozolomide prescription for 320 mg total Take ondansetron 30-60 min before temozolomide. Take at bedtime on an empty stomach. 5 capsule 0     DRUG ALLERGIES   Allergies   Allergen Reactions    Morphine     Penicillins Hives       IMMUNIZATIONS   Immunization History   Administered Date(s) Administered    COVID-19 Monovalent 18+ (Moderna) 05/04/2021, 06/01/2021, 01/28/2022     ONCOLOGIC HISTORY  -PRESENTATION: Neck pain, headache and head tremor.  -3/15/2023 MRI brain imaging with a mild asymmetric T2 hyperintense signal along right hippocampus and adjacent parahippocampal gyrus.    -PRESENTATION: Dizziness and nausea; semiology is concerning for temporal lobe auras. Slurred speech.  -5/27/2024 MR brain imaging with a right posterior temporo-occipital mass. Associated nodular peripheral enhancement and surrounding confluent T2 signal hyperintensity extending along the medial aspect of the right temporal lobe. Mass effect results in regional sulcal effacement, leftward midline shift, partial effacement the right lateral ventricle and mild right uncal herniation.   -5/28/2024 SURGERY: Craniotomy for mass resection at [facility].   No post-operative MR brain imaging performed.   Post-operative CT head imaging from 5/29 status post a right parietal craniotomy with excision of previously demonstrated paramedian parieto-occipital tumor. Decreased focal mass effect and slightly decreased mild midline shift. No significant hemorrhage or additional complicating feature.   PATHOLOGY: Glioblastoma, IDH1-R132H wildtype (CNS WHO Grade 4).    MGMT promoter unmethylated.   -6/25/2024 NEURO-ONC: Recommending chemoradiotherapy with concurrent temozolomide. U/S + for DVT; started anticoagulation.   -7/26/2024  "NEURO-ONC: She has started chemoradiotherapy with concurrent temozolomide. Acute visit for concerns; increased Keppra to 750 mg BID.   -7/23 - 9/6/2024 CHEMORT: Complete chemoradiotherapy. 60Gy in 30 fractions with concurrent temozolomide 75mg/m2 (160mg).  -10/1/2024 NEURO-ONC/ MRB/ CHEMO: Clinically well; reduced frequency of auras. Imaging with no concerns, initial positive response to treatment. Starting adjuvant temozolomide 150mg/m2 (320mg), cycle 1 (start date 10/4). Not pursuing Optune.       PHYSICAL EXAMINATION  BP 99/59 (BP Location: Left arm, Patient Position: Sitting, Cuff Size: Adult Large)   Pulse 74   Temp 98.7  F (37.1  C) (Oral)   Resp 18   Wt 97.7 kg (215 lb 6.4 oz)   SpO2 97%   BMI 33.74 kg/m    Wt Readings from Last 2 Encounters:   10/01/24 97.7 kg (215 lb 6.4 oz)   09/05/24 98.7 kg (217 lb 11.2 oz)      Ht Readings from Last 2 Encounters:   07/26/24 1.702 m (5' 7\")   06/25/24 1.676 m (5' 6\")     KPS:     -Generally well appearing. Good energy.  -Respiratory: No audible wheezing.   -Psychiatric: Normal mood and affect. Pleasant, talkative.  -Neurologic:   MENTAL STATUS:     Alert, oriented.    Recall: Intact.    Speech fluent.    Comprehension intact.     CRANIAL NERVES:     Symmetric facial movements.   Hearing intact.   With normal phonation, no dysfunction of the palate or tongue.   GAIT: Steady and unassisted.       MEDICAL RECORDS  Personally reviewed Radiation oncology on-treatment notes.    LABS  Personally reviewed; CBC (platelets 197) and CMP (AST/ ALT 21/16) from today.      IMAGING  Personally reviewed MR brain imaging from today and compared to pre-radiation imaging.    Formal read; \"Grossly similar appearance to vasogenic edema findings in the right occipital lobe and posterior right hippocampal formation. Slight interval decrease in the amount of enhancement in the area of the right occipital lobe without new areas of abnormal enhancement.\"     Imaging was shown to and " results were reviewed with Pamella and Leanne.      IMPRESSION  On date of service, 44 minutes was spent in clinic and 16 minutes was spent preparing for the visit through extensive chart review and coordinating care for this high complexity visit. The following is in explanation for the recommendations used to define the plan.       Pamella has no new neurological concerns, aside from a potential increase in the number of seizure auras that have since decreased in frequency over the past week. She remains on Keppra and reports no recurrent seizures. She also denies any lingering side effects of chemotherapy, aside from mild ongoing fatigue.    Imaging as detailed above with no new concerning findings. In fact to my eye, I note an initial positive treatment effect with a reduction in mass effect, T2 FLAIR/ edema, and contrast enhancement. The plan is to repeat imaging per NCCN guidelines of every 2 months.      With regard to cancer-directed therapy, the plan is to start adjuvant temozolomide. In the adjuvant phase, temozolomide is dosed at 150mg/m2 for days 1-5 of a 28 day cycle for the first cycle, and then increased to 200mg/m2 if tolerated. The previously reviewed common side effects of temozolomide can still be anticipated and were discussed as including, but not limited to, fatigue, nausea, and constipation. Bone marrow suppression can result in leukopenia and thrombocytopenia. Labs from today reviewed with no concerns. I alerted pharmacy and [facility] to the plan. Will continue treatment with intensive clinical and laboratory monitoring for toxicity per protocol.     PROBLEM LIST  Glioblastoma  Headaches  Homonymous hemianopsia, left-side  Benign essential tremor  Temporal lobe auras    PLAN  -CANCER DIRECTED THERAPY-  -Adjuvant temozolomide at 150mg/m2 (320mg), cycle 1 (start date of 10/4).    If this dose is well tolerated, will increase to 200mg/m2 for cycle 2.   Supportive medications; Zofran and bowel regimen.      -Repeat 28 day cycle if WBC >= 3, ANC >= 1.5, HgB >= 10, and platelets >= 100.   Surveillance labs reviewed, at goal for chemotherapy.    Will continue every 2 weeks CBC and CMP every 4 weeks.    -Repeat imaging in 2 months.   -Not pursuing use of Opune.      -STEROIDS-  -Off dexamethasone.    -SEIZURE MANAGEMENT-  -Stereotyped events that seem consistent with a history of non-dominant temporal lobe auras/ seizures.   On Keppra 750 mg twice daily.  -Continue to monitor frequency of recurrent auras.     -Quality of life/ MOOD/ FATIGUE-  -History of depression.    Self-discontinued Zoloft.    -VISUAL FIELD CUT-  -Right Homonymous hemianopsia.  -Evaluated by neuro-ophtho for thorough eye examination and baseline visual field testing to determine if there are any driving restrictions.   Peli prisms and Fresnel prisms were placed on the left lens of her glasses.      -LE DVT-  - Xarelto; continue as directed.     Return to clinic in ~4 weeks with FATEMEH Silvestre + labs.    In the meantime, Pamella knows to call the clinic with any concerns and she can be seen sooner if needed.     The longitudinal plan of care for the diagnosis(es)/condition(s) as documented were addressed during this visit. Due to the added complexity in care, I will continue to support Pamella in the subsequent management and with ongoing continuity of care.     Maribell Irizarry MD  Neuro-oncology

## 2024-10-01 ENCOUNTER — ONCOLOGY VISIT (OUTPATIENT)
Dept: ONCOLOGY | Facility: CLINIC | Age: 59
End: 2024-10-01
Attending: PSYCHIATRY & NEUROLOGY
Payer: COMMERCIAL

## 2024-10-01 ENCOUNTER — HOSPITAL ENCOUNTER (OUTPATIENT)
Dept: MRI IMAGING | Facility: CLINIC | Age: 59
Discharge: HOME | End: 2024-10-01
Attending: NURSE PRACTITIONER
Payer: COMMERCIAL

## 2024-10-01 ENCOUNTER — LAB (OUTPATIENT)
Dept: LAB | Facility: CLINIC | Age: 59
End: 2024-10-01
Attending: NURSE PRACTITIONER
Payer: COMMERCIAL

## 2024-10-01 ENCOUNTER — DOCUMENTATION ONLY (OUTPATIENT)
Dept: PHARMACY | Facility: CLINIC | Age: 59
End: 2024-10-01

## 2024-10-01 VITALS
BODY MASS INDEX: 33.74 KG/M2 | HEART RATE: 74 BPM | DIASTOLIC BLOOD PRESSURE: 59 MMHG | RESPIRATION RATE: 18 BRPM | WEIGHT: 215.4 LBS | SYSTOLIC BLOOD PRESSURE: 99 MMHG | TEMPERATURE: 98.7 F | OXYGEN SATURATION: 97 %

## 2024-10-01 DIAGNOSIS — C71.9 GLIOBLASTOMA (H): ICD-10-CM

## 2024-10-01 DIAGNOSIS — C71.9 GLIOBLASTOMA (H): Primary | ICD-10-CM

## 2024-10-01 LAB
ALBUMIN SERPL BCG-MCNC: 4.4 G/DL (ref 3.5–5.2)
ALP SERPL-CCNC: 81 U/L (ref 40–150)
ALT SERPL W P-5'-P-CCNC: 16 U/L (ref 0–50)
ANION GAP SERPL CALCULATED.3IONS-SCNC: 12 MMOL/L (ref 7–15)
AST SERPL W P-5'-P-CCNC: 21 U/L (ref 0–45)
BASOPHILS # BLD AUTO: 0 10E3/UL (ref 0–0.2)
BASOPHILS NFR BLD AUTO: 0 %
BILIRUB SERPL-MCNC: 0.4 MG/DL
BUN SERPL-MCNC: 15.9 MG/DL (ref 8–23)
CALCIUM SERPL-MCNC: 9.5 MG/DL (ref 8.8–10.4)
CHLORIDE SERPL-SCNC: 105 MMOL/L (ref 98–107)
CREAT SERPL-MCNC: 0.67 MG/DL (ref 0.51–0.95)
EGFRCR SERPLBLD CKD-EPI 2021: >90 ML/MIN/1.73M2
EOSINOPHIL # BLD AUTO: 0.1 10E3/UL (ref 0–0.7)
EOSINOPHIL NFR BLD AUTO: 2 %
ERYTHROCYTE [DISTWIDTH] IN BLOOD BY AUTOMATED COUNT: 11.6 % (ref 10–15)
GLUCOSE SERPL-MCNC: 98 MG/DL (ref 70–99)
HCO3 SERPL-SCNC: 26 MMOL/L (ref 22–29)
HCT VFR BLD AUTO: 42.5 % (ref 35–47)
HGB BLD-MCNC: 13.8 G/DL (ref 11.7–15.7)
IMM GRANULOCYTES # BLD: 0 10E3/UL
IMM GRANULOCYTES NFR BLD: 0 %
LYMPHOCYTES # BLD AUTO: 1.2 10E3/UL (ref 0.8–5.3)
LYMPHOCYTES NFR BLD AUTO: 22 %
MCH RBC QN AUTO: 30.3 PG (ref 26.5–33)
MCHC RBC AUTO-ENTMCNC: 32.5 G/DL (ref 31.5–36.5)
MCV RBC AUTO: 93 FL (ref 78–100)
MONOCYTES # BLD AUTO: 0.5 10E3/UL (ref 0–1.3)
MONOCYTES NFR BLD AUTO: 9 %
NEUTROPHILS # BLD AUTO: 3.4 10E3/UL (ref 1.6–8.3)
NEUTROPHILS NFR BLD AUTO: 66 %
NRBC # BLD AUTO: 0 10E3/UL
NRBC BLD AUTO-RTO: 0 /100
PLATELET # BLD AUTO: 197 10E3/UL (ref 150–450)
POTASSIUM SERPL-SCNC: 4.4 MMOL/L (ref 3.4–5.3)
PROT SERPL-MCNC: 6.9 G/DL (ref 6.4–8.3)
RBC # BLD AUTO: 4.56 10E6/UL (ref 3.8–5.2)
SODIUM SERPL-SCNC: 143 MMOL/L (ref 135–145)
WBC # BLD AUTO: 5.2 10E3/UL (ref 4–11)

## 2024-10-01 PROCEDURE — 99215 OFFICE O/P EST HI 40 MIN: CPT | Performed by: PSYCHIATRY & NEUROLOGY

## 2024-10-01 PROCEDURE — A9585 GADOBUTROL INJECTION: HCPCS | Performed by: NURSE PRACTITIONER

## 2024-10-01 PROCEDURE — G2211 COMPLEX E/M VISIT ADD ON: HCPCS | Performed by: PSYCHIATRY & NEUROLOGY

## 2024-10-01 PROCEDURE — 99417 PROLNG OP E/M EACH 15 MIN: CPT | Performed by: PSYCHIATRY & NEUROLOGY

## 2024-10-01 PROCEDURE — 85025 COMPLETE CBC W/AUTO DIFF WBC: CPT

## 2024-10-01 PROCEDURE — 82435 ASSAY OF BLOOD CHLORIDE: CPT

## 2024-10-01 PROCEDURE — G0463 HOSPITAL OUTPT CLINIC VISIT: HCPCS | Performed by: PSYCHIATRY & NEUROLOGY

## 2024-10-01 PROCEDURE — 70553 MRI BRAIN STEM W/O & W/DYE: CPT

## 2024-10-01 PROCEDURE — 255N000002 HC RX 255 OP 636: Performed by: NURSE PRACTITIONER

## 2024-10-01 PROCEDURE — 36415 COLL VENOUS BLD VENIPUNCTURE: CPT

## 2024-10-01 RX ORDER — TEMOZOLOMIDE 180 MG/1
180 CAPSULE ORAL DAILY
Qty: 5 CAPSULE | Refills: 0 | Status: SHIPPED | OUTPATIENT
Start: 2024-10-04 | End: 2024-10-09

## 2024-10-01 RX ORDER — GADOBUTROL 604.72 MG/ML
0.1 INJECTION INTRAVENOUS ONCE
Status: COMPLETED | OUTPATIENT
Start: 2024-10-01 | End: 2024-10-01

## 2024-10-01 RX ORDER — TEMOZOLOMIDE 140 MG/1
140 CAPSULE ORAL DAILY
Qty: 5 CAPSULE | Refills: 0 | Status: SHIPPED | OUTPATIENT
Start: 2024-10-04 | End: 2024-10-09

## 2024-10-01 RX ADMIN — GADOBUTROL 9.87 ML: 604.72 INJECTION INTRAVENOUS at 11:46

## 2024-10-01 ASSESSMENT — PAIN SCALES - GENERAL: PAINLEVEL: NO PAIN (0)

## 2024-10-01 NOTE — PROGRESS NOTES
"Oncology Rooming Note    October 1, 2024 1:29 PM   Pamella Jaimes is a 59 year old female who presents for:    Chief Complaint   Patient presents with    Oncology Clinic Visit     Glioblastoma (H)       Initial Vitals: BP 99/59 (BP Location: Left arm, Patient Position: Sitting, Cuff Size: Adult Large)   Pulse 74   Temp 98.7  F (37.1  C) (Oral)   Resp 18   Wt 97.7 kg (215 lb 6.4 oz)   SpO2 97%   BMI 33.74 kg/m   Estimated body mass index is 33.74 kg/m  as calculated from the following:    Height as of 7/26/24: 1.702 m (5' 7\").    Weight as of this encounter: 97.7 kg (215 lb 6.4 oz). Body surface area is 2.15 meters squared.  No Pain (0) Comment: Data Unavailable   No LMP recorded.  Allergies reviewed: Yes  Medications reviewed: Yes    Medications: Medication refills not needed today.  Pharmacy name entered into [facility]:    [id number] - [place] - [address] [place] [facility] [id number]  [place] - [address]  [facility] - [place], [place] - [address]    Frailty Screening:   Is the patient here for a new oncology consult visit in cancer care? 2. No      Clinical concerns: no       Daria Sullivan CMA              "

## 2024-10-01 NOTE — PROGRESS NOTES
Oral Chemotherapy Monitoring Program    Lab Monitoring Plan  Cbc every 2 weeks, CMP monthly  Subjective/Objective:  Pamella Jaimes is a 59 year old female seen in clinic for an initial visit for oral chemotherapy education.  Met with Pamella and daughter Leanne in clinic today to review the start of adjuvant temozolomide. Pamella confirms that chemoradiation phase went well and she tolerated temozolomide well. We reviewed the differences in dosing during the adjuvant phase and how to take the medication, along with side effects and monitoring. Pamella confirms that she does have some ondansetron at home but will call if she needs refills. She will also  more bowel meds.        8/8/2024     3:00 PM 8/16/2024    11:00 AM 8/23/2024    10:00 AM 8/27/2024     1:00 PM 8/28/2024     4:00 PM 9/6/2024     1:00 PM 10/1/2024     2:00 PM   ORAL CHEMOTHERAPY   Assessment Type Lab Monitoring Lab Monitoring Lab Monitoring Refill;Chart Review Chart Review;Lab Monitoring;Incoming phone call Lab Monitoring New Teach   Diagnosis Code Brain Cancer - Glioblastoma Brain Cancer - Glioblastoma Brain Cancer - Glioblastoma Brain Cancer - Glioblastoma Brain Cancer - Glioblastoma Brain Cancer - Glioblastoma Brain Cancer - Glioblastoma   Providers Dr. Juan C Irizarry   Clinic Name/Location [facility]   Drug Name Temodar (temozolomide) Temodar (temozolomide) Temodar (temozolomide) Temodar (temozolomide) Temodar (temozolomide) Temodar (temozolomide) Temodar (temozolomide)   Dose 160 mg 160 mg 160 mg 160 mg 160 mg 160 mg 320 mg   Current Schedule Daily Daily Daily Daily Daily Daily Daily   Cycle Details Continuous for 42 days during XRT Continuous for 42 days during XRT Continuous for 42 days during XRT Continuous for 42 days during XRT Continuous for 42 days during XRT Continuous for 42 days during XRT 5 days on, 23 days off   Start Date of  "Last Cycle 7/22/2024 7/22/2024 7/22/2024 7/22/2024 7/22/2024 7/22/2024 10/4/2024   Planned next cycle start date       11/1/2024   Adverse Effects  Neutropenia No AE identified during assessment No AE identified during assessment Neutropenia     Neutropenia  Grade 1   Grade 2     Pharmacist Intervention(neutropenia)  No   Yes     Intervention(s)     Referral to oncology provider     Any new drug interactions?   No No No     Is the dose as ordered appropriate for the patient?   Yes Yes Yes         Last PHQ-2 Score on record:       8/22/2024     9:32 AM 2/19/2018    10:08 AM   PHQ-2 ( 1999 [employer])   Q1: Little interest or pleasure in doing things 0 0   Q2: Feeling down, depressed or hopeless 1 0   PHQ-2 Score 1 0       Vitals:  BP:   BP Readings from Last 1 Encounters:   10/01/24 99/59     Wt Readings from Last 1 Encounters:   10/01/24 97.7 kg (215 lb 6.4 oz)     Estimated body surface area is 2.15 meters squared as calculated from the following:    Height as of 7/26/24: 1.702 m (5' 7\").    Weight as of an earlier encounter on 10/1/24: 97.7 kg (215 lb 6.4 oz).    Labs:  _  Result Component Current Result Ref Range   Sodium 143 (10/1/2024) 135 - 145 mmol/L     _  Result Component Current Result Ref Range   Potassium 4.4 (10/1/2024) 3.4 - 5.3 mmol/L     _  Result Component Current Result Ref Range   Calcium 9.5 (10/1/2024) 8.8 - 10.4 mg/dL     No results found for Mag within last 30 days.     No results found for Phos within last 30 days.     _  Result Component Current Result Ref Range   Albumin 4.4 (10/1/2024) 3.5 - 5.2 g/dL     _  Result Component Current Result Ref Range   Urea Nitrogen 15.9 (10/1/2024) 8.0 - 23.0 mg/dL     _  Result Component Current Result Ref Range   Creatinine 0.67 (10/1/2024) 0.51 - 0.95 mg/dL     _  Result Component Current Result Ref Range   AST 21 (10/1/2024) 0 - 45 U/L     _  Result Component Current Result Ref Range   ALT 16 (10/1/2024) 0 - 50 U/L     _  Result Component Current Result Ref " Range   Bilirubin Total 0.4 (10/1/2024) <=1.2 mg/dL     _  Result Component Current Result Ref Range   WBC Count 5.2 (10/1/2024) 4.0 - 11.0 10e3/uL     _  Result Component Current Result Ref Range   Hemoglobin 13.8 (10/1/2024) 11.7 - 15.7 g/dL     _  Result Component Current Result Ref Range   Platelet Count 197 (10/1/2024) 150 - 450 10e3/uL     No results found for ANC within last 30 days.     _  Result Component Current Result Ref Range   Absolute Neutrophils 3.4 (10/1/2024) 1.6 - 8.3 10e3/uL        Assessment:  Patient is appropriate to start therapy.    Plan:  Basic chemotherapy teaching was reviewed with the patient and the patient's family including indication, start date of therapy, dose, administration, adverse effects, missed doses, food and drug interactions, monitoring, side effect management, office contact information, and safe handling. Written materials were provided and all questions answered.    Follow-Up:  Start C1D1 on 10/4. Check in next week on tolerability. Labs in 2 weeks near [place].     Venessa Yarbrough PharmD  October 1, 2024

## 2024-10-01 NOTE — LETTER
10/1/2024      Pamella Jaimes  [address] [facility] [place] [address]      Dear Colleague,    Thank you for referring your patient, Pamella Jaimes, to the [facility]. Please see a copy of my visit note below.    NEURO-ONCOLOGY VISIT  Oct 1, 2024    CHIEF COMPLAINT: Ms. 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 Pamella completed standard chemoradiotherapy on 9/6/2024. Post-radiation imaging demonstrated no new concerns with an initial positive response to treatment.     The plan is to initiate adjuvant-dosed temozolomide.     Pamella is presenting in follow-up today and she is accompanied by Leanne (daughter).      HISTORY OF PRESENT ILLNESS  -Pamella states that she can become dehydrated and this can trigger a nauseated-like feeling.    Her appetite is otherwise good. No lingering nausea or constipation.  -Energy is improving. Went to [place] [email or web address] and had a fun time!   -She has not noted any seizure activity and remains on Keppra, but called in on 9/23 to report potential seizure auras. Last aura was about 1 week ago.   -No new weakness, changes in sensation, nor changes in vision.    No headaches.   -No unusual bleeding or bruising.        MEDICATIONS   Current Outpatient Medications   Medication Sig Dispense Refill     acetaminophen (TYLENOL) 500 MG tablet Take 500-1,000 mg by mouth.       ondansetron (ZOFRAN) 4 MG tablet Take 1 tablet (4 mg) by mouth at bedtime Take 30-60 mins before each dose of temozolomide. Can take every 8 hours if needed. 30 tablet 3     rivaroxaban ANTICOAGULANT (XARELTO) 20 MG TABS tablet Take 1 tablet (20 mg) by mouth daily (with dinner) 30 tablet 11     levETIRAcetam (KEPPRA) 750 MG tablet Take 1 tablet (750 mg) by mouth 2 times daily 60 tablet 1     melatonin 3 MG tablet Take 3 mg by mouth nightly as  needed for sleep (Patient not taking: Reported on 9/5/2024)       [START ON 10/4/2024] temozolomide (TEMODAR) 140 MG capsule Take 1 capsule (140 mg) by mouth daily for 5 days with 1 other temozolomide prescription for 320 mg total Take ondansetron 30-60 min before temozolomide. Take at bedtime on an empty stomach. 5 capsule 0     [START ON 10/4/2024] Temozolomide (TEMODAR) 180 MG capsule Take 1 capsule (180 mg) by mouth daily for 5 days with 1 other Temozolomide prescription for 320 mg total Take ondansetron 30-60 min before temozolomide. Take at bedtime on an empty stomach. 5 capsule 0     DRUG ALLERGIES   Allergies   Allergen Reactions     Morphine      Penicillins Hives       IMMUNIZATIONS   Immunization History   Administered Date(s) Administered     COVID-19 Monovalent 18+ (Moderna) 05/04/2021, 06/01/2021, 01/28/2022     ONCOLOGIC HISTORY  -PRESENTATION: Neck pain, headache and head tremor.  -3/15/2023 MRI brain imaging with a mild asymmetric T2 hyperintense signal along right hippocampus and adjacent parahippocampal gyrus.    -PRESENTATION: Dizziness and nausea; semiology is concerning for temporal lobe auras. Slurred speech.  -5/27/2024 MR brain imaging with a right posterior temporo-occipital mass. Associated nodular peripheral enhancement and surrounding confluent T2 signal hyperintensity extending along the medial aspect of the right temporal lobe. Mass effect results in regional sulcal effacement, leftward midline shift, partial effacement the right lateral ventricle and mild right uncal herniation.   -5/28/2024 SURGERY: Craniotomy for mass resection at [facility].   No post-operative MR brain imaging performed.   Post-operative CT head imaging from 5/29 status post a right parietal craniotomy with excision of previously demonstrated paramedian parieto-occipital tumor. Decreased focal mass effect and slightly decreased mild midline shift. No significant hemorrhage or additional complicating  "feature.   PATHOLOGY: Glioblastoma, IDH1-R132H wildtype (CNS WHO Grade 4).    MGMT promoter unmethylated.   -6/25/2024 NEURO-ONC: Recommending chemoradiotherapy with concurrent temozolomide. U/S + for DVT; started anticoagulation.   -7/26/2024 NEURO-ONC: She has started chemoradiotherapy with concurrent temozolomide. Acute visit for concerns; increased Keppra to 750 mg BID.   -7/23 - 9/6/2024 CHEMORT: Complete chemoradiotherapy. 60Gy in 30 fractions with concurrent temozolomide 75mg/m2 (160mg).  -10/1/2024 NEURO-ONC/ MRB/ CHEMO: Clinically well; reduced frequency of auras. Imaging with no concerns, initial positive response to treatment. Starting adjuvant temozolomide 150mg/m2 (320mg), cycle 1 (start date 10/4). Not pursuing Optune.       PHYSICAL EXAMINATION  BP 99/59 (BP Location: Left arm, Patient Position: Sitting, Cuff Size: Adult Large)   Pulse 74   Temp 98.7  F (37.1  C) (Oral)   Resp 18   Wt 97.7 kg (215 lb 6.4 oz)   SpO2 97%   BMI 33.74 kg/m    Wt Readings from Last 2 Encounters:   10/01/24 97.7 kg (215 lb 6.4 oz)   09/05/24 98.7 kg (217 lb 11.2 oz)      Ht Readings from Last 2 Encounters:   07/26/24 1.702 m (5' 7\")   06/25/24 1.676 m (5' 6\")     KPS:     -Generally well appearing. Good energy.  -Respiratory: No audible wheezing.   -Psychiatric: Normal mood and affect. Pleasant, talkative.  -Neurologic:   MENTAL STATUS:     Alert, oriented.    Recall: Intact.    Speech fluent.    Comprehension intact.     CRANIAL NERVES:     Symmetric facial movements.   Hearing intact.   With normal phonation, no dysfunction of the palate or tongue.   GAIT: Steady and unassisted.       MEDICAL RECORDS  Personally reviewed Radiation oncology on-treatment notes.    LABS  Personally reviewed; CBC (platelets 197) and CMP (AST/ ALT 21/16) from today.      IMAGING  Personally reviewed MR brain imaging from today and compared to pre-radiation imaging.    Formal read; \"Grossly similar appearance to vasogenic edema " "findings in the right occipital lobe and posterior right hippocampal formation. Slight interval decrease in the amount of enhancement in the area of the right occipital lobe without new areas of abnormal enhancement.\"     Imaging was shown to and results were reviewed with Pamella and Leanne.      IMPRESSION  On date of service, 44 minutes was spent in clinic and 16 minutes was spent preparing for the visit through extensive chart review and coordinating care for this high complexity visit. The following is in explanation for the recommendations used to define the plan.       Pamella has no new neurological concerns, aside from a potential increase in the number of seizure auras that have since decreased in frequency over the past week. She remains on Keppra and reports no recurrent seizures. She also denies any lingering side effects of chemotherapy, aside from mild ongoing fatigue.    Imaging as detailed above with no new concerning findings. In fact to my eye, I note an initial positive treatment effect with a reduction in mass effect, T2 FLAIR/ edema, and contrast enhancement. The plan is to repeat imaging per NCCN guidelines of every 2 months.      With regard to cancer-directed therapy, the plan is to start adjuvant temozolomide. In the adjuvant phase, temozolomide is dosed at 150mg/m2 for days 1-5 of a 28 day cycle for the first cycle, and then increased to 200mg/m2 if tolerated. The previously reviewed common side effects of temozolomide can still be anticipated and were discussed as including, but not limited to, fatigue, nausea, and constipation. Bone marrow suppression can result in leukopenia and thrombocytopenia. Labs from today reviewed with no concerns. I alerted pharmacy and RNCC to the plan. Will continue treatment with intensive clinical and laboratory monitoring for toxicity per protocol.     PROBLEM LIST  Glioblastoma  Headaches  Homonymous hemianopsia, left-side  Benign essential tremor  Temporal " lobe auras    PLAN  -CANCER DIRECTED THERAPY-  -Adjuvant temozolomide at 150mg/m2 (320mg), cycle 1 (start date of 10/4).    If this dose is well tolerated, will increase to 200mg/m2 for cycle 2.   Supportive medications; Zofran and bowel regimen.     -Repeat 28 day cycle if WBC >= 3, ANC >= 1.5, HgB >= 10, and platelets >= 100.   Surveillance labs reviewed, at goal for chemotherapy.    Will continue every 2 weeks CBC and CMP every 4 weeks.    -Repeat imaging in 2 months.   -Not pursuing use of Opune.      -STEROIDS-  -Off dexamethasone.    -SEIZURE MANAGEMENT-  -Stereotyped events that seem consistent with a history of non-dominant temporal lobe auras/ seizures.   On Keppra 750 mg twice daily.  -Continue to monitor frequency of recurrent auras.     -Quality of life/ MOOD/ FATIGUE-  -History of depression.    Self-discontinued Zoloft.    -VISUAL FIELD CUT-  -Right Homonymous hemianopsia.  -Evaluated by neuro-ophtho for thorough eye examination and baseline visual field testing to determine if there are any driving restrictions.   Peli prisms and Fresnel prisms were placed on the left lens of her glasses.      -LE DVT-  - Xarelto; continue as directed.     Return to clinic in ~4 weeks with FATEMEH Silvestre + labs.    In the meantime, Pamella knows to call the clinic with any concerns and she can be seen sooner if needed.     The longitudinal plan of care for the diagnosis(es)/condition(s) as documented were addressed during this visit. Due to the added complexity in care, I will continue to support Pamella in the subsequent management and with ongoing continuity of care.     Maribell Irizarry MD  Neuro-oncology       Oncology Rooming Note    October 1, 2024 1:29 PM   Pamella Jaimes is a 59 year old female who presents for:    Chief Complaint   Patient presents with     Oncology Clinic Visit     Glioblastoma (H)       Initial Vitals: BP 99/59 (BP Location: Left arm, Patient Position: Sitting, Cuff Size: Adult  "Large)   Pulse 74   Temp 98.7  F (37.1  C) (Oral)   Resp 18   Wt 97.7 kg (215 lb 6.4 oz)   SpO2 97%   BMI 33.74 kg/m   Estimated body mass index is 33.74 kg/m  as calculated from the following:    Height as of 7/26/24: 1.702 m (5' 7\").    Weight as of this encounter: 97.7 kg (215 lb 6.4 oz). Body surface area is 2.15 meters squared.  No Pain (0) Comment: Data Unavailable   No LMP recorded.  Allergies reviewed: Yes  Medications reviewed: Yes    Medications: Medication refills not needed today.  Pharmacy name entered into [employer]:    [id number] - [place] - [address] [place] [facility] [id number]  [place] - [address]  [facility] - [place], [place] - [address]     Frailty Screening:   Is the patient here for a new oncology consult visit in cancer care? 2. No      Clinical concerns: no       Daria Sullivan CMA                Again, thank you for allowing me to participate in the care of your patient.        Sincerely,        Maribell Irizarry MD  "

## 2024-10-02 NOTE — PROGRESS NOTES
10/3/2024  Pamella Jaimes  [phone] (home)   : 1965  MRN: [id number]  Neuro-Oncologist: Maribell Irizarry MD   Neurosurgeon: Brandan Connelly MD ([facility])   Attending Radiation Oncologist: Erna Sanchez MD PhD    RADIATION ONCOLOGY FOLLOW UP    History of Present Illness:  Pamella Jaimes is a 59-year-old female with MGMT unmethylated WHO Grade 4 glioblastoma, s/p right parietal craniotomy on 24. She received adjuvant radiation via IMRT to a total dose of 6000 cGy in 30 fx completed 24.      The patient was last seen on 24 and presents today for 1-month post-radiation follow up evaluation.    Interval History:   Last MRI brain with and without contrast on 10/01/24: IMPRESSION - Grossly similar appearance in the R occipital lobe and posterior R hippocampal formation. Slight interval decrease in the amount of enhancement in the area of the R occipital lobe without new areas of abnormal enhancement.     Overall, she is doing well following the conclusion of her radiation therapy. She endorsed fatigue and nausea that peaked in the week following treatment but has since resolved. She says that her energy has been steadily increasingly and that she has been able to start resuming activities again. She is scheduled to restart temozolomide on 10/4. She denies any headaches, weakness, N/V/D, fatigue and/or changes in vision, hearing, cognition or behavior.     Today, she specifically reports the following symptoms:    Brain ROS:  Headaches-   Denies  Seizures- Denies  Nausea/vomiting-  Denies  Changes in cognition/behavior-  Denies  Speech-  Denies  Vision/hearing changes- Denies  Gait symptoms or imbalance-  Denies  Other focal neuro deficits-  Denies    Review of Systems:  Denies additional symptoms related to current diagnosis.      Current systemic therapy: Adjuvant Temozolomide starting 10/4   Anti-epileptic: Keppra  Steroids: N/A    Current Outpatient Medications    Medication Sig Dispense Refill    acetaminophen (TYLENOL) 500 MG tablet Take 500-1,000 mg by mouth.      levETIRAcetam (KEPPRA) 750 MG tablet Take 1 tablet (750 mg) by mouth 2 times daily 60 tablet 1    melatonin 3 MG tablet Take 3 mg by mouth nightly as needed for sleep (Patient not taking: Reported on 2024)      ondansetron (ZOFRAN) 4 MG tablet Take 1 tablet (4 mg) by mouth at bedtime Take 30-60 mins before each dose of temozolomide. Can take every 8 hours if needed. 30 tablet 3    rivaroxaban ANTICOAGULANT (XARELTO) 20 MG TABS tablet Take 1 tablet (20 mg) by mouth daily (with dinner) 30 tablet 11    [START ON 10/4/2024] temozolomide (TEMODAR) 140 MG capsule Take 1 capsule (140 mg) by mouth daily for 5 days with 1 other temozolomide prescription for 320 mg total Take ondansetron 30-60 min before temozolomide. Take at bedtime on an empty stomach. 5 capsule 0    [START ON 10/4/2024] Temozolomide (TEMODAR) 180 MG capsule Take 1 capsule (180 mg) by mouth daily for 5 days with 1 other Temozolomide prescription for 320 mg total Take ondansetron 30-60 min before temozolomide. Take at bedtime on an empty stomach. 5 capsule 0     No current facility-administered medications for this visit.        Allergies   Allergen Reactions    Morphine     Penicillins Hives       Physical Exam:      ECO  KPS: 90    There were no vitals taken for this visit.  General: NAD, patient well appearing.  HEENT: Oropharynx is clear without erythema or exudates.   Extremities: No acute findings. No edema   Skin: color, texture and turgor wnl. No rashes and lesions.  Neuro:   MS: AAO x 3, appropriately interactive, normal affect, speech fluent  CN: II,III -- PERRLA, no visual field cut;   III,IV,VI -- EOMI, no ptosis;   V -- sensation intact to LT/PP; masseter function intact  VII -- no facial weakness/droop;   VIII -- hears finger rub equally bilaterally;   IX,X -- voice normal, palate elevates symmetrically,   XI -- SCM/trapezii 5/5  strength bilaterally;   XII -- tongue protrudes midline, no atrophy or fasciculation.  Motor: Normal tone, no tremor.   0-No movement. 1-Muscle contrac. 2-Moves on bed. 3-Antigrav. 4-Mildly weak. 5-Full.               Delt     Bicep   Tricep   FinAb  Ilopso  Hams  Quad   Gastr  TA      ExHL   R  5  5  5  5  5  5  5  5  5  5    L  5  5  5  5  5  5  5  5  5  5            Last CBC with Diff  WBC Count   Date Value Ref Range Status   10/01/2024 5.2 4.0 - 11.0 10e3/uL Final     RBC Count   Date Value Ref Range Status   10/01/2024 4.56 3.80 - 5.20 10e6/uL Final     Hemoglobin   Date Value Ref Range Status   10/01/2024 13.8 11.7 - 15.7 g/dL Final     Hematocrit   Date Value Ref Range Status   10/01/2024 42.5 35.0 - 47.0 % Final     MCV   Date Value Ref Range Status   10/01/2024 93 78 - 100 fL Final     MCH   Date Value Ref Range Status   10/01/2024 30.3 26.5 - 33.0 pg Final     MCHC   Date Value Ref Range Status   10/01/2024 32.5 31.5 - 36.5 g/dL Final     RDW   Date Value Ref Range Status   10/01/2024 11.6 10.0 - 15.0 % Final     Platelet Count   Date Value Ref Range Status   10/01/2024 197 150 - 450 10e3/uL Final     % Neutrophils   Date Value Ref Range Status   10/01/2024 66 % Final     % Lymphocytes   Date Value Ref Range Status   10/01/2024 22 % Final     % Monocytes   Date Value Ref Range Status   10/01/2024 9 % Final     % Eosinophils   Date Value Ref Range Status   10/01/2024 2 % Final     % Basophils   Date Value Ref Range Status   10/01/2024 0 % Final        Last Comprehensive Metabolic Panel:  Sodium   Date Value Ref Range Status   10/01/2024 143 135 - 145 mmol/L Final     Potassium   Date Value Ref Range Status   10/01/2024 4.4 3.4 - 5.3 mmol/L Final     Chloride   Date Value Ref Range Status   10/01/2024 105 98 - 107 mmol/L Final     Carbon Dioxide (CO2)   Date Value Ref Range Status   10/01/2024 26 22 - 29 mmol/L Final     Anion Gap   Date Value Ref Range Status   10/01/2024 12 7 - 15 mmol/L Final     Glucose    Date Value Ref Range Status   10/01/2024 98 70 - 99 mg/dL Final     Urea Nitrogen   Date Value Ref Range Status   10/01/2024 15.9 8.0 - 23.0 mg/dL Final     Creatinine   Date Value Ref Range Status   10/01/2024 0.67 0.51 - 0.95 mg/dL Final     GFR Estimate   Date Value Ref Range Status   10/01/2024 >90 >60 mL/min/1.73m2 Final     Comment:     eGFR calculated using 2021 CKD-EPI equation.     Calcium   Date Value Ref Range Status   10/01/2024 9.5 8.8 - 10.4 mg/dL Final     Comment:     Reference intervals for this test were updated on 7/16/2024 to reflect our healthy population more accurately. There may be differences in the flagging of prior results with similar values performed with this method. Those prior results can be interpreted in the context of the updated reference intervals.          Imaging and Diagnostic Studies:   See HPI above    Assessment/Plan:    Pamella Jaimes is a 59-year-old with MGMT unmethylated, CNS 5 WHO Grade 4 GBM, s/p right parietal craniotomy on 5/28/24 who presents for a 1-month follow-up after finishing radiation to 60 gy/30 fx on 9/6/24.       The patient presents in great condition without evidence of disease recurrence.     1)  Video visit with our team on the Thursday after your next follow up with Dr. Irizarry with imaging in 2 months     All the patient's questions were answered to verbalized satisfaction. We instructed the patient to call with any questions or concerns in the interim.        Mendoza Dee  Medical Student, OMS 4     Jeanmarie Fregoso MD  PGY2  Department of Radiation Oncology  [facility]     A medical resident participated in the care of this patient and in the preparation of this note. I have verified and edited this note. I personally performed key elements of the physical exam and medical decision making for this patient.  I agree with the assessment and plan of care as documented in the note above.      The overall time I spent on direct patient care  including self review of records, labs, and radiographic studies, as well as, direct face-to-face interaction with the patient and coordination of care with other providers was 15 minutes.      Erna Sanchez MD, PhD     Department of Radiation Oncology  [facility]

## 2024-10-03 ENCOUNTER — HOSPITAL ENCOUNTER (OUTPATIENT)
Dept: RESEARCH | Facility: CLINIC | Age: 59
Discharge: HOME | End: 2024-10-03
Attending: STUDENT IN AN ORGANIZED HEALTH CARE EDUCATION/TRAINING PROGRAM
Payer: COMMERCIAL

## 2024-10-03 ENCOUNTER — OFFICE VISIT (OUTPATIENT)
Dept: RADIATION ONCOLOGY | Facility: CLINIC | Age: 59
End: 2024-10-03
Attending: STUDENT IN AN ORGANIZED HEALTH CARE EDUCATION/TRAINING PROGRAM
Payer: COMMERCIAL

## 2024-10-03 VITALS
OXYGEN SATURATION: 97 % | BODY MASS INDEX: 33.36 KG/M2 | DIASTOLIC BLOOD PRESSURE: 63 MMHG | WEIGHT: 213 LBS | HEART RATE: 91 BPM | SYSTOLIC BLOOD PRESSURE: 113 MMHG

## 2024-10-03 DIAGNOSIS — C71.9 GLIOBLASTOMA (H): Primary | ICD-10-CM

## 2024-10-03 PROCEDURE — 510N000009 HC RESEARCH FACILITY, PER 15 MIN

## 2024-10-03 PROCEDURE — G0463 HOSPITAL OUTPT CLINIC VISIT: HCPCS | Performed by: STUDENT IN AN ORGANIZED HEALTH CARE EDUCATION/TRAINING PROGRAM

## 2024-10-03 PROCEDURE — 510N000017 HC CRU PATIENT CARE, PER 15 MIN

## 2024-10-03 PROCEDURE — 99024 POSTOP FOLLOW-UP VISIT: CPT | Performed by: STUDENT IN AN ORGANIZED HEALTH CARE EDUCATION/TRAINING PROGRAM

## 2024-10-03 NOTE — LETTER
10/3/2024      Pamella Jaimes  [address] [facility] [address]      Dear Colleague,    Thank you for referring your patient, Pamella Jaimes, to the [facility] RADIATION ONCOLOGY. Please see a copy of my visit note below.    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 She received adjuvant radiation via IMRT to a total dose of 6000 cGy in 30 fx completed 24.      The patient was last seen on 24 and presents today for 1-month post-radiation follow up evaluation.    Interval History:   Last MRI brain with and without contrast on 10/01/24: IMPRESSION - Grossly similar appearance in the R occipital lobe and posterior R hippocampal formation. Slight interval decrease in the amount of enhancement in the area of the R occipital lobe without new areas of abnormal enhancement.     Overall, she is doing well following the conclusion of her radiation therapy. She endorsed fatigue and nausea that peaked in the week following treatment but has since resolved. She says that her energy has been steadily increasingly and that she has been able to start resuming activities again. She is scheduled to restart temozolomide on 10/4. She denies any headaches, weakness, N/V/D, fatigue and/or changes in vision, hearing, cognition or behavior.     Today, she specifically reports the following symptoms:    Brain ROS:  Headaches-   Denies  Seizures- Denies  Nausea/vomiting-  Denies  Changes in cognition/behavior-  Denies  Speech-  Denies  Vision/hearing changes- Denies  Gait symptoms or imbalance-  Denies  Other focal neuro  deficits-  Denies    Review of Systems:  Denies additional symptoms related to current diagnosis.      Current systemic therapy: Adjuvant Temozolomide starting 10/4   Anti-epileptic: Keppra  Steroids: N/A    Current Outpatient Medications   Medication Sig Dispense Refill     acetaminophen (TYLENOL) 500 MG tablet Take 500-1,000 mg by mouth.       levETIRAcetam (KEPPRA) 750 MG tablet Take 1 tablet (750 mg) by mouth 2 times daily 60 tablet 1     melatonin 3 MG tablet Take 3 mg by mouth nightly as needed for sleep (Patient not taking: Reported on 2024)       ondansetron (ZOFRAN) 4 MG tablet Take 1 tablet (4 mg) by mouth at bedtime Take 30-60 mins before each dose of temozolomide. Can take every 8 hours if needed. 30 tablet 3     rivaroxaban ANTICOAGULANT (XARELTO) 20 MG TABS tablet Take 1 tablet (20 mg) by mouth daily (with dinner) 30 tablet 11     [START ON 10/4/2024] temozolomide (TEMODAR) 140 MG capsule Take 1 capsule (140 mg) by mouth daily for 5 days with 1 other temozolomide prescription for 320 mg total Take ondansetron 30-60 min before temozolomide. Take at bedtime on an empty stomach. 5 capsule 0     [START ON 10/4/2024] Temozolomide (TEMODAR) 180 MG capsule Take 1 capsule (180 mg) by mouth daily for 5 days with 1 other Temozolomide prescription for 320 mg total Take ondansetron 30-60 min before temozolomide. Take at bedtime on an empty stomach. 5 capsule 0     No current facility-administered medications for this visit.        Allergies   Allergen Reactions     Morphine      Penicillins Hives       Physical Exam:      ECO  KPS: 90    There were no vitals taken for this visit.  General: NAD, patient well appearing.  HEENT: Oropharynx is clear without erythema or exudates.   Extremities: No acute findings. No edema   Skin: color, texture and turgor wnl. No rashes and lesions.  Neuro:   MS: AAO x 3, appropriately interactive, normal affect, speech fluent  CN: II,III -- PERRLA, no visual field cut;    III,IV,VI -- EOMI, no ptosis;   V -- sensation intact to LT/PP; masseter function intact  VII -- no facial weakness/droop;   VIII -- hears finger rub equally bilaterally;   IX,X -- voice normal, palate elevates symmetrically,   XI -- SCM/trapezii 5/5 strength bilaterally;   XII -- tongue protrudes midline, no atrophy or fasciculation.  Motor: Normal tone, no tremor.   0-No movement. 1-Muscle contrac. 2-Moves on bed. 3-Antigrav. 4-Mildly weak. 5-Full.               Delt     Bicep   Tricep   FinAb  Ilopso  Hams  Quad   Gastr  TA      ExHL   R  5  5  5  5  5  5  5  5  5  5    L  5  5  5  5  5  5  5  5  5  5            Last CBC with Diff  WBC Count   Date Value Ref Range Status   10/01/2024 5.2 4.0 - 11.0 10e3/uL Final     RBC Count   Date Value Ref Range Status   10/01/2024 4.56 3.80 - 5.20 10e6/uL Final     Hemoglobin   Date Value Ref Range Status   10/01/2024 13.8 11.7 - 15.7 g/dL Final     Hematocrit   Date Value Ref Range Status   10/01/2024 42.5 35.0 - 47.0 % Final     MCV   Date Value Ref Range Status   10/01/2024 93 78 - 100 fL Final     MCH   Date Value Ref Range Status   10/01/2024 30.3 26.5 - 33.0 pg Final     MCHC   Date Value Ref Range Status   10/01/2024 32.5 31.5 - 36.5 g/dL Final     RDW   Date Value Ref Range Status   10/01/2024 11.6 10.0 - 15.0 % Final     Platelet Count   Date Value Ref Range Status   10/01/2024 197 150 - 450 10e3/uL Final     % Neutrophils   Date Value Ref Range Status   10/01/2024 66 % Final     % Lymphocytes   Date Value Ref Range Status   10/01/2024 22 % Final     % Monocytes   Date Value Ref Range Status   10/01/2024 9 % Final     % Eosinophils   Date Value Ref Range Status   10/01/2024 2 % Final     % Basophils   Date Value Ref Range Status   10/01/2024 0 % Final        Last Comprehensive Metabolic Panel:  Sodium   Date Value Ref Range Status   10/01/2024 143 135 - 145 mmol/L Final     Potassium   Date Value Ref Range Status   10/01/2024 4.4 3.4 - 5.3 mmol/L Final     Chloride    Date Value Ref Range Status   10/01/2024 105 98 - 107 mmol/L Final     Carbon Dioxide (CO2)   Date Value Ref Range Status   10/01/2024 26 22 - 29 mmol/L Final     Anion Gap   Date Value Ref Range Status   10/01/2024 12 7 - 15 mmol/L Final     Glucose   Date Value Ref Range Status   10/01/2024 98 70 - 99 mg/dL Final     Urea Nitrogen   Date Value Ref Range Status   10/01/2024 15.9 8.0 - 23.0 mg/dL Final     Creatinine   Date Value Ref Range Status   10/01/2024 0.67 0.51 - 0.95 mg/dL Final     GFR Estimate   Date Value Ref Range Status   10/01/2024 >90 >60 mL/min/1.73m2 Final     Comment:     eGFR calculated using 2021 CKD-EPI equation.     Calcium   Date Value Ref Range Status   10/01/2024 9.5 8.8 - 10.4 mg/dL Final     Comment:     Reference intervals for this test were updated on 7/16/2024 to reflect our healthy population more accurately. There may be differences in the flagging of prior results with similar values performed with this method. Those prior results can be interpreted in the context of the updated reference intervals.          Imaging and Diagnostic Studies:   See HPI above    Assessment/Plan:    Pamella Jaimes is a 59-year-old with MGMT unmethylated, CNS 5 WHO Grade 4 GBM, s/p right parietal craniotomy on 5/28/24 who presents for a 1-month follow-up after finishing radiation to 60 gy/30 fx on 9/6/24.       The patient presents in great condition without evidence of disease recurrence.     1)  Video visit with our team on the Thursday after your next follow up with Dr. Irizarry with imaging in 2 months     All the patient's questions were answered to verbalized satisfaction. We instructed the patient to call with any questions or concerns in the interim.        Mendoza Dee  Medical Student, OMS 4     Jeanmarie Fregoso MD  PGY2  Department of Radiation Oncology  [facility]     A medical resident participated in the care of this patient and in the preparation of this note. I have verified and  "edited this note. I personally performed key elements of the physical exam and medical decision making for this patient.  I agree with the assessment and plan of care as documented in the note above.      The overall time I spent on direct patient care including self review of records, labs, and radiographic studies, as well as, direct face-to-face interaction with the patient and coordination of care with other providers was 15 minutes.      Erna Sanchez MD, PhD     Department of Radiation Oncology  [facility]       Oncology Rooming Note    October 3, 2024 11:22 AM   Pamella Jaimes is a 59 year old female who presents for:    Chief Complaint   Patient presents with     Cancer     Initial Vitals: /63   Pulse 91   Wt 96.6 kg (213 lb)   SpO2 97%   BMI 33.36 kg/m   Estimated body mass index is 33.36 kg/m  as calculated from the following:    Height as of 24: 1.702 m (5' 7\").    Weight as of this encounter: 96.6 kg (213 lb). Body surface area is 2.14 meters squared.  Data Unavailable Comment: Data Unavailable   No LMP recorded.  Allergies reviewed: Yes  Medications reviewed: Yes    Medications: Medication refills not needed today.  Pharmacy name entered into [employer]:    [id number]  [place], [place] - [address] [facility] [id number] [place], [place] - [address]  [facility] - [place], [place] - [address]     Frailty Screening:   Is the patient here for a new oncology consult visit in cancer care? 2. No      Clinical concerns: Pt here for follow up      Shruthi Roblero RN                FOLLOW-UP VISIT    Patient Name: Pamella Jaimes      : 1965     Age: 59 year old        ______________________________________________________________________________     Chief Complaint   Patient presents with     Cancer     /63   Pulse 91   Wt 96.6 kg (213 lb)   SpO2 97%   BMI " 33.36 kg/m       Date Radiation Completed: Glioblastoma: Right Occ 6000 cGy completed 09/06/24    Pain  Denies    Labs  Other Labs: Yes: Research labs    Imaging  MRI: Brain MRI 10/1/24    Other Appointments: No    Residual Radiation side effect: Lt eye visual changes stable since surgery. Balance good, denies speech issues or HA.      Additional Instructions: Here to review MRI                Again, thank you for allowing me to participate in the care of your patient.        Sincerely,        Erna Sanchez MD PhD

## 2024-10-03 NOTE — PROGRESS NOTES
"Oncology Rooming Note    October 3, 2024 11:22 AM   Pamella Jaimes is a 59 year old female who presents for:    Chief Complaint   Patient presents with    Cancer     Initial Vitals: /63   Pulse 91   Wt 96.6 kg (213 lb)   SpO2 97%   BMI 33.36 kg/m   Estimated body mass index is 33.36 kg/m  as calculated from the following:    Height as of 7/26/24: 1.702 m (5' 7\").    Weight as of this encounter: 96.6 kg (213 lb). Body surface area is 2.14 meters squared.  Data Unavailable Comment: Data Unavailable   No LMP recorded.  Allergies reviewed: Yes  Medications reviewed: Yes    Medications: Medication refills not needed today.  Pharmacy name entered into [facility]:    [id number] - [place] - [address] [facility] [id number] - [place], [place] - [address]  [facility] - [place], [place] - [address]    Frailty Screening:   Is the patient here for a new oncology consult visit in cancer care? 2. No      Clinical concerns: Pt here for follow up      Shruthi Roblero RN              "

## 2024-10-03 NOTE — PROGRESS NOTES
FOLLOW-UP VISIT    Patient Name: Pamella Jaimes      : 1965     Age: 59 year old        ______________________________________________________________________________     Chief Complaint   Patient presents with    Cancer     /63   Pulse 91   Wt 96.6 kg (213 lb)   SpO2 97%   BMI 33.36 kg/m       Date Radiation Completed: Glioblastoma: Right Occ 6000 cGy completed 24    Pain  Denies    Labs  Other Labs: Yes: Research labs    Imaging  MRI: Brain MRI 10/1/24    Other Appointments: No    Residual Radiation side effect: Lt eye visual changes stable since surgery. Balance good, denies speech issues or HA.      Additional Instructions: Here to review MRI

## 2024-10-04 NOTE — ADDENDUM NOTE
Encounter addended by: Fabi Amanda RN on: 10/4/2024 6:33 AM   Actions taken: Charge Capture section accepted

## 2024-10-04 NOTE — PROCEDURES
Radiotherapy Treatment Summary          Date of Report: 2024     PATIENT: EVELIA JAIMES  MEDICAL RECORD NO: [id number]  : 1965     DIAGNOSIS: C71.8 Malignant neoplasm of overlapping sites of brain  INTENT OF RADIOTHERAPY: Cure  PATHOLOGY: MGMT promoter unmethylated , CNS5 WHO Grade 4 glioblastoma                                    STAGE: N/A  CONCURRENT SYSTEMIC THERAPY:   Temezolomide                Details of the treatments summarized below are found in records kept in the Department of Radiation Oncology at [facility].     Treatment Summary:  Radiation Oncology - Course: 1 Protocol:   Treatment Site Current Dose Modality From To Elapsed Days Fx.  PTV1_4600_RtOcc  4,600 cGy 06 X  7/23/2024  2024  35 23  PTV1_6000_RtOcc  1,400 cGy 06 X  8/28/2024  2024   9  7                Dose per Fraction:   200 cGy      Total Dose:      6000 cGy         COMMENTS:   Evelia Jaimes is a 59 year old with MGMT promoter unmethylated , CNS5 WHO Grade 4   glioblastoma  s/p right parietal craniotomy with tumor resection on 24. From 2024 to 2024 she   underwent adjuvant chemoradiation to the tumor bed to a total dose of 60 Gy in 30 fractions. This was given   concurrently with Temezolamide. She tolerated treatment well with fatigue, dermatitis, and alopecia.      Keppra: 750 mg BID  Steroids: Completed Dexamethasone Taper      ED visits/hospitalizations: None        Acute Toxicity Profile by CTC v5.0:  Fatigue Grade 1  Alopecia Grade 2  Dermatitis Grade 1     PAIN MANAGEMENT:   0/10, Not indicated                            FOLLOW UP PLAN:       1. Follow up with Dr. Sanchez and Dr. Irizarry in 1 month with an MRI of the Brain  2. Follow up with Ophthalmology as needed for eye irritation                        Resident Physician:   Jeanmarie Fregoso M.D.   Staff Physician: Erna Sanchez M.D. PhD     CC:   MD Madan Escalante MD                                        Radiation Oncology:   [facility] [address] [place] [address]

## 2024-10-07 ENCOUNTER — PATIENT OUTREACH (OUTPATIENT)
Dept: CARE COORDINATION | Facility: CLINIC | Age: 59
End: 2024-10-07
Payer: COMMERCIAL

## 2024-10-07 NOTE — PROGRESS NOTES
Social Work - Telephone/MyChart message  [facility]  Data:   Patient Name: Pamella Jaimes  Goes By: Pamella    /Age: 1965 (59 year old)     Reason for Referral: This clinician attempted Pamella today by phone according to psychosocial plan of care    Intervention: Left voice message for patient on 10/07/2024 .   Plan:  will await patient's return phone call/message and provide assistance at that time.   Natalee Bonilla, MEHDI, LICSW, OSW-C  Clinical - Adult Oncology  Phone: [phone]  She/Her/Hers  [facility]: Nohemy FERRO  8am-4:30pm  [facility]: TRISH Kellogg F 8am-4:30pm   Support Groups at [facility]: Social Work Services for Cancer Patients ([email or web address])

## 2024-10-10 ENCOUNTER — DOCUMENTATION ONLY (OUTPATIENT)
Dept: ONCOLOGY | Facility: CLINIC | Age: 59
End: 2024-10-10
Payer: COMMERCIAL

## 2024-10-10 NOTE — PROGRESS NOTES
Oral Chemotherapy Monitoring Program    Subjective/Objective:  Pamella Jaimes is a 59 year old female contacted by phone for a follow-up visit for oral chemotherapy. I spoke with Leanne regarding Pamelal. Leanne states that Pamella took first Temozolomide on 10/4 evening. No side effects noted thus far. No issues with nausea or constipation noted yet. Did briefly discuss typical dose escalation for cycle 2 and future cycles. Labs already scheduled for remainder of cycle 1 up to start of cycle 2. All questions answered.       8/16/2024    11:00 AM 8/23/2024    10:00 AM 8/27/2024     1:00 PM 8/28/2024     4:00 PM 9/6/2024     1:00 PM 10/1/2024     2:00 PM 10/10/2024     9:00 AM   ORAL CHEMOTHERAPY   Assessment Type Lab Monitoring Lab Monitoring Refill;Chart Review Chart Review;Lab Monitoring;Incoming phone call Lab Monitoring New Teach Initial Follow up;Chart Review   Diagnosis Code Brain Cancer - Glioblastoma Brain Cancer - Glioblastoma Brain Cancer - Glioblastoma Brain Cancer - Glioblastoma Brain Cancer - Glioblastoma Brain Cancer - Glioblastoma Brain Cancer - Glioblastoma   Providers Dr. Juan C Irizarry   Clinic Name/Location [facility]   Drug Name Temodar (temozolomide) Temodar (temozolomide) Temodar (temozolomide) Temodar (temozolomide) Temodar (temozolomide) Temodar (temozolomide) Temodar (temozolomide)   Dose 160 mg 160 mg 160 mg 160 mg 160 mg 320 mg 320 mg   Current Schedule Daily Daily Daily Daily Daily Daily Daily   Cycle Details Continuous for 42 days during XRT Continuous for 42 days during XRT Continuous for 42 days during XRT Continuous for 42 days during XRT Continuous for 42 days during XRT 5 days on, 23 days off 5 days on, 23 days off   Start Date of Last Cycle 7/22/2024 7/22/2024 7/22/2024 7/22/2024 7/22/2024 10/4/2024 10/4/2024   Planned next cycle start date      11/1/2024 11/1/2024   Doses missed in  "last 2 weeks       0   Adherence Assessment       Adherent   Adverse Effects Neutropenia No AE identified during assessment No AE identified during assessment Neutropenia   No AE identified during assessment   Neutropenia Grade 1   Grade 2      Pharmacist Intervention(neutropenia) No   Yes      Intervention(s)    Referral to oncology provider      Any new drug interactions?  No No No   No   Is the dose as ordered appropriate for the patient?  Yes Yes Yes   Yes       Last PHQ-2 Score on record:       8/22/2024     9:32 AM 2/19/2018    10:08 AM   PHQ-2 ( 1999 [employer])   Q1: Little interest or pleasure in doing things 0 0   Q2: Feeling down, depressed or hopeless 1 0   PHQ-2 Score 1 0       Vitals:  BP:   BP Readings from Last 1 Encounters:   10/03/24 113/63     Wt Readings from Last 1 Encounters:   10/03/24 96.6 kg (213 lb)     Estimated body surface area is 2.14 meters squared as calculated from the following:    Height as of 7/26/24: 1.702 m (5' 7\").    Weight as of 10/3/24: 96.6 kg (213 lb).    Labs:  _  Result Component Current Result Ref Range   Sodium 143 (10/1/2024) 135 - 145 mmol/L     _  Result Component Current Result Ref Range   Potassium 4.4 (10/1/2024) 3.4 - 5.3 mmol/L     _  Result Component Current Result Ref Range   Calcium 9.5 (10/1/2024) 8.8 - 10.4 mg/dL     No results found for Mag within last 30 days.     No results found for Phos within last 30 days.     _  Result Component Current Result Ref Range   Albumin 4.4 (10/1/2024) 3.5 - 5.2 g/dL     _  Result Component Current Result Ref Range   Urea Nitrogen 15.9 (10/1/2024) 8.0 - 23.0 mg/dL     _  Result Component Current Result Ref Range   Creatinine 0.67 (10/1/2024) 0.51 - 0.95 mg/dL     _  Result Component Current Result Ref Range   AST 21 (10/1/2024) 0 - 45 U/L     _  Result Component Current Result Ref Range   ALT 16 (10/1/2024) 0 - 50 U/L     _  Result Component Current Result Ref Range   Bilirubin Total 0.4 (10/1/2024) <=1.2 mg/dL     _  Result " Component Current Result Ref Range   WBC Count 5.2 (10/1/2024) 4.0 - 11.0 10e3/uL     _  Result Component Current Result Ref Range   Hemoglobin 13.8 (10/1/2024) 11.7 - 15.7 g/dL     _  Result Component Current Result Ref Range   Platelet Count 197 (10/1/2024) 150 - 450 10e3/uL     No results found for ANC within last 30 days.     _  Result Component Current Result Ref Range   Absolute Neutrophils 3.4 (10/1/2024) 1.6 - 8.3 10e3/uL      Assessment/Plan:  Overall, Pamella seemed to tolerate cycle 1 of TMZ which started on 10/4 evening. Ok to continue adjuvant TMZ as planned. Continue lab monitoring every 2 weeks (CMP every 4 weeks and CBC every 2 weeks). Repeat 28 day cycle if WBC >= 3, ANC >= 1.5, HgB >= 10, and platelets >= 100. Anticipated cycle 2 adj TMZ = 11/1 pending labs and provider visit. Leanne made aware of typical dose escalation for cycle 2 and future cycles.     Follow-Up:  10/18: lab appt  10/29: lab appt   10/30: provider appt  11/1: Adj TMZ cycle 2 anticipated    Juan C Flores PharmD  [facility] Oral Chemotherapy  [phone]  October 10, 2024

## 2024-10-18 ENCOUNTER — TELEPHONE (OUTPATIENT)
Dept: PHARMACY | Facility: CLINIC | Age: 59
End: 2024-10-18

## 2024-10-18 ENCOUNTER — LAB (OUTPATIENT)
Dept: LAB | Facility: OTHER | Age: 59
End: 2024-10-18
Attending: PSYCHIATRY & NEUROLOGY
Payer: COMMERCIAL

## 2024-10-18 ENCOUNTER — MYC MEDICAL ADVICE (OUTPATIENT)
Dept: PHARMACY | Facility: CLINIC | Age: 59
End: 2024-10-18

## 2024-10-18 DIAGNOSIS — T45.1X5A CHEMOTHERAPY-INDUCED NAUSEA AND VOMITING: ICD-10-CM

## 2024-10-18 DIAGNOSIS — R11.2 CHEMOTHERAPY-INDUCED NAUSEA AND VOMITING: ICD-10-CM

## 2024-10-18 DIAGNOSIS — C71.9 GLIOBLASTOMA (H): ICD-10-CM

## 2024-10-18 LAB
ALBUMIN SERPL BCG-MCNC: 4.2 G/DL (ref 3.5–5.2)
ALP SERPL-CCNC: 79 U/L (ref 40–150)
ALT SERPL W P-5'-P-CCNC: 21 U/L (ref 0–50)
ANION GAP SERPL CALCULATED.3IONS-SCNC: 11 MMOL/L (ref 7–15)
AST SERPL W P-5'-P-CCNC: 24 U/L (ref 0–45)
BASOPHILS # BLD AUTO: 0 10E3/UL (ref 0–0.2)
BASOPHILS NFR BLD AUTO: 0 %
BILIRUB SERPL-MCNC: 0.5 MG/DL
BUN SERPL-MCNC: 9.1 MG/DL (ref 8–23)
CALCIUM SERPL-MCNC: 9.3 MG/DL (ref 8.8–10.4)
CHLORIDE SERPL-SCNC: 107 MMOL/L (ref 98–107)
CREAT SERPL-MCNC: 0.72 MG/DL (ref 0.51–0.95)
EGFRCR SERPLBLD CKD-EPI 2021: >90 ML/MIN/1.73M2
EOSINOPHIL # BLD AUTO: 0.2 10E3/UL (ref 0–0.7)
EOSINOPHIL NFR BLD AUTO: 5 %
ERYTHROCYTE [DISTWIDTH] IN BLOOD BY AUTOMATED COUNT: 11.7 % (ref 10–15)
GLUCOSE SERPL-MCNC: 109 MG/DL (ref 70–99)
HCO3 SERPL-SCNC: 22 MMOL/L (ref 22–29)
HCT VFR BLD AUTO: 40.3 % (ref 35–47)
HGB BLD-MCNC: 13.5 G/DL (ref 11.7–15.7)
IMM GRANULOCYTES # BLD: 0 10E3/UL
IMM GRANULOCYTES NFR BLD: 0 %
LYMPHOCYTES # BLD AUTO: 1.4 10E3/UL (ref 0.8–5.3)
LYMPHOCYTES NFR BLD AUTO: 45 %
MCH RBC QN AUTO: 30.2 PG (ref 26.5–33)
MCHC RBC AUTO-ENTMCNC: 33.5 G/DL (ref 31.5–36.5)
MCV RBC AUTO: 90 FL (ref 78–100)
MONOCYTES # BLD AUTO: 0.4 10E3/UL (ref 0–1.3)
MONOCYTES NFR BLD AUTO: 13 %
NEUTROPHILS # BLD AUTO: 1.1 10E3/UL (ref 1.6–8.3)
NEUTROPHILS NFR BLD AUTO: 36 %
PLATELET # BLD AUTO: 186 10E3/UL (ref 150–450)
POTASSIUM SERPL-SCNC: 4 MMOL/L (ref 3.4–5.3)
PROT SERPL-MCNC: 6.7 G/DL (ref 6.4–8.3)
RBC # BLD AUTO: 4.47 10E6/UL (ref 3.8–5.2)
SODIUM SERPL-SCNC: 140 MMOL/L (ref 135–145)
WBC # BLD AUTO: 3.1 10E3/UL (ref 4–11)

## 2024-10-18 PROCEDURE — 36415 COLL VENOUS BLD VENIPUNCTURE: CPT

## 2024-10-18 PROCEDURE — 80053 COMPREHEN METABOLIC PANEL: CPT

## 2024-10-18 PROCEDURE — 85025 COMPLETE CBC W/AUTO DIFF WBC: CPT

## 2024-10-18 NOTE — ORAL ONC MGMT
Oral Chemotherapy Monitoring Program  Lab Follow Up    Reviewed lab results from 10/18/24.        8/23/2024    10:00 AM 8/27/2024     1:00 PM 8/28/2024     4:00 PM 9/6/2024     1:00 PM 10/1/2024     2:00 PM 10/10/2024     9:00 AM 10/18/2024     1:00 PM   ORAL CHEMOTHERAPY   Assessment Type Lab Monitoring Refill;Chart Review Chart Review;Lab Monitoring;Incoming phone call Lab Monitoring New Teach Initial Follow up;Chart Review Lab Monitoring   Diagnosis Code Brain Cancer - Glioblastoma Brain Cancer - Glioblastoma Brain Cancer - Glioblastoma Brain Cancer - Glioblastoma Brain Cancer - Glioblastoma Brain Cancer - Glioblastoma Brain Cancer - Glioblastoma   Providers Dr. Juan C Irizarry   Clinic Name/Location [facility]   Drug Name Temodar (temozolomide) Temodar (temozolomide) Temodar (temozolomide) Temodar (temozolomide) Temodar (temozolomide) Temodar (temozolomide) Temodar (temozolomide)   Dose 160 mg 160 mg 160 mg 160 mg 320 mg 320 mg 320 mg   Current Schedule Daily Daily Daily Daily Daily Daily Daily   Cycle Details Continuous for 42 days during XRT Continuous for 42 days during XRT Continuous for 42 days during XRT Continuous for 42 days during XRT 5 days on, 23 days off 5 days on, 23 days off 5 days on, 23 days off   Start Date of Last Cycle 7/22/2024 7/22/2024 7/22/2024 7/22/2024 10/4/2024 10/4/2024    Planned next cycle start date     11/1/2024 11/1/2024 11/1/2024   Doses missed in last 2 weeks      0    Adherence Assessment      Adherent    Adverse Effects No AE identified during assessment No AE identified during assessment Neutropenia   No AE identified during assessment    Neutropenia   Grade 2       Pharmacist Intervention(neutropenia)   Yes       Intervention(s)   Referral to oncology provider       Any new drug interactions? No No No   No    Is the dose as ordered appropriate for the patient? Yes Yes Yes    Yes        Labs:  _  Result Component Current Result Ref Range   Sodium 140 (10/18/2024) 135 - 145 mmol/L     _  Result Component Current Result Ref Range   Potassium 4.0 (10/18/2024) 3.4 - 5.3 mmol/L     _  Result Component Current Result Ref Range   Calcium 9.3 (10/18/2024) 8.8 - 10.4 mg/dL     No results found for Mag within last 30 days.     No results found for Phos within last 30 days.     _  Result Component Current Result Ref Range   Albumin 4.2 (10/18/2024) 3.5 - 5.2 g/dL     _  Result Component Current Result Ref Range   Urea Nitrogen 9.1 (10/18/2024) 8.0 - 23.0 mg/dL     _  Result Component Current Result Ref Range   Creatinine 0.72 (10/18/2024) 0.51 - 0.95 mg/dL     _  Result Component Current Result Ref Range   AST 24 (10/18/2024) 0 - 45 U/L     _  Result Component Current Result Ref Range   ALT 21 (10/18/2024) 0 - 50 U/L     _  Result Component Current Result Ref Range   Bilirubin Total 0.5 (10/18/2024) <=1.2 mg/dL     _  Result Component Current Result Ref Range   WBC Count 3.1 (L) (10/18/2024) 4.0 - 11.0 10e3/uL     _  Result Component Current Result Ref Range   Hemoglobin 13.5 (10/18/2024) 11.7 - 15.7 g/dL     _  Result Component Current Result Ref Range   Platelet Count 186 (10/18/2024) 150 - 450 10e3/uL     No results found for ANC within last 30 days.     _  Result Component Current Result Ref Range   Absolute Neutrophils 1.1 (L) (10/18/2024) 1.6 - 8.3 10e3/uL        Assessment & Plan:  No concerning abnormalities.    Questions answered to patient's satisfaction.    Follow-Up:  10/30 appt and labs    Cb Andrade  Oncology PharmD  October 18, 2024

## 2024-10-22 DIAGNOSIS — I82.5Y2 CHRONIC DEEP VEIN THROMBOSIS (DVT) OF PROXIMAL VEIN OF LEFT LOWER EXTREMITY (H): ICD-10-CM

## 2024-10-22 DIAGNOSIS — C71.9 GLIOBLASTOMA (H): ICD-10-CM

## 2024-10-22 RX ORDER — LEVETIRACETAM 750 MG/1
750 TABLET ORAL 2 TIMES DAILY
Qty: 60 TABLET | Refills: 11 | Status: SHIPPED | OUTPATIENT
Start: 2024-10-22

## 2024-10-29 ENCOUNTER — OFFICE VISIT (OUTPATIENT)
Dept: FAMILY MEDICINE | Facility: OTHER | Age: 59
End: 2024-10-29
Payer: COMMERCIAL

## 2024-10-29 ENCOUNTER — LAB (OUTPATIENT)
Dept: LAB | Facility: OTHER | Age: 59
End: 2024-10-29
Payer: COMMERCIAL

## 2024-10-29 VITALS
BODY MASS INDEX: 33.59 KG/M2 | RESPIRATION RATE: 18 BRPM | OXYGEN SATURATION: 97 % | DIASTOLIC BLOOD PRESSURE: 68 MMHG | SYSTOLIC BLOOD PRESSURE: 104 MMHG | WEIGHT: 214 LBS | HEART RATE: 71 BPM | TEMPERATURE: 97.2 F | HEIGHT: 67 IN

## 2024-10-29 DIAGNOSIS — D61.810 ANTINEOPLASTIC CHEMOTHERAPY INDUCED PANCYTOPENIA (H): ICD-10-CM

## 2024-10-29 DIAGNOSIS — H10.31 ACUTE CONJUNCTIVITIS OF RIGHT EYE, UNSPECIFIED ACUTE CONJUNCTIVITIS TYPE: ICD-10-CM

## 2024-10-29 DIAGNOSIS — H69.91 DYSFUNCTION OF RIGHT EUSTACHIAN TUBE: Primary | ICD-10-CM

## 2024-10-29 DIAGNOSIS — T45.1X5A ANTINEOPLASTIC CHEMOTHERAPY INDUCED PANCYTOPENIA (H): ICD-10-CM

## 2024-10-29 LAB
BASOPHILS # BLD AUTO: 0 10E3/UL (ref 0–0.2)
BASOPHILS NFR BLD AUTO: 1 %
EOSINOPHIL # BLD AUTO: 0.1 10E3/UL (ref 0–0.7)
EOSINOPHIL NFR BLD AUTO: 3 %
ERYTHROCYTE [DISTWIDTH] IN BLOOD BY AUTOMATED COUNT: 11.8 % (ref 10–15)
HCT VFR BLD AUTO: 40.4 % (ref 35–47)
HGB BLD-MCNC: 13.5 G/DL (ref 11.7–15.7)
IMM GRANULOCYTES # BLD: 0 10E3/UL
IMM GRANULOCYTES NFR BLD: 0 %
LYMPHOCYTES # BLD AUTO: 1.3 10E3/UL (ref 0.8–5.3)
LYMPHOCYTES NFR BLD AUTO: 29 %
MCH RBC QN AUTO: 30.2 PG (ref 26.5–33)
MCHC RBC AUTO-ENTMCNC: 33.4 G/DL (ref 31.5–36.5)
MCV RBC AUTO: 90 FL (ref 78–100)
MONOCYTES # BLD AUTO: 0.5 10E3/UL (ref 0–1.3)
MONOCYTES NFR BLD AUTO: 10 %
NEUTROPHILS # BLD AUTO: 2.5 10E3/UL (ref 1.6–8.3)
NEUTROPHILS NFR BLD AUTO: 57 %
PLATELET # BLD AUTO: 193 10E3/UL (ref 150–450)
RBC # BLD AUTO: 4.47 10E6/UL (ref 3.8–5.2)
WBC # BLD AUTO: 4.5 10E3/UL (ref 4–11)

## 2024-10-29 PROCEDURE — 85025 COMPLETE CBC W/AUTO DIFF WBC: CPT

## 2024-10-29 PROCEDURE — 99213 OFFICE O/P EST LOW 20 MIN: CPT | Performed by: FAMILY MEDICINE

## 2024-10-29 PROCEDURE — 36415 COLL VENOUS BLD VENIPUNCTURE: CPT

## 2024-10-29 RX ORDER — DICLOFENAC SODIUM 75 MG/1
1 TABLET, DELAYED RELEASE ORAL
COMMUNITY
Start: 2024-10-20

## 2024-10-29 RX ORDER — POLYMYXIN B SULFATE AND TRIMETHOPRIM 1; 10000 MG/ML; [USP'U]/ML
SOLUTION OPHTHALMIC
Qty: 10 ML | Refills: 0 | Status: SHIPPED | OUTPATIENT
Start: 2024-10-29 | End: 2024-11-05

## 2024-10-29 RX ORDER — FLUTICASONE PROPIONATE 50 MCG
1 SPRAY, SUSPENSION (ML) NASAL DAILY
Qty: 16 G | Refills: 0 | Status: SHIPPED | OUTPATIENT
Start: 2024-10-29

## 2024-10-29 NOTE — PROGRESS NOTES
"  Assessment & Plan     Dysfunction of right eustachian tube  Will use Flonase.    - fluticasone (FLONASE) 50 MCG/ACT nasal spray; Spray 1 spray into both nostrils daily.    Acute conjunctivitis of right eye, unspecified acute conjunctivitis type  Suspect may be related to URI.  However it has been going on for a long time and she has crusting of her eye shut in the morning so will treat with Polytrim drops.  If this is not helpful then we will have her see her eye specialist.    - polymixin b-trimethoprim (POLYTRIM) 14894-7.1 UNIT/ML-% ophthalmic solution; 1 drop in affected eye(s) every 3 hrs while awake for 5-7 days until resolved - max 6 doses per day    Subjective   Pamella is a 59 year old, presenting for the following health issues:  Ear Problem (right) and Eye Crusting Right Eye      10/29/2024    10:17 AM   Additional Questions   Roomed by adelso     Ear Problem         Concern - right ear crackling, right eye crusty in am  Onset: 1 month  Description: crackling of right ear, feels like it is plugged  Intensity: mild  Progression of Symptoms:  intermittent  Accompanying Signs & Symptoms: dizziness  Previous history of similar problem: no  Precipitating factors:        Worsened by: shower  Alleviating factors:        Improved by: blowing nose  Therapies tried and outcome:     She states that about a month ago she developed a URI.  Since then she has noticed some plugging of her right ear.  It seems to improve if she blows her nose.  She denies ear pain.  She denies nasal congestion, rhinorrhea or postnasal drip.  She also notices that she has had some crusting and mattering shot of her right eye.  She sometimes has an itchy eye.  She denies eye pain.  Denies redness of her eye.        Objective    /68 (BP Location: Right arm, Patient Position: Sitting, Cuff Size: Adult Large)   Pulse 71   Temp 97.2  F (36.2  C) (Temporal)   Resp 18   Ht 1.702 m (5' 7\")   Wt 97.1 kg (214 lb)   SpO2 97%   BMI " 33.52 kg/m    Body mass index is 33.52 kg/m .  Physical Exam   Gen: no apparent distress  Eyes: Sclera and conjunctiva are clear.  She states she already wiped out the mattering this morning.  Right ear:  mildly retracted with clear fluid bubbles, ear canal is normal  Nose: Mildly congested with clear discharge  Oropharynx: No erythema or exudate             Signed Electronically by: Kavya Syed MD

## 2024-10-30 ENCOUNTER — VIRTUAL VISIT (OUTPATIENT)
Dept: ONCOLOGY | Facility: CLINIC | Age: 59
End: 2024-10-30
Attending: STUDENT IN AN ORGANIZED HEALTH CARE EDUCATION/TRAINING PROGRAM
Payer: COMMERCIAL

## 2024-10-30 VITALS
WEIGHT: 214 LBS | HEIGHT: 67 IN | BODY MASS INDEX: 33.59 KG/M2 | SYSTOLIC BLOOD PRESSURE: 104 MMHG | DIASTOLIC BLOOD PRESSURE: 68 MMHG

## 2024-10-30 DIAGNOSIS — C71.9 GLIOBLASTOMA (H): Primary | ICD-10-CM

## 2024-10-30 PROCEDURE — 99215 OFFICE O/P EST HI 40 MIN: CPT | Mod: 95 | Performed by: STUDENT IN AN ORGANIZED HEALTH CARE EDUCATION/TRAINING PROGRAM

## 2024-10-30 RX ORDER — TEMOZOLOMIDE 180 MG/1
180 CAPSULE ORAL DAILY
Qty: 5 CAPSULE | Refills: 0 | Status: SHIPPED | OUTPATIENT
Start: 2024-11-01 | End: 2024-11-06

## 2024-10-30 RX ORDER — TEMOZOLOMIDE 250 MG/1
250 CAPSULE ORAL DAILY
Qty: 5 CAPSULE | Refills: 0 | Status: SHIPPED | OUTPATIENT
Start: 2024-11-01 | End: 2024-11-06

## 2024-10-30 ASSESSMENT — PAIN SCALES - GENERAL: PAINLEVEL_OUTOF10: NO PAIN (0)

## 2024-10-30 NOTE — PROGRESS NOTES
Virtual Visit Details    Type of service:  Video Visit   Video Start Time:  12:30PM  Video End Time: 12:48PM    Originating Location (pt. Location): Home    Distant Location (provider location):  On-site  Platform used for Video Visit: [facility]--then [facility]     NEURO-ONCOLOGY VISIT  Oct 30, 2024    CHIEF COMPLAINT: Ms. 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 Pamella completed standard chemoradiotherapy on 9/6/2024. Post-radiation imaging demonstrated no new concerns with an initial positive response to treatment.     She is currently on adjuvant-dosed temozolomide.     Pamella presents for follow up.    INTERVAL HISTORY  -Pamella reports that chemo went fine. She did not notice any nausea with taking zofran prior to temozolomide.   -Appetite is good  -No constipation  -She has some fatigue but she also stays busy watching her 3 grandkids (age 3, 5 and 8) so she's not sure that its s/t chemo  -No recurrence of auras  -She did take her Keppra late on a couple occasions  -No headaches  -No focal weakness  -Sometimes dizziness which she attributes to her vision. She was unable to get prism lenses due to insurance issues  -She has had some nasal congestion along with drainage from her eyes. This has been ongoing for more than a few weeks. Per her report, it sounds like the impression was that this was driven by allergies based on her visit yesterday. She saw PCP yesterday and started Flonase and eye drop. She thinks some of the blurriness is already better.   -No bleeding concerns      MEDICATIONS   Current Outpatient Medications   Medication Sig Dispense Refill    acetaminophen (TYLENOL) 500 MG tablet Take 500-1,000 mg by mouth.      diclofenac (VOLTAREN) 75 MG EC tablet Take 1 tablet by mouth 2 times daily.      fluticasone (FLONASE) 50 MCG/ACT nasal spray Spray 1 spray into  both nostrils daily. 16 g 0    levETIRAcetam (KEPPRA) 750 MG tablet Take 1 tablet (750 mg) by mouth 2 times daily. 60 tablet 11    ondansetron (ZOFRAN) 4 MG tablet Take 1 tablet (4 mg) by mouth at bedtime Take 30-60 mins before each dose of temozolomide. Can take every 8 hours if needed. (Patient not taking: Reported on 10/29/2024) 30 tablet 3    polymixin b-trimethoprim (POLYTRIM) 49606-6.1 UNIT/ML-% ophthalmic solution 1 drop in affected eye(s) every 3 hrs while awake for 5-7 days until resolved - max 6 doses per day 10 mL 0    rivaroxaban ANTICOAGULANT (XARELTO) 20 MG TABS tablet Take 1 tablet (20 mg) by mouth daily (with dinner). 30 tablet 11    temozolomide (TEMODAR) 140 MG capsule Take 1 capsule (140 mg) by mouth daily for 5 days with 1 other temozolomide prescription for 320 mg total Take ondansetron 30-60 min before temozolomide. Take at bedtime on an empty stomach. 5 capsule 0    Temozolomide (TEMODAR) 180 MG capsule Take 1 capsule (180 mg) by mouth daily for 5 days with 1 other Temozolomide prescription for 320 mg total Take ondansetron 30-60 min before temozolomide. Take at bedtime on an empty stomach. 5 capsule 0     DRUG ALLERGIES   Allergies   Allergen Reactions    Morphine     Penicillins Hives       IMMUNIZATIONS   Immunization History   Administered Date(s) Administered    COVID-19 Monovalent 18+ (Moderna) 05/04/2021, 06/01/2021, 01/28/2022    Influenza Vaccine 18-64 (Flublok) 01/14/2022    TDAP (Adacel,Boostrix) 01/04/2010, 01/04/2016     ONCOLOGIC HISTORY  -PRESENTATION: Neck pain, headache and head tremor.  -3/15/2023 MRI brain imaging with a mild asymmetric T2 hyperintense signal along right hippocampus and adjacent parahippocampal gyrus.    -PRESENTATION: Dizziness and nausea; semiology is concerning for temporal lobe auras. Slurred speech.  -5/27/2024 MR brain imaging with a right posterior temporo-occipital mass. Associated nodular peripheral enhancement and surrounding confluent T2 signal  "hyperintensity extending along the medial aspect of the right temporal lobe. Mass effect results in regional sulcal effacement, leftward midline shift, partial effacement the right lateral ventricle and mild right uncal herniation.   -5/28/2024 SURGERY: Craniotomy for mass resection at [facility].   No post-operative MR brain imaging performed.   Post-operative CT head imaging from 5/29 status post a right parietal craniotomy with excision of previously demonstrated paramedian parieto-occipital tumor. Decreased focal mass effect and slightly decreased mild midline shift. No significant hemorrhage or additional complicating feature.   PATHOLOGY: Glioblastoma, IDH1-R132H wildtype (CNS WHO Grade 4).    MGMT promoter unmethylated.   -6/25/2024 NEURO-ONC: Recommending chemoradiotherapy with concurrent temozolomide. U/S + for DVT; started anticoagulation.   -7/26/2024 NEURO-ONC: She has started chemoradiotherapy with concurrent temozolomide. Acute visit for concerns; increased Keppra to 750 mg BID.   -7/23 - 9/6/2024 CHEMORT: Complete chemoradiotherapy. 60Gy in 30 fractions with concurrent temozolomide 75mg/m2 (160mg).  -10/1/2024 NEURO-ONC/ MRB/ CHEMO: Clinically well; reduced frequency of auras. Imaging with no concerns, initial positive response to treatment. Starting adjuvant temozolomide 150mg/m2 (320mg), cycle 1 (start date 10/4). Not pursuing Optune.   -10/30//2024 NEURO-ONC/CHEMO: Tolerated temozolomide well. Neurologically stable. Increase adjuvant temozolomide to 200mg/m2 (430mg), cycle 2 (start date 11/1).    PHYSICAL EXAMINATION  There were no vitals taken for this visit.  Wt Readings from Last 2 Encounters:   10/29/24 97.1 kg (214 lb)   10/03/24 96.6 kg (213 lb)      Ht Readings from Last 2 Encounters:   10/29/24 1.702 m (5' 7\")   07/26/24 1.702 m (5' 7\")     KPS: 90    Video physical exam  General: Patient appears well in no acute distress. Ambulatory during visit  Skin: No visualized rash or " lesions on visualized skin  Eyes: Poorly visualized on video  Resp:  speaking in full sentences, no cough  Psych: affect appropriate, alert and oriented. Pleasant    -Neurologic:   MENTAL STATUS:     Alert, oriented.    Speech fluent.    Comprehension intact.     CRANIAL NERVES:     Hearing intact.   No dysarthria    MEDICAL RECORDS  Reviewed PCP note from 10/29/24 for dysfunction of right eustachian tube and conjunctivitis of right eye.     LABS  Personally reviewed: CBC yesterday WNL; CMP 10/18/24-glucose 109    IMAGING  N/A      IMPRESSION  Pamella tolerated cycle 1 of temozolomide well. She is neurologically stable with ongoing field cut in vision. This is more notable at night and as a result she would benefit from handicap parking sticker to minimize navigation in dim lit conditions with limited vision. She is not driving. I will fill out this form and ask this to be sent to her to complete.     Today we discussed to the option to dose increase temozolomide for cycle 2 to 200mg/m2. We discussed that there is no evidence of increased benefit at the higher dose but that decision to dose increase is based on tolerance and patient preference. I did let the patient know that there is the potential for increased side effects (e.g. fatigue, nausea, constipation) at the higher dose. After our discussion, the patient would like to increase for cycle 2 to 200mg/m2. If this is poorly tolerated, we can always consider reducing back to 150mg/m2 for future cycles.     The plan is to repeat imaging per NCCN guidelines of every 2 months.        PROBLEM LIST  Glioblastoma  Headaches  Homonymous hemianopsia, left-side  Benign essential tremor  Temporal lobe auras    PLAN  -CANCER DIRECTED THERAPY-  -Adjuvant temozolomide at 200mg/m2 (430mg), cycle 2 (start date of 11/1).    Supportive medications; Zofran and bowel regimen.     -Repeat 28 day cycle if WBC >= 3, ANC >= 1.5, HgB >= 10, and platelets >= 100.   Surveillance labs  reviewed, at goal for chemotherapy.    Will continue every 2 weeks CBC and CMP every 4 weeks.    -Repeat imaging in ~1 month  -Not pursuing use of Opune.      -STEROIDS-  -Off dexamethasone.    -SEIZURE MANAGEMENT-  -Stereotyped events that seem consistent with a history of non-dominant temporal lobe auras/ seizures.   On Keppra 750 mg twice daily.  -Continue to monitor frequency of recurrent auras.     -Quality of life/ MOOD/ FATIGUE-  -History of depression.    Self-discontinued Zoloft.    -VISUAL FIELD CUT-  -Right Homonymous hemianopsia.  -Evaluated by neuro-ophtho for thorough eye examination and baseline visual field testing to determine if there are any driving restrictions.  -She is not driving per her report.       -LE DVT-  - Xarelto; continue as directed.   -No bleeding concerns today    -CONJUNCTIVITIS-  Following with PCP  Started Polytrim drop on 10/29/24  I did ask her to let us know if symptoms worsen with chemo though temozolomide rarely causes severe leukopenia and her symptoms are longstanding, so I dont see any indication to delay temozolomide start at this point.     Return to clinic in ~4 weeks with labs, MRI and Dr Irizarry    In the meantime, Pamella knows to call the clinic with any concerns and she can be seen sooner if needed.     45 minutes spent on the date of the encounter doing chart review, review of test results, interpretation of tests, patient visit, and documentation     Amber Scheierl, CNP  [facility]  [address] [place] [id number]  [phone]

## 2024-10-30 NOTE — LETTER
10/30/2024      Pamella Jaimes  [address] [facility] [place] [address]      Dear Colleague,    Thank you for referring your patient, Pamella Jaimes, to the [facility]. Please see a copy of my visit note below.    Virtual Visit Details    Type of service:  Video Visit   Video Start Time:  12:30PM  Video End Time: 12:48PM    Originating Location (pt. Location): Home    Distant Location (provider location):  On-site  Platform used for Video Visit: [facility]then [employer]     NEURO-ONCOLOGY VISIT  Oct 30, 2024    CHIEF COMPLAINT: Ms. 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 Pamella completed standard chemoradiotherapy on 9/6/2024. Post-radiation imaging demonstrated no new concerns with an initial positive response to treatment.     She is currently on adjuvant-dosed temozolomide.     Pamella presents for follow up.    INTERVAL HISTORY  -Pamella reports that chemo went fine. She did not notice any nausea with taking zofran prior to temozolomide.   -Appetite is good  -No constipation  -She has some fatigue but she also stays busy watching her 3 grandkids (age 3, 5 and 8) so she's not sure that its s/t chemo  -No recurrence of auras  -She did take her Keppra late on a couple occasions  -No headaches  -No focal weakness  -Sometimes dizziness which she attributes to her vision. She was unable to get prism lenses due to insurance issues  -She has had some nasal congestion along with drainage from her eyes. This has been ongoing for more than a few weeks. Per her report, it sounds like the impression was that this was driven by allergies based on her visit yesterday. She saw PCP yesterday and started Flonase and eye drop. She thinks some of the blurriness is already better.   -No bleeding concerns      MEDICATIONS   Current Outpatient Medications    Medication Sig Dispense Refill     acetaminophen (TYLENOL) 500 MG tablet Take 500-1,000 mg by mouth.       diclofenac (VOLTAREN) 75 MG EC tablet Take 1 tablet by mouth 2 times daily.       fluticasone (FLONASE) 50 MCG/ACT nasal spray Spray 1 spray into both nostrils daily. 16 g 0     levETIRAcetam (KEPPRA) 750 MG tablet Take 1 tablet (750 mg) by mouth 2 times daily. 60 tablet 11     ondansetron (ZOFRAN) 4 MG tablet Take 1 tablet (4 mg) by mouth at bedtime Take 30-60 mins before each dose of temozolomide. Can take every 8 hours if needed. (Patient not taking: Reported on 10/29/2024) 30 tablet 3     polymixin b-trimethoprim (POLYTRIM) 37566-9.1 UNIT/ML-% ophthalmic solution 1 drop in affected eye(s) every 3 hrs while awake for 5-7 days until resolved - max 6 doses per day 10 mL 0     rivaroxaban ANTICOAGULANT (XARELTO) 20 MG TABS tablet Take 1 tablet (20 mg) by mouth daily (with dinner). 30 tablet 11     temozolomide (TEMODAR) 140 MG capsule Take 1 capsule (140 mg) by mouth daily for 5 days with 1 other temozolomide prescription for 320 mg total Take ondansetron 30-60 min before temozolomide. Take at bedtime on an empty stomach. 5 capsule 0     Temozolomide (TEMODAR) 180 MG capsule Take 1 capsule (180 mg) by mouth daily for 5 days with 1 other Temozolomide prescription for 320 mg total Take ondansetron 30-60 min before temozolomide. Take at bedtime on an empty stomach. 5 capsule 0     DRUG ALLERGIES   Allergies   Allergen Reactions     Morphine      Penicillins Hives       IMMUNIZATIONS   Immunization History   Administered Date(s) Administered     COVID-19 Monovalent 18+ (Moderna) 05/04/2021, 06/01/2021, 01/28/2022     Influenza Vaccine 18-64 (Flublok) 01/14/2022     TDAP (Adacel,Boostrix) 01/04/2010, 01/04/2016     ONCOLOGIC HISTORY  -PRESENTATION: Neck pain, headache and head tremor.  -3/15/2023 MRI brain imaging with a mild asymmetric T2 hyperintense signal along right hippocampus and adjacent parahippocampal  gyrus.    -PRESENTATION: Dizziness and nausea; semiology is concerning for temporal lobe auras. Slurred speech.  -5/27/2024 MR brain imaging with a right posterior temporo-occipital mass. Associated nodular peripheral enhancement and surrounding confluent T2 signal hyperintensity extending along the medial aspect of the right temporal lobe. Mass effect results in regional sulcal effacement, leftward midline shift, partial effacement the right lateral ventricle and mild right uncal herniation.   -5/28/2024 SURGERY: Craniotomy for mass resection at [facility].   No post-operative MR brain imaging performed.   Post-operative CT head imaging from 5/29 status post a right parietal craniotomy with excision of previously demonstrated paramedian parieto-occipital tumor. Decreased focal mass effect and slightly decreased mild midline shift. No significant hemorrhage or additional complicating feature.   PATHOLOGY: Glioblastoma, IDH1-R132H wildtype (CNS WHO Grade 4).    MGMT promoter unmethylated.   -6/25/2024 NEURO-ONC: Recommending chemoradiotherapy with concurrent temozolomide. U/S + for DVT; started anticoagulation.   -7/26/2024 NEURO-ONC: She has started chemoradiotherapy with concurrent temozolomide. Acute visit for concerns; increased Keppra to 750 mg BID.   -7/23 - 9/6/2024 CHEMORT: Complete chemoradiotherapy. 60Gy in 30 fractions with concurrent temozolomide 75mg/m2 (160mg).  -10/1/2024 NEURO-ONC/ MRB/ CHEMO: Clinically well; reduced frequency of auras. Imaging with no concerns, initial positive response to treatment. Starting adjuvant temozolomide 150mg/m2 (320mg), cycle 1 (start date 10/4). Not pursuing Optune.   -10/30//2024 NEURO-ONC/CHEMO: Tolerated temozolomide well. Neurologically stable. Increase adjuvant temozolomide to 200mg/m2 (430mg), cycle 2 (start date 11/1).    PHYSICAL EXAMINATION  There were no vitals taken for this visit.  Wt Readings from Last 2 Encounters:   10/29/24 97.1 kg (214 lb)  "  10/03/24 96.6 kg (213 lb)      Ht Readings from Last 2 Encounters:   10/29/24 1.702 m (5' 7\")   07/26/24 1.702 m (5' 7\")     KPS: 90    Video physical exam  General: Patient appears well in no acute distress. Ambulatory during visit  Skin: No visualized rash or lesions on visualized skin  Eyes: Poorly visualized on video  Resp:  speaking in full sentences, no cough  Psych: affect appropriate, alert and oriented. Pleasant    -Neurologic:   MENTAL STATUS:     Alert, oriented.    Speech fluent.    Comprehension intact.     CRANIAL NERVES:     Hearing intact.   No dysarthria    MEDICAL RECORDS  Reviewed PCP note from 10/29/24 for dysfunction of right eustachian tube and conjunctivitis of right eye.     LABS  Personally reviewed: CBC yesterday WNL; CMP 10/18/24-glucose 109    IMAGING  N/A      IMPRESSION  Pamella tolerated cycle 1 of temozolomide well. She is neurologically stable with ongoing field cut in vision. This is more notable at night and as a result she would benefit from handicap parking sticker to minimize navigation in dim lit conditions with limited vision. She is not driving. I will fill out this form and ask this to be sent to her to complete.     Today we discussed to the option to dose increase temozolomide for cycle 2 to 200mg/m2. We discussed that there is no evidence of increased benefit at the higher dose but that decision to dose increase is based on tolerance and patient preference. I did let the patient know that there is the potential for increased side effects (e.g. fatigue, nausea, constipation) at the higher dose. After our discussion, the patient would like to increase for cycle 2 to 200mg/m2. If this is poorly tolerated, we can always consider reducing back to 150mg/m2 for future cycles.     The plan is to repeat imaging per NCCN guidelines of every 2 months.        PROBLEM LIST  Glioblastoma  Headaches  Homonymous hemianopsia, left-side  Benign essential tremor  Temporal lobe " auras    PLAN  -CANCER DIRECTED THERAPY-  -Adjuvant temozolomide at 200mg/m2 (430mg), cycle 2 (start date of 11/1).    Supportive medications; Zofran and bowel regimen.     -Repeat 28 day cycle if WBC >= 3, ANC >= 1.5, HgB >= 10, and platelets >= 100.   Surveillance labs reviewed, at goal for chemotherapy.    Will continue every 2 weeks CBC and CMP every 4 weeks.    -Repeat imaging in ~1 month  -Not pursuing use of Opune.      -STEROIDS-  -Off dexamethasone.    -SEIZURE MANAGEMENT-  -Stereotyped events that seem consistent with a history of non-dominant temporal lobe auras/ seizures.   On Keppra 750 mg twice daily.  -Continue to monitor frequency of recurrent auras.     -Quality of life/ MOOD/ FATIGUE-  -History of depression.    Self-discontinued Zoloft.    -VISUAL FIELD CUT-  -Right Homonymous hemianopsia.  -Evaluated by neuro-ophtho for thorough eye examination and baseline visual field testing to determine if there are any driving restrictions.  -She is not driving per her report.       -LE DVT-  - Xarelto; continue as directed.   -No bleeding concerns today    -CONJUNCTIVITIS-  Following with PCP  Started Polytrim drop on 10/29/24  I did ask her to let us know if symptoms worsen with chemo though temozolomide rarely causes severe leukopenia and her symptoms are longstanding, so I dont see any indication to delay temozolomide start at this point.     Return to clinic in ~4 weeks with labs, MRI and Dr Irizarry    In the meantime, Pamella knows to call the clinic with any concerns and she can be seen sooner if needed.     45 minutes spent on the date of the encounter doing chart review, review of test results, interpretation of tests, patient visit, and documentation     Amber Scheierl, CNP  [facility] [address] [address]  [phone]        Again, thank you for allowing me to participate in the care of your patient.        Sincerely,        Amber J. Scheierl, APRN CNP

## 2024-10-30 NOTE — NURSING NOTE
Is the patient currently in the state of [place]? YES    Visit mode:VIDEO    If the visit is dropped, the patient can be reconnected by: VIDEO VISIT: Send to e-mail at: [email or web address]    Will anyone else be joining the visit? NO  (If patient encounters technical issues they should call [phone])    Are changes needed to the allergy or medication list? No    Are refills needed on medications prescribed by this physician? NO    Rooming Documentation:  Questionnaire(s) completed    Reason for visit: Video Visit (Follow UP )    Madie CONWAY

## 2024-11-04 ENCOUNTER — PATIENT OUTREACH (OUTPATIENT)
Dept: CARE COORDINATION | Facility: CLINIC | Age: 59
End: 2024-11-04
Payer: COMMERCIAL

## 2024-11-04 DIAGNOSIS — T45.1X5A CHEMOTHERAPY-INDUCED NAUSEA AND VOMITING: Primary | ICD-10-CM

## 2024-11-04 DIAGNOSIS — C71.9 GLIOBLASTOMA (H): ICD-10-CM

## 2024-11-04 DIAGNOSIS — R11.2 CHEMOTHERAPY-INDUCED NAUSEA AND VOMITING: Primary | ICD-10-CM

## 2024-11-04 RX ORDER — ONDANSETRON 4 MG/1
4 TABLET, FILM COATED ORAL AT BEDTIME
Qty: 30 TABLET | Refills: 3 | Status: SHIPPED | OUTPATIENT
Start: 2024-11-04

## 2024-11-04 NOTE — PROGRESS NOTES
Social Work - Follow-Up  [facility]    Data/Intervention:  Patient Name: Pamella Jaimes Goes By: Pamella    /Age: 1965 (59 year old)    Reason for Follow-Up:  This clinician calling Pamella for scheduled monthly check-in and support.     Intervention/Education/Resources Provided:  Pamella reports that she is coping well with chemotherapy treatment, denies issues with nausea, endorses some issues with dizziness upon waking but reports that this improves as the day progresses.    Pamella reports that finances continue to be a concern, reminded Pamella of resources SW sent in 2024, and made plan to complete [employer] on Pamella's behalf today.     Pamella with questions as to if she could access additional [facility] funding support as her sole income is survivor support from  spouse. SW to support her connection with Christina Ozuna at [facility] per her request.     SW submitted MyChart with resource consideration.     Assessment/Plan:  Onc SW will continue to be available for psychosocial support and plan to outreach in 1 month. Pamella knows to outreach prior in case of concern or need.    Previously provided patient/family with writer's contact information and availability.      Natalee Bonilla, MEHDI, LICSW, OSW-C  Clinical - Adult Oncology  Phone: [phone]  She/Her/Hers  [facility]: Nohemy FERRO  8am-4:30pm  [facility]: TRISH Kellogg F 8am-4:30pm   Support Groups at [facility]: Social Work Services for Cancer Patients ([email or web address])

## 2024-11-12 ENCOUNTER — TELEPHONE (OUTPATIENT)
Dept: ONCOLOGY | Facility: CLINIC | Age: 59
End: 2024-11-12
Payer: COMMERCIAL

## 2024-11-12 NOTE — CONFIDENTIAL NOTE
Pt calling back to clarify on the timing of symptoms. She reported that she started having symptoms on 11/8 after she was done receiving chemo.    Will send to care team to update.

## 2024-11-12 NOTE — CONFIDENTIAL NOTE
Called pt with recommendations from provider.     Maribell Irizarry MD Nielsen, Karla, RN; Scheierl, Amber J, APRN CNP; Irene Clayton, RN; [facility] [id number] minutes ago (12:18 PM)       Not sure the etiology of the rash/ itching as she previously tolerated chemo well.    Agree with recommendations.  Continue to monitor and potentially do a trial of dex with C3 for chemotherapy.    Maribell Irizarry MD  Neuro-oncology  11/12/2024     Pt verbalized understanding and will follow recommendations.

## 2024-11-12 NOTE — TELEPHONE ENCOUNTER
Oncology Nurse Triage    Situation:   Pamella reporting the following symptoms: hives and pruritus     Background:   Treating Provider:   Dr Irizarry     Date of last office visit: 10/30/2024 with Amber Scheierl, NP    Recent Treatments:temodar 11/1/2024 through 11/5/2024    Assessment:     Pt is calling. States that she has had intermittent hives and rash and itching.  Started on 11/1/2024, the same day she started her second cycle of chemo.  Pt reports severe itching and hives on her legs, arms, and abdomen on 11/1 and 11/2. Symptoms seemed to be worse in the evening. Pt took benadryl and her symptoms improved.   Over the past 12 days, she has had intermittent rashes and itching.   Today she does not report any hives, but has itching on her hands, wrists, elbows, and right upper leg. Denies rash, but states that she has redness from all of the scratching. She has been applying lotion which has been helpful.   She did not have any of these symptoms after her first cycle of chemo.    Pt denies any new lotions, soaps, deodorants, powders, foods, or medications.   States that she did get some new sheets and a robe about 2 weeks ago that she has been using without washing, but she has now put all of this in the washer today.   Denies any chest pain, shortness  of breath, wheezing, feelings of throat closing, or other urgent symptoms.     States that benadryl makes her sleepy, so she has only been taking at bedtime.    Recommendations:     Advised that pt could try claritin instead of benadryl to help decrease sedation.  Can also try hydrocortisone cream or benadryl cream to areas of pruritus.   Reviewed emergent symptoms that would warrant return call to clinic or evaluation in ER.  Will route to care team for recommendations.  OK to leave message if unable to reach patient.  Patient verbalized understanding and agreement with plan.  Patient was instructed to call the clinic with any questions, concerns, or worsening  symptoms.  Kim Kraft RN on 11/12/2024 at 11:57 AM

## 2024-11-14 ENCOUNTER — TELEPHONE (OUTPATIENT)
Dept: ONCOLOGY | Facility: CLINIC | Age: 59
End: 2024-11-14

## 2024-11-14 ENCOUNTER — LAB (OUTPATIENT)
Dept: LAB | Facility: OTHER | Age: 59
End: 2024-11-14
Payer: COMMERCIAL

## 2024-11-14 DIAGNOSIS — C71.9 GLIOBLASTOMA (H): ICD-10-CM

## 2024-11-14 LAB
ALBUMIN SERPL BCG-MCNC: 4.4 G/DL (ref 3.5–5.2)
ALP SERPL-CCNC: 82 U/L (ref 40–150)
ALT SERPL W P-5'-P-CCNC: 21 U/L (ref 0–50)
ANION GAP SERPL CALCULATED.3IONS-SCNC: 10 MMOL/L (ref 7–15)
AST SERPL W P-5'-P-CCNC: 21 U/L (ref 0–45)
BASOPHILS # BLD AUTO: 0 10E3/UL (ref 0–0.2)
BASOPHILS NFR BLD AUTO: 0 %
BILIRUB SERPL-MCNC: 0.4 MG/DL
BUN SERPL-MCNC: 11.5 MG/DL (ref 8–23)
CALCIUM SERPL-MCNC: 9.6 MG/DL (ref 8.8–10.4)
CHLORIDE SERPL-SCNC: 109 MMOL/L (ref 98–107)
CREAT SERPL-MCNC: 0.65 MG/DL (ref 0.51–0.95)
EGFRCR SERPLBLD CKD-EPI 2021: >90 ML/MIN/1.73M2
EOSINOPHIL # BLD AUTO: 0.1 10E3/UL (ref 0–0.7)
EOSINOPHIL NFR BLD AUTO: 5 %
ERYTHROCYTE [DISTWIDTH] IN BLOOD BY AUTOMATED COUNT: 11.9 % (ref 10–15)
GLUCOSE SERPL-MCNC: 100 MG/DL (ref 70–99)
HCO3 SERPL-SCNC: 21 MMOL/L (ref 22–29)
HCT VFR BLD AUTO: 39.3 % (ref 35–47)
HGB BLD-MCNC: 13.4 G/DL (ref 11.7–15.7)
IMM GRANULOCYTES # BLD: 0 10E3/UL
IMM GRANULOCYTES NFR BLD: 0 %
LYMPHOCYTES # BLD AUTO: 0.9 10E3/UL (ref 0.8–5.3)
LYMPHOCYTES NFR BLD AUTO: 36 %
MCH RBC QN AUTO: 30.5 PG (ref 26.5–33)
MCHC RBC AUTO-ENTMCNC: 34.1 G/DL (ref 31.5–36.5)
MCV RBC AUTO: 90 FL (ref 78–100)
MONOCYTES # BLD AUTO: 0.3 10E3/UL (ref 0–1.3)
MONOCYTES NFR BLD AUTO: 10 %
NEUTROPHILS # BLD AUTO: 1.2 10E3/UL (ref 1.6–8.3)
NEUTROPHILS NFR BLD AUTO: 49 %
PLATELET # BLD AUTO: 201 10E3/UL (ref 150–450)
POTASSIUM SERPL-SCNC: 3.8 MMOL/L (ref 3.4–5.3)
PROT SERPL-MCNC: 6.9 G/DL (ref 6.4–8.3)
RBC # BLD AUTO: 4.39 10E6/UL (ref 3.8–5.2)
SODIUM SERPL-SCNC: 140 MMOL/L (ref 135–145)
WBC # BLD AUTO: 2.4 10E3/UL (ref 4–11)

## 2024-11-14 PROCEDURE — 36415 COLL VENOUS BLD VENIPUNCTURE: CPT

## 2024-11-14 PROCEDURE — 80053 COMPREHEN METABOLIC PANEL: CPT

## 2024-11-14 PROCEDURE — 85025 COMPLETE CBC W/AUTO DIFF WBC: CPT

## 2024-11-14 NOTE — ORAL ONC MGMT
Oral Chemotherapy Monitoring Program  Lab Follow Up    Patient is on temozolomide (Temodar)  Reviewed lab results from 11/14/24.    I also called and spoke with Pamella's daughter Leanne to assess how increased dose went this cycle. Leanne reports that not much difference was noted. No increase in nausea or constipation that she was aware of.    Assessment & Plan:  CBC and CMP reviewed. Results show grade 2 neutropenia, but this is not unexpected in mid-cycle labs. No other abnormalities of concern.  Plan to continue Temodar as prescribed. Questions answered to patient's satisfaction.    Follow-Up:  11/26: labs and Dr Irizarry visit      Kenia Macario, PharmD  Hematology/Oncology Clinical Pharmacist  [facility]  [phone]  November 14, 2024 8/27/2024     1:00 PM 8/28/2024     4:00 PM 9/6/2024     1:00 PM 10/1/2024     2:00 PM 10/10/2024     9:00 AM 10/18/2024     1:00 PM 11/14/2024     2:00 PM   ORAL CHEMOTHERAPY   Assessment Type Refill;Chart Review Chart Review;Lab Monitoring;Incoming phone call Lab Monitoring New Teach Initial Follow up;Chart Review Lab Monitoring Lab Monitoring;Other   Diagnosis Code Brain Cancer - Glioblastoma Brain Cancer - Glioblastoma Brain Cancer - Glioblastoma Brain Cancer - Glioblastoma Brain Cancer - Glioblastoma Brain Cancer - Glioblastoma Brain Cancer - Glioblastoma   Providers Dr. Juan C Irizarry   Clinic Name/Location [facility]   Drug Name Temodar (temozolomide) Temodar (temozolomide) Temodar (temozolomide) Temodar (temozolomide) Temodar (temozolomide) Temodar (temozolomide) Temodar (temozolomide)   Dose 160 mg 160 mg 160 mg 320 mg 320 mg 320 mg 320 mg   Current Schedule Daily Daily Daily Daily Daily Daily Daily   Cycle Details Continuous for 42 days during XRT Continuous for 42 days during XRT Continuous for 42 days during XRT 5 days  on, 23 days off 5 days on, 23 days off 5 days on, 23 days off 5 days on, 23 days off   Start Date of Last Cycle 7/22/2024 7/22/2024 7/22/2024 10/4/2024 10/4/2024  11/1/2024   Planned next cycle start date    11/1/2024 11/1/2024 11/1/2024    Doses missed in last 2 weeks     0     Adherence Assessment     Adherent     Adverse Effects No AE identified during assessment Neutropenia   No AE identified during assessment  Neutropenia   Neutropenia  Grade 2     Grade 2   Pharmacist Intervention(neutropenia)  Yes     No   Intervention(s)  Referral to oncology provider        Any new drug interactions? No No   No     Is the dose as ordered appropriate for the patient? Yes Yes   Yes  Yes       Labs:  _  Result Component Current Result Ref Range   Sodium 140 (11/14/2024) 135 - 145 mmol/L     _  Result Component Current Result Ref Range   Potassium 3.8 (11/14/2024) 3.4 - 5.3 mmol/L     _  Result Component Current Result Ref Range   Calcium 9.6 (11/14/2024) 8.8 - 10.4 mg/dL     No results found for Mag within last 30 days.     No results found for Phos within last 30 days.     _  Result Component Current Result Ref Range   Albumin 4.4 (11/14/2024) 3.5 - 5.2 g/dL     _  Result Component Current Result Ref Range   Urea Nitrogen 11.5 (11/14/2024) 8.0 - 23.0 mg/dL     _  Result Component Current Result Ref Range   Creatinine 0.65 (11/14/2024) 0.51 - 0.95 mg/dL     _  Result Component Current Result Ref Range   AST 21 (11/14/2024) 0 - 45 U/L     _  Result Component Current Result Ref Range   ALT 21 (11/14/2024) 0 - 50 U/L     _  Result Component Current Result Ref Range   Bilirubin Total 0.4 (11/14/2024) <=1.2 mg/dL     _  Result Component Current Result Ref Range   WBC Count 2.4 (L) (11/14/2024) 4.0 - 11.0 10e3/uL     _  Result Component Current Result Ref Range   Hemoglobin 13.4 (11/14/2024) 11.7 - 15.7 g/dL     _  Result Component Current Result Ref Range   Platelet Count 201 (11/14/2024) 150 - 450 10e3/uL     No results found for  ANC within last 30 days.

## 2024-11-24 NOTE — PROGRESS NOTES
NEURO-ONCOLOGY VISIT  Nov 26, 2024    CHIEF COMPLAINT: Ms. 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 Pamella completed standard chemoradiotherapy on 9/6/2024. Post-radiation imaging demonstrated no new concerns with an initial positive response to treatment.     Pamella initiated adjuvant therapy with temozolomide in 9/2024 and repeat imaging in 11/2024 was stable with no new concerns. As such, the plan is to continue with temozolomide for the planned 6 cycles.     Pamella presents today in follow-up and she is accompanied by Leanne (daughter) plus her grandson.    INTERVAL HISTORY  -Pamella reports good tolerance of her most recent cycle of chemotherapy, even at an increased dose.    Denies nausea.    Appetite is good.   No constipation.   Some fatigue, but overall energy was good.  -No recurrence of auras/ seizures.   -Mild and infrequent headaches; triggered by light sensitivity.   -No focal weakness.  -Infrequent AM dizziness.   -Less nasal congestion, some ear fullness that has improved with Claritin.   -Episodes of itchy skin about the arms and inside of her legs. Improves with applying lotion.       MEDICATIONS   Current Outpatient Medications   Medication Sig Dispense Refill    acetaminophen (TYLENOL) 500 MG tablet Take 500-1,000 mg by mouth.      diclofenac (VOLTAREN) 75 MG EC tablet Take 1 tablet by mouth 2 times daily.      fluticasone (FLONASE) 50 MCG/ACT nasal spray Spray 1 spray into both nostrils daily. 16 g 0    levETIRAcetam (KEPPRA) 750 MG tablet Take 1 tablet (750 mg) by mouth 2 times daily. 60 tablet 11    ondansetron (ZOFRAN) 4 MG tablet Take 1 tablet (4 mg) by mouth at bedtime. Take 30-60 mins before each dose of temozolomide. Can take every 8 hours if needed. 30 tablet 3    rivaroxaban ANTICOAGULANT (XARELTO) 20 MG TABS tablet Take 1 tablet (20 mg) by  mouth daily (with dinner). 30 tablet 11     DRUG ALLERGIES   Allergies   Allergen Reactions    Morphine     Penicillins Hives       IMMUNIZATIONS   Immunization History   Administered Date(s) Administered    COVID-19 Monovalent 18+ (Moderna) 05/04/2021, 06/01/2021, 01/28/2022    Influenza Vaccine 18-64 (Flublok) 01/14/2022    TDAP (Adacel,Boostrix) 01/04/2010, 01/04/2016     ONCOLOGIC HISTORY  -PRESENTATION: Neck pain, headache and head tremor.  -3/15/2023 MRI brain imaging with a mild asymmetric T2 hyperintense signal along right hippocampus and adjacent parahippocampal gyrus.    -PRESENTATION: Dizziness and nausea; semiology is concerning for temporal lobe auras. Slurred speech.  -5/27/2024 MR brain imaging with a right posterior temporo-occipital mass. Associated nodular peripheral enhancement and surrounding confluent T2 signal hyperintensity extending along the medial aspect of the right temporal lobe. Mass effect results in regional sulcal effacement, leftward midline shift, partial effacement the right lateral ventricle and mild right uncal herniation.   -5/28/2024 SURGERY: Craniotomy for mass resection at [facility] [place].   No post-operative MR brain imaging performed.   Post-operative CT head imaging from 5/29 status post a right parietal craniotomy with excision of previously demonstrated paramedian parieto-occipital tumor. Decreased focal mass effect and slightly decreased mild midline shift. No significant hemorrhage or additional complicating feature.   PATHOLOGY: Glioblastoma, IDH1-R132H wildtype (CNS WHO Grade 4).    MGMT promoter unmethylated.   -6/25/2024 NEURO-ONC: Recommending chemoradiotherapy with concurrent temozolomide. U/S + for DVT; started anticoagulation.   -7/26/2024 NEURO-ONC: She has started chemoradiotherapy with concurrent temozolomide. Acute visit for concerns; increased Keppra to 750 mg BID.   -7/23 - 9/6/2024 CHEMORT: Complete chemoradiotherapy. 60Gy in 30 fractions with  "concurrent temozolomide 75mg/m2 (160mg).  -10/1/2024 NEURO-ONC/ MRB/ CHEMO: Clinically well; reduced frequency of auras. Imaging with no concerns, initial positive response to treatment. Starting adjuvant temozolomide 150mg/m2 (320mg), cycle 1 (start date 10/4). Not pursuing Optune.   -10/30//2024 NEURO-ONC/CHEMO: Tolerated temozolomide well. Neurologically stable. Increase adjuvant temozolomide to 200mg/m2 (430mg), cycle 2 (start date 11/1).  -11/26/2024 NEURO-ONC/ MRB/ CHEMO: No new neurological concerns. Itchy skin. Imaging with no new overt concerns. Adjuvant temozolomide 200mg/m2 (430mg), cycle 3.       PHYSICAL EXAMINATION  /69 (BP Location: Right arm, Patient Position: Sitting, Cuff Size: Adult Large)   Pulse 69   Temp 98  F (36.7  C) (Oral)   Resp 16   Wt 95.2 kg (209 lb 12.8 oz)   SpO2 100%   BMI 32.86 kg/m    Wt Readings from Last 2 Encounters:   11/26/24 95.2 kg (209 lb 12.8 oz)   10/30/24 97.1 kg (214 lb)      Ht Readings from Last 2 Encounters:   10/30/24 1.702 m (5' 7\")   10/29/24 1.702 m (5' 7\")     KPS:     General: Appears well in no acute distress.  Psych: Affect appropriate. Pleasant  Neurologic:   MENTAL STATUS:     Alert, oriented.    Speech fluent.    Comprehension intact.     CRANIAL NERVES:     Hearing intact.   No dysarthria.  GAIT: Steady and unassisted.       MEDICAL RECORDS  Personally reviewed; No recent encounters.     LABS  Personally reviewed; CBC (platelets 97) and CMP (AST/ ALT 22/20) from today.      IMAGING  Personally reviewed MR brain imaging from today and compared to post-radiation imaging.     Formal read; \"1. Stable postoperative change to the right parieto-occipital skull and right occipital lobe. Abnormal enhancement and abnormal T2 signal hyperintensity involving portions of the right temporal and right occipital lobes consistent with residual tumor. No evidence for tumor progression. 2. No evidence for acute intracranial pathology.\"     Imaging was " shown to and results were reviewed with Pamella and Leanne.       IMPRESSION  On date of service, 41 minutes was spent in clinic and 12 minutes was spent preparing for the visit through extensive chart review and coordinating care for this high complexity visit. The following is in explanation for the recommendations used to define the plan.       Pamella tolerated cycle 2 of temozolomide well, even at the increased dose. She is neurologically stable with only an ongoing visual field cut.     Imaging as detailed above with no overtly concerning findings. Therefore, the plan is to repeat imaging per NCCN guidelines of every 2 months for now.      Today, we discussed dosing plans for cycle 3 and due to excellent tolerance of chemotherapy, the decision is to continue at 200mg/m2 dosing. Toxicity of chemotherapy reviewed and anticipated side effects can include nausea, constipation, and hematologic intolerance. Labs from today reviewed and Pamella's platelet count dropped by about 1/2 from mid-cycle testing. As a result, the plan is to repeat CBC on 11/29. If platelets are stable to improved, she can start cycle 3 that evening. If down trending, will need to hold chemotherapy and consider a dose reduction to 150mg/m2 dosing. Will continue treatment with intensive clinical and laboratory monitoring for toxicity per protocol.     PROBLEM LIST  Glioblastoma  Headaches  Homonymous hemianopsia, left-side  Benign essential tremor  Temporal lobe auras    PLAN  -CANCER DIRECTED THERAPY-  -Adjuvant temozolomide at 200mg/m2 (430mg), cycle 3 (start date of 11/29, pending repeat platelet trend on 11/29).    Supportive medications; Zofran and bowel regimen.     -Repeat 28 day cycle if WBC >= 3, ANC >= 1.5, HgB >= 10, and platelets >= 100.   Surveillance labs reviewed, near goal for chemotherapy, but want to confirm trend of platelets.    Will continue every 2 weeks CBC and CMP every 4 weeks.    -Repeat imaging in ~2 months.  -Not pursuing  use of Opune.      -STEROIDS-  -Off dexamethasone.    -SEIZURE MANAGEMENT-  -Stereotyped events that seem consistent with a history of non-dominant temporal lobe auras/ seizures.   On Keppra 750 mg twice daily.  -Continue to monitor frequency of recurrent auras.     -Quality of life/ MOOD/ FATIGUE-  -History of depression.    Self-discontinued Zoloft.    -VISUAL FIELD CUT-  -Right Homonymous hemianopsia.  -Evaluated by neuro-ophtho for thorough eye examination and baseline visual field testing to determine if there are any driving restrictions.      -LE DVT-  -Lifelong anticoagulation indicated.    On Xarelto; continue as directed.   -No bleeding concerns.    Return to clinic in ~4 weeks with Amber Scheierl, CNP or FATEMEH Silvestre CNP + labs.    In the meantime, Pamella knows to call the clinic with any concerns and she can be seen sooner if needed.     The longitudinal plan of care for the diagnosis(es)/condition(s) as documented were addressed during this visit. Due to the added complexity in care, I will continue to support Pamella in the subsequent management and with ongoing continuity of care.     Maribell Irizarry MD  Neuro-oncology

## 2024-11-26 ENCOUNTER — HOSPITAL ENCOUNTER (OUTPATIENT)
Dept: MRI IMAGING | Facility: CLINIC | Age: 59
Discharge: HOME | End: 2024-11-26
Attending: STUDENT IN AN ORGANIZED HEALTH CARE EDUCATION/TRAINING PROGRAM
Payer: COMMERCIAL

## 2024-11-26 ENCOUNTER — ONCOLOGY VISIT (OUTPATIENT)
Dept: ONCOLOGY | Facility: CLINIC | Age: 59
End: 2024-11-26
Attending: STUDENT IN AN ORGANIZED HEALTH CARE EDUCATION/TRAINING PROGRAM
Payer: COMMERCIAL

## 2024-11-26 ENCOUNTER — LAB (OUTPATIENT)
Dept: LAB | Facility: CLINIC | Age: 59
End: 2024-11-26
Attending: STUDENT IN AN ORGANIZED HEALTH CARE EDUCATION/TRAINING PROGRAM
Payer: COMMERCIAL

## 2024-11-26 VITALS
WEIGHT: 209.8 LBS | BODY MASS INDEX: 32.86 KG/M2 | HEART RATE: 69 BPM | DIASTOLIC BLOOD PRESSURE: 69 MMHG | TEMPERATURE: 98 F | RESPIRATION RATE: 16 BRPM | SYSTOLIC BLOOD PRESSURE: 107 MMHG | OXYGEN SATURATION: 100 %

## 2024-11-26 DIAGNOSIS — T45.1X5A CHEMOTHERAPY-INDUCED NAUSEA AND VOMITING: ICD-10-CM

## 2024-11-26 DIAGNOSIS — C71.9 GLIOBLASTOMA (H): Primary | ICD-10-CM

## 2024-11-26 DIAGNOSIS — T45.1X5A ANTINEOPLASTIC CHEMOTHERAPY INDUCED PANCYTOPENIA (H): ICD-10-CM

## 2024-11-26 DIAGNOSIS — D61.810 ANTINEOPLASTIC CHEMOTHERAPY INDUCED PANCYTOPENIA (H): ICD-10-CM

## 2024-11-26 DIAGNOSIS — R11.2 CHEMOTHERAPY-INDUCED NAUSEA AND VOMITING: ICD-10-CM

## 2024-11-26 DIAGNOSIS — C71.9 GLIOBLASTOMA (H): ICD-10-CM

## 2024-11-26 LAB
ALBUMIN SERPL BCG-MCNC: 4.3 G/DL (ref 3.5–5.2)
ALP SERPL-CCNC: 82 U/L (ref 40–150)
ALT SERPL W P-5'-P-CCNC: 20 U/L (ref 0–50)
ANION GAP SERPL CALCULATED.3IONS-SCNC: 13 MMOL/L (ref 7–15)
AST SERPL W P-5'-P-CCNC: 22 U/L (ref 0–45)
BASOPHILS # BLD AUTO: 0 10E3/UL (ref 0–0.2)
BASOPHILS NFR BLD AUTO: 0 %
BILIRUB SERPL-MCNC: 0.4 MG/DL
BUN SERPL-MCNC: 16.2 MG/DL (ref 8–23)
CALCIUM SERPL-MCNC: 9.5 MG/DL (ref 8.8–10.4)
CHLORIDE SERPL-SCNC: 103 MMOL/L (ref 98–107)
CREAT SERPL-MCNC: 0.71 MG/DL (ref 0.51–0.95)
EGFRCR SERPLBLD CKD-EPI 2021: >90 ML/MIN/1.73M2
EOSINOPHIL # BLD AUTO: 0.3 10E3/UL (ref 0–0.7)
EOSINOPHIL NFR BLD AUTO: 7 %
ERYTHROCYTE [DISTWIDTH] IN BLOOD BY AUTOMATED COUNT: 12.1 % (ref 10–15)
GLUCOSE SERPL-MCNC: 94 MG/DL (ref 70–99)
HCO3 SERPL-SCNC: 24 MMOL/L (ref 22–29)
HCT VFR BLD AUTO: 39.7 % (ref 35–47)
HGB BLD-MCNC: 13 G/DL (ref 11.7–15.7)
IMM GRANULOCYTES # BLD: 0 10E3/UL
IMM GRANULOCYTES NFR BLD: 0 %
LYMPHOCYTES # BLD AUTO: 0.9 10E3/UL (ref 0.8–5.3)
LYMPHOCYTES NFR BLD AUTO: 23 %
MCH RBC QN AUTO: 29.5 PG (ref 26.5–33)
MCHC RBC AUTO-ENTMCNC: 32.7 G/DL (ref 31.5–36.5)
MCV RBC AUTO: 90 FL (ref 78–100)
MONOCYTES # BLD AUTO: 0.4 10E3/UL (ref 0–1.3)
MONOCYTES NFR BLD AUTO: 9 %
NEUTROPHILS # BLD AUTO: 2.5 10E3/UL (ref 1.6–8.3)
NEUTROPHILS NFR BLD AUTO: 61 %
NRBC # BLD AUTO: 0 10E3/UL
NRBC BLD AUTO-RTO: 0 /100
PLATELET # BLD AUTO: 97 10E3/UL (ref 150–450)
POTASSIUM SERPL-SCNC: 4 MMOL/L (ref 3.4–5.3)
PROT SERPL-MCNC: 6.8 G/DL (ref 6.4–8.3)
RBC # BLD AUTO: 4.41 10E6/UL (ref 3.8–5.2)
SODIUM SERPL-SCNC: 140 MMOL/L (ref 135–145)
WBC # BLD AUTO: 4 10E3/UL (ref 4–11)

## 2024-11-26 PROCEDURE — 70553 MRI BRAIN STEM W/O & W/DYE: CPT

## 2024-11-26 PROCEDURE — 85004 AUTOMATED DIFF WBC COUNT: CPT

## 2024-11-26 PROCEDURE — 255N000002 HC RX 255 OP 636: Performed by: STUDENT IN AN ORGANIZED HEALTH CARE EDUCATION/TRAINING PROGRAM

## 2024-11-26 PROCEDURE — 99215 OFFICE O/P EST HI 40 MIN: CPT | Performed by: PSYCHIATRY & NEUROLOGY

## 2024-11-26 PROCEDURE — G0463 HOSPITAL OUTPT CLINIC VISIT: HCPCS | Performed by: PSYCHIATRY & NEUROLOGY

## 2024-11-26 PROCEDURE — 82040 ASSAY OF SERUM ALBUMIN: CPT

## 2024-11-26 PROCEDURE — A9585 GADOBUTROL INJECTION: HCPCS | Performed by: STUDENT IN AN ORGANIZED HEALTH CARE EDUCATION/TRAINING PROGRAM

## 2024-11-26 PROCEDURE — 36415 COLL VENOUS BLD VENIPUNCTURE: CPT

## 2024-11-26 PROCEDURE — 80053 COMPREHEN METABOLIC PANEL: CPT

## 2024-11-26 RX ORDER — TEMOZOLOMIDE 180 MG/1
180 CAPSULE ORAL DAILY
Qty: 5 CAPSULE | Refills: 0 | Status: SHIPPED | OUTPATIENT
Start: 2024-11-29 | End: 2024-12-04

## 2024-11-26 RX ORDER — TEMOZOLOMIDE 250 MG/1
250 CAPSULE ORAL DAILY
Qty: 5 CAPSULE | Refills: 0 | Status: SHIPPED | OUTPATIENT
Start: 2024-11-29 | End: 2024-12-04

## 2024-11-26 RX ORDER — GADOBUTROL 604.72 MG/ML
15 INJECTION INTRAVENOUS ONCE
Status: COMPLETED | OUTPATIENT
Start: 2024-11-26 | End: 2024-11-26

## 2024-11-26 RX ADMIN — GADOBUTROL 15 ML: 604.72 INJECTION INTRAVENOUS at 10:59

## 2024-11-26 ASSESSMENT — PAIN SCALES - GENERAL: PAINLEVEL_OUTOF10: NO PAIN (0)

## 2024-11-26 NOTE — PROGRESS NOTES
"Oncology Rooming Note    November 26, 2024 1:11 PM   Pamella Jaimes is a 59 year old female who presents for:    Chief Complaint   Patient presents with    Oncology Clinic Visit     Glioblastoma (H)      Initial Vitals: Pulse 69   Temp 98  F (36.7  C) (Oral)   Resp 16   Wt 95.2 kg (209 lb 12.8 oz)   SpO2 100%   BMI 32.86 kg/m   Estimated body mass index is 32.86 kg/m  as calculated from the following:    Height as of 10/30/24: 1.702 m (5' 7\").    Weight as of this encounter: 95.2 kg (209 lb 12.8 oz). Body surface area is 2.12 meters squared.  No Pain (0) Comment: Data Unavailable   No LMP recorded.  Allergies reviewed: Yes  Medications reviewed: No    Medications: Medication refills not needed today.  Pharmacy name entered into [facility]:    [id number] - [place] - [id number] [place] [id number] - [place] - [address]  [facility] - [place], [place] - [address]     Frailty Screening:   Is the patient here for a new oncology consult visit in cancer care? 2. No      Clinical concerns: no        MARTHA Barroso    "

## 2024-11-26 NOTE — LETTER
11/26/2024      Pamella Jaimes  [address] [facility] [address]      Dear Colleague,    Thank you for referring your patient, Pamella Jaimes, to the [facility]. Please see a copy of my visit note below.    NEURO-ONCOLOGY VISIT  Nov 26, 2024    CHIEF COMPLAINT: Ms. 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 Pamella completed standard chemoradiotherapy on 9/6/2024. Post-radiation imaging demonstrated no new concerns with an initial positive response to treatment.     Pamella initiated adjuvant therapy with temozolomide in 9/2024 and repeat imaging in 11/2024 was stable with no new concerns. As such, the plan is to continue with temozolomide for the planned 6 cycles.     Pamella presents today in follow-up and she is accompanied by Leanne (daughter) plus her grandson.    INTERVAL HISTORY  -Pamella reports good tolerance of her most recent cycle of chemotherapy, even at an increased dose.    Denies nausea.    Appetite is good.   No constipation.   Some fatigue, but overall energy was good.  -No recurrence of auras/ seizures.   -Mild and infrequent headaches; triggered by light sensitivity.   -No focal weakness.  -Infrequent AM dizziness.   -Less nasal congestion, some ear fullness that has improved with Claritin.   -Episodes of itchy skin about the arms and inside of her legs. Improves with applying lotion.       MEDICATIONS   Current Outpatient Medications   Medication Sig Dispense Refill     acetaminophen (TYLENOL) 500 MG tablet Take 500-1,000 mg by mouth.       diclofenac (VOLTAREN) 75 MG EC tablet Take 1 tablet by mouth 2 times daily.       fluticasone (FLONASE) 50 MCG/ACT nasal spray Spray 1 spray into both nostrils daily. 16 g 0     levETIRAcetam (KEPPRA) 750 MG tablet Take 1 tablet (750 mg) by mouth 2 times daily. 60 tablet 11      ondansetron (ZOFRAN) 4 MG tablet Take 1 tablet (4 mg) by mouth at bedtime. Take 30-60 mins before each dose of temozolomide. Can take every 8 hours if needed. 30 tablet 3     rivaroxaban ANTICOAGULANT (XARELTO) 20 MG TABS tablet Take 1 tablet (20 mg) by mouth daily (with dinner). 30 tablet 11     DRUG ALLERGIES   Allergies   Allergen Reactions     Morphine      Penicillins Hives       IMMUNIZATIONS   Immunization History   Administered Date(s) Administered     COVID-19 Monovalent 18+ (Moderna) 05/04/2021, 06/01/2021, 01/28/2022     Influenza Vaccine 18-64 (Flublok) 01/14/2022     TDAP (Adacel,Boostrix) 01/04/2010, 01/04/2016     ONCOLOGIC HISTORY  -PRESENTATION: Neck pain, headache and head tremor.  -3/15/2023 MRI brain imaging with a mild asymmetric T2 hyperintense signal along right hippocampus and adjacent parahippocampal gyrus.    -PRESENTATION: Dizziness and nausea; semiology is concerning for temporal lobe auras. Slurred speech.  -5/27/2024 MR brain imaging with a right posterior temporo-occipital mass. Associated nodular peripheral enhancement and surrounding confluent T2 signal hyperintensity extending along the medial aspect of the right temporal lobe. Mass effect results in regional sulcal effacement, leftward midline shift, partial effacement the right lateral ventricle and mild right uncal herniation.   -5/28/2024 SURGERY: Craniotomy for mass resection at [facility].   No post-operative MR brain imaging performed.   Post-operative CT head imaging from 5/29 status post a right parietal craniotomy with excision of previously demonstrated paramedian parieto-occipital tumor. Decreased focal mass effect and slightly decreased mild midline shift. No significant hemorrhage or additional complicating feature.   PATHOLOGY: Glioblastoma, IDH1-R132H wildtype (CNS WHO Grade 4).    MGMT promoter unmethylated.   -6/25/2024 NEURO-ONC: Recommending chemoradiotherapy with concurrent temozolomide. U/S + for  "DVT; started anticoagulation.   -7/26/2024 NEURO-ONC: She has started chemoradiotherapy with concurrent temozolomide. Acute visit for concerns; increased Keppra to 750 mg BID.   -7/23 - 9/6/2024 CHEMORT: Complete chemoradiotherapy. 60Gy in 30 fractions with concurrent temozolomide 75mg/m2 (160mg).  -10/1/2024 NEURO-ONC/ MRB/ CHEMO: Clinically well; reduced frequency of auras. Imaging with no concerns, initial positive response to treatment. Starting adjuvant temozolomide 150mg/m2 (320mg), cycle 1 (start date 10/4). Not pursuing Optune.   -10/30//2024 NEURO-ONC/CHEMO: Tolerated temozolomide well. Neurologically stable. Increase adjuvant temozolomide to 200mg/m2 (430mg), cycle 2 (start date 11/1).  -11/26/2024 NEURO-ONC/ MRB/ CHEMO: No new neurological concerns. Itchy skin. Imaging with no new overt concerns. Adjuvant temozolomide 200mg/m2 (430mg), cycle 3.       PHYSICAL EXAMINATION  /69 (BP Location: Right arm, Patient Position: Sitting, Cuff Size: Adult Large)   Pulse 69   Temp 98  F (36.7  C) (Oral)   Resp 16   Wt 95.2 kg (209 lb 12.8 oz)   SpO2 100%   BMI 32.86 kg/m    Wt Readings from Last 2 Encounters:   11/26/24 95.2 kg (209 lb 12.8 oz)   10/30/24 97.1 kg (214 lb)      Ht Readings from Last 2 Encounters:   10/30/24 1.702 m (5' 7\")   10/29/24 1.702 m (5' 7\")     KPS:     General: Appears well in no acute distress.  Psych: Affect appropriate. Pleasant  Neurologic:   MENTAL STATUS:     Alert, oriented.    Speech fluent.    Comprehension intact.     CRANIAL NERVES:     Hearing intact.   No dysarthria.  GAIT: Steady and unassisted.       MEDICAL RECORDS  Personally reviewed; No recent encounters.     LABS  Personally reviewed; CBC (platelets 97) and CMP (AST/ ALT 22/20) from today.      IMAGING  Personally reviewed MR brain imaging from today and compared to post-radiation imaging.     Formal read; \"1. Stable postoperative change to the right parieto-occipital skull and right occipital lobe. " "Abnormal enhancement and abnormal T2 signal hyperintensity involving portions of the right temporal and right occipital lobes consistent with residual tumor. No evidence for tumor progression. 2. No evidence for acute intracranial pathology.\"     Imaging was shown to and results were reviewed with Pamella and Leanne.       IMPRESSION  On date of service, 41 minutes was spent in clinic and 12 minutes was spent preparing for the visit through extensive chart review and coordinating care for this high complexity visit. The following is in explanation for the recommendations used to define the plan.       Pamella tolerated cycle 2 of temozolomide well, even at the increased dose. She is neurologically stable with only an ongoing visual field cut.     Imaging as detailed above with no overtly concerning findings. Therefore, the plan is to repeat imaging per NCCN guidelines of every 2 months for now.      Today, we discussed dosing plans for cycle 3 and due to excellent tolerance of chemotherapy, the decision is to continue at 200mg/m2 dosing. Toxicity of chemotherapy reviewed and anticipated side effects can include nausea, constipation, and hematologic intolerance. Labs from today reviewed and Pamella's platelet count dropped by about 1/2 from mid-cycle testing. As a result, the plan is to repeat CBC on 11/29. If platelets are stable to improved, she can start cycle 3 that evening. If down trending, will need to hold chemotherapy and consider a dose reduction to 150mg/m2 dosing. Will continue treatment with intensive clinical and laboratory monitoring for toxicity per protocol.     PROBLEM LIST  Glioblastoma  Headaches  Homonymous hemianopsia, left-side  Benign essential tremor  Temporal lobe auras    PLAN  -CANCER DIRECTED THERAPY-  -Adjuvant temozolomide at 200mg/m2 (430mg), cycle 3 (start date of 11/29, pending repeat platelet trend on 11/29).    Supportive medications; Zofran and bowel regimen.     -Repeat 28 day cycle if " "WBC >= 3, ANC >= 1.5, HgB >= 10, and platelets >= 100.   Surveillance labs reviewed, near goal for chemotherapy, but want to confirm trend of platelets.    Will continue every 2 weeks CBC and CMP every 4 weeks.    -Repeat imaging in ~2 months.  -Not pursuing use of Opune.      -STEROIDS-  -Off dexamethasone.    -SEIZURE MANAGEMENT-  -Stereotyped events that seem consistent with a history of non-dominant temporal lobe auras/ seizures.   On Keppra 750 mg twice daily.  -Continue to monitor frequency of recurrent auras.     -Quality of life/ MOOD/ FATIGUE-  -History of depression.    Self-discontinued Zoloft.    -VISUAL FIELD CUT-  -Right Homonymous hemianopsia.  -Evaluated by neuro-ophtho for thorough eye examination and baseline visual field testing to determine if there are any driving restrictions.      -LE DVT-  -Lifelong anticoagulation indicated.    On Xarelto; continue as directed.   -No bleeding concerns.    Return to clinic in ~4 weeks with Amber Scheierl, CNP or FATEMEH Silvestre CNP + labs.    In the meantime, Pamella knows to call the clinic with any concerns and she can be seen sooner if needed.     The longitudinal plan of care for the diagnosis(es)/condition(s) as documented were addressed during this visit. Due to the added complexity in care, I will continue to support Pamella in the subsequent management and with ongoing continuity of care.     Maribell Irizarry MD  Neuro-oncology      Oncology Rooming Note    November 26, 2024 1:11 PM   Pamella Jaimes is a 59 year old female who presents for:    Chief Complaint   Patient presents with     Oncology Clinic Visit     Glioblastoma (H)      Initial Vitals: Pulse 69   Temp 98  F (36.7  C) (Oral)   Resp 16   Wt 95.2 kg (209 lb 12.8 oz)   SpO2 100%   BMI 32.86 kg/m   Estimated body mass index is 32.86 kg/m  as calculated from the following:    Height as of 10/30/24: 1.702 m (5' 7\").    Weight as of this encounter: 95.2 kg (209 lb 12.8 oz). Body " surface area is 2.12 meters squared.  No Pain (0) Comment: Data Unavailable   No LMP recorded.  Allergies reviewed: Yes  Medications reviewed: No    Medications: Medication refills not needed today.  Pharmacy name entered into [employer]:    [id number] - [place] - [address] [place] [id number] - [place] - [address]  [facility] - [place], [place] - [address]    Frailty Screening:   Is the patient here for a new oncology consult visit in cancer care? 2. No      Clinical concerns: no        Daria Sullivan CMA           cr      Again, thank you for allowing me to participate in the care of your patient.        Sincerely,        Maribell Irizarry MD

## 2024-12-03 ENCOUNTER — PATIENT OUTREACH (OUTPATIENT)
Dept: CARE COORDINATION | Facility: CLINIC | Age: 59
End: 2024-12-03
Payer: COMMERCIAL

## 2024-12-03 NOTE — PROGRESS NOTES
Social Work - Follow-Up  [facility]    Data/Intervention:  Patient Name: Pamella Jaimes Goes By: Pamella    /Age: 1965 (59 year old)    Reason for Follow-Up:  This clinician called Pamella for planned psychosocial check-in and emotional support.     Intervention/Education/Resources Provided:  Pamella endorses that she is overall coping well with cancer diagnosis and treatment. Pamella reports that she did not connect with Christina Ozuna last month, and would appreciate this clinician sending introductory email.     Pamella reports that she would like her children to go to her daughter, Leanne, if anything is to happen to her, but has yet to do anything legal to support that. SW advised that she inquire with Christina about this at their phone connection who could advise on next steps needed.     Assessment/Plan:  This clinician will plan for psychosocial check-in and support in 1 month. Pamella knows to outreach prior in case of concern or need.     Previously provided patient/family with writer's contact information and availability.      Natalee Bonilla, MEHDI, LICSW, OSW-C  Clinical - Adult Oncology  Phone: [phone]  She/Her/Hers  [facility]: Nohemy FERRO  8am-4:30pm  [facility]: TRISH Kellogg F 8am-4:30pm   Support Groups at [facility]: Social Work Services for Cancer Patients ([email or web address])

## 2024-12-17 DIAGNOSIS — C71.9 GLIOBLASTOMA (H): Primary | ICD-10-CM

## 2024-12-18 NOTE — PROGRESS NOTES
Video-Visit Details    Type of service:  Video Visit    Video Start Time (time video started): 1430    Video End Time (time video stopped): 1445    Originating Location (pt. Location): Home    Distant Location (provider location): On-site- [facility]    Mode of Communication:  Video Conference via Eric Sanchez MD PhD     Radiation Oncology Follow Up Note    Name: Pamella Jaimes MRN: [id number]   : 1965   Date of Service: Dec 19, 2024 Neuro-Oncologist: Maribell Irizarry MD  Neurosurgeon: Brandan Connelly MD ([facility])      DIAGNOSIS/ STAGE/ DOSE/ INTERVAL SINCE END OF TREATMENT:  - 59-year-old female with MGMT unmethylated WHO grade 4 glioblastoma, s/p right parietal craniotomy (2024) who is ~3.5 months s/p adjuvant radiotherapy (60 Gy/30 fractions, EOT 2024).  The patient was last seen by Dr. Sanchez on 10/3/2024 for a 1 month follow-up.  Patient is doing well following her completion of radiotherapy and that her energy has been steadily improving.  The patient was scheduled to restart temozolomide on 10/4/2024.  Interval imaging at the time was stable.  Plan at the time was to follow-up with Dr. Sanchez's clinic after follow-up with Dr. Irizarry and interval imaging in 2 months.      Oncologic History/Updates Since Last Radiation Oncology Visit on 10/3/2024:  10/30/2024, neuro-oncology follow-up:  - Patient is off dexamethasone  - Continue on Keppra 7 or 50 mg twice daily  - Patient is neurologically stable to ongoing field cut in her vision (right homonymous hemianopsia)    2024, MR brain perfusion:  - Stable postoperative change of the right parietal occipital skull and right occipital lobe  - Abnormal enhancement abnormal T2 signal hyperintensity in the portion of the right temporal and right occipital lobes consistent with residual tumor  - No evidence for tumor progression    2024, neuro-oncology follow-up:  - Patient's not pursuing Optune  - Plan to  repeat imaging in approximately 2 months  - Plan to start cycle 3 of adjuvant temozolomide pending repeat platelet labs      SUBJECTIVE:  Pamella has been feeling great and is without significant concerns today. Her fatigue has improved.     REVIEW OF SYSTEMS    Brain ROS  Headaches: Denies  Seizures: Denies  Nausea/vomiting: Denies  Changes in cognition/behavior: Denies  Speech: Denies  Vision/hearing changes: Denies  Gait symptoms or imbalance: Denies  Other focal neuro deficits: Denies      OBJECTIVE:   ECOG Performance Status: 1  Karnofsky Performance Status 90  Vital Signs: No vital signs taken   Physical Exam (limited by video):    GENERAL: Healthy, alert and no distress  HEAD: Normocephalic, atraumatic  EYES: Eyes grossly normal to inspection.  No discharge or erythema, or obvious scleral/conjunctival abnormalities.  NOSE: No discharge, normal appearance   RESP: No audible wheeze, cough, or visible cyanosis.  No visible retractions or increased work of breathing.    SKIN: Visible skin clear. No significant rash, abnormal pigmentation or lesions.  NEURO: Cranial nerves grossly intact.  Mentation and speech appropriate for age  EXTREMITIES: No obvious edema, normal appearing range of motion   PSYCH: Mentation appears normal, affect normal/bright, judgement and insight intact, normal speech and appearance well-groomed.     ASSESSMENT AND PLAN:   - 59-year-old female with MGMT unmethylated WHO grade 4 glioblastoma, s/p right parietal craniotomy (5/20/2024) who is ~3.5 months s/p adjuvant radiotherapy (60 Gy/30 fractions, EOT 9/6/2024).     Plan to see the patient the Thursday after the patient's brain imaging and follow-up appointment with Dr. Irizarry/Neuro-Oncology by video visit.     The overall time I spent on direct patient care including self review of records, labs, and radiographic studies, as well as, direct face-to-face interaction with the patient and coordination of care with other providers was 15 minutes.       Erna Sanchez MD, PhD     Department of Radiation Oncology  [facility]

## 2024-12-19 ENCOUNTER — VIRTUAL VISIT (OUTPATIENT)
Dept: RADIATION ONCOLOGY | Facility: CLINIC | Age: 59
End: 2024-12-19
Attending: STUDENT IN AN ORGANIZED HEALTH CARE EDUCATION/TRAINING PROGRAM
Payer: COMMERCIAL

## 2024-12-19 DIAGNOSIS — C71.9 GLIOBLASTOMA (H): Primary | ICD-10-CM

## 2024-12-19 ASSESSMENT — PAIN SCALES - GENERAL: PAINLEVEL_OUTOF10: NO PAIN (0)

## 2024-12-19 NOTE — PROGRESS NOTES
"Oncology Rooming Note    2024 2:20 PM   Pamella Jaimes is a 59 year old female who presents for:    Chief Complaint   Patient presents with    Oncology Clinic Visit     Follow-up     Initial Vitals: There were no vitals taken for this visit. Estimated body mass index is 32.86 kg/m  as calculated from the following:    Height as of 10/30/24: 1.702 m (5' 7\").    Weight as of 24: 95.2 kg (209 lb 12.8 oz). There is no height or weight on file to calculate BSA.  Data Unavailable Comment: Data Unavailable   No LMP recorded.    Allergies reviewed: Yes    Medications reviewed: Yes    Medications: Medication refills not needed today.  Pharmacy name entered into [employer]:    [facility][id number] - [place], [place] - [address] [id number] - [place] - [address]  [facility] - [place], [place] - [address]    Frailty Screening:   Is the patient here for a new oncology consult visit in cancer care? 2. No    FOLLOW-UP VISIT    Patient Name: Pamella Jaimes      : 1965     Age: 59 year old        ______________________________________________________________________________     Chief Complaint   Patient presents with    Oncology Clinic Visit     Follow-up     There were no vitals taken for this visit.     Date Radiation Completed: Glioblastoma: Right Occ 6000 cGy completed 24    Pain  Denies    Labs  Yes: 24 CBC w/ diff    Imaging  MRI: 24 MR brain w/wo contrast, w/ perfusion      Other Appointments:   MD Name: Ratna Palomares NP Appointment Date: 2024   MD Name: Dr. Syed Appointment Date: 2024   MD Name:  Appointment Date:     Residual Radiation side effect:  none      Clinical concerns: Patient is here for a virtual radiation oncology follow-up.    Dr. Sanchez was notified.      Aracelis Odom, RN  Radiation Oncology            "

## 2024-12-19 NOTE — LETTER
2024      Pamella Jaimes  [address] [address]      Dear Colleague,    Thank you for referring your patient, Pamella Jaimes, to the [facility] RADIATION ONCOLOGY. Please see a copy of my visit note below.    Video-Visit Details    Type of service:  Video Visit    Video Start Time (time video started): 1430    Video End Time (time video stopped): 1445    Originating Location (pt. Location): Home    Distant Location (provider location): On-site- [facility]    Mode of Communication:  Video Conference via Eric Sanchez MD PhD     Radiation Oncology Follow Up Note    Name: Pamella Jaimes MRN: [id number]   : 1965   Date of Service: Dec 19, 2024 Neuro-Oncologist: Maribell Irizarry MD  Neurosurgeon: Brandan Connelly MD ([facility])      DIAGNOSIS/ STAGE/ DOSE/ INTERVAL SINCE END OF TREATMENT:  - 59-year-old female with MGMT unmethylated WHO grade 4 glioblastoma, s/p right parietal craniotomy (2024) who is ~3.5 months s/p adjuvant radiotherapy (60 Gy/30 fractions, EOT 2024).  The patient was last seen by Dr. Sanchez on 10/3/2024 for a 1 month follow-up.  Patient is doing well following her completion of radiotherapy and that her energy has been steadily improving.  The patient was scheduled to restart temozolomide on 10/4/2024.  Interval imaging at the time was stable.  Plan at the time was to follow-up with Dr. Sanchez's clinic after follow-up with Dr. Irizarry and interval imaging in 2 months.      Oncologic History/Updates Since Last Radiation Oncology Visit on 10/3/2024:  10/30/2024, neuro-oncology follow-up:  - Patient is off dexamethasone  - Continue on Keppra 7 or 50 mg twice daily  - Patient is neurologically stable to ongoing field cut in her vision (right homonymous hemianopsia)    2024, MR brain perfusion:  - Stable postoperative change of the right parietal occipital skull and right occipital lobe  - Abnormal enhancement  abnormal T2 signal hyperintensity in the portion of the right temporal and right occipital lobes consistent with residual tumor  - No evidence for tumor progression    11/26/2024, neuro-oncology follow-up:  - Patient's not pursuing Optune  - Plan to repeat imaging in approximately 2 months  - Plan to start cycle 3 of adjuvant temozolomide pending repeat platelet labs      SUBJECTIVE:  Pamella has been feeling great and is without significant concerns today. Her fatigue has improved.     REVIEW OF SYSTEMS    Brain ROS  Headaches: Denies  Seizures: Denies  Nausea/vomiting: Denies  Changes in cognition/behavior: Denies  Speech: Denies  Vision/hearing changes: Denies  Gait symptoms or imbalance: Denies  Other focal neuro deficits: Denies      OBJECTIVE:   ECOG Performance Status: 1  Karnofsky Performance Status 90  Vital Signs: No vital signs taken   Physical Exam (limited by video):    GENERAL: Healthy, alert and no distress  HEAD: Normocephalic, atraumatic  EYES: Eyes grossly normal to inspection.  No discharge or erythema, or obvious scleral/conjunctival abnormalities.  NOSE: No discharge, normal appearance   RESP: No audible wheeze, cough, or visible cyanosis.  No visible retractions or increased work of breathing.    SKIN: Visible skin clear. No significant rash, abnormal pigmentation or lesions.  NEURO: Cranial nerves grossly intact.  Mentation and speech appropriate for age  EXTREMITIES: No obvious edema, normal appearing range of motion   PSYCH: Mentation appears normal, affect normal/bright, judgement and insight intact, normal speech and appearance well-groomed.     ASSESSMENT AND PLAN:   - 59-year-old female with MGMT unmethylated WHO grade 4 glioblastoma, s/p right parietal craniotomy (5/20/2024) who is ~3.5 months s/p adjuvant radiotherapy (60 Gy/30 fractions, EOT 9/6/2024).     Plan to see the patient the Thursday after the patient's brain imaging and follow-up appointment with Dr. Irizarry/Neuro-Oncology by  "video visit.     The overall time I spent on direct patient care including self review of records, labs, and radiographic studies, as well as, direct face-to-face interaction with the patient and coordination of care with other providers was 15 minutes.      Erna Sanchez MD, PhD     Department of Radiation Oncology  [facility]         Oncology Rooming Note    2024 2:20 PM   Pamella Jaimes is a 59 year old female who presents for:    Chief Complaint   Patient presents with     Oncology Clinic Visit     Follow-up     Initial Vitals: There were no vitals taken for this visit. Estimated body mass index is 32.86 kg/m  as calculated from the following:    Height as of 10/30/24: 1.702 m (5' 7\").    Weight as of 24: 95.2 kg (209 lb 12.8 oz). There is no height or weight on file to calculate BSA.  Data Unavailable Comment: Data Unavailable   No LMP recorded.    Allergies reviewed: Yes    Medications reviewed: Yes    Medications: Medication refills not needed today.  Pharmacy name entered into [facility]:    [id number]  [place], [place] - [address] [address] [facility] [id number] [facility] [place] [facility] [address]  [facility] - [place], [place] - [address]     Frailty Screening:   Is the patient here for a new oncology consult visit in cancer care? 2. No    FOLLOW-UP VISIT    Patient Name: Pamella Jaimes      : 1965     Age: 59 year old        ______________________________________________________________________________     Chief Complaint   Patient presents with     Oncology Clinic Visit     Follow-up     There were no vitals taken for this visit.     Date Radiation Completed: Glioblastoma: Right Occ 6000 cGy completed 24    Pain  Denies    Labs  Yes: 24 CBC w/ diff    Imaging  MRI: 24 MR brain w/wo contrast, w/ perfusion      Other Appointments:   MD Name: Ratna" Jelani CONWAY Appointment Date: 12/23/2024   MD Name: Dr. Syed Appointment Date: 12/20/2024   MD Name:  Appointment Date:     Residual Radiation side effect:  none      Clinical concerns: Patient is here for a virtual radiation oncology follow-up.    Dr. Sanchez was notified.      Aracelis Odom, RN  Radiation Oncology              Again, thank you for allowing me to participate in the care of your patient.        Sincerely,        Erna Sanchez MD PhD    Electronically signed

## 2024-12-23 ENCOUNTER — VIRTUAL VISIT (OUTPATIENT)
Dept: ONCOLOGY | Facility: CLINIC | Age: 59
End: 2024-12-23
Attending: NURSE PRACTITIONER
Payer: COMMERCIAL

## 2024-12-23 ENCOUNTER — LAB (OUTPATIENT)
Dept: LAB | Facility: OTHER | Age: 59
End: 2024-12-23
Payer: COMMERCIAL

## 2024-12-23 VITALS — BODY MASS INDEX: 31.52 KG/M2 | HEIGHT: 68 IN | WEIGHT: 208 LBS

## 2024-12-23 DIAGNOSIS — T45.1X5A ANTINEOPLASTIC CHEMOTHERAPY INDUCED PANCYTOPENIA (H): ICD-10-CM

## 2024-12-23 DIAGNOSIS — R11.2 CHEMOTHERAPY-INDUCED NAUSEA AND VOMITING: ICD-10-CM

## 2024-12-23 DIAGNOSIS — T45.1X5A CHEMOTHERAPY-INDUCED NAUSEA AND VOMITING: ICD-10-CM

## 2024-12-23 DIAGNOSIS — C71.9 GLIOBLASTOMA (H): Primary | ICD-10-CM

## 2024-12-23 DIAGNOSIS — D61.810 ANTINEOPLASTIC CHEMOTHERAPY INDUCED PANCYTOPENIA (H): ICD-10-CM

## 2024-12-23 LAB
ALBUMIN SERPL BCG-MCNC: 4 G/DL (ref 3.5–5.2)
ALP SERPL-CCNC: 77 U/L (ref 40–150)
ALT SERPL W P-5'-P-CCNC: 16 U/L (ref 0–50)
ANION GAP SERPL CALCULATED.3IONS-SCNC: 12 MMOL/L (ref 7–15)
AST SERPL W P-5'-P-CCNC: 13 U/L (ref 0–45)
BASOPHILS # BLD AUTO: 0 10E3/UL (ref 0–0.2)
BASOPHILS NFR BLD AUTO: 0 %
BILIRUB SERPL-MCNC: 0.3 MG/DL
BUN SERPL-MCNC: 10.3 MG/DL (ref 8–23)
CALCIUM SERPL-MCNC: 9.1 MG/DL (ref 8.8–10.4)
CHLORIDE SERPL-SCNC: 108 MMOL/L (ref 98–107)
CREAT SERPL-MCNC: 0.66 MG/DL (ref 0.51–0.95)
EGFRCR SERPLBLD CKD-EPI 2021: >90 ML/MIN/1.73M2
EOSINOPHIL # BLD AUTO: 0.1 10E3/UL (ref 0–0.7)
EOSINOPHIL NFR BLD AUTO: 1 %
ERYTHROCYTE [DISTWIDTH] IN BLOOD BY AUTOMATED COUNT: 14 % (ref 10–15)
GLUCOSE SERPL-MCNC: 117 MG/DL (ref 70–99)
HCO3 SERPL-SCNC: 23 MMOL/L (ref 22–29)
HCT VFR BLD AUTO: 35.5 % (ref 35–47)
HGB BLD-MCNC: 12.1 G/DL (ref 11.7–15.7)
IMM GRANULOCYTES # BLD: 0 10E3/UL
IMM GRANULOCYTES NFR BLD: 0 %
LYMPHOCYTES # BLD AUTO: 0.9 10E3/UL (ref 0.8–5.3)
LYMPHOCYTES NFR BLD AUTO: 18 %
MCH RBC QN AUTO: 30.7 PG (ref 26.5–33)
MCHC RBC AUTO-ENTMCNC: 34.1 G/DL (ref 31.5–36.5)
MCV RBC AUTO: 90 FL (ref 78–100)
MONOCYTES # BLD AUTO: 0.4 10E3/UL (ref 0–1.3)
MONOCYTES NFR BLD AUTO: 8 %
NEUTROPHILS # BLD AUTO: 3.5 10E3/UL (ref 1.6–8.3)
NEUTROPHILS NFR BLD AUTO: 73 %
PLATELET # BLD AUTO: 183 10E3/UL (ref 150–450)
POTASSIUM SERPL-SCNC: 3.3 MMOL/L (ref 3.4–5.3)
PROT SERPL-MCNC: 6.6 G/DL (ref 6.4–8.3)
RBC # BLD AUTO: 3.94 10E6/UL (ref 3.8–5.2)
SODIUM SERPL-SCNC: 143 MMOL/L (ref 135–145)
WBC # BLD AUTO: 4.9 10E3/UL (ref 4–11)

## 2024-12-23 PROCEDURE — 80053 COMPREHEN METABOLIC PANEL: CPT

## 2024-12-23 PROCEDURE — G2211 COMPLEX E/M VISIT ADD ON: HCPCS | Mod: 95 | Performed by: NURSE PRACTITIONER

## 2024-12-23 PROCEDURE — 99214 OFFICE O/P EST MOD 30 MIN: CPT | Mod: 95 | Performed by: NURSE PRACTITIONER

## 2024-12-23 PROCEDURE — 36415 COLL VENOUS BLD VENIPUNCTURE: CPT

## 2024-12-23 PROCEDURE — 85025 COMPLETE CBC W/AUTO DIFF WBC: CPT

## 2024-12-23 ASSESSMENT — PAIN SCALES - GENERAL: PAINLEVEL_OUTOF10: NO PAIN (0)

## 2024-12-23 NOTE — Clinical Note
"12/23/2024      Pamella Jaimes  [address] [facility] [address]      Dear Colleague,    Thank you for referring your patient, Pamella Jaimes, to the [facility]. Please see a copy of my visit note below.    Virtual Visit Details    Type of service:  Video Visit   Video Start Time: {video visit start/end time for provider to select:095786}  Video End Time:{video visit start/end time for provider to select:342242}    Originating Location (pt. Location): {video visit patient location:828922::\"Home\"}  {PROVIDER LOCATION On-site should be selected for visits conducted from your clinic location or adjoining [facility] hospital, academic office, or other nearby [facility] building. Off-site should be selected for all other provider locations, including home:[id number]}  Distant Location (provider location):  {virtual location provider:533399}  Platform used for Video Visit: {Virtual Visit Platforms:222733::\"[employer]\"}NEURO-ONCOLOGY VISIT  Dec 23, 2024    CHIEF COMPLAINT: Ms. 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 Pamella completed standard chemoradiotherapy on 9/6/2024. Post-radiation imaging demonstrated no new concerns with an initial positive response to treatment.     Pamella initiated adjuvant therapy with temozolomide in 9/2024 and repeat imaging in 11/2024 was stable with no new concerns. As such, the plan is to continue with temozolomide for the planned 6 cycles.     Pamella presents today in follow-up and she is accompanied by Leanne (daughter) plus her grandson.    INTERVAL HISTORY  -Pamella reports good tolerance of her most recent cycle of chemotherapy, even at an increased dose.    Denies nausea.    Appetite is good.   No constipation.   Some fatigue, but overall energy was good.  -No recurrence of auras/ seizures.   -Mild and infrequent headaches; " triggered by light sensitivity.   -No focal weakness.  -Infrequent AM dizziness.   -Less nasal congestion, some ear fullness that has improved with Claritin.   -Episodes of itchy skin about the arms and inside of her legs. Improves with applying lotion.       MEDICATIONS   Current Outpatient Medications   Medication Sig Dispense Refill    acetaminophen (TYLENOL) 500 MG tablet Take 500-1,000 mg by mouth.      diclofenac (VOLTAREN) 75 MG EC tablet Take 1 tablet by mouth 2 times daily. (Patient not taking: Reported on 12/19/2024)      fluticasone (FLONASE) 50 MCG/ACT nasal spray Spray 1 spray into both nostrils daily. 16 g 0    levETIRAcetam (KEPPRA) 750 MG tablet Take 1 tablet (750 mg) by mouth 2 times daily. 60 tablet 11    ondansetron (ZOFRAN) 4 MG tablet Take 1 tablet (4 mg) by mouth at bedtime. Take 30-60 mins before each dose of temozolomide. Can take every 8 hours if needed. 30 tablet 3    rivaroxaban ANTICOAGULANT (XARELTO) 20 MG TABS tablet Take 1 tablet (20 mg) by mouth daily (with dinner). 30 tablet 11    [START ON 12/27/2024] Temozolomide (TEMODAR) 180 MG capsule Take 1 capsule (180 mg) by mouth daily for 5 days with 1 other Temozolomide prescription for 430 mg total Take ondansetron 30-60 min before temozolomide. Take at bedtime on an empty stomach. 5 capsule 0    Temozolomide (TEMODAR) 180 MG capsule Take 1 capsule (180 mg) by mouth daily for 5 days with 1 other Temozolomide prescription for 430 mg total Take ondansetron 30-60 min before temozolomide. Take at bedtime on an empty stomach. 5 capsule 0    [START ON 12/27/2024] temozolomide (TEMODAR) 250 MG capsule Take 1 capsule (250 mg) by mouth daily for 5 days with 1 other temozolomide prescription for 430 mg total Take ondansetron 30-60 min before temozolomide. Take at bedtime on an empty stomach. 5 capsule 0    temozolomide (TEMODAR) 250 MG capsule Take 1 capsule (250 mg) by mouth daily for 5 days with 1 other temozolomide prescription for 430 mg total  Take ondansetron 30-60 min before temozolomide. Take at bedtime on an empty stomach. 5 capsule 0     DRUG ALLERGIES   Allergies   Allergen Reactions    Morphine     Penicillins Hives       IMMUNIZATIONS   Immunization History   Administered Date(s) Administered    COVID-19 Monovalent 18+ (Moderna) 05/04/2021, 06/01/2021, 01/28/2022    Influenza Vaccine 18-64 (Flublok) 01/14/2022    TDAP (Adacel,Boostrix) 01/04/2010, 01/04/2016     ONCOLOGIC HISTORY  -PRESENTATION: Neck pain, headache and head tremor.  -3/15/2023 MRI brain imaging with a mild asymmetric T2 hyperintense signal along right hippocampus and adjacent parahippocampal gyrus.    -PRESENTATION: Dizziness and nausea; semiology is concerning for temporal lobe auras. Slurred speech.  -5/27/2024 MR brain imaging with a right posterior temporo-occipital mass. Associated nodular peripheral enhancement and surrounding confluent T2 signal hyperintensity extending along the medial aspect of the right temporal lobe. Mass effect results in regional sulcal effacement, leftward midline shift, partial effacement the right lateral ventricle and mild right uncal herniation.   -5/28/2024 SURGERY: Craniotomy for mass resection at [facility] [place].   No post-operative MR brain imaging performed.   Post-operative CT head imaging from 5/29 status post a right parietal craniotomy with excision of previously demonstrated paramedian parieto-occipital tumor. Decreased focal mass effect and slightly decreased mild midline shift. No significant hemorrhage or additional complicating feature.   PATHOLOGY: Glioblastoma, IDH1-R132H wildtype (CNS WHO Grade 4).    MGMT promoter unmethylated.   -6/25/2024 NEURO-ONC: Recommending chemoradiotherapy with concurrent temozolomide. U/S + for DVT; started anticoagulation.   -7/26/2024 NEURO-ONC: She has started chemoradiotherapy with concurrent temozolomide. Acute visit for concerns; increased Keppra to 750 mg BID.   -7/23 - 9/6/2024 CHEMORT:  "Complete chemoradiotherapy. 60Gy in 30 fractions with concurrent temozolomide 75mg/m2 (160mg).  -10/1/2024 NEURO-ONC/ MRB/ CHEMO: Clinically well; reduced frequency of auras. Imaging with no concerns, initial positive response to treatment. Starting adjuvant temozolomide 150mg/m2 (320mg), cycle 1 (start date 10/4). Not pursuing Optune.   -10/30//2024 NEURO-ONC/CHEMO: Tolerated temozolomide well. Neurologically stable. Increase adjuvant temozolomide to 200mg/m2 (430mg), cycle 2 (start date 11/1).  -11/26/2024 NEURO-ONC/ MRB/ CHEMO: No new neurological concerns. Itchy skin. Imaging with no new overt concerns. Adjuvant temozolomide 200mg/m2 (430mg), cycle 3.   -12/23/2024 NEURO-ONC/ CHEMO: No new neurological concerns. Itchy skin. Imaging with no new overt concerns. Adjuvant temozolomide 200mg/m2 (430mg), cycle 4.       PHYSICAL EXAMINATION  There were no vitals taken for this visit.  Wt Readings from Last 2 Encounters:   11/26/24 95.2 kg (209 lb 12.8 oz)   10/30/24 97.1 kg (214 lb)      Ht Readings from Last 2 Encounters:   10/30/24 1.702 m (5' 7\")   10/29/24 1.702 m (5' 7\")     KPS:     General: Appears well in no acute distress.  Psych: Affect appropriate. Pleasant  Neurologic:   MENTAL STATUS:     Alert, oriented.    Speech fluent.    Comprehension intact.     CRANIAL NERVES:     Hearing intact.   No dysarthria.  GAIT: Steady and unassisted.       MEDICAL RECORDS  Personally reviewed; No recent encounters.     LABS  Personally reviewed; CBC (platelets 97) and CMP (AST/ ALT 22/20) from today.      IMAGING  Personally reviewed MR brain imaging from today and compared to post-radiation imaging.     Formal read; \"1. Stable postoperative change to the right parieto-occipital skull and right occipital lobe. Abnormal enhancement and abnormal T2 signal hyperintensity involving portions of the right temporal and right occipital lobes consistent with residual tumor. No evidence for tumor progression. 2. No evidence for " "acute intracranial pathology.\"     Imaging was shown to and results were reviewed with Pamella and Leanne.       IMPRESSION  On date of service, 41 minutes was spent in clinic and 12 minutes was spent preparing for the visit through extensive chart review and coordinating care for this high complexity visit. The following is in explanation for the recommendations used to define the plan.       Pamella tolerated cycle 2 of temozolomide well, even at the increased dose. She is neurologically stable with only an ongoing visual field cut.     Imaging as detailed above with no overtly concerning findings. Therefore, the plan is to repeat imaging per NCCN guidelines of every 2 months for now.      Today, we discussed dosing plans for cycle 3 and due to excellent tolerance of chemotherapy, the decision is to continue at 200mg/m2 dosing. Toxicity of chemotherapy reviewed and anticipated side effects can include nausea, constipation, and hematologic intolerance. Labs from today reviewed and Pamella's platelet count dropped by about 1/2 from mid-cycle testing. As a result, the plan is to repeat CBC on 11/29. If platelets are stable to improved, she can start cycle 3 that evening. If down trending, will need to hold chemotherapy and consider a dose reduction to 150mg/m2 dosing. Will continue treatment with intensive clinical and laboratory monitoring for toxicity per protocol.     PROBLEM LIST  Glioblastoma  Headaches  Homonymous hemianopsia, left-side  Benign essential tremor  Temporal lobe auras    PLAN  -CANCER DIRECTED THERAPY-  -Adjuvant temozolomide at 200mg/m2 (430mg), cycle 3 (start date of 11/29, pending repeat platelet trend on 11/29).    Supportive medications; Zofran and bowel regimen.     -Repeat 28 day cycle if WBC >= 3, ANC >= 1.5, HgB >= 10, and platelets >= 100.   Surveillance labs reviewed, near goal for chemotherapy, but want to confirm trend of platelets.    Will continue every 2 weeks CBC and CMP every 4 " weeks.    -Repeat imaging in ~2 months.  -Not pursuing use of Opune.      -STEROIDS-  -Off dexamethasone.    -SEIZURE MANAGEMENT-  -Stereotyped events that seem consistent with a history of non-dominant temporal lobe auras/ seizures.   On Keppra 750 mg twice daily.  -Continue to monitor frequency of recurrent auras.     -Quality of life/ MOOD/ FATIGUE-  -History of depression.    Self-discontinued Zoloft.    -VISUAL FIELD CUT-  -Right Homonymous hemianopsia.  -Evaluated by neuro-ophtho for thorough eye examination and baseline visual field testing to determine if there are any driving restrictions.      -LE DVT-  -Lifelong anticoagulation indicated.    On Xarelto; continue as directed.   -No bleeding concerns.    Return to clinic in ~4 weeks labs, MRI and to see Dr. Irizarry.     In the meantime, Pamella knows to call the clinic with any concerns and she can be seen sooner if needed.     The longitudinal plan of care for the diagnosis(es)/condition(s) as documented were addressed during this visit. Due to the added complexity in care, I will continue to support Pamella in the subsequent management and with ongoing continuity of care.     FATEMEH Silvestre  Neuro-oncology      Virtual Visit Details    Type of service:  Video Visit - changed to telehealth related to technical difficulties  Video Start Time: 4:11 PM  Video End Time: 4:25 PM    Originating Location (pt. Location): Home  Distant Location (provider location):  On-site  Platform used for Video Visit: Other: She was not able to connect, switched to telephone.       NEURO-ONCOLOGY VISIT  Dec 23, 2024    CHIEF COMPLAINT: Ms. 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 Pamella completed standard chemoradiotherapy on 9/6/2024. Post-radiation imaging demonstrated no new concerns with an initial positive response to  treatment.     Pamella initiated adjuvant therapy with temozolomide in 9/2024 and repeat imaging in 11/2024 was stable with no new concerns. As such, the plan is to continue with temozolomide for the planned 6 cycles.     Pamella presents today in follow-up.    INTERVAL HISTORY  -Eye issue so sge strggled with getting into video  -Good overall   -No nusea anjali good  -cold now cough, congestion  -gets itchy/hives at night when she goes to bed and yesterday when to the ER for worsening rash.   -Using a lot od Cold Ease  -Bought fragrance free detergent, sprayed and flipped mattress.  -No new sym since yesterday  -Hands itched once after taking out laundry.        -Pamella reports good tolerance of her most recent cycle of chemotherapy, even at an increased dose.    Denies nausea.    Appetite is good.   No constipation.   Some fatigue, but overall energy was good.  -No recurrence of auras/ seizures.   -Mild and infrequent headaches; triggered by light sensitivity.   -No focal weakness.  -Infrequent AM dizziness.   -Less nasal congestion, some ear fullness that has improved with Claritin.   -Episodes of itchy skin about the arms and inside of her legs. Improves with applying lotion.       MEDICATIONS   Current Outpatient Medications   Medication Sig Dispense Refill     acetaminophen (TYLENOL) 500 MG tablet Take 500-1,000 mg by mouth.       diclofenac (VOLTAREN) 75 MG EC tablet Take 1 tablet by mouth 2 times daily. (Patient not taking: Reported on 12/19/2024)       fluticasone (FLONASE) 50 MCG/ACT nasal spray Spray 1 spray into both nostrils daily. 16 g 0     levETIRAcetam (KEPPRA) 750 MG tablet Take 1 tablet (750 mg) by mouth 2 times daily. 60 tablet 11     ondansetron (ZOFRAN) 4 MG tablet Take 1 tablet (4 mg) by mouth at bedtime. Take 30-60 mins before each dose of temozolomide. Can take every 8 hours if needed. 30 tablet 3     rivaroxaban ANTICOAGULANT (XARELTO) 20 MG TABS tablet Take 1 tablet (20 mg) by mouth daily (with  dinner). 30 tablet 11     [START ON 12/27/2024] Temozolomide (TEMODAR) 180 MG capsule Take 1 capsule (180 mg) by mouth daily for 5 days with 1 other Temozolomide prescription for 430 mg total Take ondansetron 30-60 min before temozolomide. Take at bedtime on an empty stomach. 5 capsule 0     Temozolomide (TEMODAR) 180 MG capsule Take 1 capsule (180 mg) by mouth daily for 5 days with 1 other Temozolomide prescription for 430 mg total Take ondansetron 30-60 min before temozolomide. Take at bedtime on an empty stomach. 5 capsule 0     [START ON 12/27/2024] temozolomide (TEMODAR) 250 MG capsule Take 1 capsule (250 mg) by mouth daily for 5 days with 1 other temozolomide prescription for 430 mg total Take ondansetron 30-60 min before temozolomide. Take at bedtime on an empty stomach. 5 capsule 0     temozolomide (TEMODAR) 250 MG capsule Take 1 capsule (250 mg) by mouth daily for 5 days with 1 other temozolomide prescription for 430 mg total Take ondansetron 30-60 min before temozolomide. Take at bedtime on an empty stomach. 5 capsule 0     DRUG ALLERGIES   Allergies   Allergen Reactions     Morphine      Penicillins Hives       IMMUNIZATIONS   Immunization History   Administered Date(s) Administered     COVID-19 Monovalent 18+ (Moderna) 05/04/2021, 06/01/2021, 01/28/2022     Influenza Vaccine 18-64 (Flublok) 01/14/2022     TDAP (Adacel,Boostrix) 01/04/2010, 01/04/2016     ONCOLOGIC HISTORY  -PRESENTATION: Neck pain, headache and head tremor.  -3/15/2023 MRI brain imaging with a mild asymmetric T2 hyperintense signal along right hippocampus and adjacent parahippocampal gyrus.    -PRESENTATION: Dizziness and nausea; semiology is concerning for temporal lobe auras. Slurred speech.  -5/27/2024 MR brain imaging with a right posterior temporo-occipital mass. Associated nodular peripheral enhancement and surrounding confluent T2 signal hyperintensity extending along the medial aspect of the right temporal lobe. Mass effect  "results in regional sulcal effacement, leftward midline shift, partial effacement the right lateral ventricle and mild right uncal herniation.   -5/28/2024 SURGERY: Craniotomy for mass [place] at [facility] [place].   No post-operative MR brain imaging performed.   Post-operative CT head imaging from 5/29 status post a right parietal craniotomy with excision of previously demonstrated paramedian parieto-occipital tumor. Decreased focal mass effect and slightly decreased mild midline shift. No significant hemorrhage or additional complicating feature.   PATHOLOGY: Glioblastoma, IDH1-R132H wildtype (CNS WHO Grade 4).    MGMT promoter unmethylated.   -6/25/2024 NEURO-ONC: Recommending chemoradiotherapy with concurrent temozolomide. U/S + for DVT; started anticoagulation.   -7/26/2024 NEURO-ONC: She has started chemoradiotherapy with concurrent temozolomide. Acute visit for concerns; increased Keppra to 750 mg BID.   -7/23 - 9/6/2024 CHEMORT: Complete chemoradiotherapy. 60Gy in 30 fractions with concurrent temozolomide 75mg/m2 (160mg).  -10/1/2024 NEURO-ONC/ MRB/ CHEMO: Clinically well; reduced frequency of auras. Imaging with no concerns, initial positive response to treatment. Starting adjuvant temozolomide 150mg/m2 (320mg), cycle 1 (start date 10/4). Not pursuing Optune.   -10/30//2024 NEURO-ONC/CHEMO: Tolerated temozolomide well. Neurologically stable. Increase adjuvant temozolomide to 200mg/m2 (430mg), cycle 2 (start date 11/1).  -11/26/2024 NEURO-ONC/ MRB/ CHEMO: No new neurological concerns. Itchy skin. Imaging with no new overt concerns. Adjuvant temozolomide 200mg/m2 (430mg), cycle 3.   -12/23/2024 NEURO-ONC/ CHEMO: No new neurological concerns. Itchy skin. Imaging with no new overt concerns. Adjuvant temozolomide 200mg/m2 (430mg), cycle 4.       PHYSICAL EXAMINATION  Ht 1.727 m (5' 8\")   Wt 94.3 kg (208 lb)   BMI 31.63 kg/m    Wt Readings from Last 2 Encounters:   11/26/24 95.2 kg (209 lb 12.8 oz) " "  10/30/24 97.1 kg (214 lb)      Ht Readings from Last 2 Encounters:   10/30/24 1.702 m (5' 7\")   10/29/24 1.702 m (5' 7\")     KPS:     General: Appears well in no acute distress.  Psych: Affect appropriate. Pleasant  Neurologic:   MENTAL STATUS:     Alert, oriented.    Speech fluent.    Comprehension intact.     CRANIAL NERVES:     Hearing intact.   No dysarthria.  GAIT: Steady and unassisted.       MEDICAL RECORDS  Personally reviewed; No recent encounters.     LABS  Personally reviewed; CBC (platelets 97) and CMP (AST/ ALT 22/20) from today.      IMAGING  No new imaging today.     IMPRESSION  On date of service, 41 minutes was spent in clinic and 12 minutes was spent preparing for the visit through extensive chart review and coordinating care for this high complexity visit. The following is in explanation for the recommendations used to define the plan.       Pamella tolerated cycle 2 of temozolomide well, even at the increased dose. She is neurologically stable with only an ongoing visual field cut.     Imaging as detailed above with no overtly concerning findings. Therefore, the plan is to repeat imaging per NCCN guidelines of every 2 months for now.      Today, we discussed dosing plans for cycle 3 and due to excellent tolerance of chemotherapy, the decision is to continue at 200mg/m2 dosing. Toxicity of chemotherapy reviewed and anticipated side effects can include nausea, constipation, and hematologic intolerance. Labs from today reviewed and Pamella's platelet count dropped by about 1/2 from mid-cycle testing. As a result, the plan is to repeat CBC on 11/29. If platelets are stable to improved, she can start cycle 3 that evening. If down trending, will need to hold chemotherapy and consider a dose reduction to 150mg/m2 dosing. Will continue treatment with intensive clinical and laboratory monitoring for toxicity per protocol.     PROBLEM LIST  Glioblastoma  Headaches  Homonymous hemianopsia, " left-side  Benign essential tremor  Temporal lobe auras    PLAN  -CANCER DIRECTED THERAPY-  -Adjuvant temozolomide at 200mg/m2 (430mg), cycle 4 (start date of 12/17, pending repeat platelet trend on 11/29).    Supportive medications; Zofran and bowel regimen.     -Repeat 28 day cycle if WBC >= 3, ANC >= 1.5, HgB >= 10, and platelets >= 100.   Surveillance labs reviewed, near goal for chemotherapy, but want to confirm trend of platelets.    Will continue every 2 weeks CBC and CMP every 4 weeks.    -Repeat imaging in ~1 month.  -Not pursuing use of Opune.      -STEROIDS-  -Off dexamethasone.    -SEIZURE MANAGEMENT-  -Stereotyped events that seem consistent with a history of non-dominant temporal lobe auras/ seizures.   On Keppra 750 mg twice daily.  -Continue to monitor frequency of recurrent auras.     -Quality of life/ MOOD/ FATIGUE-  -History of depression.    Self-discontinued Zoloft.    -VISUAL FIELD CUT-  -Right Homonymous hemianopsia.  -Evaluated by neuro-ophtho for thorough eye examination and baseline visual field testing to determine if there are any driving restrictions.      -LE DVT-  -Lifelong anticoagulation indicated.    On Xarelto; continue as directed.   -No bleeding concerns.    Return to clinic in ~4 weeks labs, MRI and to see Dr. Irizarry.     In the meantime, Pamella knows to call the clinic with any concerns and she can be seen sooner if needed.     The longitudinal plan of care for the diagnosis(es)/condition(s) as documented were addressed during this visit. Due to the added complexity in care, I will continue to support Pamella in the subsequent management and with ongoing continuity of care.     FATEMEH Silvestre  Neuro-oncology        Again, thank you for allowing me to participate in the care of your patient.        Sincerely,        FATEMEH Silvestre CNP    Electronically signed

## 2024-12-23 NOTE — PROGRESS NOTES
Virtual Visit Details    Type of service:  Video Visit - changed to telehealth related to technical difficulties  Video Start Time: 4:11 PM  Video End Time: 4:25 PM    Originating Location (pt. Location): Home  Distant Location (provider location):  On-site  Platform used for Video Visit: Other: She was not able to connect, switched to telephone.       NEURO-ONCOLOGY VISIT  Dec 23, 2024    CHIEF COMPLAINT: Ms. 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 Pamella completed standard chemoradiotherapy on 9/6/2024. Post-radiation imaging demonstrated no new concerns with an initial positive response to treatment.     Pamella initiated adjuvant therapy with temozolomide in 9/2024 and repeat imaging in 11/2024 was stable with no new concerns. As such, the plan is to continue with temozolomide for the planned 6 cycles.     Pamella presents today in follow-up.    INTERVAL HISTORY  -Eye issue so sge strggled with getting into video  -Good overall   -No nusea anjali good  -cold now cough, congestion  -gets itchy/hives at night when she goes to bed and yesterday when to the ER for worsening rash.   -Using a lot od Cold Ease  -Bought fragrance free detergent, sprayed and flipped mattress.  -No new sym since yesterday  -Hands itched once after taking out laundry.        -Pamella reports good tolerance of her most recent cycle of chemotherapy, even at an increased dose.    Denies nausea.    Appetite is good.   No constipation.   Some fatigue, but overall energy was good.  -No recurrence of auras/ seizures.   -Mild and infrequent headaches; triggered by light sensitivity.   -No focal weakness.  -Infrequent AM dizziness.   -Less nasal congestion, some ear fullness that has improved with Claritin.   -Episodes of itchy skin about the arms and inside of her legs. Improves with applying lotion.        MEDICATIONS   Current Outpatient Medications   Medication Sig Dispense Refill    acetaminophen (TYLENOL) 500 MG tablet Take 500-1,000 mg by mouth.      diclofenac (VOLTAREN) 75 MG EC tablet Take 1 tablet by mouth 2 times daily. (Patient not taking: Reported on 12/19/2024)      fluticasone (FLONASE) 50 MCG/ACT nasal spray Spray 1 spray into both nostrils daily. 16 g 0    levETIRAcetam (KEPPRA) 750 MG tablet Take 1 tablet (750 mg) by mouth 2 times daily. 60 tablet 11    ondansetron (ZOFRAN) 4 MG tablet Take 1 tablet (4 mg) by mouth at bedtime. Take 30-60 mins before each dose of temozolomide. Can take every 8 hours if needed. 30 tablet 3    rivaroxaban ANTICOAGULANT (XARELTO) 20 MG TABS tablet Take 1 tablet (20 mg) by mouth daily (with dinner). 30 tablet 11    [START ON 12/27/2024] Temozolomide (TEMODAR) 180 MG capsule Take 1 capsule (180 mg) by mouth daily for 5 days with 1 other Temozolomide prescription for 430 mg total Take ondansetron 30-60 min before temozolomide. Take at bedtime on an empty stomach. 5 capsule 0    Temozolomide (TEMODAR) 180 MG capsule Take 1 capsule (180 mg) by mouth daily for 5 days with 1 other Temozolomide prescription for 430 mg total Take ondansetron 30-60 min before temozolomide. Take at bedtime on an empty stomach. 5 capsule 0    [START ON 12/27/2024] temozolomide (TEMODAR) 250 MG capsule Take 1 capsule (250 mg) by mouth daily for 5 days with 1 other temozolomide prescription for 430 mg total Take ondansetron 30-60 min before temozolomide. Take at bedtime on an empty stomach. 5 capsule 0    temozolomide (TEMODAR) 250 MG capsule Take 1 capsule (250 mg) by mouth daily for 5 days with 1 other temozolomide prescription for 430 mg total Take ondansetron 30-60 min before temozolomide. Take at bedtime on an empty stomach. 5 capsule 0     DRUG ALLERGIES   Allergies   Allergen Reactions    Morphine     Penicillins Hives       IMMUNIZATIONS   Immunization History   Administered Date(s)  Administered    COVID-19 Monovalent 18+ (Moderna) 05/04/2021, 06/01/2021, 01/28/2022    Influenza Vaccine 18-64 (Flublok) 01/14/2022    TDAP (Adacel,Boostrix) 01/04/2010, 01/04/2016     ONCOLOGIC HISTORY  -PRESENTATION: Neck pain, headache and head tremor.  -3/15/2023 MRI brain imaging with a mild asymmetric T2 hyperintense signal along right hippocampus and adjacent parahippocampal gyrus.    -PRESENTATION: Dizziness and nausea; semiology is concerning for temporal lobe auras. Slurred speech.  -5/27/2024 MR brain imaging with a right posterior temporo-occipital mass. Associated nodular peripheral enhancement and surrounding confluent T2 signal hyperintensity extending along the medial aspect of the right temporal lobe. Mass effect results in regional sulcal effacement, leftward midline shift, partial effacement the right lateral ventricle and mild right uncal herniation.   -5/28/2024 SURGERY: Craniotomy for mass resection at [facility].   No post-operative MR brain imaging performed.   Post-operative CT head imaging from 5/29 status post a right parietal craniotomy with excision of previously demonstrated paramedian parieto-occipital tumor. Decreased focal mass effect and slightly decreased mild midline shift. No significant hemorrhage or additional complicating feature.   PATHOLOGY: Glioblastoma, IDH1-R132H wildtype (CNS WHO Grade 4).    MGMT promoter unmethylated.   -6/25/2024 NEURO-ONC: Recommending chemoradiotherapy with concurrent temozolomide. U/S + for DVT; started anticoagulation.   -7/26/2024 NEURO-ONC: She has started chemoradiotherapy with concurrent temozolomide. Acute visit for concerns; increased Keppra to 750 mg BID.   -7/23 - 9/6/2024 CHEMORT: Complete chemoradiotherapy. 60Gy in 30 fractions with concurrent temozolomide 75mg/m2 (160mg).  -10/1/2024 NEURO-ONC/ MRB/ CHEMO: Clinically well; reduced frequency of auras. Imaging with no concerns, initial positive response to treatment.  "Starting adjuvant temozolomide 150mg/m2 (320mg), cycle 1 (start date 10/4). Not pursuing Optune.   -10/30//2024 NEURO-ONC/CHEMO: Tolerated temozolomide well. Neurologically stable. Increase adjuvant temozolomide to 200mg/m2 (430mg), cycle 2 (start date 11/1).  -11/26/2024 NEURO-ONC/ MRB/ CHEMO: No new neurological concerns. Itchy skin. Imaging with no new overt concerns. Adjuvant temozolomide 200mg/m2 (430mg), cycle 3.   -12/23/2024 NEURO-ONC/ CHEMO: No new neurological concerns. Itchy skin. Imaging with no new overt concerns. Adjuvant temozolomide 200mg/m2 (430mg), cycle 4.       PHYSICAL EXAMINATION  Ht 1.727 m (5' 8\")   Wt 94.3 kg (208 lb)   BMI 31.63 kg/m    Wt Readings from Last 2 Encounters:   11/26/24 95.2 kg (209 lb 12.8 oz)   10/30/24 97.1 kg (214 lb)      Ht Readings from Last 2 Encounters:   10/30/24 1.702 m (5' 7\")   10/29/24 1.702 m (5' 7\")     KPS:     General: Appears well in no acute distress.  Psych: Affect appropriate. Pleasant  Neurologic:   MENTAL STATUS:     Alert, oriented.    Speech fluent.    Comprehension intact.     CRANIAL NERVES:     Hearing intact.   No dysarthria.  GAIT: Steady and unassisted.       MEDICAL RECORDS  Personally reviewed; No recent encounters.     LABS  Personally reviewed; CBC (platelets 97) and CMP (AST/ ALT 22/20) from today.      IMAGING  No new imaging today.     IMPRESSION  On date of service, 41 minutes was spent in clinic and 12 minutes was spent preparing for the visit through extensive chart review and coordinating care for this high complexity visit. The following is in explanation for the recommendations used to define the plan.       Pamella tolerated cycle 2 of temozolomide well, even at the increased dose. She is neurologically stable with only an ongoing visual field cut.     Imaging as detailed above with no overtly concerning findings. Therefore, the plan is to repeat imaging per NCCN guidelines of every 2 months for now.      Today, we discussed " dosing plans for cycle 3 and due to excellent tolerance of chemotherapy, the decision is to continue at 200mg/m2 dosing. Toxicity of chemotherapy reviewed and anticipated side effects can include nausea, constipation, and hematologic intolerance. Labs from today reviewed and Pamella's platelet count dropped by about 1/2 from mid-cycle testing. As a result, the plan is to repeat CBC on 11/29. If platelets are stable to improved, she can start cycle 3 that evening. If down trending, will need to hold chemotherapy and consider a dose reduction to 150mg/m2 dosing. Will continue treatment with intensive clinical and laboratory monitoring for toxicity per protocol.     PROBLEM LIST  Glioblastoma  Headaches  Homonymous hemianopsia, left-side  Benign essential tremor  Temporal lobe auras    PLAN  -CANCER DIRECTED THERAPY-  -Adjuvant temozolomide at 200mg/m2 (430mg), cycle 4 (start date of 12/27).    Supportive medications; Zofran and bowel regimen.     -Repeat 28 day cycle if WBC >= 3, ANC >= 1.5, HgB >= 10, and platelets >= 100.   Surveillance labs reviewed, near goal for chemotherapy, but want to confirm trend of platelets.    Will continue every 2 weeks CBC and CMP every 4 weeks.    -Repeat imaging in ~1 month.  -Not pursuing use of Opune.      -STEROIDS-  -Off dexamethasone.    -SEIZURE MANAGEMENT-  -Stereotyped events that seem consistent with a history of non-dominant temporal lobe auras/ seizures.   On Keppra 750 mg twice daily.  -Continue to monitor frequency of recurrent auras.     -Quality of life/ MOOD/ FATIGUE-  -History of depression.    Self-discontinued Zoloft.    -VISUAL FIELD CUT-  -Right Homonymous hemianopsia.  -Evaluated by neuro-ophtho for thorough eye examination and baseline visual field testing to determine if there are any driving restrictions.      -LE DVT-  -Lifelong anticoagulation indicated.    On Xarelto; continue as directed.   -No bleeding concerns.    Return to clinic in ~4 weeks labs, MRI and  to see Dr. Irizarry.     In the meantime, Pamella knows to call the clinic with any concerns and she can be seen sooner if needed.     The longitudinal plan of care for the diagnosis(es)/condition(s) as documented were addressed during this visit. Due to the added complexity in care, I will continue to support Pamella in the subsequent management and with ongoing continuity of care.     Ratna Hernandez, FATEMEH  Neuro-oncology

## 2025-01-02 ENCOUNTER — TELEPHONE (OUTPATIENT)
Dept: ONCOLOGY | Facility: CLINIC | Age: 60
End: 2025-01-02
Payer: COMMERCIAL

## 2025-01-02 NOTE — TELEPHONE ENCOUNTER
"Oncology Nurse Triage    Situation:   Pamella reporting the following symptoms: increase in seizure like activity     Background:   Treating Provider:   Dr Irizarry     Date of last office visit: 12/23/2024 with Ratna Hernandez NP     Recent Treatments:finished last cycle of temodar on 12/31/2024    Assessment:     Pt is calling.   She first wants to report that her rash/hives that have been going on for several weeks is improving.  States that she was seen at dermatology last week and dermatologist told her that rash is caused by a delayed reaction to her chemo. She has been using triamcinolone cream, Cerave itching cream, and hydrocortisone spray which have all been helpful. Her itching has improved. She only has a few round red spots on her legs which are not bothersome as long as she does not scratch them. Pt is wondering if her dose of temodar should be decreased and she will discuss this with Dr Irizarry at her next appt.    Pt reports increase in seizure activity over the past week. States that she will feel nausea, and then she will get goose bumps all over her arms and legs. This is exactly like the symptoms she has had in the past. Does not have episodes of shaking or twitching and does not have episodes of staring off into the distance. Denies headaches or any vision changes. Also denies chest pain, shortness of breath, wheezing, dizziness, weakness, or other urgent symptoms.   She has been taking keppra 750 mg BID as directed.  She does admit that she has had a \"head cold\" for the past 2 weeks with nasal congestion and non-productive cough. States that everyone in her household has been sick. Her symptoms are improving and she feels that she is getting better.  Denies any recent alcohol intake. States that she has been sleeping well.     Recommendations:     Advised that recent URI could have lowered her seizure threshold.  Will route to care team for recommendations.  OK to leave message if unable to reach " patient.  Patient verbalized understanding and agreement with plan.  Patient was instructed to call the clinic with any questions, concerns, or worsening symptoms.  Kim Kraft RN on 1/2/2025 at 9:56 AM

## 2025-01-02 NOTE — TELEPHONE ENCOUNTER
Ratna Hernandez APRN CNP  You; Maribell Irizarry MD; Irene Clayton, RN1 minute ago (4:28 PM)     SOHAM Alvarez,    Sorry to respond so late!  I think that we could increase her seizure meds but I agree with you that maybe the uptick is related to her illness.  I am feeling mixed about upping her dose as she has been doing so well with these before she was sick, but if she is really uncomfortable with them, we can increase to 1000 mg BID.    Ratna     Pt was notified with recommendations per ZORAIDA Segundo.  Pt states that she does not want to increase keppra dosing at this time.  States that her symptoms seem slightly improved today.  She will continue to monitor and will contact the clinic if symptoms worsen.  Pt will continue to push fluids and rest as much as possible to help get over this URI.  Reviewed emergent symptoms that would warrant return call to clinic or evaluation in ER.  Patient verbalized understanding and agreement with plan.  Patient was instructed to call the clinic with any questions, concerns, or worsening symptoms.  Kim Kraft RN on 1/2/2025 at 4:44 PM

## 2025-01-13 ENCOUNTER — DOCUMENTATION ONLY (OUTPATIENT)
Dept: PHARMACY | Facility: CLINIC | Age: 60
End: 2025-01-13
Payer: COMMERCIAL

## 2025-01-13 NOTE — PROGRESS NOTES
Oral Chemotherapy Monitoring Program  Lab Follow Up    Reviewed lab results from 1/10/25.        10/1/2024     2:00 PM 10/10/2024     9:00 AM 10/18/2024     1:00 PM 11/14/2024     2:00 PM 11/26/2024     2:00 PM 12/13/2024     2:00 PM 1/13/2025     9:00 AM   ORAL CHEMOTHERAPY   Assessment Type New Teach Initial Follow up;Chart Review Lab Monitoring Lab Monitoring;Other Refill Lab Monitoring Lab Monitoring   Diagnosis Code Brain Cancer - Glioblastoma Brain Cancer - Glioblastoma Brain Cancer - Glioblastoma Brain Cancer - Glioblastoma Brain Cancer - Glioblastoma Brain Cancer - Glioblastoma Brain Cancer - Glioblastoma   Providers Dr. Juan C Irizarry   Clinic Name/Location [facility]   Drug Name Temodar (temozolomide) Temodar (temozolomide) Temodar (temozolomide) Temodar (temozolomide) Temodar (temozolomide) Temodar (temozolomide) Temodar (temozolomide)   Dose 320 mg 320 mg 320 mg 320 mg Other: Other: Other:   Current Schedule Daily Daily Daily Daily Daily Daily Daily   Cycle Details 5 days on, 23 days off 5 days on, 23 days off 5 days on, 23 days off 5 days on, 23 days off 5 days on, 23 days off 5 days on, 23 days off 5 days on, 23 days off   Start Date of Last Cycle 10/4/2024 10/4/2024  11/1/2024 11/1/2024 11/29/2024    Planned next cycle start date 11/1/2024 11/1/2024 11/1/2024 11/29/2024 12/27/2024    Doses missed in last 2 weeks  0        Adherence Assessment  Adherent        Adverse Effects  No AE identified during assessment  Neutropenia  Neutropenia Neutropenia   Neutropenia    Grade 2  Grade 2 Grade 2   Pharmacist Intervention(neutropenia)    No  No No   Any new drug interactions?  No        Is the dose as ordered appropriate for the patient?  Yes  Yes          Labs:  _  Result Component Current Result Ref Range   Sodium 140 (1/10/2025) 135 - 145 mmol/L     _  Result Component Current Result Ref Range  "  Potassium 4.1 (1/10/2025) 3.4 - 5.3 mmol/L     _  Result Component Current Result Ref Range   Calcium 9.6 (1/10/2025) 8.8 - 10.4 mg/dL     No results found for Mag within last 30 days.     No results found for Phos within last 30 days.     _  Result Component Current Result Ref Range   Albumin 4.2 (1/10/2025) 3.5 - 5.2 g/dL     _  Result Component Current Result Ref Range   Urea Nitrogen 11.7 (1/10/2025) 8.0 - 23.0 mg/dL     _  Result Component Current Result Ref Range   Creatinine 0.94 (1/10/2025) 0.51 - 0.95 mg/dL     _  Result Component Current Result Ref Range   AST 20 (1/10/2025) 0 - 45 U/L     _  Result Component Current Result Ref Range   ALT 16 (1/10/2025) 0 - 50 U/L     _  Result Component Current Result Ref Range   Bilirubin Total 0.6 (1/10/2025) <=1.2 mg/dL     _  Result Component Current Result Ref Range   WBC Count 2.4 (L) (1/10/2025) 4.0 - 11.0 10e3/uL     _  Result Component Current Result Ref Range   Hemoglobin 12.9 (1/10/2025) 11.7 - 15.7 g/dL     _  Result Component Current Result Ref Range   Platelet Count 299 (1/10/2025) 150 - 450 10e3/uL     _  Result Component Current Result Ref Range   Absolute Neutrophils 1.3 (L) (1/10/2025) 1.6 - 8.3 10e3/uL        Assessment & Plan:  Results are concerning for grade 2 neutropenia. Patient is on \"off\" time of therapy. Will look for ANC to improve to at least 1.5 prior to starting next cycle. Ok to proceed with temozolomide.    Questions answered to patient's satisfaction.    Follow-Up:  1/21 - Labs + Appointment with Dr. Neil Rachel Moniz, PharmD  Hematology/Oncology Pharmacist  [facility]  [phone]      "

## 2025-01-14 ENCOUNTER — PATIENT OUTREACH (OUTPATIENT)
Dept: CARE COORDINATION | Facility: CLINIC | Age: 60
End: 2025-01-14
Payer: COMMERCIAL

## 2025-01-14 NOTE — PROGRESS NOTES
Social Work - Follow-Up  [facility]    Data/Intervention:  Patient Name: Pamella Jaimes Goes By: Pamella    /Age: 1965 (59 year old)    Reason for Follow-Up:  This clinician called Pamella for planned psychosocial check-in    Intervention/Education/Resources Provided:  Pamella reports that she had a really great month with family, a nice holiday. Pamella denies emotional distress. Pamella reports that she has been in communication with Christina Ozuna regarding social security, and discussed additional legal engagement for support with planning about kids and house after death.     Pamella endorses ongoing financial concern. SW reviewed mono giving organizations available for support ([facility], [employer], [employer]) and also discussed food support ([employer]) and outreached to [employer] to explore eligibility from application placed 2024.     Assessment/Plan:  This clinician will plan for psychosocial check-in and support in 1 month. Pamella knows to outreach to this clinician sooner if in case of psychosocial concern or need.     Previously provided patient/family with writer's contact information and availability.      Natalee Bonilla, MEHDI, LICSW, OSW-C  Clinical - Adult Oncology  Phone: [phone]  She/Her/Hers  [facility] [place]: Nohemy FERRO  8am-4:30pm  [facility]: TRISH Kellogg F 8am-4:30pm   Support Groups at [facility]: Social Work Services for Cancer Patients ([email or web address])

## 2025-01-16 NOTE — PROGRESS NOTES
NEURO-ONCOLOGY VISIT  Jan 21, 2025    CHIEF COMPLAINT: Ms. 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 Pamella completed standard chemoradiotherapy on 9/6/2024. Post-radiation imaging demonstrated no new concerns with an initial positive response to treatment.     Pamella initiated adjuvant therapy with temozolomide in 9/2024 and repeat imaging in 11/2024 and again in 1/2025 was largely stable with no significant concerns. As such, the plan is to continue with temozolomide for the planned 6 cycles.     Pamella presents today in follow-up. She is accompanied by Leanne (daughter) + 2 grandsons.     INTERVAL HISTORY  -Pamella denies any nausea. Her appetite is good.  -She reports today that she gets itchy/hives essentially every night (mostly in her legs, a couple days ago appeared in her back), evaluated by dermatology who told her it was a delayed reaction to increased dose of chemotherapy. Did not have this reaction to lower dose of chemotherapy. She manages by putting Cera-Ve itch relief cream at night and does her best not to itch (otherwise this exacerbates hives) and, typically, this will resolve within a couple hours. Has not had recurrent lip or eye edema. Takes an allergy pill right before she goes to bed.   -The last time this has happened was last night  -Otherwise, denies any neurologic changes, and has had no activities concerning for seizures.  -No pain in limbs      MEDICATIONS   Current Outpatient Medications   Medication Sig Dispense Refill    acetaminophen (TYLENOL) 500 MG tablet Take 500-1,000 mg by mouth.      diclofenac (VOLTAREN) 75 MG EC tablet Take 1 tablet by mouth 2 times daily.      fluticasone (FLONASE) 50 MCG/ACT nasal spray Spray 1 spray into both nostrils daily. 16 g 0    levETIRAcetam (KEPPRA) 750 MG tablet Take 1 tablet (750 mg) by mouth 2 times  daily. 60 tablet 11    ondansetron (ZOFRAN) 4 MG tablet Take 1 tablet (4 mg) by mouth at bedtime. Take 30-60 mins before each dose of temozolomide. Can take every 8 hours if needed. 30 tablet 3    rivaroxaban ANTICOAGULANT (XARELTO) 20 MG TABS tablet Take 1 tablet (20 mg) by mouth daily (with dinner). 30 tablet 11    [START ON 1/24/2025] temozolomide (TEMODAR) 140 MG capsule Take 1 capsule (140 mg) by mouth daily for 5 days with 1 other temozolomide prescription for 320 mg total Take ondansetron 30-60 min before temozolomide. Take at bedtime on an empty stomach. 5 capsule 0    [START ON 1/24/2025] Temozolomide (TEMODAR) 180 MG capsule Take 1 capsule (180 mg) by mouth daily for 5 days with 1 other Temozolomide prescription for 320 mg total Take ondansetron 30-60 min before temozolomide. Take at bedtime on an empty stomach. 5 capsule 0     DRUG ALLERGIES   Allergies   Allergen Reactions    Morphine     Penicillins Hives       IMMUNIZATIONS   Immunization History   Administered Date(s) Administered    COVID-19 Monovalent 18+ (Moderna) 05/04/2021, 06/01/2021, 01/28/2022    Influenza Vaccine 18-64 (Flublok) 01/14/2022    TDAP (Adacel,Boostrix) 01/04/2010, 01/04/2016     ONCOLOGIC HISTORY  -PRESENTATION: Neck pain, headache and head tremor.  -3/15/2023 MRI brain imaging with a mild asymmetric T2 hyperintense signal along right hippocampus and adjacent parahippocampal gyrus.    -PRESENTATION: Dizziness and nausea; semiology is concerning for temporal lobe auras. Slurred speech.  -5/27/2024 MR brain imaging with a right posterior temporo-occipital mass. Associated nodular peripheral enhancement and surrounding confluent T2 signal hyperintensity extending along the medial aspect of the right temporal lobe. Mass effect results in regional sulcal effacement, leftward midline shift, partial effacement the right lateral ventricle and mild right uncal herniation.   -5/28/2024 SURGERY: Craniotomy for mass resection at [place].   No post-operative MR brain imaging performed.   Post-operative CT head imaging from 5/29 status post a right parietal craniotomy with excision of previously demonstrated paramedian parieto-occipital tumor. Decreased focal mass effect and slightly decreased mild midline shift. No significant hemorrhage or additional complicating feature.   PATHOLOGY: Glioblastoma, IDH1-R132H wildtype (CNS WHO Grade 4).    MGMT promoter unmethylated.   -6/25/2024 NEURO-ONC: Recommending chemoradiotherapy with concurrent temozolomide. U/S + for DVT; started anticoagulation.   -7/26/2024 NEURO-ONC: She has started chemoradiotherapy with concurrent temozolomide. Acute visit for concerns; increased Keppra to 750 mg BID.   -7/23 - 9/6/2024 CHEMORT: Complete chemoradiotherapy. 60Gy in 30 fractions with concurrent temozolomide 75mg/m2 (160mg).  -10/1/2024 NEURO-ONC/ MRB/ CHEMO: Clinically well; reduced frequency of auras. Imaging with no concerns, initial positive response to treatment. Starting adjuvant temozolomide 150mg/m2 (320mg), cycle 1 (start date 10/4). Not pursuing Optune.   -10/30//2024 NEURO-ONC/CHEMO: Tolerated temozolomide well. Neurologically stable. Increase adjuvant temozolomide to 200mg/m2 (430mg), cycle 2 (start date 11/1).  -11/26/2024 NEURO-ONC/ MRB/ CHEMO: No new neurological concerns. Itchy skin. Imaging with no new overt concerns. Adjuvant temozolomide 200mg/m2 (430mg), cycle 3.   -12/23/2024 NEURO-ONC/ CHEMO: No new neurological concerns. Itchy skin persists and she did present to the emergency room for itchy skin plus rash.  We reviewed this as above.  It does not seem to be associated with when she is taking temozolomide.  Adjuvant temozolomide 200mg/m2 (430mg), cycle 4.   -1/21/2025 NEURO-ONC/ MRB/ CHEMO: No new neurological concerns. Itchy skin. Imaging with no new overt concerns. Adjuvant temozolomide 1500mg/m2 (320mg), cycle 5 (dose reduce in the setting of skin rash).       PHYSICAL  "EXAMINATION  /74   Pulse 65   Resp 16   Wt 93.4 kg (206 lb)   SpO2 99%   BMI 31.32 kg/m    Wt Readings from Last 2 Encounters:   01/21/25 93.4 kg (206 lb)   12/23/24 94.3 kg (208 lb)      Ht Readings from Last 2 Encounters:   12/23/24 1.727 m (5' 8\")   10/30/24 1.702 m (5' 7\")     KPS:     General: Appears well in no acute distress.  Psych: Affect appropriate. Pleasant  Neurologic:   MENTAL STATUS:     Alert, oriented.    Speech fluent.    Comprehension intact.     CRANIAL NERVES:     Left homonymous hemianopsia   Hearing intact.   No dysarthria.   MOTOR    Mild no-no head tremor  GAIT: Steady and unassisted.       MEDICAL RECORDS  Personally reviewed; ED visit on 12/22, indicated for evaluation of an \"Allergic Reaction\" - \"rash started this morning and mostly present on the trunk and upper extremity. Also has swelling of the lip. No difficulty breathing. No nausea or vomiting. Vital signs have been reviewed and essentially unremarkable except for blood pressure 111/93. Patient has been off her chemotherapy for brain cancer for almost 20 days. Plan at this time is to give a dose of epinephrine. Will also give IV fluid, famotidine, Benadryl and Solu-Medrol.    Reached patient. Rash is improving. Lip swelling is improving. Rechecked on patient again. Rash is almost resolved. Lip swelling is completely resolved.\"    LABS  Personally reviewed; CBC (platelets 135) and CMP (AST/ ALT 25/20) from today.      IMAGING  Personally reviewed MR brain imaging from today and compared to prior imaging. To my eye, imaging is largely stable compared to prior with unchanged T2 FLAIR and a stable to slightly decreased degree of contrast enhancement in the right temporal/ occipital region.    Formal read; \"1.  No significant change in the expansile FLAIR hyperintensity involving the medial right temporal lobe and in the right parietal-occipital region surrounding the resection cavity compared to prior brain MRI " "11/26/2024. This is suspicious for infiltrative nonenhancing tumor versus less likely posttreatment related changes. 2.  Stable 1.3 x 2.1 cm area of irregularly shaped enhancement involving the medial right occipital lobe and the posterior right temporal lobe. There is suggestion of corresponding elevated relative cerebral blood volume. This is suspicious for viable tumor. 3.  No superimposed acute intracranial process.\"     Imaging was shown to and results were reviewed with Pamella and Leanne.       IMPRESSION  On date of service, 55 minutes was spent in clinic and 13 minutes was spent preparing for the visit through extensive chart review and coordinating care for this high complexity visit. The following is in explanation for the recommendations used to define the plan.       Pamella continues to tolerate her cycles of temozolomide well, aside from some mild fatigue and this ongoing, intermittent rash. Pamella endorses that the rash is itchy and bothersome, but tolerable with application of skin lotion. In December, Pamella was seen in the emergency room for evaluation and management of a rash, which resolved with use of antihistamines. She was since evaluated by dermatology and the impression was that it was a delayed reaction to her chemotherapy.      She endorses no new neurological concerns. No change to her known visual field cut.     Imaging as detailed above is largely stable with no significant findings. Therefore, the plan is to repeat imaging per NCCN guidelines in 2 months.     In the meantime, the plan is to start cycle 5 of adjuvant temozolomide. However, given the potential association between the higher chemotherapy dosing and the bothersome cyclic rash, will dose reduce to 150mg/m2 for this cycle. If the rash improves/ resolves, will continue at 150mg/m2 dosing for cycle 6. If the rash persists despite the dose reduction, can consider a return to 200mg/m2 for cycle 6. Toxicity of chemotherapy reviewed " and anticipated side effects can include nausea, constipation, skin rash, and hematologic intolerance. Her labs from today show no concerns; platelet count > 130K. We will continue with intensive clinical and laboratory monitoring for toxicity per protocol. I updated [facility], pharmacy, and [facility] to this plan.     PROBLEM LIST  Glioblastoma  Headaches  Homonymous hemianopsia, left-side  Benign essential tremor  Temporal lobe auras    PLAN  -CANCER DIRECTED THERAPY-  -Adjuvant temozolomide at 150mg/m2 (320mg), cycle 5 (start date of 1/24).    Supportive medications; Zofran and bowel regimen.     -Repeat 28 day cycle if WBC >= 3, ANC >= 1.5, HgB >= 10, and platelets >= 100.   Surveillance labs reviewed, at goal for chemotherapy.   Will continue every 2 weeks CBC and CMP every 4 weeks.    -Repeat imaging in ~2 months.  -Not pursuing use of Opune.      -STEROIDS-  -Off dexamethasone.    -SEIZURE MANAGEMENT-  -Stereotyped events that seem consistent with a history of non-dominant temporal lobe auras/ seizures.   On Keppra 750 mg twice daily.  -She has not had any auras recently.    -Quality of life/ MOOD/ FATIGUE-  -History of depression.    Self-discontinued Zoloft.    -VISUAL FIELD CUT-  -Left Homonymous hemianopsia.  -Evaluated by neuro-ophtho for thorough eye examination and baseline visual field testing to determine if there are any driving restrictions.      -LE DVT-  -Lifelong anticoagulation indicated.    On Xarelto; continue as directed.   -No bleeding concerns today.    Return to clinic in ~4 weeks with FATEMEH Silvestre + labs.    In the meantime, Pamella knows to call the clinic with any concerns and she can be seen sooner if needed.     The longitudinal plan of care for the diagnosis(es)/condition(s) as documented were addressed during this visit. Due to the added complexity in care, I will continue to support Pamella in the subsequent management and with ongoing continuity of care.     Patient was also seen  and examined by Dr. Gardenia Small, Neurology Resident, under my direct supervision.    Maribell Irizarry MD  Neuro-oncology

## 2025-01-21 ENCOUNTER — ONCOLOGY VISIT (OUTPATIENT)
Dept: ONCOLOGY | Facility: CLINIC | Age: 60
End: 2025-01-21
Attending: PSYCHIATRY & NEUROLOGY
Payer: COMMERCIAL

## 2025-01-21 ENCOUNTER — LAB (OUTPATIENT)
Dept: LAB | Facility: CLINIC | Age: 60
End: 2025-01-21
Attending: NURSE PRACTITIONER
Payer: COMMERCIAL

## 2025-01-21 ENCOUNTER — HOSPITAL ENCOUNTER (OUTPATIENT)
Dept: MRI IMAGING | Facility: CLINIC | Age: 60
Discharge: HOME | End: 2025-01-21
Attending: NURSE PRACTITIONER
Payer: COMMERCIAL

## 2025-01-21 VITALS
RESPIRATION RATE: 16 BRPM | WEIGHT: 206 LBS | SYSTOLIC BLOOD PRESSURE: 115 MMHG | OXYGEN SATURATION: 99 % | HEART RATE: 65 BPM | BODY MASS INDEX: 31.32 KG/M2 | DIASTOLIC BLOOD PRESSURE: 74 MMHG

## 2025-01-21 DIAGNOSIS — C71.9 GLIOBLASTOMA (H): ICD-10-CM

## 2025-01-21 DIAGNOSIS — R11.2 CHEMOTHERAPY-INDUCED NAUSEA AND VOMITING: ICD-10-CM

## 2025-01-21 DIAGNOSIS — C71.9 GLIOBLASTOMA (H): Primary | ICD-10-CM

## 2025-01-21 DIAGNOSIS — T45.1X5A CHEMOTHERAPY-INDUCED NAUSEA AND VOMITING: ICD-10-CM

## 2025-01-21 LAB
ALBUMIN SERPL BCG-MCNC: 4.1 G/DL (ref 3.5–5.2)
ALP SERPL-CCNC: 101 U/L (ref 40–150)
ALT SERPL W P-5'-P-CCNC: 20 U/L (ref 0–50)
ANION GAP SERPL CALCULATED.3IONS-SCNC: 9 MMOL/L (ref 7–15)
AST SERPL W P-5'-P-CCNC: 25 U/L (ref 0–45)
BASOPHILS # BLD AUTO: 0 10E3/UL (ref 0–0.2)
BASOPHILS NFR BLD AUTO: 0 %
BILIRUB SERPL-MCNC: 0.6 MG/DL
BUN SERPL-MCNC: 7.3 MG/DL (ref 8–23)
CALCIUM SERPL-MCNC: 9.5 MG/DL (ref 8.8–10.4)
CHLORIDE SERPL-SCNC: 103 MMOL/L (ref 98–107)
CREAT SERPL-MCNC: 0.69 MG/DL (ref 0.51–0.95)
EGFRCR SERPLBLD CKD-EPI 2021: >90 ML/MIN/1.73M2
EOSINOPHIL # BLD AUTO: 0.1 10E3/UL (ref 0–0.7)
EOSINOPHIL NFR BLD AUTO: 5 %
ERYTHROCYTE [DISTWIDTH] IN BLOOD BY AUTOMATED COUNT: 14.3 % (ref 10–15)
GLUCOSE SERPL-MCNC: 89 MG/DL (ref 70–99)
HCO3 SERPL-SCNC: 26 MMOL/L (ref 22–29)
HCT VFR BLD AUTO: 37.1 % (ref 35–47)
HGB BLD-MCNC: 12.5 G/DL (ref 11.7–15.7)
IMM GRANULOCYTES # BLD: 0 10E3/UL
IMM GRANULOCYTES NFR BLD: 0 %
LYMPHOCYTES # BLD AUTO: 0.8 10E3/UL (ref 0.8–5.3)
LYMPHOCYTES NFR BLD AUTO: 26 %
MCH RBC QN AUTO: 30.8 PG (ref 26.5–33)
MCHC RBC AUTO-ENTMCNC: 33.7 G/DL (ref 31.5–36.5)
MCV RBC AUTO: 91 FL (ref 78–100)
MONOCYTES # BLD AUTO: 0.3 10E3/UL (ref 0–1.3)
MONOCYTES NFR BLD AUTO: 10 %
NEUTROPHILS # BLD AUTO: 1.8 10E3/UL (ref 1.6–8.3)
NEUTROPHILS NFR BLD AUTO: 60 %
NRBC # BLD AUTO: 0 10E3/UL
NRBC BLD AUTO-RTO: 0 /100
PLATELET # BLD AUTO: 135 10E3/UL (ref 150–450)
POTASSIUM SERPL-SCNC: 3.8 MMOL/L (ref 3.4–5.3)
PROT SERPL-MCNC: 6.9 G/DL (ref 6.4–8.3)
RBC # BLD AUTO: 4.06 10E6/UL (ref 3.8–5.2)
SODIUM SERPL-SCNC: 138 MMOL/L (ref 135–145)
WBC # BLD AUTO: 3 10E3/UL (ref 4–11)

## 2025-01-21 PROCEDURE — 255N000002 HC RX 255 OP 636: Performed by: NURSE PRACTITIONER

## 2025-01-21 PROCEDURE — 70553 MRI BRAIN STEM W/O & W/DYE: CPT

## 2025-01-21 PROCEDURE — 99417 PROLNG OP E/M EACH 15 MIN: CPT | Mod: GC | Performed by: PSYCHIATRY & NEUROLOGY

## 2025-01-21 PROCEDURE — 36415 COLL VENOUS BLD VENIPUNCTURE: CPT

## 2025-01-21 PROCEDURE — G2211 COMPLEX E/M VISIT ADD ON: HCPCS | Performed by: PSYCHIATRY & NEUROLOGY

## 2025-01-21 PROCEDURE — G0463 HOSPITAL OUTPT CLINIC VISIT: HCPCS | Performed by: PSYCHIATRY & NEUROLOGY

## 2025-01-21 PROCEDURE — A9585 GADOBUTROL INJECTION: HCPCS | Performed by: NURSE PRACTITIONER

## 2025-01-21 PROCEDURE — 84460 ALANINE AMINO (ALT) (SGPT): CPT

## 2025-01-21 PROCEDURE — 85025 COMPLETE CBC W/AUTO DIFF WBC: CPT

## 2025-01-21 PROCEDURE — 99215 OFFICE O/P EST HI 40 MIN: CPT | Mod: GC | Performed by: PSYCHIATRY & NEUROLOGY

## 2025-01-21 RX ORDER — TEMOZOLOMIDE 180 MG/1
180 CAPSULE ORAL DAILY
Qty: 5 CAPSULE | Refills: 0 | Status: SHIPPED | OUTPATIENT
Start: 2025-01-24 | End: 2025-01-29

## 2025-01-21 RX ORDER — GADOBUTROL 604.72 MG/ML
15 INJECTION INTRAVENOUS ONCE
Status: COMPLETED | OUTPATIENT
Start: 2025-01-21 | End: 2025-01-21

## 2025-01-21 RX ORDER — TEMOZOLOMIDE 140 MG/1
140 CAPSULE ORAL DAILY
Qty: 5 CAPSULE | Refills: 0 | Status: SHIPPED | OUTPATIENT
Start: 2025-01-24 | End: 2025-01-29

## 2025-01-21 RX ADMIN — GADOBUTROL 15 ML: 604.72 INJECTION INTRAVENOUS at 14:32

## 2025-01-21 ASSESSMENT — PAIN SCALES - GENERAL: PAINLEVEL_OUTOF10: NO PAIN (0)

## 2025-01-21 NOTE — PROGRESS NOTES
Virtual Visit Details    Type of service:  Video Visit   Call duration: 15 minutes    Originating Location (pt. Location): Home    Distant Location (provider location):  On-site  Platform used for Video Visit: Phone call    Radiation Oncology Follow Up Note    Name: Pamella Jaimes MRN: [id number]   : 1965   Date of Service: 2025 Neuro-Oncologist: Maribell Irizarry MD  Neurosurgeon: Brandan Connelly MD ([facility])      DIAGNOSIS/ STAGE/ DOSE/ INTERVAL SINCE END OF TREATMENT:  59-year-old female with MGMT unmethylated WHO grade 4 glioblastoma, s/p right parietal craniotomy (2024) who is s/p adjuvant radiotherapy (60 Gy/30 fractions, EOT 2024).         The patient was last seen by Dr. Sanchez on 2024 for a 3 month follow-up. She is following with Dr. Irizarry on adjuvant TMZ. On interview, the patient is feeling well today. On 24, she started cycle 4 TMZ. She reports good energy today, minimal fatigue, good appetite. She is most concerned about her itchiness/rash, which is thought to be attributed to TMZ, neuro-onc is attempting a lower dose of TMZ for her next cycle. She is using anti-itching cream and moisturizer, which is providing her some benefit.    MRIb 2025:  IMPRESSION:  1.  No significant change in the expansile FLAIR hyperintensity involving the medial right temporal lobe and in the right parietal-occipital region surrounding the resection cavity compared to prior brain MRI 2024. This is suspicious for   infiltrative nonenhancing tumor versus less likely posttreatment related changes.  2.  Stable 1.3 x 2.1 cm area of irregularly shaped enhancement involving the medial right occipital lobe and the posterior right temporal lobe. There is suggestion of corresponding elevated relative cerebral blood volume. This is suspicious for viable tumor.  3.  No superimposed acute intracranial process.    SUBJECTIVE:  Pamella has been feeling great and is without  significant concerns today. Her fatigue has improved.     Brain ROS  Headaches: Denies  Seizures: Denies  Nausea/vomiting: Denies  Changes in cognition/behavior: Denies  Speech: Denies  Vision/hearing changes: Denies  Gait symptoms or imbalance: Denies  Other focal neuro deficits: Denies    OBJECTIVE:   Limited by telephone visit     ASSESSMENT AND PLAN:   59-year-old female with MGMT unmethylated WHO grade 4 glioblastoma, s/p right parietal craniotomy (5/20/2024) who is s/p adjuvant radiotherapy (60 Gy/30 fractions, EOT 9/6/2024). She presents without clinical or radiographic evidence of disease progression. She has no acute issues today, has a plan in place per med-onc for her hives. We will see her back as needed and she will continue to follow with neuro-oncology.     Margret Cooper MD MS PGY3  Seen and discussed with Dr. Sanchez    A medical resident participated in the care of this patient and in the preparation of this note. I have verified and edited this note. I personally performed key elements of the physical exam and medical decision making for this patient.  I agree with the assessment and plan of care as documented in the note above.      The overall time I spent on direct patient care including self review of records, labs, and radiographic studies, as well as, direct interaction with the patient was 15 minutes.      Erna Sanchez MD, PhD     Department of Radiation Oncology  [facility]

## 2025-01-21 NOTE — LETTER
1/21/2025      Pamella Jaimes  [address] [facility] [address]      Dear Colleague,    Thank you for referring your patient, Pamella Jaimes, to the [facility]. Please see a copy of my visit note below.    NEURO-ONCOLOGY VISIT  Jan 21, 2025    CHIEF COMPLAINT: Ms. 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 Pamella completed standard chemoradiotherapy on 9/6/2024. Post-radiation imaging demonstrated no new concerns with an initial positive response to treatment.     Pamella initiated adjuvant therapy with temozolomide in 9/2024 and repeat imaging in 11/2024 and again in 1/2025 was largely stable with no significant concerns. As such, the plan is to continue with temozolomide for the planned 6 cycles.     Pamella presents today in follow-up. She is accompanied by Leanne (daughter) + 2 grandsons.     INTERVAL HISTORY  -Pamella denies any nausea. Her appetite is good.  -She reports today that she gets itchy/hives essentially every night (mostly in her legs, a couple days ago appeared in her back), evaluated by dermatology who told her it was a delayed reaction to increased dose of chemotherapy. Did not have this reaction to lower dose of chemotherapy. She manages by putting Cera-Ve itch relief cream at night and does her best not to itch (otherwise this exacerbates hives) and, typically, this will resolve within a couple hours. Has not had recurrent lip or eye edema. Takes an allergy pill right before she goes to bed.   -The last time this has happened was last night  -Otherwise, denies any neurologic changes, and has had no activities concerning for seizures.  -No pain in limbs      MEDICATIONS   Current Outpatient Medications   Medication Sig Dispense Refill     acetaminophen (TYLENOL) 500 MG tablet Take 500-1,000 mg by mouth.       diclofenac  (VOLTAREN) 75 MG EC tablet Take 1 tablet by mouth 2 times daily.       fluticasone (FLONASE) 50 MCG/ACT nasal spray Spray 1 spray into both nostrils daily. 16 g 0     levETIRAcetam (KEPPRA) 750 MG tablet Take 1 tablet (750 mg) by mouth 2 times daily. 60 tablet 11     ondansetron (ZOFRAN) 4 MG tablet Take 1 tablet (4 mg) by mouth at bedtime. Take 30-60 mins before each dose of temozolomide. Can take every 8 hours if needed. 30 tablet 3     rivaroxaban ANTICOAGULANT (XARELTO) 20 MG TABS tablet Take 1 tablet (20 mg) by mouth daily (with dinner). 30 tablet 11     [START ON 1/24/2025] temozolomide (TEMODAR) 140 MG capsule Take 1 capsule (140 mg) by mouth daily for 5 days with 1 other temozolomide prescription for 320 mg total Take ondansetron 30-60 min before temozolomide. Take at bedtime on an empty stomach. 5 capsule 0     [START ON 1/24/2025] Temozolomide (TEMODAR) 180 MG capsule Take 1 capsule (180 mg) by mouth daily for 5 days with 1 other Temozolomide prescription for 320 mg total Take ondansetron 30-60 min before temozolomide. Take at bedtime on an empty stomach. 5 capsule 0     DRUG ALLERGIES   Allergies   Allergen Reactions     Morphine      Penicillins Hives       IMMUNIZATIONS   Immunization History   Administered Date(s) Administered     COVID-19 Monovalent 18+ (Moderna) 05/04/2021, 06/01/2021, 01/28/2022     Influenza Vaccine 18-64 (Flublok) 01/14/2022     TDAP (Adacel,Boostrix) 01/04/2010, 01/04/2016     ONCOLOGIC HISTORY  -PRESENTATION: Neck pain, headache and head tremor.  -3/15/2023 MRI brain imaging with a mild asymmetric T2 hyperintense signal along right hippocampus and adjacent parahippocampal gyrus.    -PRESENTATION: Dizziness and nausea; semiology is concerning for temporal lobe auras. Slurred speech.  -5/27/2024 MR brain imaging with a right posterior temporo-occipital mass. Associated nodular peripheral enhancement and surrounding confluent T2 signal hyperintensity extending along the medial  aspect of the right temporal lobe. Mass effect results in regional sulcal effacement, leftward midline shift, partial effacement the right lateral ventricle and mild right uncal herniation.   -5/28/2024 SURGERY: Craniotomy for mass resection at [facility].   No post-operative MR brain imaging performed.   Post-operative CT head imaging from 5/29 status post a right parietal craniotomy with excision of previously demonstrated paramedian parieto-occipital tumor. Decreased focal mass effect and slightly decreased mild midline shift. No significant hemorrhage or additional complicating feature.   PATHOLOGY: Glioblastoma, IDH1-R132H wildtype (CNS WHO Grade 4).    MGMT promoter unmethylated.   -6/25/2024 NEURO-ONC: Recommending chemoradiotherapy with concurrent temozolomide. U/S + for DVT; started anticoagulation.   -7/26/2024 NEURO-ONC: She has started chemoradiotherapy with concurrent temozolomide. Acute visit for concerns; increased Keppra to 750 mg BID.   -7/23 - 9/6/2024 CHEMORT: Complete chemoradiotherapy. 60Gy in 30 fractions with concurrent temozolomide 75mg/m2 (160mg).  -10/1/2024 NEURO-ONC/ MRB/ CHEMO: Clinically well; reduced frequency of auras. Imaging with no concerns, initial positive response to treatment. Starting adjuvant temozolomide 150mg/m2 (320mg), cycle 1 (start date 10/4). Not pursuing Optune.   -10/30//2024 NEURO-ONC/CHEMO: Tolerated temozolomide well. Neurologically stable. Increase adjuvant temozolomide to 200mg/m2 (430mg), cycle 2 (start date 11/1).  -11/26/2024 NEURO-ONC/ MRB/ CHEMO: No new neurological concerns. Itchy skin. Imaging with no new overt concerns. Adjuvant temozolomide 200mg/m2 (430mg), cycle 3.   -12/23/2024 NEURO-ONC/ CHEMO: No new neurological concerns. Itchy skin persists and she did present to the emergency room for itchy skin plus rash.  We reviewed this as above.  It does not seem to be associated with when she is taking temozolomide.  Adjuvant temozolomide  "200mg/m2 (430mg), cycle 4.   -1/21/2025 NEURO-ONC/ MRB/ CHEMO: No new neurological concerns. Itchy skin. Imaging with no new overt concerns. Adjuvant temozolomide 1500mg/m2 (320mg), cycle 5 (dose reduce in the setting of skin rash).       PHYSICAL EXAMINATION  /74   Pulse 65   Resp 16   Wt 93.4 kg (206 lb)   SpO2 99%   BMI 31.32 kg/m    Wt Readings from Last 2 Encounters:   01/21/25 93.4 kg (206 lb)   12/23/24 94.3 kg (208 lb)      Ht Readings from Last 2 Encounters:   12/23/24 1.727 m (5' 8\")   10/30/24 1.702 m (5' 7\")     KPS:     General: Appears well in no acute distress.  Psych: Affect appropriate. Pleasant  Neurologic:   MENTAL STATUS:     Alert, oriented.    Speech fluent.    Comprehension intact.     CRANIAL NERVES:     Left homonymous hemianopsia   Hearing intact.   No dysarthria.   MOTOR    Mild no-no head tremor  GAIT: Steady and unassisted.       MEDICAL RECORDS  Personally reviewed; ED visit on 12/22, indicated for evaluation of an \"Allergic Reaction\" - \"rash started this morning and mostly present on the trunk and upper extremity. Also has swelling of the lip. No difficulty breathing. No nausea or vomiting. Vital signs have been reviewed and essentially unremarkable except for blood pressure 111/93. Patient has been off her chemotherapy for brain cancer for almost 20 days. Plan at this time is to give a dose of epinephrine. Will also give IV fluid, famotidine, Benadryl and Solu-Medrol.    Reached patient. Rash is improving. Lip swelling is improving. Rechecked on patient again. Rash is almost resolved. Lip swelling is completely resolved.\"    LABS  Personally reviewed; CBC (platelets 135) and CMP (AST/ ALT 25/20) from today.      IMAGING  Personally reviewed MR brain imaging from today and compared to prior imaging. To my eye, imaging is largely stable compared to prior with unchanged T2 FLAIR and a stable to slightly decreased degree of contrast enhancement in the right temporal/ " "occipital region.    Formal read; \"1.  No significant change in the expansile FLAIR hyperintensity involving the medial right temporal lobe and in the right parietal-occipital region surrounding the resection cavity compared to prior brain MRI 11/26/2024. This is suspicious for infiltrative nonenhancing tumor versus less likely posttreatment related changes. 2.  Stable 1.3 x 2.1 cm area of irregularly shaped enhancement involving the medial right occipital lobe and the posterior right temporal lobe. There is suggestion of corresponding elevated relative cerebral blood volume. This is suspicious for viable tumor. 3.  No superimposed acute intracranial process.\"     Imaging was shown to and results were reviewed with Pamella and Leanne.       IMPRESSION  On date of service, 55 minutes was spent in clinic and 13 minutes was spent preparing for the visit through extensive chart review and coordinating care for this high complexity visit. The following is in explanation for the recommendations used to define the plan.       Pamella continues to tolerate her cycles of temozolomide well, aside from some mild fatigue and this ongoing, intermittent rash. Pamella endorses that the rash is itchy and bothersome, but tolerable with application of skin lotion. In December, Pamella was seen in the emergency room for evaluation and management of a rash, which resolved with use of antihistamines. She was since evaluated by dermatology and the impression was that it was a delayed reaction to her chemotherapy.      She endorses no new neurological concerns. No change to her known visual field cut.     Imaging as detailed above is largely stable with no significant findings. Therefore, the plan is to repeat imaging per NCCN guidelines in 2 months.     In the meantime, the plan is to start cycle 5 of adjuvant temozolomide. However, given the potential association between the higher chemotherapy dosing and the bothersome cyclic rash, will dose " reduce to 150mg/m2 for this cycle. If the rash improves/ resolves, will continue at 150mg/m2 dosing for cycle 6. If the rash persists despite the dose reduction, can consider a return to 200mg/m2 for cycle 6. Toxicity of chemotherapy reviewed and anticipated side effects can include nausea, constipation, skin rash, and hematologic intolerance. Her labs from today show no concerns; platelet count > 130K. We will continue with intensive clinical and laboratory monitoring for toxicity per protocol. I updated RNCC, pharmacy, and RADHA to this plan.     PROBLEM LIST  Glioblastoma  Headaches  Homonymous hemianopsia, left-side  Benign essential tremor  Temporal lobe auras    PLAN  -CANCER DIRECTED THERAPY-  -Adjuvant temozolomide at 150mg/m2 (320mg), cycle 5 (start date of 1/24).    Supportive medications; Zofran and bowel regimen.     -Repeat 28 day cycle if WBC >= 3, ANC >= 1.5, HgB >= 10, and platelets >= 100.   Surveillance labs reviewed, at goal for chemotherapy.   Will continue every 2 weeks CBC and CMP every 4 weeks.    -Repeat imaging in ~2 months.  -Not pursuing use of Opune.      -STEROIDS-  -Off dexamethasone.    -SEIZURE MANAGEMENT-  -Stereotyped events that seem consistent with a history of non-dominant temporal lobe auras/ seizures.   On Keppra 750 mg twice daily.  -She has not had any auras recently.    -Quality of life/ MOOD/ FATIGUE-  -History of depression.    Self-discontinued Zoloft.    -VISUAL FIELD CUT-  -Left Homonymous hemianopsia.  -Evaluated by neuro-ophtho for thorough eye examination and baseline visual field testing to determine if there are any driving restrictions.      -LE DVT-  -Lifelong anticoagulation indicated.    On Xarelto; continue as directed.   -No bleeding concerns today.    Return to clinic in ~4 weeks with FATEMEH Silvestre + labs.    In the meantime, Pamella knows to call the clinic with any concerns and she can be seen sooner if needed.     The longitudinal plan of care for  the diagnosis(es)/condition(s) as documented were addressed during this visit. Due to the added complexity in care, I will continue to support Pamella in the subsequent management and with ongoing continuity of care.     Patient was also seen and examined by Dr. Gardenia Small, Neurology Resident, under my direct supervision.    Maribell Irizarry MD  Neuro-oncology      Again, thank you for allowing me to participate in the care of your patient.        Sincerely,        Maribell Irizarry MD    Electronically signed

## 2025-01-23 ENCOUNTER — VIRTUAL VISIT (OUTPATIENT)
Dept: RADIATION ONCOLOGY | Facility: CLINIC | Age: 60
End: 2025-01-23
Attending: STUDENT IN AN ORGANIZED HEALTH CARE EDUCATION/TRAINING PROGRAM
Payer: COMMERCIAL

## 2025-01-23 DIAGNOSIS — C71.9 GLIOBLASTOMA (H): Primary | ICD-10-CM

## 2025-01-23 PROCEDURE — 98012 SYNCH AUDIO-ONLY EST SF 10: CPT | Performed by: STUDENT IN AN ORGANIZED HEALTH CARE EDUCATION/TRAINING PROGRAM

## 2025-01-23 ASSESSMENT — PAIN SCALES - GENERAL: PAINLEVEL_OUTOF10: NO PAIN (0)

## 2025-01-23 NOTE — PROGRESS NOTES
"Oncology Rooming Note    2025 8:42 AM   Pamella Jaimes is a 59 year old female who presents for:    Chief Complaint   Patient presents with    Oncology Clinic Visit     Radiation Oncology Follow-Up      Initial Vitals: There were no vitals taken for this visit. Estimated body mass index is 31.32 kg/m  as calculated from the following:    Height as of 24: 1.727 m (5' 8\").    Weight as of 25: 93.4 kg (206 lb). There is no height or weight on file to calculate BSA.  Data Unavailable Comment: Data Unavailable   No LMP recorded.    Allergies reviewed: Yes    Medications reviewed: Yes    Medications: Medication refills not needed today.  Pharmacy name entered into [employer]:    [id number] - [place] - [address] [place] [id number] - [place] - [address]  [facility] - [place], [place] - [address]    Frailty Screening:   Is the patient here for a new oncology consult visit in cancer care? 2. No    FOLLOW-UP VISIT    Patient Name: Pamella Jaimes      : 1965     Age: 59 year old        ______________________________________________________________________________     Chief Complaint   Patient presents with    Oncology Clinic Visit     Radiation Oncology Follow-Up      There were no vitals taken for this visit.     Date Radiation Completed:   Glioblastoma: Right Occ 6000 cGy completed 24    Pain  Denies    Labs  25 - CMP, CBC w/ diff    Imaging  25 - MR brain wo/w contrast, w/ perfusion      Other Appointments:   MD Name: Ratna Hernandez NP Appointment Date: 25   MD Name: Appointment Date:   MD Name: Appointment Date:       Residual Radiation side effect: none       Clinical concerns: Patient is here for a Radiation Oncology Follow Up.     Dr. Sanchez was notified.      Aracelis Quiñones RN            "

## 2025-01-23 NOTE — LETTER
2025      Pamella Jaimes  [address] [facility] [address]      Dear Colleague,    Thank you for referring your patient, Pamella Jaimes, to the [facility] RADIATION ONCOLOGY. Please see a copy of my visit note below.    Virtual Visit Details    Type of service:  Video Visit   Call duration: 15 minutes    Originating Location (pt. Location): Home    Distant Location (provider location):  On-site  Platform used for Video Visit: Phone call    Radiation Oncology Follow Up Note    Name: Pamella Jaimes MRN: [id number]   : 1965   Date of Service: 2025 Neuro-Oncologist: Maribell Irizarry MD  Neurosurgeon: Brandan Connelly MD ([facility])      DIAGNOSIS/ STAGE/ DOSE/ INTERVAL SINCE END OF TREATMENT:  59-year-old female with MGMT unmethylated WHO grade 4 glioblastoma, s/p right parietal craniotomy (2024) who is s/p adjuvant radiotherapy (60 Gy/30 fractions, EOT 2024).         The patient was last seen by Dr. Sanchez on 2024 for a 3 month follow-up. She is following with Dr. Irizarry on adjuvant TMZ. On interview, the patient is feeling well today. On 24, she started cycle 4 TMZ. She reports good energy today, minimal fatigue, good appetite. She is most concerned about her itchiness/rash, which is thought to be attributed to TMZ, neuro-onc is attempting a lower dose of TMZ for her next cycle. She is using anti-itching cream and moisturizer, which is providing her some benefit.    MRIb 2025:  IMPRESSION:  1.  No significant change in the expansile FLAIR hyperintensity involving the medial right temporal lobe and in the right parietal-occipital region surrounding the resection cavity compared to prior brain MRI 2024. This is suspicious for   infiltrative nonenhancing tumor versus less likely posttreatment related changes.  2.  Stable 1.3 x 2.1 cm area of irregularly shaped enhancement involving the medial right occipital lobe and the posterior  right temporal lobe. There is suggestion of corresponding elevated relative cerebral blood volume. This is suspicious for viable tumor.  3.  No superimposed acute intracranial process.    SUBJECTIVE:  Pamella has been feeling great and is without significant concerns today. Her fatigue has improved.     Brain ROS  Headaches: Denies  Seizures: Denies  Nausea/vomiting: Denies  Changes in cognition/behavior: Denies  Speech: Denies  Vision/hearing changes: Denies  Gait symptoms or imbalance: Denies  Other focal neuro deficits: Denies    OBJECTIVE:   Limited by telephone visit     ASSESSMENT AND PLAN:   59-year-old female with MGMT unmethylated WHO grade 4 glioblastoma, s/p right parietal craniotomy (5/20/2024) who is s/p adjuvant radiotherapy (60 Gy/30 fractions, EOT 9/6/2024). She presents without clinical or radiographic evidence of disease progression. She has no acute issues today, has a plan in place per med-onc for her hives. We will see her back as needed and she will continue to follow with neuro-oncology.     Margret Cooper MD MS PGY3  Seen and discussed with Dr. Sanchez    A medical resident participated in the care of this patient and in the preparation of this note. I have verified and edited this note. I personally performed key elements of the physical exam and medical decision making for this patient.  I agree with the assessment and plan of care as documented in the note above.      The overall time I spent on direct patient care including self review of records, labs, and radiographic studies, as well as, direct interaction with the patient was 15 minutes.      Erna Sanchez MD, PhD     Department of Radiation Oncology  [facility]           Oncology Rooming Note    January 23, 2025 8:42 AM   Pamella Jaimes is a 59 year old female who presents for:    Chief Complaint   Patient presents with     Oncology Clinic Visit     Radiation Oncology Follow-Up   "    Initial Vitals: There were no vitals taken for this visit. Estimated body mass index is 31.32 kg/m  as calculated from the following:    Height as of 24: 1.727 m (5' 8\").    Weight as of 25: 93.4 kg (206 lb). There is no height or weight on file to calculate BSA.  Data Unavailable Comment: Data Unavailable   No LMP recorded.    Allergies reviewed: Yes    Medications reviewed: Yes    Medications: Medication refills not needed today.  Pharmacy name entered into [facility]:    [facility][id number] - [place], [place] - [address] [place] [id number] - [place], [place] - [address]  [place] [facility] - [place], [place] - [address]    Frailty Screening:   Is the patient here for a new oncology consult visit in cancer care? 2. No    FOLLOW-UP VISIT    Patient Name: Pamella Jaimes      : 1965     Age: 59 year old        ______________________________________________________________________________     Chief Complaint   Patient presents with     Oncology Clinic Visit     Radiation Oncology Follow-Up      There were no vitals taken for this visit.     Date Radiation Completed:   Glioblastoma: Right Occ 6000 cGy completed 24    Pain  Denies    Labs  25 - CMP, CBC w/ diff    Imaging  25 - MR brain wo/w contrast, w/ perfusion      Other Appointments:   MD Name: Ratna Hernandez NP Appointment Date: 25   MD Name: Appointment Date:   MD Name: Appointment Date:       Residual Radiation side effect: none       Clinical concerns: Patient is here for a Radiation Oncology Follow Up.     Dr. Sanchez was notified.      Aracelis Quiñones, RAMOS              Again, thank you for allowing me to participate in the care of your patient.        Sincerely,        Erna Sanchez MD PhD    Electronically signed"

## 2025-01-30 ENCOUNTER — DOCUMENTATION ONLY (OUTPATIENT)
Dept: PHARMACY | Facility: CLINIC | Age: 60
End: 2025-01-30
Payer: COMMERCIAL

## 2025-01-30 NOTE — PROGRESS NOTES
Oral Chemotherapy Monitoring Program     I placed a phone call to Leanne, Pamella's daughter, to see how Pamella was doing on the lower dose of Temodar (320 mg). I confirmed the regimen of one 140 mg capsule and one 180 mg capsule by mouth once daily for 5 days. I also confirmed the start date of cycle 5 on 01/24/2025. No missed doses or any new medications were reported. Leanne stated that patient's itchy rash/hives seems to have improved with the lower dose. There is still some itchiness but not nearly as much as when Pamella was taking the higher dose (430 mg). I also placed a phone call to Pamella, and she still utilizes the Cera-Ve itch relief cream and has Claritin and Benadryl at home as needed. She reported that her knee was starting to itch but that she put some cream on it. She also reported her forearm itching but that the anti-itch cream had helped. She reports that she does not have hives but a little bit of redness and that the itchiness she is experiencing is bearable. When assessing for fatigue, Pamella reports that she can do her daily activities such as laundry and vacuuming normally. Pamella has not been experiencing any side effects of nausea, vomiting, constipation, loss of appetite, or weight loss. Pamella reported some weight gain instead. Pamella reported that she had taken tumeric in the past and asked if she can take it, but there is a drug interaction with Temodar and I encouraged her to avoid taking tumeric.         10/18/2024     1:00 PM 11/14/2024     2:00 PM 11/26/2024     2:00 PM 12/13/2024     2:00 PM 1/13/2025     9:00 AM 1/21/2025     4:00 PM 1/30/2025    11:00 AM   ORAL CHEMOTHERAPY   Assessment Type Lab Monitoring Lab Monitoring;Other Refill Lab Monitoring Lab Monitoring Refill Initial Follow up   Diagnosis Code Brain Cancer - Glioblastoma Brain Cancer - Glioblastoma Brain Cancer - Glioblastoma Brain Cancer - Glioblastoma Brain Cancer - Glioblastoma Brain Cancer - Glioblastoma Brain Cancer -  Glioblastoma   Providers Dr. Juan C Irizarry   Clinic Name/Location [facility]   Drug Name Temodar (temozolomide) Temodar (temozolomide) Temodar (temozolomide) Temodar (temozolomide) Temodar (temozolomide) Temodar (temozolomide) Temodar (temozolomide)   Dose 320 mg 320 mg Other: Other: Other: 320 mg 320 mg   Current Schedule Daily Daily Daily Daily Daily Daily Daily   Cycle Details 5 days on, 23 days off 5 days on, 23 days off 5 days on, 23 days off 5 days on, 23 days off 5 days on, 23 days off 5 days on, 23 days off 5 days on, 23 days off   Start Date of Last Cycle  11/1/2024 11/1/2024 11/29/2024 1/24/2025   Planned next cycle start date 11/1/2024 11/29/2024 12/27/2024      Doses missed in last 2 weeks       0   Adherence Assessment       Adherent   Adverse Effects  Neutropenia  Neutropenia Neutropenia  Rash   Rash       --   Pharmacist Intervention(Rash)       No   Neutropenia  Grade 2  Grade 2 Grade 2     Pharmacist Intervention(neutropenia)  No  No No     Any new drug interactions?       No   Is the dose as ordered appropriate for the patient?  Yes            Follow-up:   Labs on 02/07/2025 at 09:30 am   Video appointment with Ratna on 02/21/2025 at 11:00 am

## 2025-02-01 DIAGNOSIS — H69.91 DYSFUNCTION OF RIGHT EUSTACHIAN TUBE: ICD-10-CM

## 2025-02-03 RX ORDER — FLUTICASONE PROPIONATE 50 MCG
1 SPRAY, SUSPENSION (ML) NASAL DAILY
Qty: 16 G | Refills: 0 | OUTPATIENT
Start: 2025-02-03

## 2025-02-18 ENCOUNTER — PATIENT OUTREACH (OUTPATIENT)
Dept: CARE COORDINATION | Facility: CLINIC | Age: 60
End: 2025-02-18

## 2025-02-18 NOTE — PROGRESS NOTES
Social Work - Telephone/MyChart message  [facility]  Data:   Patient Name: Pamella Jaimes  Goes By: Pamella PENA/Age: 1965 (60 year old)        Reason for Referral: This clinician called Pamella for planned monthly check-in    Intervention: Left voice message for patient on 2025 .    SW collaborated with [employer] and learned that they mailed out gift cards for Pamella's mono a few weeks ago.    Plan:  will await patient's return phone call/message and provide assistance at that time. SW will plan to outreach next month if no return call is made prior.      Natalee Bonilla, MEHDI, LICSW, OSW-C  Clinical - Adult Oncology  Phone: [phone]  She/Her/Hers  [facility]: Nohemy FERRO  8am-4:30pm  [facility]: TRISH Kellogg F 8am-4:30pm   Support Groups at [facility]: Social Work Services for Cancer Patients [email or web address])

## 2025-02-21 ENCOUNTER — VIRTUAL VISIT (OUTPATIENT)
Dept: ONCOLOGY | Facility: CLINIC | Age: 60
End: 2025-02-21
Attending: NURSE PRACTITIONER
Payer: COMMERCIAL

## 2025-02-21 VITALS — WEIGHT: 208 LBS | BODY MASS INDEX: 32.65 KG/M2 | HEIGHT: 67 IN

## 2025-02-21 DIAGNOSIS — C71.9 GLIOBLASTOMA (H): Primary | ICD-10-CM

## 2025-02-21 PROCEDURE — G2211 COMPLEX E/M VISIT ADD ON: HCPCS | Mod: 95 | Performed by: NURSE PRACTITIONER

## 2025-02-21 PROCEDURE — 98006 SYNCH AUDIO-VIDEO EST MOD 30: CPT | Performed by: NURSE PRACTITIONER

## 2025-02-21 ASSESSMENT — PAIN SCALES - GENERAL: PAINLEVEL_OUTOF10: NO PAIN (0)

## 2025-02-21 NOTE — LETTER
2/21/2025      Pamella Jaimes  [address] [facility] [address]      Dear Colleague,    Thank you for referring your patient, Pamella Jaimes, to the [facility]. Please see a copy of my visit note below.    Virtual Visit Details    Type of service:  Video Visit   Video Start Time: 10:43 AM  Video End Time: 10:53 AM    Originating Location (pt. Location): Home  Distant Location (provider location):  off site  Platform used for Video Visit: [facility]    NEURO-ONCOLOGY VISIT  Feb 21, 2025    CHIEF COMPLAINT: Ms. 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 Pamella completed standard chemoradiotherapy on 9/6/2024. Post-radiation imaging demonstrated no new concerns with an initial positive response to treatment.     Pamella initiated adjuvant therapy with temozolomide in 9/2024 and repeat imaging in 11/2024 and again in 1/2025 was largely stable with no significant concerns. As such, the plan is to continue with temozolomide for the planned 6 cycles.     Pamella presents today in follow-up. She is accompanied by Leanne (daughter).    INTERVAL HISTORY  -She reports that the rash is rash and itching is better with lower dose of temozolomide. She is using Cerave itch + Zyrtec once a day with benefits.  -No nausea and she is using Zofran per protocol.  -She denies constipation.   -No fatigue and she is feeling well.   -No new neuro changes.  She denies seizure activity.  Neck pain from slouching in bed.  She has some arthritis in her neck and she feels her posture while in bed watching TV exacerbates this.  She uses heat with benefit.       MEDICATIONS   Current Outpatient Medications   Medication Sig Dispense Refill     acetaminophen (TYLENOL) 500 MG tablet Take 500-1,000 mg by mouth.       diclofenac (VOLTAREN) 75 MG EC tablet Take 1 tablet by mouth 2  times daily. (Patient not taking: Reported on 1/23/2025)       fluticasone (FLONASE) 50 MCG/ACT nasal spray SHAKE LIQUID AND USE 1 SPRAY IN EACH NOSTRIL DAILY 16 g 0     levETIRAcetam (KEPPRA) 750 MG tablet Take 1 tablet (750 mg) by mouth 2 times daily. 60 tablet 11     ondansetron (ZOFRAN) 4 MG tablet Take 1 tablet (4 mg) by mouth at bedtime. Take 30-60 mins before each dose of temozolomide. Can take every 8 hours if needed. 30 tablet 3     rivaroxaban ANTICOAGULANT (XARELTO) 20 MG TABS tablet Take 1 tablet (20 mg) by mouth daily (with dinner). 30 tablet 11     temozolomide (TEMODAR) 140 MG capsule Take 1 capsule (140 mg) by mouth daily for 5 days with 1 other temozolomide prescription for 320 mg total Take ondansetron 30-60 min before temozolomide. Take at bedtime on an empty stomach. 5 capsule 0     Temozolomide (TEMODAR) 180 MG capsule Take 1 capsule (180 mg) by mouth daily for 5 days with 1 other Temozolomide prescription for 320 mg total Take ondansetron 30-60 min before temozolomide. Take at bedtime on an empty stomach. 5 capsule 0     DRUG ALLERGIES   Allergies   Allergen Reactions     Morphine      Penicillins Hives       IMMUNIZATIONS   Immunization History   Administered Date(s) Administered     COVID-19 Monovalent 18+ (Moderna) 05/04/2021, 06/01/2021, 01/28/2022     Influenza Vaccine 18-64 (Flublok) 01/14/2022     TDAP (Adacel,Boostrix) 01/04/2010, 01/04/2016     ONCOLOGIC HISTORY  -PRESENTATION: Neck pain, headache and head tremor.  -3/15/2023 MRI brain imaging with a mild asymmetric T2 hyperintense signal along right hippocampus and adjacent parahippocampal gyrus.    -PRESENTATION: Dizziness and nausea; semiology is concerning for temporal lobe auras. Slurred speech.  -5/27/2024 MR brain imaging with a right posterior temporo-occipital mass. Associated nodular peripheral enhancement and surrounding confluent T2 signal hyperintensity extending along the medial aspect of the right temporal lobe. Mass  effect results in regional sulcal effacement, leftward midline shift, partial effacement the right lateral ventricle and mild right uncal herniation.   -5/28/2024 SURGERY: Craniotomy for mass resection at [facility].   No post-operative MR brain imaging performed.   Post-operative CT head imaging from 5/29 status post a right parietal craniotomy with excision of previously demonstrated paramedian parieto-occipital tumor. Decreased focal mass effect and slightly decreased mild midline shift. No significant hemorrhage or additional complicating feature.   PATHOLOGY: Glioblastoma, IDH1-R132H wildtype (CNS WHO Grade 4).    MGMT promoter unmethylated.   -6/25/2024 NEURO-ONC: Recommending chemoradiotherapy with concurrent temozolomide. U/S + for DVT; started anticoagulation.   -7/26/2024 NEURO-ONC: She has started chemoradiotherapy with concurrent temozolomide. Acute visit for concerns; increased Keppra to 750 mg BID.   -7/23 - 9/6/2024 CHEMORT: Complete chemoradiotherapy. 60Gy in 30 fractions with concurrent temozolomide 75mg/m2 (160mg).  -10/1/2024 NEURO-ONC/ MRB/ CHEMO: Clinically well; reduced frequency of auras. Imaging with no concerns, initial positive response to treatment. Starting adjuvant temozolomide 150mg/m2 (320mg), cycle 1 (start date 10/4). Not pursuing Optune.   -10/30//2024 NEURO-ONC/CHEMO: Tolerated temozolomide well. Neurologically stable. Increase adjuvant temozolomide to 200mg/m2 (430mg), cycle 2 (start date 11/1).  -11/26/2024 NEURO-ONC/ MRB/ CHEMO: No new neurological concerns. Itchy skin. Imaging with no new overt concerns. Adjuvant temozolomide 200mg/m2 (430mg), cycle 3.   -12/23/2024 NEURO-ONC/ CHEMO: No new neurological concerns. Itchy skin persists and she did present to the emergency room for itchy skin plus rash.  We reviewed this as above.  It does not seem to be associated with when she is taking temozolomide.  Adjuvant temozolomide 200mg/m2 (430mg), cycle 4.   -1/21/2025  "NEURO-ONC/ MRB/ CHEMO: No new neurological concerns. Itchy skin. Imaging with no new overt concerns. Adjuvant temozolomide 1500mg/m2 (320mg), cycle 5 (dose reduce in the setting of skin rash).   -2/21/2025 NEURO-ONC/ CHEMO: No new neurological concerns. Itchy skin. Imaging with no new overt concerns. Adjuvant temozolomide 1500mg/m2 (320mg), cycle 6 (dose reduce in the setting of skin rash).       PHYSICAL EXAMINATION  There were no vitals taken for this visit.  Wt Readings from Last 2 Encounters:   01/21/25 93.4 kg (206 lb)   12/23/24 94.3 kg (208 lb)      Ht Readings from Last 2 Encounters:   12/23/24 1.727 m (5' 8\")   10/30/24 1.702 m (5' 7\")     KPS:     General: Appears well in no acute distress.  Psych: Affect appropriate. Pleasant  Neurologic:   MENTAL STATUS:     Alert, oriented.    Speech fluent.    Comprehension intact.     CRANIAL NERVES:     Left homonymous hemianopsia   Hearing intact.   No dysarthria.   MOTOR    Mild no-no head tremor  GAIT: Steady and unassisted.       MEDICAL RECORDS  Personally reviewed; ED visit on 12/22, indicated for evaluation of an \"Allergic Reaction\" - \"rash started this morning and mostly present on the trunk and upper extremity. Also has swelling of the lip. No difficulty breathing. No nausea or vomiting. Vital signs have been reviewed and essentially unremarkable except for blood pressure 111/93. Patient has been off her chemotherapy for brain cancer for almost 20 days. Plan at this time is to give a dose of epinephrine. Will also give IV fluid, famotidine, Benadryl and Solu-Medrol.    Reached patient. Rash is improving. Lip swelling is improving. Rechecked on patient again. Rash is almost resolved. Lip swelling is completely resolved.\"    LABS  Personally reviewed; CBC (platelets 135) and CMP (AST/ ALT 25/20) from today.      IMAGING        IMPRESSION  On date of service, 55 minutes was spent in clinic and 13 minutes was spent preparing for the visit through extensive " chart review and coordinating care for this high complexity visit. The following is in explanation for the recommendations used to define the plan.       Pamella continues to tolerate her cycles of temozolomide well, aside from some mild fatigue and this ongoing, intermittent rash. Pamella endorses that the rash is itchy and bothersome, but tolerable with application of skin lotion. In December, Pamella was seen in the emergency room for evaluation and management of a rash, which resolved with use of antihistamines. She was since evaluated by dermatology and the impression was that it was a delayed reaction to her chemotherapy.      She endorses no new neurological concerns. No change to her known visual field cut.     Imaging as detailed above is largely stable with no significant findings. Therefore, the plan is to repeat imaging per NCCN guidelines in 2 months.     In the meantime, the plan is to start cycle 5 of adjuvant temozolomide. However, given the potential association between the higher chemotherapy dosing and the bothersome cyclic rash, will dose reduce to 150mg/m2 for this cycle. If the rash improves/ resolves, will continue at 150mg/m2 dosing for cycle 6. If the rash persists despite the dose reduction, can consider a return to 200mg/m2 for cycle 6. Toxicity of chemotherapy reviewed and anticipated side effects can include nausea, constipation, skin rash, and hematologic intolerance. Her labs from today show no concerns; platelet count > 130K. We will continue with intensive clinical and laboratory monitoring for toxicity per protocol. I updated RNCC, pharmacy, and RADHA to this plan.     PROBLEM LIST  Glioblastoma  Headaches  Homonymous hemianopsia, left-side  Benign essential tremor  Temporal lobe auras    PLAN  -CANCER DIRECTED THERAPY-  -Adjuvant temozolomide at 150mg/m2 (320mg), cycle 6 (start date of 2/21.    Supportive medications; Zofran and bowel regimen.     -Repeat 28 day cycle if WBC >= 3, ANC >=  1.5, HgB >= 10, and platelets >= 100.   Surveillance labs reviewed, at goal for chemotherapy.   Will continue every 2 weeks CBC and CMP every 4 weeks.    -Repeat imaging in 1 month.  -Not pursuing use of Opune.      -STEROIDS-  -Off dexamethasone.    -SEIZURE MANAGEMENT-  -Stereotyped events that seem consistent with a history of non-dominant temporal lobe auras/ seizures.   On Keppra 750 mg twice daily.  -She has not had any auras recently.    -Quality of life/ MOOD/ FATIGUE-  -History of depression.    Self-discontinued Zoloft.    -VISUAL FIELD CUT-  -Left Homonymous hemianopsia.  -Evaluated by neuro-ophtho for thorough eye examination and baseline visual field testing to determine if there are any driving restrictions.      -LE DVT-  -Lifelong anticoagulation indicated.    On Xarelto; continue as directed.   -No bleeding concerns today.    Return to clinic in ~4 weeks with FATEMEH Silvestre + labs.    In the meantime, Pamella knows to call the clinic with any concerns and she can be seen sooner if needed.     The longitudinal plan of care for the diagnosis(es)/condition(s) as documented were addressed during this visit. Due to the added complexity in care, I will continue to support Pamella in the subsequent management and with ongoing continuity of care.     Patient was also seen and examined by Dr. Gardenia Small, Neurology Resident, under my direct supervision.    FATEMEH Silvestre  Neuro-oncology      Again, thank you for allowing me to participate in the care of your patient.        Sincerely,        FATEMEH Silvestre CNP    Electronically signed

## 2025-02-21 NOTE — NURSING NOTE
Current patient location: [address] [place] [id number]    Is the patient currently in the state of [place]? YES    Visit mode: VIDEO    If the visit is dropped, the patient can be reconnected by:VIDEO VISIT: Text to cell phone:   Telephone Information:   Mobile [phone]       Will anyone else be joining the visit? NO  (If patient encounters technical issues they should call [phone])    Are changes needed to the allergy or medication list? Pt stated no changes to allergies and Pt stated no med changes    Are refills needed on medications prescribed by this physician? NO    Rooming Documentation:  Questionnaire(s) completed    Reason for visit: JUDE CONWAY

## 2025-02-21 NOTE — PROGRESS NOTES
Virtual Visit Details    Type of service:  Video Visit   Video Start Time: 10:43 AM  Video End Time: 10:53 AM    Originating Location (pt. Location): Home  Distant Location (provider location):  off site  Platform used for Video Visit: [facility]    NEURO-ONCOLOGY VISIT  Feb 21, 2025    CHIEF COMPLAINT: Ms. 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 Pamella completed standard chemoradiotherapy on 9/6/2024. Post-radiation imaging demonstrated no new concerns with an initial positive response to treatment.     Pamella initiated adjuvant therapy with temozolomide in 9/2024 and repeat imaging in 11/2024 and again in 1/2025 was largely stable with no significant concerns. As such, the plan is to continue with temozolomide for the planned 6 cycles.     Pamella presents today in follow-up. She is accompanied by Leanne (daughter).    INTERVAL HISTORY  -She reports that the rash is rash and itching is better with lower dose of temozolomide. She is using Cerave itch + Zyrtec once a day with benefits.  -No nausea and she is using Zofran per protocol.  -She denies constipation.   -No fatigue and she is feeling well.   -No new neuro changes.  She denies seizure activity.  Neck pain from slouching in bed.  She has some arthritis in her neck and she feels her posture while in bed watching TV exacerbates this.  She uses heat with benefit.       MEDICATIONS   Current Outpatient Medications   Medication Sig Dispense Refill    acetaminophen (TYLENOL) 500 MG tablet Take 500-1,000 mg by mouth.      diclofenac (VOLTAREN) 75 MG EC tablet Take 1 tablet by mouth 2 times daily. (Patient not taking: Reported on 1/23/2025)      fluticasone (FLONASE) 50 MCG/ACT nasal spray SHAKE LIQUID AND USE 1 SPRAY IN EACH NOSTRIL DAILY 16 g 0    levETIRAcetam (KEPPRA) 750 MG tablet Take 1 tablet (750 mg) by mouth 2 times  daily. 60 tablet 11    ondansetron (ZOFRAN) 4 MG tablet Take 1 tablet (4 mg) by mouth at bedtime. Take 30-60 mins before each dose of temozolomide. Can take every 8 hours if needed. 30 tablet 3    rivaroxaban ANTICOAGULANT (XARELTO) 20 MG TABS tablet Take 1 tablet (20 mg) by mouth daily (with dinner). 30 tablet 11    temozolomide (TEMODAR) 140 MG capsule Take 1 capsule (140 mg) by mouth daily for 5 days with 1 other temozolomide prescription for 320 mg total Take ondansetron 30-60 min before temozolomide. Take at bedtime on an empty stomach. 5 capsule 0    Temozolomide (TEMODAR) 180 MG capsule Take 1 capsule (180 mg) by mouth daily for 5 days with 1 other Temozolomide prescription for 320 mg total Take ondansetron 30-60 min before temozolomide. Take at bedtime on an empty stomach. 5 capsule 0     DRUG ALLERGIES   Allergies   Allergen Reactions    Morphine     Penicillins Hives       IMMUNIZATIONS   Immunization History   Administered Date(s) Administered    COVID-19 Monovalent 18+ (Moderna) 05/04/2021, 06/01/2021, 01/28/2022    Influenza Vaccine 18-64 (Flublok) 01/14/2022    TDAP (Adacel,Boostrix) 01/04/2010, 01/04/2016     ONCOLOGIC HISTORY  -PRESENTATION: Neck pain, headache and head tremor.  -3/15/2023 MRI brain imaging with a mild asymmetric T2 hyperintense signal along right hippocampus and adjacent parahippocampal gyrus.    -PRESENTATION: Dizziness and nausea; semiology is concerning for temporal lobe auras. Slurred speech.  -5/27/2024 MR brain imaging with a right posterior temporo-occipital mass. Associated nodular peripheral enhancement and surrounding confluent T2 signal hyperintensity extending along the medial aspect of the right temporal lobe. Mass effect results in regional sulcal effacement, leftward midline shift, partial effacement the right lateral ventricle and mild right uncal herniation.   -5/28/2024 SURGERY: Craniotomy for mass resection at [facility].   No post-operative MR brain  imaging performed.   Post-operative CT head imaging from 5/29 status post a right parietal craniotomy with excision of previously demonstrated paramedian parieto-occipital tumor. Decreased focal mass effect and slightly decreased mild midline shift. No significant hemorrhage or additional complicating feature.   PATHOLOGY: Glioblastoma, IDH1-R132H wildtype (CNS WHO Grade 4).    MGMT promoter unmethylated.   -6/25/2024 NEURO-ONC: Recommending chemoradiotherapy with concurrent temozolomide. U/S + for DVT; started anticoagulation.   -7/26/2024 NEURO-ONC: She has started chemoradiotherapy with concurrent temozolomide. Acute visit for concerns; increased Keppra to 750 mg BID.   -7/23 - 9/6/2024 CHEMORT: Complete chemoradiotherapy. 60Gy in 30 fractions with concurrent temozolomide 75mg/m2 (160mg).  -10/1/2024 NEURO-ONC/ MRB/ CHEMO: Clinically well; reduced frequency of auras. Imaging with no concerns, initial positive response to treatment. Starting adjuvant temozolomide 150mg/m2 (320mg), cycle 1 (start date 10/4). Not pursuing Optune.   -10/30//2024 NEURO-ONC/CHEMO: Tolerated temozolomide well. Neurologically stable. Increase adjuvant temozolomide to 200mg/m2 (430mg), cycle 2 (start date 11/1).  -11/26/2024 NEURO-ONC/ MRB/ CHEMO: No new neurological concerns. Itchy skin. Imaging with no new overt concerns. Adjuvant temozolomide 200mg/m2 (430mg), cycle 3.   -12/23/2024 NEURO-ONC/ CHEMO: No new neurological concerns. Itchy skin persists and she did present to the emergency room for itchy skin plus rash.  We reviewed this as above.  It does not seem to be associated with when she is taking temozolomide.  Adjuvant temozolomide 200mg/m2 (430mg), cycle 4.   -1/21/2025 NEURO-ONC/ MRB/ CHEMO: No new neurological concerns. Itchy skin. Imaging with no new overt concerns. Adjuvant temozolomide 1500mg/m2 (320mg), cycle 5 (dose reduce in the setting of skin rash).   -2/21/2025 NEURO-ONC/ CHEMO: No new neurological concerns. Itchy  "skin. Imaging with no new overt concerns. Adjuvant temozolomide 1500mg/m2 (320mg), cycle 6 (dose reduce in the setting of skin rash).       PHYSICAL EXAMINATION  There were no vitals taken for this visit.  Wt Readings from Last 2 Encounters:   01/21/25 93.4 kg (206 lb)   12/23/24 94.3 kg (208 lb)      Ht Readings from Last 2 Encounters:   12/23/24 1.727 m (5' 8\")   10/30/24 1.702 m (5' 7\")     KPS:     General: Appears well in no acute distress.  Psych: Affect appropriate. Pleasant  Neurologic:   MENTAL STATUS:     Alert, oriented.    Speech fluent.    Comprehension intact.     CRANIAL NERVES:     Left homonymous hemianopsia   Hearing intact.   No dysarthria.   MOTOR    Mild no-no head tremor  GAIT: Steady and unassisted.       MEDICAL RECORDS  Personally reviewed; oncology notes, MyChart and pharmacy messages.     LABS  Personally reviewed; CBC (platelets 259) and CMP (AST/ ALT 30/27) from today.      IMAGING  No new imaging to review today.    IMPRESSION  On date of service, 55 minutes was spent in clinic and 13 minutes was spent preparing for the visit through extensive chart review and coordinating care for this high complexity visit. The following is in explanation for the recommendations used to define the plan.       Pamella continues to tolerate her cycles of temozolomide well, aside from some mild fatigue and this ongoing, intermittent rash. Pamella endorses that the rash is itchy and bothersome, but tolerable with application of skin lotion. In December, Pamella was seen in the emergency room for evaluation and management of a rash, which resolved with use of antihistamines. She was since evaluated by dermatology and the impression was that it was a delayed reaction to her chemotherapy.      She endorses no new neurological concerns. No change to her known visual field cut.     Imaging as detailed above is largely stable with no significant findings. Therefore, the plan is to repeat imaging per NCCN " guidelines in 2 months.     In the meantime, the plan is to start cycle 5 of adjuvant temozolomide. However, given the potential association between the higher chemotherapy dosing and the bothersome cyclic rash, will dose reduce to 150mg/m2 for this cycle. If the rash improves/ resolves, will continue at 150mg/m2 dosing for cycle 6. If the rash persists despite the dose reduction, can consider a return to 200mg/m2 for cycle 6. Toxicity of chemotherapy reviewed and anticipated side effects can include nausea, constipation, skin rash, and hematologic intolerance. Her labs from today show no concerns; platelet count > 130K. We will continue with intensive clinical and laboratory monitoring for toxicity per protocol. I updated RNCC, pharmacy, and RADHA to this plan.     PROBLEM LIST  Glioblastoma  Headaches  Homonymous hemianopsia, left-side  Benign essential tremor  Temporal lobe auras    PLAN  -CANCER DIRECTED THERAPY-  -Adjuvant temozolomide at 150mg/m2 (320mg), cycle 6 (start date of 2/21.    Supportive medications; Zofran and bowel regimen.     -Repeat 28 day cycle if WBC >= 3, ANC >= 1.5, HgB >= 10, and platelets >= 100.   Surveillance labs reviewed, at goal for chemotherapy.   Will continue every 2 weeks CBC and CMP every 4 weeks.    -Repeat imaging in 1 month.  -Not pursuing use of Opune.      -STEROIDS-  -Off dexamethasone.    -SEIZURE MANAGEMENT-  -Stereotyped events that seem consistent with a history of non-dominant temporal lobe auras/ seizures.   On Keppra 750 mg twice daily.  -She has not had any auras recently.    -Quality of life/ MOOD/ FATIGUE-  -History of depression.    Self-discontinued Zoloft.    -VISUAL FIELD CUT-  -Left Homonymous hemianopsia.  -Evaluated by neuro-ophtho for thorough eye examination and baseline visual field testing to determine if there are any driving restrictions.      -LE DVT-  -Lifelong anticoagulation indicated.    On Xarelto; continue as directed.   -No bleeding concerns  today.    Return to clinic in ~4 weeks with FATEMEH Silvestre + labs.    In the meantime, Pamella knows to call the clinic with any concerns and she can be seen sooner if needed.     The longitudinal plan of care for the diagnosis(es)/condition(s) as documented were addressed during this visit. Due to the added complexity in care, I will continue to support Pamella in the subsequent management and with ongoing continuity of care.     Patient was also seen and examined by Dr. Gardenia Small, Neurology Resident, under my direct supervision.    FATEMEH Silvestre  Neuro-oncology

## 2025-03-10 ENCOUNTER — DOCUMENTATION ONLY (OUTPATIENT)
Dept: PHARMACY | Facility: CLINIC | Age: 60
End: 2025-03-10
Payer: COMMERCIAL

## 2025-03-10 NOTE — PROGRESS NOTES
"Oral Chemotherapy Monitoring Program  Lab Follow Up    Reviewed lab results from 3/7.    Assessment & Plan:  CMP and CBC w/diff reviewed. Results are concerning for grade 2 neutropenia. Patient is on \"off\" time of therapy. Will look for ANC to improve to at least 1.5 prior to starting next cycle. Ok to proceed with temozolomide.     Follow-Up:  3/18 labs and appt with Dr. Juan C Roldan, PharmD  Hematology/Oncology Clinical Pharmacist  [facility]  [phone]        12/13/2024     2:00 PM 1/13/2025     9:00 AM 1/21/2025     4:00 PM 1/30/2025    11:00 AM 2/7/2025     1:00 PM 2/21/2025    12:00 PM 3/10/2025    10:00 AM   ORAL CHEMOTHERAPY   Assessment Type Lab Monitoring Lab Monitoring Refill Initial Follow up Lab Monitoring Refill;Chart Review Lab Monitoring   Diagnosis Code Brain Cancer - Glioblastoma Brain Cancer - Glioblastoma Brain Cancer - Glioblastoma Brain Cancer - Glioblastoma Brain Cancer - Glioblastoma Brain Cancer - Glioblastoma Brain Cancer - Glioblastoma   Providers Dr. Juan C Irizarry   Clinic Name/Location [facility]   Drug Name Temodar (temozolomide) Temodar (temozolomide) Temodar (temozolomide) Temodar (temozolomide) Temodar (temozolomide) Temodar (temozolomide) Temodar (temozolomide)   Dose Other: Other: 320 mg 320 mg 320 mg 320 mg 320 mg   Current Schedule Daily Daily Daily Daily Daily Daily Daily   Cycle Details 5 days on, 23 days off 5 days on, 23 days off 5 days on, 23 days off 5 days on, 23 days off 5 days on, 23 days off 5 days on, 23 days off 5 days on, 23 days off   Start Date of Last Cycle 11/29/2024 1/24/2025 1/24/2025 2/21/2025 2/21/2025   Planned next cycle start date 12/27/2024      3/20/2025   Doses missed in last 2 weeks    0      Adherence Assessment    Adherent      Adverse Effects Neutropenia Neutropenia  Rash Neutropenia Neutropenia    Rash   "  --      Pharmacist Intervention(Rash)    No      Neutropenia Grade 2 Grade 2   Grade 2 Grade 2    Pharmacist Intervention(neutropenia) No No   No No    Any new drug interactions?    No          Labs:  _  Result Component Current Result Ref Range   Sodium 140 (3/7/2025) 135 - 145 mmol/L     _  Result Component Current Result Ref Range   Potassium 4.3 (3/7/2025) 3.4 - 5.3 mmol/L     _  Result Component Current Result Ref Range   Calcium 10.0 (3/7/2025) 8.8 - 10.4 mg/dL     No results found for Mag within last 30 days.     No results found for Phos within last 30 days.     _  Result Component Current Result Ref Range   Albumin 4.3 (3/7/2025) 3.5 - 5.2 g/dL     _  Result Component Current Result Ref Range   Urea Nitrogen 12.7 (3/7/2025) 8.0 - 23.0 mg/dL     _  Result Component Current Result Ref Range   Creatinine 0.66 (3/7/2025) 0.51 - 0.95 mg/dL     _  Result Component Current Result Ref Range   AST 25 (3/7/2025) 0 - 45 U/L     _  Result Component Current Result Ref Range   ALT 22 (3/7/2025) 0 - 50 U/L     _  Result Component Current Result Ref Range   Bilirubin Total 0.3 (3/7/2025) <=1.2 mg/dL     _  Result Component Current Result Ref Range   WBC Count 2.8 (L) (3/7/2025) 4.0 - 11.0 10e3/uL     _  Result Component Current Result Ref Range   Hemoglobin 13.3 (3/7/2025) 11.7 - 15.7 g/dL     _  Result Component Current Result Ref Range   Platelet Count 201 (3/7/2025) 150 - 450 10e3/uL     _  Result Component Current Result Ref Range   Absolute Neutrophils 1.3 (L) (3/7/2025) 1.6 - 8.3 10e3/uL

## 2025-03-12 NOTE — PROGRESS NOTES
NEURO-ONCOLOGY VISIT  Mar 18, 2025    CHIEF COMPLAINT: Ms. 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 Pamella completed standard chemoradiotherapy on 9/6/2024. Post-radiation imaging demonstrated no new concerns with an initial positive response to treatment.     Pamella initiated adjuvant therapy with temozolomide in 9/2024 and repeat imaging in 11/2024 and again in 1/2025 was largely stable with no significant concerns. She completed the planned 6th cycle of adjuvant temozolomide in 2/2025 and repeat imaging in 3/2025 showed subtle evolving changes of indeterminate significance that require close imaging surveillance. The plan is to start imaging surveillance for close monitoring.    Pamella presents today in follow-up. She is not accompanied.    INTERVAL HISTORY  -Pamella reports that the rash and itching of her skin has resolved.    No longer needing lotion. Taking Zyrtec.   -Appetite is good.    No lingering nausea or constipation.   -No fatigue, good energy.   -No new weakness, numbness, vision changes, or changes in cognition.    Denies seizure activity.    -Continued neck pain, sore muscles. Exacerbated poor position of her head/ neck while sleep or when lounging.   Using a heating pad.        MEDICATIONS   Current Outpatient Medications   Medication Sig Dispense Refill    acetaminophen (TYLENOL) 500 MG tablet Take 500-1,000 mg by mouth.      fluticasone (FLONASE) 50 MCG/ACT nasal spray SHAKE LIQUID AND USE 1 SPRAY IN EACH NOSTRIL DAILY 16 g 0    levETIRAcetam (KEPPRA) 750 MG tablet Take 1 tablet (750 mg) by mouth 2 times daily. 60 tablet 11    ondansetron (ZOFRAN) 4 MG tablet Take 1 tablet (4 mg) by mouth at bedtime. Take 30-60 mins before each dose of temozolomide. Can take every 8 hours if needed. 30 tablet 3    rivaroxaban ANTICOAGULANT (XARELTO) 20 MG TABS tablet  Take 1 tablet (20 mg) by mouth daily (with dinner). 30 tablet 11     DRUG ALLERGIES   Allergies   Allergen Reactions    Morphine     Penicillins Hives       IMMUNIZATIONS   Immunization History   Administered Date(s) Administered    COVID-19 Monovalent 18+ (Moderna) 05/04/2021, 06/01/2021, 01/28/2022    Influenza Vaccine 18-64 (Flublok) 01/14/2022    TDAP (Adacel,Boostrix) 01/04/2010, 01/04/2016     ONCOLOGIC HISTORY  -PRESENTATION: Neck pain, headache and head tremor.  -3/15/2023 MRI brain imaging with a mild asymmetric T2 hyperintense signal along right hippocampus and adjacent parahippocampal gyrus.    -PRESENTATION: Dizziness and nausea; semiology is concerning for temporal lobe auras. Slurred speech.  -5/27/2024 MR brain imaging with a right posterior temporo-occipital mass. Associated nodular peripheral enhancement and surrounding confluent T2 signal hyperintensity extending along the medial aspect of the right temporal lobe. Mass effect results in regional sulcal effacement, leftward midline shift, partial effacement the right lateral ventricle and mild right uncal herniation.   -5/28/2024 SURGERY: Craniotomy for mass resection at [facility].   No post-operative MR brain imaging performed.   Post-operative CT head imaging from 5/29 status post a right parietal craniotomy with excision of previously demonstrated paramedian parieto-occipital tumor. Decreased focal mass effect and slightly decreased mild midline shift. No significant hemorrhage or additional complicating feature.   PATHOLOGY: Glioblastoma, IDH1-R132H wildtype (CNS WHO Grade 4).    MGMT promoter unmethylated.   -6/25/2024 NEURO-ONC: Recommending chemoradiotherapy with concurrent temozolomide. U/S + for DVT; started anticoagulation.   -7/26/2024 NEURO-ONC: She has started chemoradiotherapy with concurrent temozolomide. Acute visit for concerns; increased Keppra to 750 mg BID.   -7/23 - 9/6/2024 CHEMORT: Complete chemoradiotherapy.  "60Gy in 30 fractions with concurrent temozolomide 75mg/m2 (160mg).  -10/1/2024 NEURO-ONC/ MRB/ CHEMO: Clinically well; reduced frequency of auras. Imaging with no concerns, initial positive response to treatment. Starting adjuvant temozolomide 150mg/m2 (320mg), cycle 1 (start date 10/4). Not pursuing Optune.   -10/30//2024 NEURO-ONC/CHEMO: Tolerated temozolomide well. Neurologically stable. Increase adjuvant temozolomide to 200mg/m2 (430mg), cycle 2 (start date 11/1).  -11/26/2024 NEURO-ONC/ MRB/ CHEMO: No new neurological concerns. Itchy skin. Imaging with no new overt concerns. Adjuvant temozolomide 200mg/m2 (430mg), cycle 3.   -12/23/2024 NEURO-ONC/ CHEMO: No new neurological concerns. Itchy skin persists and she did present to the emergency room for itchy skin plus rash.  We reviewed this as above.  It does not seem to be associated with when she is taking temozolomide.  Adjuvant temozolomide 200mg/m2 (430mg), cycle 4.   -1/21/2025 NEURO-ONC/ MRB/ CHEMO: No new neurological concerns. Itchy skin. Imaging with no new overt concerns. Adjuvant temozolomide 1500mg/m2 (320mg), cycle 5 (dose reduce in the setting of skin rash).   -2/21/2025 NEURO-ONC/ CHEMO: No new neurological concerns.  Adjuvant temozolomide 1500mg/m2 (320mg), cycle 6 (dose reduce in the setting of skin rash).   -3/18/2025 NEURO-ONC/ MRB: No new neurological concerns. Neck muscle soreness. Imaging with subtle evolving changes of indeterminate significance that require close imaging surveillance; repeat imaging in 6 weeks.      PHYSICAL EXAMINATION  /78   Pulse 68   Temp 98.1  F (36.7  C) (Oral)   Resp 14   Wt 96.6 kg (213 lb)   SpO2 99%   BMI 33.36 kg/m    Wt Readings from Last 2 Encounters:   03/18/25 96.6 kg (213 lb)   02/21/25 94.3 kg (208 lb)      Ht Readings from Last 2 Encounters:   02/21/25 1.702 m (5' 7\")   12/23/24 1.727 m (5' 8\")     KPS:     General: Appears well in no acute distress.  Psych: Affect appropriate. " "Pleasant  Neurologic:   MENTAL STATUS:     Alert, oriented.    Speech fluent.    Comprehension intact.     CRANIAL NERVES:     Left homonymous hemianopsia   Hearing intact.   No dysarthria.   MOTOR    Mild no-no head tremor       MEDICAL RECORDS  Personally reviewed; oncology notes, MyChart and pharmacy messages.     LABS  Personally reviewed; CBC (platelets 235) and CMP (AST/ ALT 25/22) from today.      IMAGING  Personally reviewed MR brain imaging from today and compared to prior imaging.    Formal read; \"1.  Slight interval increase in the focus of heterogeneous enhancement involving the medial right occipital lobe compared to 1/21/2025. This may reflect treatment related change, but continued follow-up is advised. 2.  No significant change in masslike T2/FLAIR hyperintensity within the right occipital lobe and extending anteriorly along the inferior right temporal lobe into the right hippocampus. 3.  No superimposed acute intracranial process or significant interval change elsewhere. 4.  Persistent small right mastoid effusion.\"     Imaging was shown to and results were reviewed with Pamella.       IMPRESSION  On date of service, 44 minutes was spent in clinic and 13 minutes was spent preparing for the visit through extensive chart review and coordinating care for this high complexity visit. The following is in explanation for the recommendations used to define the plan.       Pamella has no new neurological concerns and denies any lingering side effects to chemotherapy, including resolution of skin dryness/ rash.     Imaging as detailed above demonstrates subtle evolving changes about the right occipital region that are of indeterminate significance. While there is a decrease in the degree of nodular enhancement in the anterior portion, there is an increase in contrast enhancement posteriorly. There is no conveniencing increase in perfusion nor a significant increase in T2 FLAIR. While treatment-/ radiation-induced " injury remains on the differential, as does cancer progression. I personally reviewed Pamella's imaging and case at Brain Tumor Conference and all in attendance were in agreement with this impression that close imaging surveillance would be necessary. So, in the setting of both clinical and having completed the planned 6 cycles of adjuvant chemotherapy, the plan is to initiate imaging surveillance per NCCN guidelines, but I will repeat imaging at a shorter interval of 6 weeks. In the meantime, Pamella knows to call the clinic with any concerns and appointments can be moved up if needed.       CBC and CMP from today reviewed and without concerns. Will repeat labs again at her next follow-up visit and if stable, there will be no need to continue to monitor while off chemotherapy.      PROBLEM LIST  Glioblastoma  Headaches  Homonymous hemianopsia, left-side  Benign essential tremor  Temporal lobe auras    PLAN  -CANCER DIRECTED THERAPY-  -Starting imaging surveillance; repeat imaging in 6 weeks.  -Not pursuing use of Opune.      -STEROIDS-  -Off dexamethasone.    -SEIZURE MANAGEMENT-  -Stereotyped events that seem consistent with a history of non-dominant temporal lobe auras/ seizures.   On Keppra 750 mg twice daily.  -She has not had any auras recently.    -Quality of life/ MOOD/ FATIGUE-  -History of depression.    Self-discontinued Zoloft.    -VISUAL FIELD CUT-  -Left Homonymous hemianopsia.  -Evaluated by neuro-ophtho for thorough eye examination and baseline visual field testing to determine if there are any driving restrictions.      -LE DVT-  -Lifelong anticoagulation indicated.    On Xarelto; continue as directed.   -No bleeding concerns today.    Return to clinic in late April 2025 + labs and imaging.    In the meantime, Pamella knows to call the clinic with any concerns and she can be seen sooner if needed.     The longitudinal plan of care for the diagnosis(es)/condition(s) as documented were addressed during this  visit. Due to the added complexity in care, I will continue to support Pamella in the subsequent management and with ongoing continuity of care.     Maribell Irizarry MD  Neuro-oncology

## 2025-03-18 ENCOUNTER — LAB (OUTPATIENT)
Dept: LAB | Facility: CLINIC | Age: 60
End: 2025-03-18
Attending: NURSE PRACTITIONER
Payer: COMMERCIAL

## 2025-03-18 ENCOUNTER — ONCOLOGY VISIT (OUTPATIENT)
Dept: ONCOLOGY | Facility: CLINIC | Age: 60
End: 2025-03-18
Attending: NURSE PRACTITIONER
Payer: COMMERCIAL

## 2025-03-18 ENCOUNTER — HOSPITAL ENCOUNTER (OUTPATIENT)
Dept: MRI IMAGING | Facility: CLINIC | Age: 60
Discharge: HOME | End: 2025-03-18
Attending: NURSE PRACTITIONER
Payer: COMMERCIAL

## 2025-03-18 VITALS
WEIGHT: 213 LBS | BODY MASS INDEX: 33.36 KG/M2 | RESPIRATION RATE: 14 BRPM | DIASTOLIC BLOOD PRESSURE: 78 MMHG | TEMPERATURE: 98.1 F | SYSTOLIC BLOOD PRESSURE: 118 MMHG | HEART RATE: 68 BPM | OXYGEN SATURATION: 99 %

## 2025-03-18 DIAGNOSIS — C71.9 GLIOBLASTOMA (H): Primary | ICD-10-CM

## 2025-03-18 DIAGNOSIS — C71.9 GLIOBLASTOMA (H): ICD-10-CM

## 2025-03-18 DIAGNOSIS — T45.1X5A ANTINEOPLASTIC CHEMOTHERAPY INDUCED PANCYTOPENIA: ICD-10-CM

## 2025-03-18 DIAGNOSIS — D61.810 ANTINEOPLASTIC CHEMOTHERAPY INDUCED PANCYTOPENIA: ICD-10-CM

## 2025-03-18 DIAGNOSIS — R11.2 CHEMOTHERAPY-INDUCED NAUSEA AND VOMITING: ICD-10-CM

## 2025-03-18 DIAGNOSIS — T45.1X5A CHEMOTHERAPY-INDUCED NAUSEA AND VOMITING: ICD-10-CM

## 2025-03-18 LAB
ALBUMIN SERPL BCG-MCNC: 4.3 G/DL (ref 3.5–5.2)
ALP SERPL-CCNC: 101 U/L (ref 40–150)
ALT SERPL W P-5'-P-CCNC: 22 U/L (ref 0–50)
ANION GAP SERPL CALCULATED.3IONS-SCNC: 11 MMOL/L (ref 7–15)
AST SERPL W P-5'-P-CCNC: 25 U/L (ref 0–45)
BASOPHILS # BLD AUTO: 0 10E3/UL (ref 0–0.2)
BASOPHILS NFR BLD AUTO: 1 %
BILIRUB SERPL-MCNC: 0.3 MG/DL
BUN SERPL-MCNC: 11 MG/DL (ref 8–23)
CALCIUM SERPL-MCNC: 9.7 MG/DL (ref 8.8–10.4)
CHLORIDE SERPL-SCNC: 104 MMOL/L (ref 98–107)
CREAT SERPL-MCNC: 0.65 MG/DL (ref 0.51–0.95)
EGFRCR SERPLBLD CKD-EPI 2021: >90 ML/MIN/1.73M2
EOSINOPHIL # BLD AUTO: 0.2 10E3/UL (ref 0–0.7)
EOSINOPHIL NFR BLD AUTO: 4 %
ERYTHROCYTE [DISTWIDTH] IN BLOOD BY AUTOMATED COUNT: 12.5 % (ref 10–15)
GLUCOSE SERPL-MCNC: 95 MG/DL (ref 70–99)
HCO3 SERPL-SCNC: 25 MMOL/L (ref 22–29)
HCT VFR BLD AUTO: 37.3 % (ref 35–47)
HGB BLD-MCNC: 12.9 G/DL (ref 11.7–15.7)
IMM GRANULOCYTES # BLD: 0 10E3/UL
IMM GRANULOCYTES NFR BLD: 0 %
LYMPHOCYTES # BLD AUTO: 1.2 10E3/UL (ref 0.8–5.3)
LYMPHOCYTES NFR BLD AUTO: 35 %
MCH RBC QN AUTO: 31.6 PG (ref 26.5–33)
MCHC RBC AUTO-ENTMCNC: 34.6 G/DL (ref 31.5–36.5)
MCV RBC AUTO: 91 FL (ref 78–100)
MONOCYTES # BLD AUTO: 0.4 10E3/UL (ref 0–1.3)
MONOCYTES NFR BLD AUTO: 11 %
NEUTROPHILS # BLD AUTO: 1.7 10E3/UL (ref 1.6–8.3)
NEUTROPHILS NFR BLD AUTO: 49 %
NRBC # BLD AUTO: 0 10E3/UL
NRBC BLD AUTO-RTO: 0 /100
PLATELET # BLD AUTO: 235 10E3/UL (ref 150–450)
POTASSIUM SERPL-SCNC: 4.3 MMOL/L (ref 3.4–5.3)
PROT SERPL-MCNC: 7.1 G/DL (ref 6.4–8.3)
RBC # BLD AUTO: 4.08 10E6/UL (ref 3.8–5.2)
SODIUM SERPL-SCNC: 140 MMOL/L (ref 135–145)
WBC # BLD AUTO: 3.5 10E3/UL (ref 4–11)

## 2025-03-18 PROCEDURE — 70553 MRI BRAIN STEM W/O & W/DYE: CPT

## 2025-03-18 PROCEDURE — 99215 OFFICE O/P EST HI 40 MIN: CPT | Performed by: PSYCHIATRY & NEUROLOGY

## 2025-03-18 PROCEDURE — 36415 COLL VENOUS BLD VENIPUNCTURE: CPT

## 2025-03-18 PROCEDURE — 82374 ASSAY BLOOD CARBON DIOXIDE: CPT

## 2025-03-18 PROCEDURE — 85025 COMPLETE CBC W/AUTO DIFF WBC: CPT

## 2025-03-18 PROCEDURE — 99417 PROLNG OP E/M EACH 15 MIN: CPT | Performed by: PSYCHIATRY & NEUROLOGY

## 2025-03-18 PROCEDURE — 82310 ASSAY OF CALCIUM: CPT

## 2025-03-18 PROCEDURE — G0463 HOSPITAL OUTPT CLINIC VISIT: HCPCS | Performed by: PSYCHIATRY & NEUROLOGY

## 2025-03-18 PROCEDURE — A9585 GADOBUTROL INJECTION: HCPCS | Performed by: NURSE PRACTITIONER

## 2025-03-18 PROCEDURE — G2211 COMPLEX E/M VISIT ADD ON: HCPCS | Performed by: PSYCHIATRY & NEUROLOGY

## 2025-03-18 PROCEDURE — 255N000002 HC RX 255 OP 636: Performed by: NURSE PRACTITIONER

## 2025-03-18 RX ORDER — GADOBUTROL 604.72 MG/ML
15 INJECTION INTRAVENOUS ONCE
Status: COMPLETED | OUTPATIENT
Start: 2025-03-18 | End: 2025-03-18

## 2025-03-18 RX ADMIN — GADOBUTROL 15 ML: 604.72 INJECTION INTRAVENOUS at 11:35

## 2025-03-18 ASSESSMENT — PAIN SCALES - GENERAL: PAINLEVEL_OUTOF10: NO PAIN (0)

## 2025-03-18 NOTE — LETTER
3/18/2025      Pamella Jaimes  [address] - [place], [place] [address]      Dear Colleague,    Thank you for referring your patient, Pamella Jaimes, to the [facility]. Please see a copy of my visit note below.    NEURO-ONCOLOGY VISIT  Mar 18, 2025    CHIEF COMPLAINT: Ms. 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 Pamella completed standard chemoradiotherapy on 9/6/2024. Post-radiation imaging demonstrated no new concerns with an initial positive response to treatment.     Pamella initiated adjuvant therapy with temozolomide in 9/2024 and repeat imaging in 11/2024 and again in 1/2025 was largely stable with no significant concerns. She completed the planned 6th cycle of adjuvant temozolomide in 2/2025 and repeat imaging in 3/2025 showed subtle evolving changes of indeterminate significance that require close imaging surveillance. The plan is to start imaging surveillance for close monitoring.    Pamella presents today in follow-up. She is not accompanied.    INTERVAL HISTORY  -Pamella reports that the rash and itching of her skin has resolved.    No longer needing lotion. Taking Zyrtec.   -Appetite is good.    No lingering nausea or constipation.   -No fatigue, good energy.   -No new weakness, numbness, vision changes, or changes in cognition.    Denies seizure activity.    -Continued neck pain, sore muscles. Exacerbated poor position of her head/ neck while sleep or when lounging.   Using a heating pad.        MEDICATIONS   Current Outpatient Medications   Medication Sig Dispense Refill     acetaminophen (TYLENOL) 500 MG tablet Take 500-1,000 mg by mouth.       fluticasone (FLONASE) 50 MCG/ACT nasal spray SHAKE LIQUID AND USE 1 SPRAY IN EACH NOSTRIL DAILY 16 g 0     levETIRAcetam (KEPPRA) 750 MG tablet Take 1 tablet (750 mg) by mouth 2 times daily. 60  tablet 11     ondansetron (ZOFRAN) 4 MG tablet Take 1 tablet (4 mg) by mouth at bedtime. Take 30-60 mins before each dose of temozolomide. Can take every 8 hours if needed. 30 tablet 3     rivaroxaban ANTICOAGULANT (XARELTO) 20 MG TABS tablet Take 1 tablet (20 mg) by mouth daily (with dinner). 30 tablet 11     DRUG ALLERGIES   Allergies   Allergen Reactions     Morphine      Penicillins Hives       IMMUNIZATIONS   Immunization History   Administered Date(s) Administered     COVID-19 Monovalent 18+ (Moderna) 05/04/2021, 06/01/2021, 01/28/2022     Influenza Vaccine 18-64 (Flublok) 01/14/2022     TDAP (Adacel,Boostrix) 01/04/2010, 01/04/2016     ONCOLOGIC HISTORY  -PRESENTATION: Neck pain, headache and head tremor.  -3/15/2023 MRI brain imaging with a mild asymmetric T2 hyperintense signal along right hippocampus and adjacent parahippocampal gyrus.    -PRESENTATION: Dizziness and nausea; semiology is concerning for temporal lobe auras. Slurred speech.  -5/27/2024 MR brain imaging with a right posterior temporo-occipital mass. Associated nodular peripheral enhancement and surrounding confluent T2 signal hyperintensity extending along the medial aspect of the right temporal lobe. Mass effect results in regional sulcal effacement, leftward midline shift, partial effacement the right lateral ventricle and mild right uncal herniation.   -5/28/2024 SURGERY: Craniotomy for mass resection at [facility].   No post-operative MR brain imaging performed.   Post-operative CT head imaging from 5/29 status post a right parietal craniotomy with excision of previously demonstrated paramedian parieto-occipital tumor. Decreased focal mass effect and slightly decreased mild midline shift. No significant hemorrhage or additional complicating feature.   PATHOLOGY: Glioblastoma, IDH1-R132H wildtype (CNS WHO Grade 4).    MGMT promoter unmethylated.   -6/25/2024 NEURO-ONC: Recommending chemoradiotherapy with concurrent  temozolomide. U/S + for DVT; started anticoagulation.   -7/26/2024 NEURO-ONC: She has started chemoradiotherapy with concurrent temozolomide. Acute visit for concerns; increased Keppra to 750 mg BID.   -7/23 - 9/6/2024 CHEMORT: Complete chemoradiotherapy. 60Gy in 30 fractions with concurrent temozolomide 75mg/m2 (160mg).  -10/1/2024 NEURO-ONC/ MRB/ CHEMO: Clinically well; reduced frequency of auras. Imaging with no concerns, initial positive response to treatment. Starting adjuvant temozolomide 150mg/m2 (320mg), cycle 1 (start date 10/4). Not pursuing Optune.   -10/30//2024 NEURO-ONC/CHEMO: Tolerated temozolomide well. Neurologically stable. Increase adjuvant temozolomide to 200mg/m2 (430mg), cycle 2 (start date 11/1).  -11/26/2024 NEURO-ONC/ MRB/ CHEMO: No new neurological concerns. Itchy skin. Imaging with no new overt concerns. Adjuvant temozolomide 200mg/m2 (430mg), cycle 3.   -12/23/2024 NEURO-ONC/ CHEMO: No new neurological concerns. Itchy skin persists and she did present to the emergency room for itchy skin plus rash.  We reviewed this as above.  It does not seem to be associated with when she is taking temozolomide.  Adjuvant temozolomide 200mg/m2 (430mg), cycle 4.   -1/21/2025 NEURO-ONC/ MRB/ CHEMO: No new neurological concerns. Itchy skin. Imaging with no new overt concerns. Adjuvant temozolomide 1500mg/m2 (320mg), cycle 5 (dose reduce in the setting of skin rash).   -2/21/2025 NEURO-ONC/ CHEMO: No new neurological concerns.  Adjuvant temozolomide 1500mg/m2 (320mg), cycle 6 (dose reduce in the setting of skin rash).   -3/18/2025 NEURO-ONC/ MRB: No new neurological concerns. Neck muscle soreness. Imaging with subtle evolving changes of indeterminate significance that require close imaging surveillance; repeat imaging in 6 weeks.      PHYSICAL EXAMINATION  /78   Pulse 68   Temp 98.1  F (36.7  C) (Oral)   Resp 14   Wt 96.6 kg (213 lb)   SpO2 99%   BMI 33.36 kg/m    Wt Readings from Last 2  "Encounters:   03/18/25 96.6 kg (213 lb)   02/21/25 94.3 kg (208 lb)      Ht Readings from Last 2 Encounters:   02/21/25 1.702 m (5' 7\")   12/23/24 1.727 m (5' 8\")     KPS:     General: Appears well in no acute distress.  Psych: Affect appropriate. Pleasant  Neurologic:   MENTAL STATUS:     Alert, oriented.    Speech fluent.    Comprehension intact.     CRANIAL NERVES:     Left homonymous hemianopsia   Hearing intact.   No dysarthria.   MOTOR    Mild no-no head tremor       MEDICAL RECORDS  Personally reviewed; oncology notes, MyChart and pharmacy messages.     LABS  Personally reviewed; CBC (platelets 235) and CMP (AST/ ALT 25/22) from today.      IMAGING  Personally reviewed MR brain imaging from today and compared to prior imaging.    Formal read; \"1.  Slight interval increase in the focus of heterogeneous enhancement involving the medial right occipital lobe compared to 1/21/2025. This may reflect treatment related change, but continued follow-up is advised. 2.  No significant change in masslike T2/FLAIR hyperintensity within the right occipital lobe and extending anteriorly along the inferior right temporal lobe into the right hippocampus. 3.  No superimposed acute intracranial process or significant interval change elsewhere. 4.  Persistent small right mastoid effusion.\"     Imaging was shown to and results were reviewed with Pamella.       IMPRESSION  On date of service, 44 minutes was spent in clinic and 13 minutes was spent preparing for the visit through extensive chart review and coordinating care for this high complexity visit. The following is in explanation for the recommendations used to define the plan.       Pamella has no new neurological concerns and denies any lingering side effects to chemotherapy, including resolution of skin dryness/ rash.     Imaging as detailed above demonstrates subtle evolving changes about the right occipital region that are of indeterminate significance. While there is " a decrease in the degree of nodular enhancement in the anterior portion, there is an increase in contrast enhancement posteriorly. There is no conveniencing increase in perfusion nor a significant increase in T2 FLAIR. While treatment-/ radiation-induced injury remains on the differential, as does cancer progression. I personally reviewed Pamella's imaging and case at Brain Tumor Conference and all in attendance were in agreement with this impression that close imaging surveillance would be necessary. So, in the setting of both clinical and having completed the planned 6 cycles of adjuvant chemotherapy, the plan is to initiate imaging surveillance per NCCN guidelines, but I will repeat imaging at a shorter interval of 6 weeks. In the meantime, Pamella knows to call the clinic with any concerns and appointments can be moved up if needed.       CBC and CMP from today reviewed and without concerns. Will repeat labs again at her next follow-up visit and if stable, there will be no need to continue to monitor while off chemotherapy.      PROBLEM LIST  Glioblastoma  Headaches  Homonymous hemianopsia, left-side  Benign essential tremor  Temporal lobe auras    PLAN  -CANCER DIRECTED THERAPY-  -Starting imaging surveillance; repeat imaging in 6 weeks.  -Not pursuing use of Opune.      -STEROIDS-  -Off dexamethasone.    -SEIZURE MANAGEMENT-  -Stereotyped events that seem consistent with a history of non-dominant temporal lobe auras/ seizures.   On Keppra 750 mg twice daily.  -She has not had any auras recently.    -Quality of life/ MOOD/ FATIGUE-  -History of depression.    Self-discontinued Zoloft.    -VISUAL FIELD CUT-  -Left Homonymous hemianopsia.  -Evaluated by neuro-ophtho for thorough eye examination and baseline visual field testing to determine if there are any driving restrictions.      -LE DVT-  -Lifelong anticoagulation indicated.    On Xarelto; continue as directed.   -No bleeding concerns today.    Return to clinic  "in late April 2025 + labs and imaging.    In the meantime, Pamella knows to call the clinic with any concerns and she can be seen sooner if needed.     The longitudinal plan of care for the diagnosis(es)/condition(s) as documented were addressed during this visit. Due to the added complexity in care, I will continue to support Pamella in the subsequent management and with ongoing continuity of care.     Maribell Irizarry MD  Neuro-oncology      Oncology Rooming Note    March 18, 2025 12:40 PM   Pamella Jaimes is a 60 year old female who presents for:    Chief Complaint   Patient presents with     Oncology Clinic Visit     Initial Vitals: /78   Pulse 68   Temp 98.1  F (36.7  C) (Oral)   Resp 14   Wt 96.6 kg (213 lb)   SpO2 99%   BMI 33.36 kg/m   Estimated body mass index is 33.36 kg/m  as calculated from the following:    Height as of 2/21/25: 1.702 m (5' 7\").    Weight as of this encounter: 96.6 kg (213 lb). Body surface area is 2.14 meters squared.  No Pain (0) Comment: Data Unavailable   No LMP recorded.  Allergies reviewed: Yes  Medications reviewed: Yes    Medications: MEDICATION REFILLS NEEDED TODAY. Provider was notified.    Zofran    Pharmacy name entered into [facility]:    [id number] [place], [place] - [address] [facility] [id number] [place], [place] - [address]  [facility] - [place], [place] - [address]    Frailty Screening:   Is the patient here for a new oncology consult visit in cancer care? 2. No    PHQ9:  Did this patient require a PHQ9?: No      Clinical concerns: no       Shari J. Schoenberger, CMA                Again, thank you for allowing me to participate in the care of your patient.        Sincerely,        Maribell Irizarry MD    Electronically signed"

## 2025-03-18 NOTE — PROGRESS NOTES
"Oncology Rooming Note    March 18, 2025 12:40 PM   Pamella Jaimes is a 60 year old female who presents for:    Chief Complaint   Patient presents with    Oncology Clinic Visit     Initial Vitals: /78   Pulse 68   Temp 98.1  F (36.7  C) (Oral)   Resp 14   Wt 96.6 kg (213 lb)   SpO2 99%   BMI 33.36 kg/m   Estimated body mass index is 33.36 kg/m  as calculated from the following:    Height as of 2/21/25: 1.702 m (5' 7\").    Weight as of this encounter: 96.6 kg (213 lb). Body surface area is 2.14 meters squared.  No Pain (0) Comment: Data Unavailable   No LMP recorded.  Allergies reviewed: Yes  Medications reviewed: Yes    Medications: MEDICATION REFILLS NEEDED TODAY. Provider was notified.    Zofran    Pharmacy name entered into [facility]:    [id number] - [place] - [address] [facility] [id number]  [place] - [address]  [facility] - [place], [place] - [address]     Frailty Screening:   Is the patient here for a new oncology consult visit in cancer care? 2. No    PHQ9:  Did this patient require a PHQ9?: No      Clinical concerns: no       Shari J. Schoenberger, [facility]              "

## 2025-03-22 ENCOUNTER — HEALTH MAINTENANCE LETTER (OUTPATIENT)
Age: 60
End: 2025-03-22

## 2025-03-24 ENCOUNTER — PATIENT OUTREACH (OUTPATIENT)
Dept: ONCOLOGY | Facility: CLINIC | Age: 60
End: 2025-03-24

## 2025-03-24 ENCOUNTER — TUMOR CONFERENCE (OUTPATIENT)
Dept: ONCOLOGY | Facility: CLINIC | Age: 60
End: 2025-03-24
Payer: COMMERCIAL

## 2025-03-24 NOTE — PROGRESS NOTES
C discussion   Indeterminate changes   Follow up with new MRI in 6 weeks  Leanne verbalizes understanding     Irene Clayton, RN, BSN  Specialty Care Coordinator  [facility]  [phone]

## 2025-04-15 ENCOUNTER — TELEPHONE (OUTPATIENT)
Dept: ONCOLOGY | Facility: CLINIC | Age: 60
End: 2025-04-15
Payer: COMMERCIAL

## 2025-04-15 DIAGNOSIS — C71.9 GLIOBLASTOMA (H): ICD-10-CM

## 2025-04-15 RX ORDER — LEVETIRACETAM 1000 MG/1
1000 TABLET ORAL 2 TIMES DAILY
Qty: 60 TABLET | Refills: 1 | Status: SHIPPED | OUTPATIENT
Start: 2025-04-15

## 2025-04-15 NOTE — TELEPHONE ENCOUNTER
Juan C, Maribell Sidhu MD  You; Ratna Hernandez, FATEMEH CNP; Irene Clayton, RAMOS6 minutes ago (2:53 PM)       Excellent history Kim.  Agree with your recommendations.  I think sleep deprivation could be a trigger.    I do want to increase Keppra dosing to 1000mg BID.  I placed a new Rx to her pharmacy; please let her know.    Maribell Irizarry MD  Neuro-oncology     Pt was notified with recommendations per  Dr Irizarry.  Patient verbalized understanding and agreement with plan.  Patient was instructed to call the clinic with any questions, concerns, or worsening symptoms.  Kim Kraft RN on 4/15/2025 at 3:05 PM

## 2025-04-15 NOTE — TELEPHONE ENCOUNTER
"Oncology Nurse Triage    Situation:   Pamella reporting the following symptoms: increase in seizure like activity    Background:   Treating Provider:   Dr Irizarry     Date of last office visit: 3/18/2025 with Dr Irizarry     Recent Treatments: on surveillance for glioblastoma with imaging scheduled on 4/28/2025    Assessment:     Pt reports increase in seizure-like activity over the past week. Describes them as \"little seizures\". States that she will get a mild ache in her abdomen, then goose bumps all over, then she \"feels icky\" for 2-3 minutes, and then she normally has one episode of diarrhea a short time later. States that these symptoms never last long.   Were previously happening 2 times per day, but today it has already happened 4 times.   States that these symptoms are just like the seizure symptoms she has had in the past, but these recent episodes have not lasted as long.   Denies loss of consciousness, fever/chills, chest pain, shortness of breath, dizziness, palpitations, weakness, or other urgent symptoms.   Denies any recent infections or use of alcohol. Has been eating and drinking well.  States that she does have a new puppy and admits that she has not been getting as much sleep.    She takes keppra 750 mg BID at 8 am and 8 pm, and states that she did miss a dose about one week ago and has also been late with a dose over the past week.     Recommendations:     Will route to Dr Irizarry for advice.  Advised to try to take keppra at scheduled times of 8 am and 8 pm.  Continue with good fluid and food intake.  Work to find ways to get adequate sleep.   Reviewed emergent symptoms that would warrant return call to clinic or evaluation in ER.    Patient verbalized understanding and agreement with plan.  Patient was instructed to call the clinic with any questions, concerns, or worsening symptoms. OK to leave message if unable to reach patient.  Kim Kraft RN on 4/15/2025 at 1:30 PM         "

## 2025-04-17 ENCOUNTER — TELEPHONE (OUTPATIENT)
Dept: ONCOLOGY | Facility: CLINIC | Age: 60
End: 2025-04-17
Payer: COMMERCIAL

## 2025-04-17 DIAGNOSIS — G93.6 BRAIN SWELLING (H): Primary | ICD-10-CM

## 2025-04-17 RX ORDER — DEXAMETHASONE 2 MG/1
2 TABLET ORAL
Qty: 30 TABLET | Refills: 1 | Status: SHIPPED | OUTPATIENT
Start: 2025-04-17

## 2025-04-17 NOTE — TELEPHONE ENCOUNTER
"Oncology Nurse Triage    Situation:   Pamella reporting the following symptoms: increase in seizure-like activity     Background:   Treating Provider:   Dr Irizarry     Date of last office visit: 3/18/2025 with Dr Irizarry     Recent Treatments:on surveillance for glioblastoma with imaging scheduled on 4/28/2025     Assessment:     Pt is calling with an update on her symptoms.  See also telephone encounter from 4/15/2025 for further details.  Pt states that she increased dose of keppra to 1000 mg BID. First dose of 1000 mg was yesterday morning, and she has continued to take it at 8 am and 8 pm daily.   Pt reports that her seizure like episodes have increased in frequency. States that her stomach feels \"icky\" most of the time. Symptoms continue to be the same: she will get a mild ache in her abdomen, then goose bumps all over, then she \"feels icky\" for 2-3 minutes, and then she normally has one episode of diarrhea a short time later.  Pt states that she has already had 4 episodes of goose bumps and feeling \"icky\" today, but she has not had any diarrhea so far today.   Pt states that it seems like her symptoms never truly go away. States that they are \"in the background\" most of the time, although she did have a period of time last night when she was feeling good. She has also been waking up with symptoms in the middle of the night.    Pt reports that she did take a 3 hour nap yesterday afternoon. She slept from 11 pm to 5 am today.   Denies chest pain, shortness of breath, fever/chills, dizziness, headaches, vision changes, loss of consciousness, weakness, numbness/tingling, vomiting, signs of infection, or other urgent symptoms.  Pt has custody of her 2 grandkids, and she does report that both of them reported stomach aches yesterday, but they are feeling fine today.     Recommendations:     Will route to Dr Irizarry for advice.  Pt is scheduled for brain MRI on  4/28/2025, and follow up appt with Dr Irizarry on 4/29/2025.  OK to " leave message if unable to reach patient.  Patient verbalized understanding and agreement with plan.  Patient was instructed to call the clinic with any questions, concerns, or worsening symptoms.  Kim Kraft RN on 4/17/2025 at 8:37 AM

## 2025-04-17 NOTE — TELEPHONE ENCOUNTER
Juan C, Maribell Sidhu MD  You; Ratna Hernandez APRN CNP; Irene Clayton RN9 minutes ago (2:03 PM)       Please have pt take an additional dose of Keppra 1000mg now and then, again at bedtime.    Starting tomorrow, can increase Keppra to 1500mg BID (1.5 tabs).    Maribell Irizarry MD  Neuro-oncology  4/17/2025     Pt was notified and agrees with plan.   She will continue to monitor and will update the clinic if symptoms do not improve.  Reviewed emergent symptoms that would warrant return call to clinic or evaluation in ER.  Patient verbalized understanding and agreement with plan.  Patient was instructed to call the clinic with any questions, concerns, or worsening symptoms.   Kim Kraft RN on 4/17/2025 at 2:19 PM

## 2025-04-17 NOTE — TELEPHONE ENCOUNTER
Juan C, Maribell Sidhu MD  You; Ratna Hernandez APRN CNP; Irene Clayton RN2 hours ago (11:28 AM)       Ok to start dexamethasone 2mg daily.  Appt and imaging with me later this month.  Please route Rx.  Maribell Irizarry MD  Neuro-oncology  4/17/2025     Pt was notified. Advised pt to take medication with food. Take in the morning with breakfast so that there is decreased chance of interrupting sleep.   Pt states that the episodes of symptoms have been happening much more frequently today since phone call earlier this morning.   States that she is having these symptoms almost every 5 minutes. She otherwise feels OK and has been able to do all of her normal activities.  Will route update to care team for advice.     Kim Kraft RN on 4/17/2025 at 1:55 PM

## 2025-04-24 NOTE — PROGRESS NOTES
NEURO-ONCOLOGY VISIT  Apr 29, 2025    CHIEF COMPLAINT: Ms. 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 Pamella completed standard chemoradiotherapy on 9/6/2024. Post-radiation imaging demonstrated no new concerns with an initial positive response to treatment.     Pamella initiated adjuvant therapy with temozolomide in 9/2024 and repeat imaging in 11/2024 and again in 1/2025 was largely stable with no significant concerns. She completed the planned 6th cycle of adjuvant temozolomide in 2/2025 and repeat imaging in 3/2025 showed subtle evolving changes of indeterminate significance that require close imaging surveillance. On repeat imaging in 4/2025, there was an increase in contrast enhancement with an associated increase in perfusion, which is concerning for local cancer recurrence. The plan is to refer to neurosurgery for consideration of a re-do craniotomy.     Pamella presents today in follow-up. She is accompanied by Leanne (daughter) and her grandson.    INTERVAL HISTORY  -Pamella reports no fatigue, good energy.   -Mood is good, but she has been feeling bit more anxious recently.  -No new weakness, numbness, vision changes, or changes in cognition.    Denies seizure activity. Increased dose Keppra has been controlling seizures.    -No more neck pain, sore muscles.  -No worsening of vision/ worsening of field cut.    No issues with telling time/ reading.   -No issues with balance.       MEDICATIONS   Current Outpatient Medications   Medication Sig Dispense Refill    acetaminophen (TYLENOL) 500 MG tablet Take 500-1,000 mg by mouth.      dexAMETHasone (DECADRON) 2 MG tablet Take 1 tablet (2 mg) by mouth daily (with breakfast). 30 tablet 1    fluticasone (FLONASE) 50 MCG/ACT nasal spray SHAKE LIQUID AND USE 1 SPRAY IN EACH NOSTRIL DAILY 16 g 0    levETIRAcetam (KEPPRA) 1000 MG  tablet Take 1 tablet (1,000 mg) by mouth 2 times daily. 60 tablet 1    ondansetron (ZOFRAN) 4 MG tablet Take 1 tablet (4 mg) by mouth at bedtime. Take 30-60 mins before each dose of temozolomide. Can take every 8 hours if needed. 30 tablet 3    rivaroxaban ANTICOAGULANT (XARELTO) 20 MG TABS tablet Take 1 tablet (20 mg) by mouth daily (with dinner). 30 tablet 11     DRUG ALLERGIES   Allergies   Allergen Reactions    Morphine     Penicillins Hives       IMMUNIZATIONS   Immunization History   Administered Date(s) Administered    COVID-19 Monovalent 18+ (Moderna) 05/04/2021, 06/01/2021, 01/28/2022    Influenza Vaccine 18-64 (Flublok) 01/14/2022    TDAP (Adacel,Boostrix) 01/04/2010, 01/04/2016     ONCOLOGIC HISTORY  -PRESENTATION: Neck pain, headache and head tremor.  -3/15/2023 MRI brain imaging with a mild asymmetric T2 hyperintense signal along right hippocampus and adjacent parahippocampal gyrus.    -PRESENTATION: Dizziness and nausea; semiology is concerning for temporal lobe auras. Slurred speech.  -5/27/2024 MR brain imaging with a right posterior temporo-occipital mass. Associated nodular peripheral enhancement and surrounding confluent T2 signal hyperintensity extending along the medial aspect of the right temporal lobe. Mass effect results in regional sulcal effacement, leftward midline shift, partial effacement the right lateral ventricle and mild right uncal herniation.   -5/28/2024 SURGERY: Craniotomy for mass resection at [facility] with Dr. Brandan Connelly.   No post-operative MR brain imaging performed.   Post-operative CT head imaging from 5/29 status post a right parietal craniotomy with excision of previously demonstrated paramedian parieto-occipital tumor. Decreased focal mass effect and slightly decreased mild midline shift. No significant hemorrhage or additional complicating feature.   PATHOLOGY: Glioblastoma, IDH1-R132H wildtype (CNS WHO Grade 4).    MGMT promoter unmethylated.    -6/25/2024 NEURO-ONC: Recommending chemoradiotherapy with concurrent temozolomide. U/S + for DVT; started anticoagulation.   -7/26/2024 NEURO-ONC: She has started chemoradiotherapy with concurrent temozolomide. Acute visit for concerns; increased Keppra to 750 mg BID.   -7/23 - 9/6/2024 CHEMORT: Complete chemoradiotherapy. 60Gy in 30 fractions with concurrent temozolomide 75mg/m2 (160mg).  -10/1/2024 NEURO-ONC/ MRB/ CHEMO: Clinically well; reduced frequency of auras. Imaging with no concerns, initial positive response to treatment. Starting adjuvant temozolomide 150mg/m2 (320mg), cycle 1 (start date 10/4). Not pursuing Optune.   -10/30//2024 NEURO-ONC/CHEMO: Tolerated temozolomide well. Neurologically stable. Increase adjuvant temozolomide to 200mg/m2 (430mg), cycle 2 (start date 11/1).  -11/26/2024 NEURO-ONC/ MRB/ CHEMO: No new neurological concerns. Itchy skin. Imaging with no new overt concerns. Adjuvant temozolomide 200mg/m2 (430mg), cycle 3.   -12/23/2024 NEURO-ONC/ CHEMO: No new neurological concerns. Itchy skin persists and she did present to the emergency room for itchy skin plus rash.  We reviewed this as above.  It does not seem to be associated with when she is taking temozolomide.  Adjuvant temozolomide 200mg/m2 (430mg), cycle 4.   -1/21/2025 NEURO-ONC/ MRB/ CHEMO: No new neurological concerns. Itchy skin. Imaging with no new overt concerns. Adjuvant temozolomide 1500mg/m2 (320mg), cycle 5 (dose reduce in the setting of skin rash).   -2/21/2025 NEURO-ONC/ CHEMO: No new neurological concerns.  Adjuvant temozolomide 1500mg/m2 (320mg), cycle 6 (dose reduce in the setting of skin rash).   -3/18/2025 NEURO-ONC/ MRB: No new neurological concerns. Neck muscle soreness. Imaging with subtle evolving changes of indeterminate significance that require close imaging surveillance; repeat imaging in 6 weeks.  -4/29/2025 NEURO-ONC/ MRB: No new neurological concerns; stable field cut. Imaging with an increase in  "contrast enhancement with an associated increase in perfusion, which is concerning for local cancer recurrence. Referral to neurosurgery; Dr. Steele.      PHYSICAL EXAMINATION  /75 (BP Location: Right arm, Patient Position: Sitting, Cuff Size: Adult Large)   Pulse 58   Temp 98.3  F (36.8  C) (Oral)   Resp 18   Wt 99.3 kg (219 lb)   SpO2 97%   BMI 34.30 kg/m    Wt Readings from Last 2 Encounters:   04/29/25 99.3 kg (219 lb)   03/18/25 96.6 kg (213 lb)      Ht Readings from Last 2 Encounters:   02/21/25 1.702 m (5' 7\")   12/23/24 1.727 m (5' 8\")     KPS:     General: Appears well in no acute distress.  Psych: Affect appropriate. Pleasant  Neurologic:   MENTAL STATUS:     Alert, oriented.    Speech fluent.    Comprehension intact.     CRANIAL NERVES:     Left homonymous hemianopsia   Hearing intact.   No dysarthria.   MOTOR    Mild no-no head tremor       MEDICAL RECORDS  Personally reviewed; No new encounters.     LABS  Personally reviewed; No recent labs.     IMAGING  Personally reviewed MR brain imaging from today and compared to prior imaging.    Formal read; \"Continued increase in size of the medial right occipital lobe lesion with worsening central necrosis, surrounding masslike T2 FLAIR signal and increased perfusion compared with MRI from 3/18/2025. Findings are concerning for local tumor progression rather than post radiation necrosis.\"     Imaging was shown to and results were reviewed with Pamella and Leanne.        IMPRESSION  On date of service, 45 minutes was spent in clinic and 12 minutes was spent preparing for the visit through extensive chart review and coordinating care for this high complexity visit. The following is in explanation for the recommendations used to define the plan.       Pamella has no new neurological concerns. She states that her visual field cut is stable with no worsening of her vision. With the start of steroids and the increase in Keppra, Pamella denies any recurrent " seizure events.     Imaging as detailed above with further evolving changes that are now most consistent with local cancer recurrence. I personally reviewed Pamella's imaging and case at Brain Tumor Conference and all in attendance were in agreement with this impression. Therefore, I discussed the option of a referral to neurosurgery to review the risks/ benefits of a re-do craniotomy. Pamella stated that she is not wanting to return to [facility] for her care. As a result, I will place a referral to Dr. Steele. I personally reviewed Pamella's case and imaging with Dr. Steele and he is wanting to see Pamella in consultation. In the meantime, Pamella knows to call the clinic with any concerns.    PROBLEM LIST  Glioblastoma  Headaches  Homonymous hemianopsia, left-side  Benign essential tremor  Temporal lobe auras    PLAN  -CANCER DIRECTED THERAPY-  -As above.       -STEROIDS-  -On dexamethasone 2mg daily.    -SEIZURE MANAGEMENT-  -Stereotyped events that seem consistent with a history of non-dominant temporal lobe auras/ seizures.   Keppra dosing recently increased to 1000 mg twice daily.    -Quality of life/ MOOD/ FATIGUE-  -History of depression.    Self-discontinued Zoloft.    -VISUAL FIELD CUT-  -Left Homonymous hemianopsia.  -Evaluated by neuro-ophtho for thorough eye examination and baseline visual field testing to determine if there are any driving restrictions.      -LE DVT-  -Lifelong anticoagulation indicated.    On Xarelto; continue as directed.     Return to clinic pending discussion with neurosurgery.    In the meantime, Pamella knows to call the clinic with any concerns and she can be seen sooner if needed.     The longitudinal plan of care for the diagnosis(es)/condition(s) as documented were addressed during this visit. Due to the added complexity in care, I will continue to support Pamella in the subsequent management and with ongoing continuity of care.     Maribell Irizarry MD  Neuro-oncology

## 2025-04-28 ENCOUNTER — ANCILLARY PROCEDURE (OUTPATIENT)
Dept: MRI IMAGING | Facility: CLINIC | Age: 60
End: 2025-04-28
Attending: PSYCHIATRY & NEUROLOGY
Payer: COMMERCIAL

## 2025-04-28 ENCOUNTER — TUMOR CONFERENCE (OUTPATIENT)
Dept: ONCOLOGY | Facility: CLINIC | Age: 60
End: 2025-04-28
Payer: COMMERCIAL

## 2025-04-28 DIAGNOSIS — C71.9 GLIOBLASTOMA (H): ICD-10-CM

## 2025-04-28 PROCEDURE — A9585 GADOBUTROL INJECTION: HCPCS | Performed by: STUDENT IN AN ORGANIZED HEALTH CARE EDUCATION/TRAINING PROGRAM

## 2025-04-28 PROCEDURE — 70553 MRI BRAIN STEM W/O & W/DYE: CPT | Mod: GC | Performed by: STUDENT IN AN ORGANIZED HEALTH CARE EDUCATION/TRAINING PROGRAM

## 2025-04-28 RX ORDER — GADOBUTROL 604.72 MG/ML
10 INJECTION INTRAVENOUS ONCE
Status: COMPLETED | OUTPATIENT
Start: 2025-04-28 | End: 2025-04-28

## 2025-04-28 RX ADMIN — GADOBUTROL 10 ML: 604.72 INJECTION INTRAVENOUS at 11:08

## 2025-04-29 ENCOUNTER — ONCOLOGY VISIT (OUTPATIENT)
Dept: ONCOLOGY | Facility: CLINIC | Age: 60
End: 2025-04-29
Attending: PSYCHIATRY & NEUROLOGY
Payer: COMMERCIAL

## 2025-04-29 VITALS
BODY MASS INDEX: 34.3 KG/M2 | OXYGEN SATURATION: 97 % | DIASTOLIC BLOOD PRESSURE: 75 MMHG | RESPIRATION RATE: 18 BRPM | HEART RATE: 58 BPM | WEIGHT: 219 LBS | TEMPERATURE: 98.3 F | SYSTOLIC BLOOD PRESSURE: 115 MMHG

## 2025-04-29 DIAGNOSIS — C71.9 GLIOBLASTOMA (H): Primary | ICD-10-CM

## 2025-04-29 PROCEDURE — 99417 PROLNG OP E/M EACH 15 MIN: CPT | Performed by: PSYCHIATRY & NEUROLOGY

## 2025-04-29 PROCEDURE — G2211 COMPLEX E/M VISIT ADD ON: HCPCS | Performed by: PSYCHIATRY & NEUROLOGY

## 2025-04-29 PROCEDURE — 99215 OFFICE O/P EST HI 40 MIN: CPT | Performed by: PSYCHIATRY & NEUROLOGY

## 2025-04-29 PROCEDURE — G0463 HOSPITAL OUTPT CLINIC VISIT: HCPCS | Performed by: PSYCHIATRY & NEUROLOGY

## 2025-04-29 ASSESSMENT — PAIN SCALES - GENERAL: PAINLEVEL_OUTOF10: NO PAIN (0)

## 2025-04-29 NOTE — LETTER
4/29/2025      Pamella Jaimes  [address] [address]      Dear Colleague,    Thank you for referring your patient, Pamella Jaimes, to the [facility]. Please see a copy of my visit note below.    NEURO-ONCOLOGY VISIT  Apr 29, 2025    CHIEF COMPLAINT: Ms. 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 Paemlla completed standard chemoradiotherapy on 9/6/2024. Post-radiation imaging demonstrated no new concerns with an initial positive response to treatment.     Pamella initiated adjuvant therapy with temozolomide in 9/2024 and repeat imaging in 11/2024 and again in 1/2025 was largely stable with no significant concerns. She completed the planned 6th cycle of adjuvant temozolomide in 2/2025 and repeat imaging in 3/2025 showed subtle evolving changes of indeterminate significance that require close imaging surveillance. On repeat imaging in 4/2025, there was an increase in contrast enhancement with an associated increase in perfusion, which is concerning for local cancer recurrence. The plan is to refer to neurosurgery for consideration of a re-do craniotomy.     Pamella presents today in follow-up. She is accompanied by Leanne (daughter) and her grandson.    INTERVAL HISTORY  -Pamella reports no fatigue, good energy.   -Mood is good, but she has been feeling bit more anxious recently.  -No new weakness, numbness, vision changes, or changes in cognition.    Denies seizure activity. Increased dose Keppra has been controlling seizures.    -No more neck pain, sore muscles.  -No worsening of vision/ worsening of field cut.    No issues with telling time/ reading.   -No issues with balance.       MEDICATIONS   Current Outpatient Medications   Medication Sig Dispense Refill     acetaminophen (TYLENOL) 500 MG tablet Take 500-1,000 mg by mouth.        dexAMETHasone (DECADRON) 2 MG tablet Take 1 tablet (2 mg) by mouth daily (with breakfast). 30 tablet 1     fluticasone (FLONASE) 50 MCG/ACT nasal spray SHAKE LIQUID AND USE 1 SPRAY IN EACH NOSTRIL DAILY 16 g 0     levETIRAcetam (KEPPRA) 1000 MG tablet Take 1 tablet (1,000 mg) by mouth 2 times daily. 60 tablet 1     ondansetron (ZOFRAN) 4 MG tablet Take 1 tablet (4 mg) by mouth at bedtime. Take 30-60 mins before each dose of temozolomide. Can take every 8 hours if needed. 30 tablet 3     rivaroxaban ANTICOAGULANT (XARELTO) 20 MG TABS tablet Take 1 tablet (20 mg) by mouth daily (with dinner). 30 tablet 11     DRUG ALLERGIES   Allergies   Allergen Reactions     Morphine      Penicillins Hives       IMMUNIZATIONS   Immunization History   Administered Date(s) Administered     COVID-19 Monovalent 18+ (Moderna) 05/04/2021, 06/01/2021, 01/28/2022     Influenza Vaccine 18-64 (Flublok) 01/14/2022     TDAP (Adacel,Boostrix) 01/04/2010, 01/04/2016     ONCOLOGIC HISTORY  -PRESENTATION: Neck pain, headache and head tremor.  -3/15/2023 MRI brain imaging with a mild asymmetric T2 hyperintense signal along right hippocampus and adjacent parahippocampal gyrus.    -PRESENTATION: Dizziness and nausea; semiology is concerning for temporal lobe auras. Slurred speech.  -5/27/2024 MR brain imaging with a right posterior temporo-occipital mass. Associated nodular peripheral enhancement and surrounding confluent T2 signal hyperintensity extending along the medial aspect of the right temporal lobe. Mass effect results in regional sulcal effacement, leftward midline shift, partial effacement the right lateral ventricle and mild right uncal herniation.   -5/28/2024 SURGERY: Craniotomy for mass resection at [facility] with Dr. Brandan Connelly.   No post-operative MR brain imaging performed.   Post-operative CT head imaging from 5/29 status post a right parietal craniotomy with excision of previously demonstrated paramedian  parieto-occipital tumor. Decreased focal mass effect and slightly decreased mild midline shift. No significant hemorrhage or additional complicating feature.   PATHOLOGY: Glioblastoma, IDH1-R132H wildtype (CNS WHO Grade 4).    MGMT promoter unmethylated.   -6/25/2024 NEURO-ONC: Recommending chemoradiotherapy with concurrent temozolomide. U/S + for DVT; started anticoagulation.   -7/26/2024 NEURO-ONC: She has started chemoradiotherapy with concurrent temozolomide. Acute visit for concerns; increased Keppra to 750 mg BID.   -7/23 - 9/6/2024 CHEMORT: Complete chemoradiotherapy. 60Gy in 30 fractions with concurrent temozolomide 75mg/m2 (160mg).  -10/1/2024 NEURO-ONC/ MRB/ CHEMO: Clinically well; reduced frequency of auras. Imaging with no concerns, initial positive response to treatment. Starting adjuvant temozolomide 150mg/m2 (320mg), cycle 1 (start date 10/4). Not pursuing Optune.   -10/30//2024 NEURO-ONC/CHEMO: Tolerated temozolomide well. Neurologically stable. Increase adjuvant temozolomide to 200mg/m2 (430mg), cycle 2 (start date 11/1).  -11/26/2024 NEURO-ONC/ MRB/ CHEMO: No new neurological concerns. Itchy skin. Imaging with no new overt concerns. Adjuvant temozolomide 200mg/m2 (430mg), cycle 3.   -12/23/2024 NEURO-ONC/ CHEMO: No new neurological concerns. Itchy skin persists and she did present to the emergency room for itchy skin plus rash.  We reviewed this as above.  It does not seem to be associated with when she is taking temozolomide.  Adjuvant temozolomide 200mg/m2 (430mg), cycle 4.   -1/21/2025 NEURO-ONC/ MRB/ CHEMO: No new neurological concerns. Itchy skin. Imaging with no new overt concerns. Adjuvant temozolomide 1500mg/m2 (320mg), cycle 5 (dose reduce in the setting of skin rash).   -2/21/2025 NEURO-ONC/ CHEMO: No new neurological concerns.  Adjuvant temozolomide 1500mg/m2 (320mg), cycle 6 (dose reduce in the setting of skin rash).   -3/18/2025 NEURO-ONC/ MRB: No new neurological concerns. Neck muscle  "soreness. Imaging with subtle evolving changes of indeterminate significance that require close imaging surveillance; repeat imaging in 6 weeks.  -4/29/2025 NEURO-ONC/ MRB: No new neurological concerns; stable field cut. Imaging with an increase in contrast enhancement with an associated increase in perfusion, which is concerning for local cancer recurrence. Referral to neurosurgery; Dr. Steele.      PHYSICAL EXAMINATION  /75 (BP Location: Right arm, Patient Position: Sitting, Cuff Size: Adult Large)   Pulse 58   Temp 98.3  F (36.8  C) (Oral)   Resp 18   Wt 99.3 kg (219 lb)   SpO2 97%   BMI 34.30 kg/m    Wt Readings from Last 2 Encounters:   04/29/25 99.3 kg (219 lb)   03/18/25 96.6 kg (213 lb)      Ht Readings from Last 2 Encounters:   02/21/25 1.702 m (5' 7\")   12/23/24 1.727 m (5' 8\")     KPS:     General: Appears well in no acute distress.  Psych: Affect appropriate. Pleasant  Neurologic:   MENTAL STATUS:     Alert, oriented.    Speech fluent.    Comprehension intact.     CRANIAL NERVES:     Left homonymous hemianopsia   Hearing intact.   No dysarthria.   MOTOR    Mild no-no head tremor       MEDICAL RECORDS  Personally reviewed; No new encounters.     LABS  Personally reviewed; No recent labs.     IMAGING  Personally reviewed MR brain imaging from today and compared to prior imaging.    Formal read; \"Continued increase in size of the medial right occipital lobe lesion with worsening central necrosis, surrounding masslike T2 FLAIR signal and increased perfusion compared with MRI from 3/18/2025. Findings are concerning for local tumor progression rather than post radiation necrosis.\"     Imaging was shown to and results were reviewed with Perla.        IMPRESSION  On date of service, 45 minutes was spent in clinic and 12 minutes was spent preparing for the visit through extensive chart review and coordinating care for this high complexity visit. The following is in explanation for the " recommendations used to define the plan.       Pamella has no new neurological concerns. She states that her visual field cut is stable with no worsening of her vision. With the start of steroids and the increase in Keppra, Pamella denies any recurrent seizure events.     Imaging as detailed above with further evolving changes that are now most consistent with local cancer recurrence. I personally reviewed Pamella's imaging and case at Brain Tumor Conference and all in attendance were in agreement with this impression. Therefore, I discussed the option of a referral to neurosurgery to review the risks/ benefits of a re-do craniotomy. Pamella stated that she is not wanting to return to [facility] for her care. As a result, I will place a referral to Dr. Steele. I personally reviewed Pamella's case and imaging with Dr. Steele and he is wanting to see Pamella in consultation. In the meantime, Pamella knows to call the clinic with any concerns.    PROBLEM LIST  Glioblastoma  Headaches  Homonymous hemianopsia, left-side  Benign essential tremor  Temporal lobe auras    PLAN  -CANCER DIRECTED THERAPY-  -As above.       -STEROIDS-  -On dexamethasone 2mg daily.    -SEIZURE MANAGEMENT-  -Stereotyped events that seem consistent with a history of non-dominant temporal lobe auras/ seizures.   Keppra dosing recently increased to 1000 mg twice daily.    -Quality of life/ MOOD/ FATIGUE-  -History of depression.    Self-discontinued Zoloft.    -VISUAL FIELD CUT-  -Left Homonymous hemianopsia.  -Evaluated by neuro-ophtho for thorough eye examination and baseline visual field testing to determine if there are any driving restrictions.      -LE DVT-  -Lifelong anticoagulation indicated.    On Xarelto; continue as directed.     Return to clinic pending discussion with neurosurgery.    In the meantime, Pamella knows to call the clinic with any concerns and she can be seen sooner if needed.     The longitudinal plan of care for the  "diagnosis(es)/condition(s) as documented were addressed during this visit. Due to the added complexity in care, I will continue to support Pamella in the subsequent management and with ongoing continuity of care.     Maribell Irizarry MD  Neuro-oncology      Oncology Rooming Note    April 29, 2025 11:59 AM   Pamella Jaimes is a 60 year old female who presents for:    Chief Complaint   Patient presents with     Oncology Clinic Visit     Glioblastoma (H)       Initial Vitals: /75 (BP Location: Right arm, Patient Position: Sitting, Cuff Size: Adult Large)   Pulse 58   Temp 98.3  F (36.8  C) (Oral)   Resp 18   Wt 99.3 kg (219 lb)   SpO2 97%   BMI 34.30 kg/m   Estimated body mass index is 34.3 kg/m  as calculated from the following:    Height as of 2/21/25: 1.702 m (5' 7\").    Weight as of this encounter: 99.3 kg (219 lb). Body surface area is 2.17 meters squared.  No Pain (0) Comment: Data Unavailable   No LMP recorded.  Allergies reviewed: Yes  Medications reviewed: Yes    Medications: Medication refills not needed today.  Pharmacy name entered into [employer]:    [id number] - [place], [place] - [address] [facility] [id number] [place], [place] - [address]  [facility] - [place], [place] - [address]     Frailty Screening:   Is the patient here for a new oncology consult visit in cancer care? 2. No    PHQ9:  Did this patient require a PHQ9?: No      Clinical concerns: no     Daria Sullivan CMA                Again, thank you for allowing me to participate in the care of your patient.        Sincerely,        Maribell Irizarry MD    Electronically signed"

## 2025-04-29 NOTE — PROGRESS NOTES
"Oncology Rooming Note    April 29, 2025 11:59 AM   Pamella Jaimes is a 60 year old female who presents for:    Chief Complaint   Patient presents with    Oncology Clinic Visit     Glioblastoma (H)       Initial Vitals: /75 (BP Location: Right arm, Patient Position: Sitting, Cuff Size: Adult Large)   Pulse 58   Temp 98.3  F (36.8  C) (Oral)   Resp 18   Wt 99.3 kg (219 lb)   SpO2 97%   BMI 34.30 kg/m   Estimated body mass index is 34.3 kg/m  as calculated from the following:    Height as of 2/21/25: 1.702 m (5' 7\").    Weight as of this encounter: 99.3 kg (219 lb). Body surface area is 2.17 meters squared.  No Pain (0) Comment: Data Unavailable   No LMP recorded.  Allergies reviewed: Yes  Medications reviewed: Yes    Medications: Medication refills not needed today.  Pharmacy name entered into [employer]:    [id number] - [place] - [address] [facility] [id number] [place], [place] - [address]  [facility] - [place], [place] - [address]     Frailty Screening:   Is the patient here for a new oncology consult visit in cancer care? 2. No    PHQ9:  Did this patient require a PHQ9?: No      Clinical concerns: no     Daria Sullivan CMA              "

## 2025-05-09 ENCOUNTER — OFFICE VISIT (OUTPATIENT)
Dept: NEUROSURGERY | Facility: CLINIC | Age: 60
End: 2025-05-09
Payer: COMMERCIAL

## 2025-05-09 VITALS
DIASTOLIC BLOOD PRESSURE: 67 MMHG | HEART RATE: 58 BPM | HEIGHT: 67 IN | BODY MASS INDEX: 34.37 KG/M2 | OXYGEN SATURATION: 100 % | WEIGHT: 219 LBS | SYSTOLIC BLOOD PRESSURE: 103 MMHG

## 2025-05-09 DIAGNOSIS — G93.6 VASOGENIC CEREBRAL EDEMA (H): ICD-10-CM

## 2025-05-09 DIAGNOSIS — C71.4 GLIOBLASTOMA MULTIFORME OF OCCIPITAL LOBE (H): Primary | ICD-10-CM

## 2025-05-09 PROCEDURE — 99204 OFFICE O/P NEW MOD 45 MIN: CPT | Performed by: NEUROLOGICAL SURGERY

## 2025-05-09 ASSESSMENT — PAIN SCALES - GENERAL: PAINLEVEL_OUTOF10: NO PAIN (0)

## 2025-05-09 NOTE — NURSING NOTE
PRE-SURGERY EDUCATION  Reviewed pre- and post-operative instructions as outlined in the After Visit Summary/Patient Instructions with patient.   Surgery folder was given to patient    Patient Education Topic: Procedure with Dr. Steele   Learner(s): Patient and Family   Knowledge Level: Basic  Readiness to Learn: Ready  Method:  Verbal explanation and Written material   Outcome: Able to verbalize instructions  Barriers to Learning: No barrier    STD/FMLA: No  Job Description: Not applicable   Time Off: Not applicable      Patient had the opportunity for questions to be answered. Advised Patient and Family  to call clinic with any questions/concerns. Gave patient antibacterial soap for pre-surgery skin preparation.       BRAIN PRE-SURGICAL CHECKLIST   Intervention/Diagnostic Meets Criteria Details   Nicotine Status  Former  Yes Non-fusion: no action needed     Imaging  MRI or CT completed within 6 months Yes Records verified and located Internal   Chronic Pain or Pain contract  No Yes If yes: RN needs to collect Pain Provider name, organization, last office visit note, and connect with provider to share plan leading into surgery and planned post-op pain medication timeframe. For involved chronic pain patients, RN to update [facility] team using <dot>PAINMANAGEMENTSURGERY within separate telephone encounter

## 2025-05-09 NOTE — PROGRESS NOTES
Dear Dr. Irizarry,    It was a pleasure to see Pamella Jaimes today in Neurosurgery Clinic. She is a 60 year old female who was diagnosed with glioblastoma in May 2024.  She underwent initial surgery at [facility].  She underwent postsurgical radiochemotherapy followed by adjuvant temozolomide.    On routine follow-up she has noted to have worsening enhancement and elevated relative cerebral blood volume concerning for recurrent tumor.  And she was referred here for consideration for repeat resection.    She has a visual field deficit consistent with her right occipital tumor location.  Otherwise she is doing quite well.    No past medical history on file.  Past Surgical History:   Procedure Laterality Date    APPENDECTOMY      20 years ago    C/SECTION, LOW TRANSVERSE      x2    HEAD & NECK SURGERY  05/28/2024    head surgery at [facility] in [facility]        Allergies   Allergen Reactions    Morphine     Penicillins Hives       Current Outpatient Medications:     acetaminophen (TYLENOL) 500 MG tablet, Take 500-1,000 mg by mouth., Disp: , Rfl:     dexAMETHasone (DECADRON) 2 MG tablet, Take 1 tablet (2 mg) by mouth daily (with breakfast)., Disp: 30 tablet, Rfl: 1    fluticasone (FLONASE) 50 MCG/ACT nasal spray, SHAKE LIQUID AND USE 1 SPRAY IN EACH NOSTRIL DAILY, Disp: 16 g, Rfl: 0    levETIRAcetam (KEPPRA) 1000 MG tablet, Take 1 tablet (1,000 mg) by mouth 2 times daily., Disp: 60 tablet, Rfl: 1    ondansetron (ZOFRAN) 4 MG tablet, Take 1 tablet (4 mg) by mouth at bedtime. Take 30-60 mins before each dose of temozolomide. Can take every 8 hours if needed., Disp: 30 tablet, Rfl: 3    rivaroxaban ANTICOAGULANT (XARELTO) 20 MG TABS tablet, Take 1 tablet (20 mg) by mouth daily (with dinner)., Disp: 30 tablet, Rfl: 11  Social History     Socioeconomic History    Marital status:      Spouse name: None    Number of children: 2    Years of education: None    Highest education level: None   Tobacco Use     "Smoking status: Former     Types: Cigarettes    Smokeless tobacco: Never    Tobacco comments:     Haven't smoked for 3 months   Vaping Use    Vaping status: Never Used   Substance and Sexual Activity    Alcohol use: Not Currently    Drug use: Not Currently     Social Drivers of Health     Food Insecurity: No Food Insecurity (5/27/2024)    Received from [facility]     Hunger Vital Sign     Worried About Running Out of Food in the Last Year: Never true     Ran Out of Food in the Last Year: Never true   Transportation Needs: No Transportation Needs (5/27/2024)    Received from [facility]     PRAPARE - Transportation     Lack of Transportation (Medical): No     Lack of Transportation (Non-Medical): No   Interpersonal Safety: Not At Risk (12/22/2024)    Received from [facility] and [facility]    Intimate Partner Violence     Are you in a relationship where you are physically hurt, threatened and/or made to feel afraid?: No   Housing Stability: Low Risk  (5/27/2024)    Received from [facility]     Housing Stability Vital Sign     Unable to Pay for Housing in the Last Year: No     Number of Times Moved in the Last Year: 1     Homeless in the Last Year: No      Problem (# of Occurrences) Relation (Name,Age of Onset)    Breast Cancer (2) Mother, Maternal Grandmother    Prostate Cancer (1) Brother    Bladder Cancer (1) Brother: primary was prostate, possibly spread to bladder    Brain Cancer (1) Maternal Aunt           Negative family history of: Glaucoma, Macular Degeneration             ROS: 10 point ROS neg other than the symptoms noted above in the HPI.    Vitals:    05/09/25 1131   BP: 103/67   Pulse: 58   SpO2: 100%   Weight: 99.3 kg (219 lb)   Height: 1.702 m (5' 7\")     Body mass index is 34.3 kg/m .  No Pain (0)    Awake and alert  Right visual field deficit/homonymous hemianopsia  Bilateral upper and lower extremity strength is 5-5  No pronator drift  Well-healed cranial " incision.      Imaging: Review of her serial MRIs show increasing enhancement and relative cerebral blood volume with vasogenic cerebral edema in the right occipital lobe.      Assessment: Likely recurrent glioblastoma with vasogenic cerebral edema.    Plan: We discussed redo resection for the tumor in this area.  I think she would be a reasonable candidate given her good overall functional status.  We discussed the risk benefits and alternatives to this and the patient understands and wishes to proceed with surgery.  Will work on scheduling this in the near future.

## 2025-05-09 NOTE — LETTER
5/9/2025      Pamella Jaimes  [address] [address]      Dear Colleague,    Thank you for referring your patient, Pamella Jaimes, to the [facility]. Please see a copy of my visit note below.    Dear Dr. Irizarry,    It was a pleasure to see Pamella Jaimes today in Neurosurgery Clinic. She is a 60 year old female who was diagnosed with glioblastoma in May 2024.  She underwent initial surgery at [facility].  She underwent postsurgical radiochemotherapy followed by adjuvant temozolomide.    On routine follow-up she has noted to have worsening enhancement and elevated relative cerebral blood volume concerning for recurrent tumor.  And she was referred here for consideration for repeat resection.    She has a visual field deficit consistent with her right occipital tumor location.  Otherwise she is doing quite well.    No past medical history on file.  Past Surgical History:   Procedure Laterality Date     APPENDECTOMY      20 years ago     C/SECTION, LOW TRANSVERSE      x2     HEAD & NECK SURGERY  05/28/2024    head surgery at [facility] in [facility]        Allergies   Allergen Reactions     Morphine      Penicillins Hives       Current Outpatient Medications:      acetaminophen (TYLENOL) 500 MG tablet, Take 500-1,000 mg by mouth., Disp: , Rfl:      dexAMETHasone (DECADRON) 2 MG tablet, Take 1 tablet (2 mg) by mouth daily (with breakfast)., Disp: 30 tablet, Rfl: 1     fluticasone (FLONASE) 50 MCG/ACT nasal spray, SHAKE LIQUID AND USE 1 SPRAY IN EACH NOSTRIL DAILY, Disp: 16 g, Rfl: 0     levETIRAcetam (KEPPRA) 1000 MG tablet, Take 1 tablet (1,000 mg) by mouth 2 times daily., Disp: 60 tablet, Rfl: 1     ondansetron (ZOFRAN) 4 MG tablet, Take 1 tablet (4 mg) by mouth at bedtime. Take 30-60 mins before each dose of temozolomide. Can take every 8 hours if needed., Disp: 30 tablet, Rfl: 3     rivaroxaban ANTICOAGULANT (XARELTO) 20 MG TABS tablet, Take 1 tablet (20  mg) by mouth daily (with dinner)., Disp: 30 tablet, Rfl: 11  Social History     Socioeconomic History     Marital status:      Spouse name: None     Number of children: 2     Years of education: None     Highest education level: None   Tobacco Use     Smoking status: Former     Types: Cigarettes     Smokeless tobacco: Never     Tobacco comments:     Haven't smoked for 3 months   Vaping Use     Vaping status: Never Used   Substance and Sexual Activity     Alcohol use: Not Currently     Drug use: Not Currently     Social Drivers of Health     Food Insecurity: No Food Insecurity (5/27/2024)    Received from [facility]     Hunger Vital Sign      Worried About Running Out of Food in the Last Year: Never true      Ran Out of Food in the Last Year: Never true   Transportation Needs: No Transportation Needs (5/27/2024)    [facility]     [facility]      Lack of Transportation (Medical): No      Lack of Transportation (Non-Medical): No   Interpersonal Safety: Not At Risk (12/22/2024)    Received from [facility] and [facility]    Intimate Partner Violence      Are you in a relationship where you are physically hurt, threatened and/or made to feel afraid?: No   Housing Stability: Low Risk  (5/27/2024)    Received from [facility]     Housing Stability Vital Sign      Unable to Pay for Housing in the Last Year: No      Number of Times Moved in the Last Year: 1      Homeless in the Last Year: No      Problem (# of Occurrences) Relation (Name,Age of Onset)    Breast Cancer (2) Mother, Maternal Grandmother    Prostate Cancer (1) Brother    Bladder Cancer (1) Brother: primary was prostate, possibly spread to bladder    Brain Cancer (1) Maternal Aunt           Negative family history of: Glaucoma, Macular Degeneration             ROS: 10 point ROS neg other than the symptoms noted above in the HPI.    Vitals:    05/09/25 1131   BP: 103/67   Pulse: 58   SpO2:  "100%   Weight: 99.3 kg (219 lb)   Height: 1.702 m (5' 7\")     Body mass index is 34.3 kg/m .  No Pain (0)    Awake and alert  Right visual field deficit/homonymous hemianopsia  Bilateral upper and lower extremity strength is 5-5  No pronator drift  Well-healed cranial incision.      Imaging: Review of her serial MRIs show increasing enhancement and relative cerebral blood volume with vasogenic cerebral edema in the right occipital lobe.      Assessment: Likely recurrent glioblastoma with vasogenic cerebral edema.    Plan: We discussed redo resection for the tumor in this area.  I think she would be a reasonable candidate given her good overall functional status.  We discussed the risk benefits and alternatives to this and the patient understands and wishes to proceed with surgery.  Will work on scheduling this in the near future.       Again, thank you for allowing me to participate in the care of your patient.        Sincerely,        Mendoza Steele MD    Electronically signed"

## 2025-05-09 NOTE — NURSING NOTE
"Pamella Jaimes is a 60 year old female who presents for:  Chief Complaint   Patient presents with    Consult     Glioblastoma. Patient reports no pain in head, no headaches. Patient feels an achy feeling in their right eye, like they've been staring at a screen for too long.        Initial Vitals:  /67   Pulse 58   Ht 5' 7\" (1.702 m)   Wt 219 lb (99.3 kg)   SpO2 100%   BMI 34.30 kg/m   Estimated body mass index is 34.3 kg/m  as calculated from the following:    Height as of this encounter: 5' 7\" (1.702 m).    Weight as of this encounter: 219 lb (99.3 kg). Body surface area is 2.17 meters squared. BP completed using cuff size: large  No Pain (0)        Siva Clements    "

## 2025-05-09 NOTE — PATIENT INSTRUCTIONS
Patient Instructions    Surgery scheduled at [facility] for craniotomy and tumor resection with Mendoza Steele MD     Tentatively the week of May 19th.    Pre-Operative  You will need a Stereotactic MRI 1-3 days before surgery. The Surgery Scheduler will help coordinate this appointment.      Surgical risks: blood clots in the leg or lung, problems urinating, nerve damage, drainage from the incision, infection,stiffness  Pre-operative physical with primary care physician within 30 days of surgical date.   You will spend 2-3 days in hospital   Shower  You will need to shower the night before and morning of surgery using the antimicrobial soap given to you  You can wash your hair using a gently shampoo such as [employer]  Eating/Drinking  Stop all solid foods 8 hours before surgery.  Keep drinking clear liquids until 4 hours before surgery. Clear liquids include water, clear juice, black coffee, or clear tea without milk, Gatorade, clear soda  Medications  Discontinue Aspirin, NSAIDs (Advil/Ibuprofen, Naproxen, Nuprin, Relafen/Nabumetone, Diclofenac, Meloxicam, Aleve, Celebrex) x 7 days prior to surgical date.  May try tylenol for pain 1000 mg three times per day for pain  Discontinue Xarelto 5-7 days prior to surgery  If you are on chronic pain medication (oxycodone, Percocet, hydrocodone, Vicodin, Norco, Dilaudid, morphine, MS Contin, naltrexone, Suboxone, etc) or have a pain contract we will reach out to your pain clinic to gather your most recent records. Continue obtaining your pain medications from your current provider until surgery. Our team will manage your acute post-op pain in the hospital and during the recovery period. Your pain team will continue to manage your chronic pain. Please contact your provider who manages your pain medications to inform them of your upcoming surgery.    Post-Operative  Incision Care  You may get your incision wet in the shower. Use a gentle shampoo with  no fragrance, such as baby shampoo, until incision is completely healed. Allow soap and water to run over the incision, and gently pat it dry.   Do not soak in a bath, hot tub, or pool until the incision is completely healed (4-6 weeks after surgery)  Avoid coloring your hair or permanent styling until cleared by your surgeon  Pain Management  Dealing with pain  As your body heals, you might feel a stabbing, burning, or aching pain. You may also have some numbness.  Everyone feels pain differently, we may ask you to rate your pain using a pain scale. This will let us know how much pain you feel.   Keep in mind that medicine won't take away all of your pain. It helps to try other ways to relax and ease pain.   Things to help with pain  After surgery, we will give you medicine for your pain. These medications work well, but they can make you drowsy, itchy, or sick to your stomach. If we give you narcotics for pain, try to take the pills with food.   For mild to moderate pain, you can take medication such as Tylenol. These can be used with narcotics, but make sure that your narcotic does not contain Tylenol.   Do NOT drive while taking narcotic pain medication  Do NOT drink alcohol while using any pain medication  You can utilize ice as needed (no longer than 20 minutes at one time)  Refills: please call a few days before you run out.  Activity/Restrictions  We encourage short frequent walks, increasing as tolerated  No driving until you are seen in clinic and cleared by your neurosurgeon or while on narcotics.  If you have had a seizure, you may not drive for at least 3 months according to [place] law.  No strenuous activity  No lifting more than 10 pounds until you are seen in clinic (a gallon of milk weighs approximately 8 pounds)   Results/Steroids  If you have not received the results of the pathology (diagnosis) at the time of discharge, our office will call you with these results as soon as they become  available, or we will discuss them with you during your clinic visit, whichever is first.   If you are on a steroid medication (decadron or dexamethasone) please follow the detailed instructions with the prescription to slowly decrease the amount you are taking.     Post-Op Appointments  You will need to schedule an appointment with one of our nurses for suture/staple removal at one of our clinic locations 2 weeks after your surgery. If you live far away, you may see your primary care doctor for a wound check at 2 weeks.   Follow up in clinic at 6 weeks and again at 12 weeks (3 months).  Please call our clinic at [phone] to schedule an appointment with the Neurosurgery teams.     Resources    Go to the nearest Emergency Room for Evaluation if you develop:  Acute changes in the level of consciousness (increased confusion, memory loss, speech abnormalities)  Any change in hearing or vision  Increased headaches  New onset of seizures.  Please call if you have:  Increased pain, redness, drainage, swelling at your incision  Fevers > 101.5 F degrees  Please call the clinic at [phone] and ask for the NURSE LINE    If you are currently employed, you will need to be off work for 4 weeks for recovery and healing. If you are having a brain biopsy, you will likely be able to return to work sooner.  Please fax any FMLA/short term disability paperwork to [phone] prior to surgery  You may call our clinic when you'd like to return to work and we can provide a work letter  To allow staff adequate time to complete paperwork, please send as soon as possible     [facility]  Phone: [phone]   Fax: [phone]

## 2025-05-12 ENCOUNTER — TELEPHONE (OUTPATIENT)
Dept: NEUROSURGERY | Facility: CLINIC | Age: 60
End: 2025-05-12
Payer: COMMERCIAL

## 2025-05-12 NOTE — TELEPHONE ENCOUNTER
Left the patient a voice message requesting a return call to receive surgery details for 5/19/25, and MRI scheduling.

## 2025-05-12 NOTE — TELEPHONE ENCOUNTER
Spoke with the o.r schedulers about adding this case to 5/19/25. They will get back to meand let me know if 5/19/25 works or not.

## 2025-05-12 NOTE — TELEPHONE ENCOUNTER
Spoke to the patient, and attempted to schedule MRI. Order needs to be placed as stat as there is no appointments before 5/19/25 with out the stat order. A message was sent to  requesting that.

## 2025-05-13 NOTE — TELEPHONE ENCOUNTER
Spoke to the patient, and scheduled her MRI. She will have her daughter call me to receive surgery details.

## 2025-05-13 NOTE — TELEPHONE ENCOUNTER
Called patient to schedule surgery with Dr. Steele    Spoke with:  Leanne (Patient's Daughter)    Date of Surgery: 5/19/25    Estimated Arrival time Discussed with Patient:  Yes    Location of surgery: [facility] OR     Pre-Op H&P: Abrahan Bergman    Post-Op Appts: scheduled     Imaging:  Yes - MRI Scheduled    Discussed with patient pre-op RN will call 2-3 days prior to surgery with arrival time and instructions:  Yes     Packet sent out: Yes 05/13/25  via in clinic    Surgical soap: Receiving via in clinic    Same Day Surgery: No  If Yes: Informed patient they will need a  to drive them home:  No  Patients :     Additional Comments:  Patient's daughter is requesting a return call to discuss the patient's medication that needs to be stopped prior to surgery.    All patients questions were answered and patient was instructed to review surgical packet and call back with any questions or concerns.       Erika Covington on 5/13/2025 at 2:13 PM

## 2025-05-14 ENCOUNTER — OFFICE VISIT (OUTPATIENT)
Dept: FAMILY MEDICINE | Facility: OTHER | Age: 60
End: 2025-05-14
Payer: COMMERCIAL

## 2025-05-14 VITALS
HEART RATE: 72 BPM | BODY MASS INDEX: 35.31 KG/M2 | SYSTOLIC BLOOD PRESSURE: 127 MMHG | RESPIRATION RATE: 18 BRPM | OXYGEN SATURATION: 98 % | TEMPERATURE: 97.5 F | HEIGHT: 67 IN | WEIGHT: 225 LBS | DIASTOLIC BLOOD PRESSURE: 84 MMHG

## 2025-05-14 DIAGNOSIS — Z12.11 SCREEN FOR COLON CANCER: ICD-10-CM

## 2025-05-14 DIAGNOSIS — C71.9 GLIOBLASTOMA (H): ICD-10-CM

## 2025-05-14 DIAGNOSIS — Z12.31 VISIT FOR SCREENING MAMMOGRAM: ICD-10-CM

## 2025-05-14 DIAGNOSIS — Z01.818 PREOP GENERAL PHYSICAL EXAM: Primary | ICD-10-CM

## 2025-05-14 DIAGNOSIS — Z86.718 HISTORY OF DEEP VENOUS THROMBOSIS: ICD-10-CM

## 2025-05-14 PROCEDURE — G2211 COMPLEX E/M VISIT ADD ON: HCPCS | Performed by: FAMILY MEDICINE

## 2025-05-14 PROCEDURE — 99214 OFFICE O/P EST MOD 30 MIN: CPT | Performed by: FAMILY MEDICINE

## 2025-05-14 RX ORDER — CETIRIZINE HYDROCHLORIDE 10 MG/1
10 TABLET ORAL DAILY
COMMUNITY

## 2025-05-14 RX ORDER — CARBOXYMETHYLCELLULOSE SODIUM 5 MG/ML
1 SOLUTION/ DROPS OPHTHALMIC 3 TIMES DAILY PRN
COMMUNITY

## 2025-05-14 RX ORDER — EPINEPHRINE 0.3 MG/.3ML
0.3 INJECTION SUBCUTANEOUS PRN
COMMUNITY

## 2025-05-14 RX ORDER — MULTIVITAMIN
1 TABLET ORAL DAILY
COMMUNITY

## 2025-05-14 ASSESSMENT — PAIN SCALES - GENERAL: PAINLEVEL_OUTOF10: NO PAIN (0)

## 2025-05-14 NOTE — PROGRESS NOTES
Preoperative Evaluation  [address]  [facility] [address]  Phone: [phone]  Fax: [phone]  Primary Provider: Abrahan Bergman MD, MD  Pre-op Performing Provider: Abrahan Bergman MD, MD  May 14, 2025             5/14/2025   Surgical Information   What procedure is being done? tumor removal on head   Facility or Hospital where procedure/surgery will be performed: [facility]   Who is doing the procedure / surgery? dr bills   Date of surgery / procedure: monday 19th   Time of surgery / procedure: 330pm   Where do you plan to recover after surgery? at home with family     Fax number for surgical facility: Note does not need to be faxed, will be available electronically in Epic.    Assessment & Plan     The proposed surgical procedure is considered HIGH risk.      ICD-10-CM    1. Preop general physical exam  Z01.818       2. Glioblastoma (H)  C71.9       3. History of deep venous thrombosis  Z86.718       4. Visit for screening mammogram  Z12.31 MA Screening Bilateral w/ Charlie      5. Screen for colon cancer  Z12.11 Colonoscopy Screening  Referral                  Risks and Recommendations  The patient has the following additional risks and recommendations for perioperative complications:  Anemia/Bleeding/Clotting:    - History of DVT or PE, consider DVT prevention postoperatively    Preoperative Medication Instructions  Antiplatelet or Anticoagulation Medication Instructions   - apixaban (Eliquis), edoxaban (Savaysa), rivaroxaban (Xarelto): Neuraxial or regional block anticipated AND CrCL (>=) 50mL/min. DO NOT TAKE 3 days before surgery.     Additional Medication Instructions  Take all scheduled medications on the day of surgery EXCEPT for modifications listed below:    Recommendation  Approval given to proceed with proposed procedure, without further diagnostic evaluation.          Pito Can is a 60 year old, presenting  for the following:  Pre-Op Exam          5/14/2025     8:07 AM   Additional Questions   Roomed by janet   Accompanied by self     HPI: Pt was diagnosed with glioblastoma about a year ago.  Had her first excision in May of 2024.  Did have radiochemotherapy after surgery.  Appears to have a partial recurrence and is planning on re-excision with Dr. Steele to address.  Denies symptoms of recurrence.  First surgery did cause some vision loss due to it's occipital location.    She did have DVT after her last surgery.  Has been on Xarelto for that.            5/14/2025   Pre-Op Questionnaire   Have you ever had a heart attack or stroke? No   Have you ever had surgery on your heart or blood vessels, such as a stent placement, a coronary artery bypass, or surgery on an artery in your head, neck, heart, or legs? No   Do you have chest pain with activity? No   Do you have a history of heart failure? No   Do you currently have a cold, bronchitis or symptoms of other infection? No   Do you have a cough, shortness of breath, or wheezing? No   Do you or anyone in your family have previous history of blood clots? Yes, DVT after previous surgery   Do you or does anyone in your family have a serious bleeding problem such as prolonged bleeding following surgeries or cuts? No   Have you ever had problems with anemia or been told to take iron pills? No   Have you had any abnormal blood loss such as black, tarry or bloody stools, or abnormal vaginal bleeding? No   Have you ever had a blood transfusion? No   Are you willing to have a blood transfusion if it is medically needed before, during, or after your surgery? Yes   Have you or any of your relatives ever had problems with anesthesia? No   Do you have sleep apnea, excessive snoring or daytime drowsiness? No   Do you have any artifical heart valves or other implanted medical devices like a pacemaker, defibrillator, or continuous glucose monitor? No   Do you have artificial joints?  No   Are you allergic to latex? No     Advance Care Planning    Discussed advance care planning with patient; informed AVS has link to [employer].    Preoperative Review of    reviewed - controlled substances reflected in medication list.      Status of Chronic Conditions:  See problem list for active medical problems.  Problems all longstanding and stable, except as noted/documented.  See ROS for pertinent symptoms related to these conditions.    Patient Active Problem List    Diagnosis Date Noted    Glioblastoma (H) 06/25/2024     Priority: Medium      Past Medical History:   Diagnosis Date    DVT (deep venous thrombosis) (H)      Past Surgical History:   Procedure Laterality Date    APPENDECTOMY      20 years ago    C/SECTION, LOW TRANSVERSE      x2    CRANIOTOMY FOR TUMOR  05/28/2024    craniotomy for glioblastoma at [facility] in [facility]     Current Outpatient Medications   Medication Sig Dispense Refill    levETIRAcetam (KEPPRA) 1000 MG tablet Take 1 tablet (1,000 mg) by mouth 2 times daily. 60 tablet 1    rivaroxaban ANTICOAGULANT (XARELTO) 20 MG TABS tablet Take 1 tablet (20 mg) by mouth daily (with dinner). 30 tablet 11    acetaminophen (TYLENOL) 500 MG tablet Take 500-1,000 mg by mouth. (Patient not taking: Reported on 5/14/2025)      dexAMETHasone (DECADRON) 2 MG tablet Take 1 tablet (2 mg) by mouth daily (with breakfast). (Patient not taking: Reported on 5/14/2025) 30 tablet 1    fluticasone (FLONASE) 50 MCG/ACT nasal spray SHAKE LIQUID AND USE 1 SPRAY IN EACH NOSTRIL DAILY (Patient not taking: Reported on 5/14/2025) 16 g 0    ondansetron (ZOFRAN) 4 MG tablet Take 1 tablet (4 mg) by mouth at bedtime. Take 30-60 mins before each dose of temozolomide. Can take every 8 hours if needed. (Patient not taking: Reported on 5/14/2025) 30 tablet 3       Allergies   Allergen Reactions    Morphine     Penicillins Hives        Social History     Tobacco Use    Smoking status: Former     Current  "packs/day: 0.00     Types: Cigarettes     Quit date:      Years since quittin.3    Smokeless tobacco: Never    Tobacco comments:     Haven't smoked for 3 months   Substance Use Topics    Alcohol use: Not Currently     Family History   Problem Relation Age of Onset    Breast Cancer Mother     Breast Cancer Maternal Grandmother     Bladder Cancer Brother         primary was prostate, possibly spread to bladder    Prostate Cancer Brother     Brain Cancer Maternal Aunt     Glaucoma No family hx of     Macular Degeneration No family hx of      History   Drug Use Unknown               Objective    /84   Pulse 72   Temp 97.5  F (36.4  C) (Temporal)   Resp 18   Ht 1.702 m (5' 7\")   Wt 102.1 kg (225 lb)   SpO2 98%   BMI 35.24 kg/m     Estimated body mass index is 35.24 kg/m  as calculated from the following:    Height as of this encounter: 1.702 m (5' 7\").    Weight as of this encounter: 102.1 kg (225 lb).  Physical Exam  GENERAL: alert and no distress  EYES: Eyes grossly normal to inspection, PERRL and conjunctivae and sclerae normal  HENT: ear canals and TM's normal, nose and mouth without ulcers or lesions  NECK: no adenopathy, no asymmetry, masses, or scars  RESP: lungs clear to auscultation - no rales, rhonchi or wheezes  CV: regular rate and rhythm, normal S1 S2, no S3 or S4, no murmur, click or rub, no peripheral edema  ABDOMEN: soft, nontender, no hepatosplenomegaly, no masses and bowel sounds normal  MS: no gross musculoskeletal defects noted, no edema  SKIN: no suspicious lesions or rashes  NEURO: Normal strength and tone, mentation intact and speech normal  PSYCH: mentation appears normal, affect normal/bright    Recent Labs   Lab Test 25  1102 25  1141   HGB 12.9 13.3    201    140   POTASSIUM 4.3 4.3   CR 0.65 0.66        Diagnostics  No labs were ordered during this visit.   No EKG required, no history of coronary heart disease, significant arrhythmia, peripheral " arterial disease or other structural heart disease.    Revised Cardiac Risk Index (RCRI)  The patient has the following serious cardiovascular risks for perioperative complications:   - No serious cardiac risks = 0 points     RCRI Interpretation: 0 points: Class I (very low risk - 0.4% complication rate)         Signed Electronically by: Abrahan Bergman MD, MD  A copy of this evaluation report is provided to the requesting physician.

## 2025-05-14 NOTE — PROGRESS NOTES
PTA medications updated by Medication Scribe prior to surgery via phone call with patient (last doses completed by Nurse)     Medication history sources: Patient, Surescripts, and H&P  In the past week, patient estimated taking medication this percent of the time: Greater than 90%      Significant changes made to the medication list:  Patient reports no longer taking the following meds (med scribe removed from PTA med list): DECADRON, ZOFRAN      Additional medication history information:   Patient was advised to bring: EPIPEN,FLONASE    Medication reconciliation completed by provider prior to medication history? No    Time spent in this activity: 30 MINUTES    The information provided in this note is only as accurate as the sources available at the time of update(s)      Prior to Admission medications    Medication Sig Last Dose Taking? Auth Provider Long Term End Date   acetaminophen (TYLENOL) 500 MG tablet Take 500-1,000 mg by mouth.  Yes Reported, Patient     carboxymethylcellulose PF (REFRESH PLUS) 0.5 % ophthalmic solution Place 1 drop into both eyes 3 times daily as needed for dry eyes.  Yes Unknown, Entered By History     cetirizine (ZYRTEC) 10 MG tablet Take 10 mg by mouth daily.  Yes Unknown, Entered By History     EPINEPHrine (ANY BX GENERIC EQUIV) 0.3 MG/0.3ML injection 2-pack Inject 0.3 mg into the muscle as needed for anaphylaxis. May repeat one time in 5-15 minutes if response to initial dose is inadequate.  Yes Unknown, Entered By History     fluticasone (FLONASE) 50 MCG/ACT nasal spray SHAKE LIQUID AND USE 1 SPRAY IN EACH NOSTRIL DAILY  Yes Kavya Syed MD     levETIRAcetam (KEPPRA) 1000 MG tablet Take 1 tablet (1,000 mg) by mouth 2 times daily.  Yes Maribell Irizarry MD Yes    multiple vitamin  tablet TABS Take 1 tablet by mouth daily.  Yes Unknown, Entered By History     rivaroxaban ANTICOAGULANT (XARELTO) 20 MG TABS tablet Take 1 tablet (20 mg) by mouth daily (with dinner).   Yes Maribell Irizarry MD Yes        Medication history completed by: Keshia Adams

## 2025-05-14 NOTE — TELEPHONE ENCOUNTER
"Per Dr. Steele patient needs to hold Xarelto 72 hours prior to surgery.    Per PCP pre-op note, \"Take your Xarelto dose normally on 5/15, then take it early (before 3:30 PM) on 5/16, and then skip until your surgery is done.\"    Called patient's daughter to confirm. Advised to contact clinic with any questions or concerns.   "

## 2025-05-14 NOTE — TELEPHONE ENCOUNTER
[facility] Call Center    Phone Message    May a detailed message be left on voicemail: yes     Reason for Call: Other: Patient's daughter wants to confirm when the patient should stop taking her blood thiners before the surgery. Please review      Action Taken: Message routed to:  Other: [facility]    Travel Screening: Not Applicable     Date of Service:

## 2025-05-14 NOTE — PATIENT INSTRUCTIONS
Take your Xarelto dose normally on 5/15, then take it early (before 3:30 PM) on , and then skip until your surgery is done.      Keppra should be continued.      Can get other screenings we discussed.    Consider updating your immunizations.    How to Take Your Medication Before Surgery  Preoperative Medication Instructions   Antiplatelet or Anticoagulation Medication Instructions   - apixaban (Eliquis), edoxaban (Savaysa), rivaroxaban (Xarelto): Neuraxial or regional block anticipated AND CrCL (>=) 50mL/min. DO NOT TAKE 3 days before surgery.     Additional Medication Instructions  Take all scheduled medications on the day of surgery EXCEPT for modifications listed below:       Patient Education   Preparing for Your Surgery  For Adults  Getting started  In most cases, a nurse will call to review your health history and instructions. They will give you an arrival time based on your scheduled surgery time. Please be ready to share:  Your doctor's clinic name and phone number  Your medical, surgical, and anesthesia history  A list of allergies and sensitivities  A list of medicines, including herbal treatments and over-the-counter drugs  Whether the patient has a legal guardian (ask how to send us the papers in advance)  Note: You may not receive a call if you were seen at our PAC (Preoperative Assessment Center).  Please tell us if you're pregnant--or if there's any chance you might be pregnant. Some surgeries may injure a fetus (unborn baby), so they require a pregnancy test. Surgeries that are safe for a fetus don't always need a test, and you can choose whether to have one.   Preparing for surgery  Within 10 to 30 days of surgery: Have a pre-op exam (sometimes called an H&P, or History and Physical). This can be done at a clinic or pre-operative center.  If you're having a , you may not need this exam. Talk to your care team.  At your pre-op exam, talk to your care team about all medicines you take.  (This includes CBD oil and any drugs, such as THC, marijuana, and other forms of cannabis.) If you need to stop any medicine before surgery, ask when to start taking it again.  This is for your safety. Many medicines and drugs can make you bleed too much during surgery. Some change how well surgery (anesthesia) drugs work.  Call your insurance company to let them know you're having surgery. (If you don't have insurance, call [phone].)  Call your clinic if there's any change in your health. This includes a scrape or scratch near the surgery site, or any signs of a cold (sore throat, runny nose, cough, rash, fever).  Eating and drinking guidelines  For your safety: Unless your surgeon tells you otherwise, follow the guidelines below.  Eat and drink as normal until 8 hours before you arrive for surgery. After that, no food or milk. You can spit out gum when you arrive.  Drink clear liquids until 2 hours before you arrive. These are liquids you can see through, like water, Gatorade, and Propel Water. They also include plain black coffee and tea (no cream or milk).  No alcohol for 24 hours before you arrive. The night before surgery, stop any drinks that contain THC.  If your care team tells you to take medicine on the morning of surgery, it's okay to take it with a sip of water. No other medicines or drugs are allowed (including CBD oil)--follow your care team's instructions.  If you have questions the day of surgery, call your hospital or surgery center.   Preventing infection  Shower or bathe the night before and the morning of surgery. Follow the instructions your clinic gave you. (If no instructions, use regular soap.)  Don't shave or clip hair near your surgery site. We'll remove the hair if needed.  Don't smoke or vape the morning of surgery. No chewing tobacco for 6 hours before you arrive. A nicotine patch is okay. You may spit out nicotine gum when you arrive.  For some surgeries, the surgeon will tell you  to fully quit smoking and nicotine.  We will make every effort to keep you safe from infection. We will:  Clean our hands often with soap and water (or an alcohol-based hand rub).  Clean the skin at your surgery site with a special soap that kills germs.  Give you a special gown to keep you warm. (Cold raises the risk of infection.)  Wear hair covers, masks, gowns, and gloves during surgery.  Give antibiotic medicine, if prescribed. Not all surgeries need this medicine.  What to bring on the day of surgery  Photo ID and insurance card  Copy of your health care directive, if you have one  Glasses and hearing aids (bring cases)  You can't wear contacts during surgery  Inhaler and eye drops, if you use them (tell us about these when you arrive)  CPAP machine or breathing device, if you use them  A few personal items, if spending the night  If you have . . .  A pacemaker, ICD (cardiac defibrillator), or other implant: Bring the ID card.  An implanted stimulator: Bring the remote control.  A legal guardian: Bring a copy of the certified (court-stamped) guardianship papers.  Please remove any jewelry, including body piercings. Leave jewelry and other valuables at home.  If you're going home the day of surgery  You must have a responsible adult drive you home. They should stay with you overnight as well.  If you don't have someone to stay with you, and you aren't safe to go home alone, we may keep you overnight. Insurance often won't pay for this.  After surgery  If it's hard to control your pain or you need more pain medicine, please call your surgeon's office.  Questions?   If you have any questions for your care team, list them here:   ____________________________________________________________________________________________________________________________________________________________________________________________________________________________________________________________  For informational purposes only.  Not to replace the advice of your health care provider. Copyright   2003, 2019 [facility]. All rights reserved. Clinically reviewed by Harish Muniz MD. [employer] [id number] - REV 08/24.

## 2025-05-17 ENCOUNTER — HOSPITAL ENCOUNTER (OUTPATIENT)
Dept: MRI IMAGING | Facility: CLINIC | Age: 60
Discharge: HOME | End: 2025-05-17
Attending: NEUROLOGICAL SURGERY | Admitting: NEUROLOGICAL SURGERY
Payer: COMMERCIAL

## 2025-05-17 DIAGNOSIS — C71.4 GLIOBLASTOMA MULTIFORME OF OCCIPITAL LOBE (H): ICD-10-CM

## 2025-05-17 PROCEDURE — 255N000002 HC RX 255 OP 636: Performed by: NEUROLOGICAL SURGERY

## 2025-05-17 PROCEDURE — 70552 MRI BRAIN STEM W/DYE: CPT

## 2025-05-17 PROCEDURE — A9585 GADOBUTROL INJECTION: HCPCS | Performed by: NEUROLOGICAL SURGERY

## 2025-05-17 RX ORDER — GADOBUTROL 604.72 MG/ML
15 INJECTION INTRAVENOUS ONCE
Status: COMPLETED | OUTPATIENT
Start: 2025-05-17 | End: 2025-05-17

## 2025-05-17 RX ADMIN — GADOBUTROL 15 ML: 604.72 INJECTION INTRAVENOUS at 09:52

## 2025-05-19 ENCOUNTER — ANESTHESIA (OUTPATIENT)
Dept: SURGERY | Facility: CLINIC | Age: 60
End: 2025-05-19
Payer: COMMERCIAL

## 2025-05-19 ENCOUNTER — HOSPITAL ENCOUNTER (INPATIENT)
Facility: CLINIC | Age: 60
LOS: 3 days | Discharge: HOME | End: 2025-05-22
Attending: NEUROLOGICAL SURGERY | Admitting: NEUROLOGICAL SURGERY
Payer: COMMERCIAL

## 2025-05-19 ENCOUNTER — ANESTHESIA EVENT (OUTPATIENT)
Dept: SURGERY | Facility: CLINIC | Age: 60
End: 2025-05-19
Payer: COMMERCIAL

## 2025-05-19 ENCOUNTER — TRANSFERRED RECORDS (OUTPATIENT)
Dept: HEALTH INFORMATION MANAGEMENT | Facility: CLINIC | Age: 60
End: 2025-05-19

## 2025-05-19 DIAGNOSIS — I82.5Y2 CHRONIC DEEP VEIN THROMBOSIS (DVT) OF PROXIMAL VEIN OF LEFT LOWER EXTREMITY (H): ICD-10-CM

## 2025-05-19 DIAGNOSIS — C71.9 GLIOBLASTOMA (H): ICD-10-CM

## 2025-05-19 DIAGNOSIS — G93.89 BRAIN MASS: Primary | ICD-10-CM

## 2025-05-19 LAB
ABO + RH BLD: NORMAL
ANION GAP SERPL CALCULATED.3IONS-SCNC: 11 MMOL/L (ref 7–15)
APTT PPP: 28 SECONDS (ref 22–38)
BASOPHILS # BLD AUTO: 0 10E3/UL (ref 0–0.2)
BASOPHILS NFR BLD AUTO: 0 %
BLD GP AB SCN SERPL QL: NEGATIVE
BUN SERPL-MCNC: 12 MG/DL (ref 8–23)
CALCIUM SERPL-MCNC: 9.1 MG/DL (ref 8.8–10.4)
CHLORIDE SERPL-SCNC: 104 MMOL/L (ref 98–107)
CREAT SERPL-MCNC: 0.73 MG/DL (ref 0.51–0.95)
EGFRCR SERPLBLD CKD-EPI 2021: >90 ML/MIN/1.73M2
EOSINOPHIL # BLD AUTO: 0.1 10E3/UL (ref 0–0.7)
EOSINOPHIL NFR BLD AUTO: 1 %
ERYTHROCYTE [DISTWIDTH] IN BLOOD BY AUTOMATED COUNT: 12.3 % (ref 10–15)
GLUCOSE BLDC GLUCOMTR-MCNC: 118 MG/DL (ref 70–99)
GLUCOSE SERPL-MCNC: 106 MG/DL (ref 70–99)
HCO3 SERPL-SCNC: 23 MMOL/L (ref 22–29)
HCT VFR BLD AUTO: 42 % (ref 35–47)
HGB BLD-MCNC: 13.9 G/DL (ref 11.7–15.7)
IMM GRANULOCYTES # BLD: 0 10E3/UL
IMM GRANULOCYTES NFR BLD: 0 %
INR PPP: 0.97 (ref 0.85–1.15)
LYMPHOCYTES # BLD AUTO: 1.4 10E3/UL (ref 0.8–5.3)
LYMPHOCYTES NFR BLD AUTO: 19 %
MCH RBC QN AUTO: 30.1 PG (ref 26.5–33)
MCHC RBC AUTO-ENTMCNC: 33.1 G/DL (ref 31.5–36.5)
MCV RBC AUTO: 91 FL (ref 78–100)
MONOCYTES # BLD AUTO: 0.5 10E3/UL (ref 0–1.3)
MONOCYTES NFR BLD AUTO: 7 %
NEUTROPHILS # BLD AUTO: 5.5 10E3/UL (ref 1.6–8.3)
NEUTROPHILS NFR BLD AUTO: 73 %
NRBC # BLD AUTO: 0 10E3/UL
NRBC BLD AUTO-RTO: 0 /100
PLATELET # BLD AUTO: 203 10E3/UL (ref 150–450)
POTASSIUM SERPL-SCNC: 4 MMOL/L (ref 3.4–5.3)
PROTHROMBIN TIME: 12.7 SECONDS (ref 11.8–14.8)
RBC # BLD AUTO: 4.62 10E6/UL (ref 3.8–5.2)
SODIUM SERPL-SCNC: 138 MMOL/L (ref 135–145)
SPECIMEN EXP DATE BLD: NORMAL
WBC # BLD AUTO: 7.5 10E3/UL (ref 4–11)

## 2025-05-19 PROCEDURE — 250N000011 HC RX IP 250 OP 636: Performed by: ANESTHESIOLOGY

## 2025-05-19 PROCEDURE — 250N000009 HC RX 250: Performed by: NEUROLOGICAL SURGERY

## 2025-05-19 PROCEDURE — 250N000011 HC RX IP 250 OP 636: Performed by: PHYSICIAN ASSISTANT

## 2025-05-19 PROCEDURE — 8E09XBF COMPUTER ASSISTED PROCEDURE OF HEAD AND NECK REGION, WITH FLUOROSCOPY: ICD-10-PCS | Performed by: NEUROLOGICAL SURGERY

## 2025-05-19 PROCEDURE — 85610 PROTHROMBIN TIME: CPT | Performed by: PHYSICIAN ASSISTANT

## 2025-05-19 PROCEDURE — 61781 SCAN PROC CRANIAL INTRA: CPT | Performed by: NEUROLOGICAL SURGERY

## 2025-05-19 PROCEDURE — 258N000003 HC RX IP 258 OP 636: Performed by: PHYSICIAN ASSISTANT

## 2025-05-19 PROCEDURE — 370N000017 HC ANESTHESIA TECHNICAL FEE, PER MIN: Performed by: NEUROLOGICAL SURGERY

## 2025-05-19 PROCEDURE — 82435 ASSAY OF BLOOD CHLORIDE: CPT | Performed by: PHYSICIAN ASSISTANT

## 2025-05-19 PROCEDURE — 88307 TISSUE EXAM BY PATHOLOGIST: CPT | Mod: TC | Performed by: NEUROLOGICAL SURGERY

## 2025-05-19 PROCEDURE — C1713 ANCHOR/SCREW BN/BN,TIS/BN: HCPCS | Performed by: NEUROLOGICAL SURGERY

## 2025-05-19 PROCEDURE — 88307 TISSUE EXAM BY PATHOLOGIST: CPT | Mod: 26 | Performed by: STUDENT IN AN ORGANIZED HEALTH CARE EDUCATION/TRAINING PROGRAM

## 2025-05-19 PROCEDURE — 272N000001 HC OR GENERAL SUPPLY STERILE: Performed by: NEUROLOGICAL SURGERY

## 2025-05-19 PROCEDURE — 61510 CRNEC TREPH EXC BRN TUM STTL: CPT | Performed by: NEUROLOGICAL SURGERY

## 2025-05-19 PROCEDURE — 250N000011 HC RX IP 250 OP 636: Mod: JZ | Performed by: NURSE ANESTHETIST, CERTIFIED REGISTERED

## 2025-05-19 PROCEDURE — 360N000079 HC SURGERY LEVEL 6, PER MIN: Performed by: NEUROLOGICAL SURGERY

## 2025-05-19 PROCEDURE — 86850 RBC ANTIBODY SCREEN: CPT | Performed by: PHYSICIAN ASSISTANT

## 2025-05-19 PROCEDURE — 250N000009 HC RX 250: Performed by: NURSE ANESTHETIST, CERTIFIED REGISTERED

## 2025-05-19 PROCEDURE — 258N000003 HC RX IP 258 OP 636: Performed by: NURSE ANESTHETIST, CERTIFIED REGISTERED

## 2025-05-19 PROCEDURE — 8E090EM FLUORESCENCE GUIDED PROCEDURE OF HEAD AND NECK REGION USING AMINOLEVULINIC ACID, OPEN APPROACH: ICD-10-PCS | Performed by: NEUROLOGICAL SURGERY

## 2025-05-19 PROCEDURE — 85730 THROMBOPLASTIN TIME PARTIAL: CPT | Performed by: PHYSICIAN ASSISTANT

## 2025-05-19 PROCEDURE — C1763 CONN TISS, NON-HUMAN: HCPCS | Performed by: NEUROLOGICAL SURGERY

## 2025-05-19 PROCEDURE — 250N000011 HC RX IP 250 OP 636: Performed by: NEUROLOGICAL SURGERY

## 2025-05-19 PROCEDURE — 200N000001 HC R&B ICU

## 2025-05-19 PROCEDURE — 250N000025 HC SEVOFLURANE, PER MIN: Performed by: NEUROLOGICAL SURGERY

## 2025-05-19 PROCEDURE — 258N000003 HC RX IP 258 OP 636: Performed by: ANESTHESIOLOGY

## 2025-05-19 PROCEDURE — 258N000001 HC RX 258: Performed by: NEUROLOGICAL SURGERY

## 2025-05-19 PROCEDURE — 250N000011 HC RX IP 250 OP 636: Performed by: NURSE ANESTHETIST, CERTIFIED REGISTERED

## 2025-05-19 PROCEDURE — 69990 MICROSURGERY ADD-ON: CPT | Mod: 59 | Performed by: NEUROLOGICAL SURGERY

## 2025-05-19 PROCEDURE — 36415 COLL VENOUS BLD VENIPUNCTURE: CPT | Performed by: PHYSICIAN ASSISTANT

## 2025-05-19 PROCEDURE — 00B70ZZ EXCISION OF CEREBRAL HEMISPHERE, OPEN APPROACH: ICD-10-PCS | Performed by: NEUROLOGICAL SURGERY

## 2025-05-19 PROCEDURE — 999N000141 HC STATISTIC PRE-PROCEDURE NURSING ASSESSMENT: Performed by: NEUROLOGICAL SURGERY

## 2025-05-19 PROCEDURE — 250N000009 HC RX 250: Performed by: PHYSICIAN ASSISTANT

## 2025-05-19 PROCEDURE — 85025 COMPLETE CBC W/AUTO DIFF WBC: CPT | Performed by: PHYSICIAN ASSISTANT

## 2025-05-19 PROCEDURE — 710N000010 HC RECOVERY PHASE 1, LEVEL 2, PER MIN: Performed by: NEUROLOGICAL SURGERY

## 2025-05-19 DEVICE — IMP PLATE SYN MATRIXNEURO STR 2 HOLE 12MM  04.503.062: Type: IMPLANTABLE DEVICE | Site: CRANIAL | Status: FUNCTIONAL

## 2025-05-19 DEVICE — IMP PLATE SYN BURR HOLE COVER 17MM 04.503.023: Type: IMPLANTABLE DEVICE | Site: CRANIAL | Status: FUNCTIONAL

## 2025-05-19 DEVICE — IMP SCR SYN MATRIX LOW PRO 1.5X04MM SELF DRILL 04.503.104.01: Type: IMPLANTABLE DEVICE | Site: CRANIAL | Status: FUNCTIONAL

## 2025-05-19 DEVICE — GRAFT DURAGEN 2X2" ID220: Type: IMPLANTABLE DEVICE | Site: CRANIAL | Status: FUNCTIONAL

## 2025-05-19 RX ORDER — ONDANSETRON 2 MG/ML
4 INJECTION INTRAMUSCULAR; INTRAVENOUS EVERY 6 HOURS PRN
Status: DISCONTINUED | OUTPATIENT
Start: 2025-05-19 | End: 2025-05-19

## 2025-05-19 RX ORDER — PROCHLORPERAZINE MALEATE 10 MG
10 TABLET ORAL EVERY 6 HOURS PRN
Status: DISCONTINUED | OUTPATIENT
Start: 2025-05-19 | End: 2025-05-19

## 2025-05-19 RX ORDER — HYDRALAZINE HYDROCHLORIDE 20 MG/ML
10 INJECTION INTRAMUSCULAR; INTRAVENOUS EVERY 30 MIN PRN
Status: DISCONTINUED | OUTPATIENT
Start: 2025-05-19 | End: 2025-05-22 | Stop reason: HOSPADM

## 2025-05-19 RX ORDER — DEXAMETHASONE SODIUM PHOSPHATE 4 MG/ML
INJECTION, SOLUTION INTRA-ARTICULAR; INTRALESIONAL; INTRAMUSCULAR; INTRAVENOUS; SOFT TISSUE PRN
Status: DISCONTINUED | OUTPATIENT
Start: 2025-05-19 | End: 2025-05-19

## 2025-05-19 RX ORDER — MAGNESIUM HYDROXIDE 1200 MG/15ML
LIQUID ORAL PRN
Status: DISCONTINUED | OUTPATIENT
Start: 2025-05-19 | End: 2025-05-19 | Stop reason: HOSPADM

## 2025-05-19 RX ORDER — ONDANSETRON 2 MG/ML
4 INJECTION INTRAMUSCULAR; INTRAVENOUS EVERY 6 HOURS PRN
Status: DISCONTINUED | OUTPATIENT
Start: 2025-05-19 | End: 2025-05-19 | Stop reason: HOSPADM

## 2025-05-19 RX ORDER — HYDROXYZINE HYDROCHLORIDE 25 MG/1
25 TABLET, FILM COATED ORAL EVERY 6 HOURS PRN
Status: DISCONTINUED | OUTPATIENT
Start: 2025-05-19 | End: 2025-05-19 | Stop reason: HOSPADM

## 2025-05-19 RX ORDER — DEXAMETHASONE SODIUM PHOSPHATE 4 MG/ML
4 INJECTION, SOLUTION INTRA-ARTICULAR; INTRALESIONAL; INTRAMUSCULAR; INTRAVENOUS; SOFT TISSUE
Status: DISCONTINUED | OUTPATIENT
Start: 2025-05-19 | End: 2025-05-19 | Stop reason: HOSPADM

## 2025-05-19 RX ORDER — CEFAZOLIN SODIUM/WATER 2 G/20 ML
2 SYRINGE (ML) INTRAVENOUS
Status: COMPLETED | OUTPATIENT
Start: 2025-05-19 | End: 2025-05-19

## 2025-05-19 RX ORDER — LEVETIRACETAM 500 MG/1
1000 TABLET ORAL 2 TIMES DAILY
Status: DISCONTINUED | OUTPATIENT
Start: 2025-05-20 | End: 2025-05-22 | Stop reason: HOSPADM

## 2025-05-19 RX ORDER — VECURONIUM BROMIDE 1 MG/ML
INJECTION, POWDER, LYOPHILIZED, FOR SOLUTION INTRAVENOUS PRN
Status: DISCONTINUED | OUTPATIENT
Start: 2025-05-19 | End: 2025-05-19

## 2025-05-19 RX ORDER — SODIUM CHLORIDE, SODIUM LACTATE, POTASSIUM CHLORIDE, CALCIUM CHLORIDE 600; 310; 30; 20 MG/100ML; MG/100ML; MG/100ML; MG/100ML
INJECTION, SOLUTION INTRAVENOUS CONTINUOUS PRN
Status: DISCONTINUED | OUTPATIENT
Start: 2025-05-19 | End: 2025-05-19

## 2025-05-19 RX ORDER — SODIUM CHLORIDE 9 MG/ML
INJECTION, SOLUTION INTRAVENOUS CONTINUOUS
Status: DISCONTINUED | OUTPATIENT
Start: 2025-05-20 | End: 2025-05-22 | Stop reason: HOSPADM

## 2025-05-19 RX ORDER — SODIUM CHLORIDE, SODIUM LACTATE, POTASSIUM CHLORIDE, CALCIUM CHLORIDE 600; 310; 30; 20 MG/100ML; MG/100ML; MG/100ML; MG/100ML
INJECTION, SOLUTION INTRAVENOUS CONTINUOUS
Status: DISCONTINUED | OUTPATIENT
Start: 2025-05-19 | End: 2025-05-19 | Stop reason: HOSPADM

## 2025-05-19 RX ORDER — LIDOCAINE HYDROCHLORIDE 20 MG/ML
INJECTION, SOLUTION INFILTRATION; PERINEURAL PRN
Status: DISCONTINUED | OUTPATIENT
Start: 2025-05-19 | End: 2025-05-19

## 2025-05-19 RX ORDER — PROCHLORPERAZINE MALEATE 10 MG
10 TABLET ORAL EVERY 6 HOURS PRN
Status: DISCONTINUED | OUTPATIENT
Start: 2025-05-19 | End: 2025-05-22 | Stop reason: HOSPADM

## 2025-05-19 RX ORDER — ACETAMINOPHEN 325 MG/1
975 TABLET ORAL ONCE
Status: DISCONTINUED | OUTPATIENT
Start: 2025-05-19 | End: 2025-05-19 | Stop reason: HOSPADM

## 2025-05-19 RX ORDER — LIDOCAINE 40 MG/G
CREAM TOPICAL
Status: DISCONTINUED | OUTPATIENT
Start: 2025-05-19 | End: 2025-05-22 | Stop reason: HOSPADM

## 2025-05-19 RX ORDER — LABETALOL HYDROCHLORIDE 5 MG/ML
10 INJECTION, SOLUTION INTRAVENOUS
Status: DISCONTINUED | OUTPATIENT
Start: 2025-05-19 | End: 2025-05-19 | Stop reason: HOSPADM

## 2025-05-19 RX ORDER — HYDROMORPHONE HYDROCHLORIDE 1 MG/ML
INJECTION, SOLUTION INTRAMUSCULAR; INTRAVENOUS; SUBCUTANEOUS PRN
Status: DISCONTINUED | OUTPATIENT
Start: 2025-05-19 | End: 2025-05-19

## 2025-05-19 RX ORDER — AMOXICILLIN 250 MG
1 CAPSULE ORAL 2 TIMES DAILY
Status: DISCONTINUED | OUTPATIENT
Start: 2025-05-20 | End: 2025-05-22 | Stop reason: HOSPADM

## 2025-05-19 RX ORDER — NALOXONE HYDROCHLORIDE 0.4 MG/ML
0.2 INJECTION, SOLUTION INTRAMUSCULAR; INTRAVENOUS; SUBCUTANEOUS
Status: DISCONTINUED | OUTPATIENT
Start: 2025-05-19 | End: 2025-05-22 | Stop reason: HOSPADM

## 2025-05-19 RX ORDER — FENTANYL CITRATE 50 UG/ML
INJECTION, SOLUTION INTRAMUSCULAR; INTRAVENOUS PRN
Status: DISCONTINUED | OUTPATIENT
Start: 2025-05-19 | End: 2025-05-19

## 2025-05-19 RX ORDER — ONDANSETRON 4 MG/1
4 TABLET, ORALLY DISINTEGRATING ORAL EVERY 6 HOURS PRN
Status: DISCONTINUED | OUTPATIENT
Start: 2025-05-19 | End: 2025-05-22 | Stop reason: HOSPADM

## 2025-05-19 RX ORDER — ONDANSETRON 2 MG/ML
4 INJECTION INTRAMUSCULAR; INTRAVENOUS EVERY 6 HOURS PRN
Status: DISCONTINUED | OUTPATIENT
Start: 2025-05-19 | End: 2025-05-22 | Stop reason: HOSPADM

## 2025-05-19 RX ORDER — NALOXONE HYDROCHLORIDE 0.4 MG/ML
0.1 INJECTION, SOLUTION INTRAMUSCULAR; INTRAVENOUS; SUBCUTANEOUS
Status: DISCONTINUED | OUTPATIENT
Start: 2025-05-19 | End: 2025-05-19 | Stop reason: HOSPADM

## 2025-05-19 RX ORDER — FENTANYL CITRATE 50 UG/ML
50 INJECTION, SOLUTION INTRAMUSCULAR; INTRAVENOUS EVERY 5 MIN PRN
Status: DISCONTINUED | OUTPATIENT
Start: 2025-05-19 | End: 2025-05-19 | Stop reason: HOSPADM

## 2025-05-19 RX ORDER — NALOXONE HYDROCHLORIDE 0.4 MG/ML
0.4 INJECTION, SOLUTION INTRAMUSCULAR; INTRAVENOUS; SUBCUTANEOUS
Status: DISCONTINUED | OUTPATIENT
Start: 2025-05-19 | End: 2025-05-22 | Stop reason: HOSPADM

## 2025-05-19 RX ORDER — BISACODYL 10 MG
10 SUPPOSITORY, RECTAL RECTAL DAILY PRN
Status: DISCONTINUED | OUTPATIENT
Start: 2025-05-22 | End: 2025-05-19

## 2025-05-19 RX ORDER — ONDANSETRON 2 MG/ML
INJECTION INTRAMUSCULAR; INTRAVENOUS PRN
Status: DISCONTINUED | OUTPATIENT
Start: 2025-05-19 | End: 2025-05-19

## 2025-05-19 RX ORDER — APREPITANT 40 MG/1
40 CAPSULE ORAL ONCE
Status: DISCONTINUED | OUTPATIENT
Start: 2025-05-19 | End: 2025-05-19 | Stop reason: HOSPADM

## 2025-05-19 RX ORDER — HYDROMORPHONE HCL IN WATER/PF 6 MG/30 ML
0.4 PATIENT CONTROLLED ANALGESIA SYRINGE INTRAVENOUS EVERY 5 MIN PRN
Status: DISCONTINUED | OUTPATIENT
Start: 2025-05-19 | End: 2025-05-19 | Stop reason: HOSPADM

## 2025-05-19 RX ORDER — PROPOFOL 10 MG/ML
INJECTION, EMULSION INTRAVENOUS CONTINUOUS PRN
Status: DISCONTINUED | OUTPATIENT
Start: 2025-05-19 | End: 2025-05-19

## 2025-05-19 RX ORDER — ONDANSETRON 2 MG/ML
4 INJECTION INTRAMUSCULAR; INTRAVENOUS EVERY 30 MIN PRN
Status: DISCONTINUED | OUTPATIENT
Start: 2025-05-19 | End: 2025-05-19 | Stop reason: HOSPADM

## 2025-05-19 RX ORDER — HYDROMORPHONE HCL IN WATER/PF 6 MG/30 ML
0.4 PATIENT CONTROLLED ANALGESIA SYRINGE INTRAVENOUS
Status: DISCONTINUED | OUTPATIENT
Start: 2025-05-19 | End: 2025-05-22 | Stop reason: HOSPADM

## 2025-05-19 RX ORDER — CEFAZOLIN SODIUM/WATER 2 G/20 ML
2 SYRINGE (ML) INTRAVENOUS SEE ADMIN INSTRUCTIONS
Status: DISCONTINUED | OUTPATIENT
Start: 2025-05-19 | End: 2025-05-19 | Stop reason: HOSPADM

## 2025-05-19 RX ORDER — BACITRACIN ZINC 500 [USP'U]/G
OINTMENT TOPICAL PRN
Status: DISCONTINUED | OUTPATIENT
Start: 2025-05-19 | End: 2025-05-19 | Stop reason: HOSPADM

## 2025-05-19 RX ORDER — APREPITANT 40 MG/1
40 CAPSULE ORAL ONCE
Status: COMPLETED | OUTPATIENT
Start: 2025-05-19 | End: 2025-05-19

## 2025-05-19 RX ORDER — OXYCODONE HYDROCHLORIDE 5 MG/1
5 TABLET ORAL EVERY 4 HOURS PRN
Status: DISCONTINUED | OUTPATIENT
Start: 2025-05-19 | End: 2025-05-22 | Stop reason: HOSPADM

## 2025-05-19 RX ORDER — ONDANSETRON 4 MG/1
4 TABLET, ORALLY DISINTEGRATING ORAL EVERY 6 HOURS PRN
Status: DISCONTINUED | OUTPATIENT
Start: 2025-05-19 | End: 2025-05-19

## 2025-05-19 RX ORDER — BISACODYL 10 MG
10 SUPPOSITORY, RECTAL RECTAL DAILY PRN
Status: DISCONTINUED | OUTPATIENT
Start: 2025-05-22 | End: 2025-05-22 | Stop reason: HOSPADM

## 2025-05-19 RX ORDER — OXYCODONE HYDROCHLORIDE 5 MG/1
10 TABLET ORAL EVERY 4 HOURS PRN
Status: DISCONTINUED | OUTPATIENT
Start: 2025-05-19 | End: 2025-05-22 | Stop reason: HOSPADM

## 2025-05-19 RX ORDER — DEXAMETHASONE SODIUM PHOSPHATE 4 MG/ML
4 INJECTION, SOLUTION INTRA-ARTICULAR; INTRALESIONAL; INTRAMUSCULAR; INTRAVENOUS; SOFT TISSUE EVERY 6 HOURS
Status: DISCONTINUED | OUTPATIENT
Start: 2025-05-20 | End: 2025-05-20

## 2025-05-19 RX ORDER — POLYETHYLENE GLYCOL 3350 17 G/17G
17 POWDER, FOR SOLUTION ORAL DAILY
Status: DISCONTINUED | OUTPATIENT
Start: 2025-05-20 | End: 2025-05-22 | Stop reason: HOSPADM

## 2025-05-19 RX ORDER — BUPIVACAINE HCL/EPINEPHRINE 0.25-.0005
VIAL (ML) INJECTION PRN
Status: DISCONTINUED | OUTPATIENT
Start: 2025-05-19 | End: 2025-05-19 | Stop reason: HOSPADM

## 2025-05-19 RX ORDER — LABETALOL HYDROCHLORIDE 5 MG/ML
10 INJECTION, SOLUTION INTRAVENOUS EVERY 10 MIN PRN
Status: DISCONTINUED | OUTPATIENT
Start: 2025-05-19 | End: 2025-05-22 | Stop reason: HOSPADM

## 2025-05-19 RX ORDER — HYDROMORPHONE HCL IN WATER/PF 6 MG/30 ML
0.2 PATIENT CONTROLLED ANALGESIA SYRINGE INTRAVENOUS EVERY 5 MIN PRN
Status: DISCONTINUED | OUTPATIENT
Start: 2025-05-19 | End: 2025-05-19 | Stop reason: HOSPADM

## 2025-05-19 RX ORDER — FENTANYL CITRATE 50 UG/ML
25 INJECTION, SOLUTION INTRAMUSCULAR; INTRAVENOUS EVERY 5 MIN PRN
Status: DISCONTINUED | OUTPATIENT
Start: 2025-05-19 | End: 2025-05-19 | Stop reason: HOSPADM

## 2025-05-19 RX ORDER — PROPOFOL 10 MG/ML
INJECTION, EMULSION INTRAVENOUS PRN
Status: DISCONTINUED | OUTPATIENT
Start: 2025-05-19 | End: 2025-05-19

## 2025-05-19 RX ORDER — POLYETHYLENE GLYCOL 3350 17 G/17G
17 POWDER, FOR SOLUTION ORAL DAILY
Status: DISCONTINUED | OUTPATIENT
Start: 2025-05-20 | End: 2025-05-19

## 2025-05-19 RX ORDER — CETIRIZINE HYDROCHLORIDE 10 MG/1
10 TABLET ORAL DAILY
Status: DISCONTINUED | OUTPATIENT
Start: 2025-05-20 | End: 2025-05-22 | Stop reason: HOSPADM

## 2025-05-19 RX ORDER — ONDANSETRON 4 MG/1
4 TABLET, ORALLY DISINTEGRATING ORAL EVERY 30 MIN PRN
Status: DISCONTINUED | OUTPATIENT
Start: 2025-05-19 | End: 2025-05-19 | Stop reason: HOSPADM

## 2025-05-19 RX ORDER — HYDROMORPHONE HCL IN WATER/PF 6 MG/30 ML
0.2 PATIENT CONTROLLED ANALGESIA SYRINGE INTRAVENOUS
Status: DISCONTINUED | OUTPATIENT
Start: 2025-05-19 | End: 2025-05-22 | Stop reason: HOSPADM

## 2025-05-19 RX ORDER — HYDRALAZINE HYDROCHLORIDE 20 MG/ML
2.5-5 INJECTION INTRAMUSCULAR; INTRAVENOUS EVERY 10 MIN PRN
Status: DISCONTINUED | OUTPATIENT
Start: 2025-05-19 | End: 2025-05-19 | Stop reason: HOSPADM

## 2025-05-19 RX ADMIN — DEXAMETHASONE SODIUM PHOSPHATE 10 MG: 4 INJECTION, SOLUTION INTRA-ARTICULAR; INTRALESIONAL; INTRAMUSCULAR; INTRAVENOUS; SOFT TISSUE at 19:40

## 2025-05-19 RX ADMIN — PHENYLEPHRINE HYDROCHLORIDE 0.5 MCG/KG/MIN: 10 INJECTION INTRAVENOUS at 19:57

## 2025-05-19 RX ADMIN — ONDANSETRON 4 MG: 2 INJECTION INTRAMUSCULAR; INTRAVENOUS at 21:08

## 2025-05-19 RX ADMIN — LIDOCAINE HYDROCHLORIDE 100 MG: 20 INJECTION, SOLUTION INFILTRATION; PERINEURAL at 19:06

## 2025-05-19 RX ADMIN — SODIUM CHLORIDE: 0.9 INJECTION, SOLUTION INTRAVENOUS at 23:51

## 2025-05-19 RX ADMIN — VECURONIUM BROMIDE 0.5 MG: 1 INJECTION, POWDER, LYOPHILIZED, FOR SOLUTION INTRAVENOUS at 21:00

## 2025-05-19 RX ADMIN — FENTANYL CITRATE 50 MCG: 50 INJECTION INTRAMUSCULAR; INTRAVENOUS at 19:06

## 2025-05-19 RX ADMIN — Medication 200 MG: at 21:22

## 2025-05-19 RX ADMIN — Medication 2 G: at 19:18

## 2025-05-19 RX ADMIN — SODIUM CHLORIDE, SODIUM LACTATE, POTASSIUM CHLORIDE, AND CALCIUM CHLORIDE: .6; .31; .03; .02 INJECTION, SOLUTION INTRAVENOUS at 16:05

## 2025-05-19 RX ADMIN — PHENYLEPHRINE HYDROCHLORIDE 150 MCG: 10 INJECTION INTRAVENOUS at 19:15

## 2025-05-19 RX ADMIN — AMINOLEVULINIC ACID HYDROCHLORIDE 1500 MG: 1500 POWDER, FOR SOLUTION ORAL at 16:56

## 2025-05-19 RX ADMIN — PROCHLORPERAZINE EDISYLATE 5 MG: 5 INJECTION INTRAMUSCULAR; INTRAVENOUS at 22:13

## 2025-05-19 RX ADMIN — PROPOFOL 50 MCG/KG/MIN: 10 INJECTION, EMULSION INTRAVENOUS at 19:48

## 2025-05-19 RX ADMIN — FENTANYL CITRATE 50 MCG: 50 INJECTION INTRAMUSCULAR; INTRAVENOUS at 19:48

## 2025-05-19 RX ADMIN — SODIUM CHLORIDE, SODIUM LACTATE, POTASSIUM CHLORIDE, AND CALCIUM CHLORIDE: .6; .31; .03; .02 INJECTION, SOLUTION INTRAVENOUS at 19:17

## 2025-05-19 RX ADMIN — ONDANSETRON 4 MG: 2 INJECTION INTRAMUSCULAR; INTRAVENOUS at 18:50

## 2025-05-19 RX ADMIN — VECURONIUM BROMIDE 2 MG: 1 INJECTION, POWDER, LYOPHILIZED, FOR SOLUTION INTRAVENOUS at 20:37

## 2025-05-19 RX ADMIN — PROPOFOL 200 MG: 10 INJECTION, EMULSION INTRAVENOUS at 19:06

## 2025-05-19 RX ADMIN — PHENYLEPHRINE HYDROCHLORIDE 100 MCG: 10 INJECTION INTRAVENOUS at 19:57

## 2025-05-19 RX ADMIN — PHENYLEPHRINE HYDROCHLORIDE 100 MCG: 10 INJECTION INTRAVENOUS at 20:57

## 2025-05-19 RX ADMIN — APREPITANT 40 MG: 40 CAPSULE ORAL at 18:56

## 2025-05-19 RX ADMIN — ROCURONIUM BROMIDE 50 MG: 50 INJECTION, SOLUTION INTRAVENOUS at 19:06

## 2025-05-19 RX ADMIN — PHENYLEPHRINE HYDROCHLORIDE 100 MCG: 10 INJECTION INTRAVENOUS at 20:21

## 2025-05-19 RX ADMIN — VECURONIUM BROMIDE 2 MG: 1 INJECTION, POWDER, LYOPHILIZED, FOR SOLUTION INTRAVENOUS at 19:47

## 2025-05-19 RX ADMIN — HYDROMORPHONE HYDROCHLORIDE 0.5 MG: 1 INJECTION, SOLUTION INTRAMUSCULAR; INTRAVENOUS; SUBCUTANEOUS at 19:50

## 2025-05-19 ASSESSMENT — ACTIVITIES OF DAILY LIVING (ADL)
ADLS_ACUITY_SCORE: 15
ADLS_ACUITY_SCORE: 41
ADLS_ACUITY_SCORE: 15
ADLS_ACUITY_SCORE: 17
ADLS_ACUITY_SCORE: 15
ADLS_ACUITY_SCORE: 15

## 2025-05-19 ASSESSMENT — LIFESTYLE VARIABLES: TOBACCO_USE: 1

## 2025-05-19 NOTE — ANESTHESIA PREPROCEDURE EVALUATION
Anesthesia Pre-Procedure Evaluation    Patient: Pamella Jaimes   MRN: [id number] : 1965          Procedure : Procedure(s):  Right stealth redo craniotomy and tumor resection with intraoperative fluorescence         Past Medical History:   Diagnosis Date    DVT (deep venous thrombosis) (H)       Past Surgical History:   Procedure Laterality Date    APPENDECTOMY      20 years ago    C/SECTION, LOW TRANSVERSE      x2    CRANIOTOMY FOR TUMOR  2024    craniotomy for glioblastoma at [facility] in [facility]      Allergies   Allergen Reactions    Morphine     Penicillins Hives      Social History     Tobacco Use    Smoking status: Former     Current packs/day: 0.00     Types: Cigarettes     Quit date:      Years since quittin.3    Smokeless tobacco: Never    Tobacco comments:     Haven't smoked for 3 months   Substance Use Topics    Alcohol use: Not Currently      Wt Readings from Last 1 Encounters:   25 102.1 kg (225 lb)        Anesthesia Evaluation   Pt has had prior anesthetic. Type: General.    No history of anesthetic complications       ROS/MED HX  ENT/Pulmonary:     (+)                tobacco use (Quit in ), Past use,                       Neurologic: Comment: H/o glioblastoma s/p resection in , c/b partial visual loss given location adjacent to occipital lobe      Cardiovascular:     (+)  - -   -  - -   Taking blood thinners (Xarelto)                                   METS/Exercise Tolerance:     Hematologic:     (+) History of blood clots (DVT),               Musculoskeletal:       GI/Hepatic:       Renal/Genitourinary:       Endo:     (+)               Obesity (Class 2),       Psychiatric/Substance Use:       Infectious Disease:       Malignancy:       Other:              Physical Exam  Airway  Mallampati: II  TM distance: >3 FB  Neck ROM: full  Mouth opening: >= 4 cm    Cardiovascular   Rhythm: regular  Rate: normal rate     Dental     Pulmonary Breath sounds clear to  "auscultation        Neurological   Other Findings       OUTSIDE LABS:  CBC:   Lab Results   Component Value Date    WBC 3.5 (L) 03/18/2025    WBC 2.8 (L) 03/07/2025    HGB 12.9 03/18/2025    HGB 13.3 03/07/2025    HCT 37.3 03/18/2025    HCT 39.6 03/07/2025     03/18/2025     03/07/2025     BMP:   Lab Results   Component Value Date     03/18/2025     03/07/2025    POTASSIUM 4.3 03/18/2025    POTASSIUM 4.3 03/07/2025    CHLORIDE 104 03/18/2025    CHLORIDE 106 03/07/2025    CO2 25 03/18/2025    CO2 22 03/07/2025    BUN 11.0 03/18/2025    BUN 12.7 03/07/2025    CR 0.65 03/18/2025    CR 0.66 03/07/2025    GLC 95 03/18/2025    GLC 97 03/07/2025     COAGS: No results found for: \"PTT\", \"INR\", \"FIBR\"  POC: No results found for: \"BGM\", \"HCG\", \"HCGS\"  HEPATIC:   Lab Results   Component Value Date    ALBUMIN 4.3 03/18/2025    PROTTOTAL 7.1 03/18/2025    ALT 22 03/18/2025    AST 25 03/18/2025    ALKPHOS 101 03/18/2025    BILITOTAL 0.3 03/18/2025     OTHER:   Lab Results   Component Value Date    SUHAIL 9.7 03/18/2025       Anesthesia Plan    ASA Status:  3      NPO Status: NPO Appropriate   Anesthesia Type: General.  Airway: oral.  Induction: intravenous.  Maintenance: Balanced.   Techniques and Equipment:     - Airway:   Arterial Line Kit: Radial     - Monitoring Plan: standard ASA monitoring, arterial line kit     Consents    Anesthesia Plan(s) and associated risks, benefits, and realistic alternatives discussed. Questions answered and patient/representative(s) expressed understanding.     - Discussed:     - Discussed with:  Patient               Postoperative Care    Pain management: non-narcotic analgesics, plan for postoperative opioid use, multimodal analgesia.     Comments:                   Niko Mcgee MD    I have reviewed the pertinent notes and labs in the chart from the past 30 days and (re)examined the patient.  Any updates or changes from those notes are reflected in this " "note.    Clinically Significant Risk Factors Present on Admission                             # Obesity: Estimated body mass index is 35.24 kg/m  as calculated from the following:    Height as of 5/14/25: 1.702 m (5' 7\").    Weight as of 5/14/25: 102.1 kg (225 lb).                    "

## 2025-05-19 NOTE — OR NURSING
Pt states she tripped on her dog this morning and fell on her left knee. States its painful to walk had intermit. Swelling and pain at baseline but is more swollen and painful with falling, notable swelling observed. No bruising or abrasion.

## 2025-05-20 ENCOUNTER — APPOINTMENT (OUTPATIENT)
Dept: MRI IMAGING | Facility: CLINIC | Age: 60
End: 2025-05-20
Attending: PHYSICIAN ASSISTANT
Payer: COMMERCIAL

## 2025-05-20 ENCOUNTER — APPOINTMENT (OUTPATIENT)
Dept: OCCUPATIONAL THERAPY | Facility: CLINIC | Age: 60
End: 2025-05-20
Attending: PHYSICIAN ASSISTANT
Payer: COMMERCIAL

## 2025-05-20 LAB
GLUCOSE BLDC GLUCOMTR-MCNC: 130 MG/DL (ref 70–99)
GLUCOSE BLDC GLUCOMTR-MCNC: 196 MG/DL (ref 70–99)

## 2025-05-20 PROCEDURE — 97530 THERAPEUTIC ACTIVITIES: CPT | Mod: GO | Performed by: OCCUPATIONAL THERAPIST

## 2025-05-20 PROCEDURE — 250N000011 HC RX IP 250 OP 636: Performed by: PHYSICIAN ASSISTANT

## 2025-05-20 PROCEDURE — 255N000002 HC RX 255 OP 636: Performed by: PHYSICIAN ASSISTANT

## 2025-05-20 PROCEDURE — 250N000013 HC RX MED GY IP 250 OP 250 PS 637: Performed by: NURSE PRACTITIONER

## 2025-05-20 PROCEDURE — 250N000012 HC RX MED GY IP 250 OP 636 PS 637: Performed by: NURSE PRACTITIONER

## 2025-05-20 PROCEDURE — A9585 GADOBUTROL INJECTION: HCPCS | Performed by: PHYSICIAN ASSISTANT

## 2025-05-20 PROCEDURE — 97535 SELF CARE MNGMENT TRAINING: CPT | Mod: GO | Performed by: OCCUPATIONAL THERAPIST

## 2025-05-20 PROCEDURE — 97166 OT EVAL MOD COMPLEX 45 MIN: CPT | Mod: GO | Performed by: OCCUPATIONAL THERAPIST

## 2025-05-20 PROCEDURE — 70553 MRI BRAIN STEM W/O & W/DYE: CPT

## 2025-05-20 PROCEDURE — 250N000013 HC RX MED GY IP 250 OP 250 PS 637: Performed by: PHYSICIAN ASSISTANT

## 2025-05-20 PROCEDURE — 120N000001 HC R&B MED SURG/OB

## 2025-05-20 RX ORDER — DEXAMETHASONE 1 MG
1 TABLET ORAL DAILY
Status: DISCONTINUED | OUTPATIENT
Start: 2025-05-27 | End: 2025-05-22 | Stop reason: HOSPADM

## 2025-05-20 RX ORDER — DEXAMETHASONE 2 MG/1
2 TABLET ORAL 2 TIMES DAILY
Status: DISCONTINUED | OUTPATIENT
Start: 2025-05-22 | End: 2025-05-22 | Stop reason: HOSPADM

## 2025-05-20 RX ORDER — ACETAMINOPHEN 325 MG/1
650 TABLET ORAL EVERY 4 HOURS PRN
Status: DISCONTINUED | OUTPATIENT
Start: 2025-05-20 | End: 2025-05-22 | Stop reason: HOSPADM

## 2025-05-20 RX ORDER — GADOBUTROL 604.72 MG/ML
10 INJECTION INTRAVENOUS ONCE
Status: COMPLETED | OUTPATIENT
Start: 2025-05-20 | End: 2025-05-20

## 2025-05-20 RX ORDER — DEXAMETHASONE 4 MG/1
4 TABLET ORAL 2 TIMES DAILY
Status: COMPLETED | OUTPATIENT
Start: 2025-05-20 | End: 2025-05-22

## 2025-05-20 RX ORDER — DEXAMETHASONE 1 MG
1 TABLET ORAL 2 TIMES DAILY
Status: DISCONTINUED | OUTPATIENT
Start: 2025-05-24 | End: 2025-05-22 | Stop reason: HOSPADM

## 2025-05-20 RX ADMIN — OXYCODONE 5 MG: 5 TABLET ORAL at 01:31

## 2025-05-20 RX ADMIN — SENNOSIDES AND DOCUSATE SODIUM 1 TABLET: 50; 8.6 TABLET ORAL at 00:17

## 2025-05-20 RX ADMIN — DEXAMETHASONE SODIUM PHOSPHATE 4 MG: 4 INJECTION, SOLUTION INTRAMUSCULAR; INTRAVENOUS at 05:53

## 2025-05-20 RX ADMIN — ACETAMINOPHEN 650 MG: 325 TABLET, FILM COATED ORAL at 16:52

## 2025-05-20 RX ADMIN — OXYCODONE 5 MG: 5 TABLET ORAL at 20:09

## 2025-05-20 RX ADMIN — ACETAMINOPHEN 650 MG: 325 TABLET, FILM COATED ORAL at 12:55

## 2025-05-20 RX ADMIN — CETIRIZINE HYDROCHLORIDE 10 MG: 10 TABLET, FILM COATED ORAL at 09:55

## 2025-05-20 RX ADMIN — OXYCODONE 5 MG: 5 TABLET ORAL at 09:53

## 2025-05-20 RX ADMIN — DEXAMETHASONE SODIUM PHOSPHATE 4 MG: 4 INJECTION, SOLUTION INTRAMUSCULAR; INTRAVENOUS at 00:07

## 2025-05-20 RX ADMIN — DEXAMETHASONE 4 MG: 4 TABLET ORAL at 20:09

## 2025-05-20 RX ADMIN — SENNOSIDES AND DOCUSATE SODIUM 1 TABLET: 50; 8.6 TABLET ORAL at 09:53

## 2025-05-20 RX ADMIN — OXYCODONE 5 MG: 5 TABLET ORAL at 15:05

## 2025-05-20 RX ADMIN — LEVETIRACETAM 1000 MG: 500 TABLET, FILM COATED ORAL at 09:53

## 2025-05-20 RX ADMIN — POLYETHYLENE GLYCOL 3350 17 G: 17 POWDER, FOR SOLUTION ORAL at 09:53

## 2025-05-20 RX ADMIN — LEVETIRACETAM 1000 MG: 500 TABLET, FILM COATED ORAL at 20:09

## 2025-05-20 RX ADMIN — GADOBUTROL 10 ML: 604.72 INJECTION INTRAVENOUS at 05:41

## 2025-05-20 RX ADMIN — LEVETIRACETAM 1000 MG: 500 TABLET, FILM COATED ORAL at 00:17

## 2025-05-20 RX ADMIN — SENNOSIDES AND DOCUSATE SODIUM 1 TABLET: 50; 8.6 TABLET ORAL at 20:09

## 2025-05-20 ASSESSMENT — ACTIVITIES OF DAILY LIVING (ADL)
ADLS_ACUITY_SCORE: 40
ADLS_ACUITY_SCORE: 35
ADLS_ACUITY_SCORE: 40
ADLS_ACUITY_SCORE: 35
ADLS_ACUITY_SCORE: 37
ADLS_ACUITY_SCORE: 40
ADLS_ACUITY_SCORE: 37
ADLS_ACUITY_SCORE: 40
ADLS_ACUITY_SCORE: 35
ADLS_ACUITY_SCORE: 40
ADLS_ACUITY_SCORE: 37
ADLS_ACUITY_SCORE: 40
ADLS_ACUITY_SCORE: 40
ADLS_ACUITY_SCORE: 37
ADLS_ACUITY_SCORE: 40
ADLS_ACUITY_SCORE: 37
ADLS_ACUITY_SCORE: 37
ADLS_ACUITY_SCORE: 35
ADLS_ACUITY_SCORE: 40
ADLS_ACUITY_SCORE: 40

## 2025-05-20 NOTE — ANESTHESIA POSTPROCEDURE EVALUATION
Patient: Pamella Jaimes    Procedure: Procedure(s):  Right stealth redo craniotomy and tumor resection with intraoperative fluorescence       Anesthesia Type:  General    Note:  Disposition: Inpatient   Postop Pain Control: Uneventful            Sign Out: Well controlled pain   PONV: Yes            Sign Out: Ongoing Nausea   Neuro/Psych: Uneventful            Sign Out: Acceptable/Baseline neuro status   Airway/Respiratory: Uneventful            Sign Out: Acceptable/Baseline resp. status   CV/Hemodynamics: Uneventful            Sign Out: Acceptable CV status; No obvious hypovolemia; No obvious fluid overload   Other NRE: NONE   DID A NON-ROUTINE EVENT OCCUR? No       Last vitals:  Vitals Value Taken Time   /76 05/19/25 21:47   Temp     Pulse 82 05/19/25 22:19   Resp 17 05/19/25 22:19   SpO2 88 % 05/19/25 22:19   Vitals shown include unfiled device data.    Electronically Signed By: Pipe Phan MD  May 19, 2025  10:20 PM

## 2025-05-20 NOTE — ANESTHESIA PROCEDURE NOTES
Arterial Line Procedure Note    Pre-Procedure   Staff -        Anesthesiologist:  Pipe Phan MD       Performed By: anesthesiologist       Location: pre-op       Pre-Anesthestic Checklist: patient identified, IV checked, risks and benefits discussed, informed consent, monitors and equipment checked and pre-op evaluation  Timeout:       Correct Patient: Yes        Correct Procedure: Yes        Correct Site: Yes        Correct Position: Yes   Line Placement:   This line was placed Post Induction  Procedure   Procedure: arterial line       Laterality: right       Insertion Site: radial.  Sterile Prep        Standard elements of sterile barrier followed       Skin prep: Chloraprep  Insertion/Injection        Technique: ultrasound guided        1. Ultrasound was used to evaluate the access site.       2. Artery evaluated via ultrasound for patency/adequacy.       3. Using real-time ultrasound the needle/catheter was observed entering the artery/vein.       4. Permanent image was captured and entered into the patient's record.       5. The visualized structures were anatomically normal.       6. There were no apparent abnormal pathologic findings.       Catheter Type/Size: 20 G, 1.75 in/4.5 cm quick cath (integral wire)  Narrative         Secured by: anchor securement device and other       Tegaderm dressing used.       Complications: None apparent,        Arterial waveform: Yes    Comments:  Arterial line secured with a Tegaderm, anchor device, and tape. Sterile gloves, mask, hat, and sterile drape utilized. US needed due to difficulty with palpation of pulse.

## 2025-05-20 NOTE — PROGRESS NOTES
Neurosurgery Progress Note:     POD#1 s/p right Stealth redo craniotomy and tumor resection with intraoperative fluorescence with Dr. Steele.    24 hours events: Exam stable overnight MRI completed postoperatively     On exam: Neurologically intact nonfocal exam.  Endorses some pre-existing left knee pain resulting from tripping over her dog  Incision: Clean dry and intact     PLAN:  -PT OT  - Up with assist  - Okay for diet  - 1 week steroid taper  - Okay to transfer to seventh floor  - Possible discharge tomorrow  - Light precautions x 48 hours must be in dark room cover eyes if transported in hallway  -Every 4 neurochecks     [facility] Neurosurgery  [facility]  Vocera messaging strongly preferred  Please always look up on-call provider before messaging/paging            Dragon Disclosure   This was created at least in part with a voice recognition software. Mistakes/typos may be present.

## 2025-05-20 NOTE — ANESTHESIA PROCEDURE NOTES
Airway       Patient location during procedure: OR       Procedure Start/Stop Times: 5/19/2025 7:09 PM  Staff -        CRNA: Diane Dobbins APRN CRNA       Performed By: CRNA  Consent for Airway        Urgency: elective  Indications and Patient Condition       Indications for airway management: mally-procedural       Induction type:intravenous       Mask difficulty assessment: 1 - vent by mask    Final Airway Details       Final airway type: endotracheal airway       Successful airway: ETT - single  Endotracheal Airway Details        ETT size (mm): 7.0       Cuffed: yes       Successful intubation technique: video laryngoscopy       VL Blade Size: Glidescope 3       Grade View of Cords: 1       Adjucts: stylet       Position: Right       Measured from: lips       Secured at (cm): 22       Bite block used: None    Post intubation assessment        Placement verified by: capnometry, equal breath sounds and chest rise        Number of attempts at approach: 1       Secured with: commercial tube saul and tape       Ease of procedure: easy       Dentition: Intact and Unchanged    Medication(s) Administered   Medication Administration Time: 5/19/2025 7:09 PM

## 2025-05-20 NOTE — PROGRESS NOTES
05/20/25 0924   Appointment Info   Signing Clinician's Name / Credentials (OT) Natividad Fowler,OTR/L   Rehab Comments (OT) Initial Evaluation   Living Environment   People in Home child(magno), adult;grandchild(magno)  (adult dtr, has custody of 3 grandchildren, 10, 7 and 5 yo.)   Current Living Arrangements house   Home Accessibility stairs to enter home;stairs within home   Number of Stairs, Main Entrance 3  (Lives in custody.)   Number of Stairs, Within Home, Primary greater than 10 stairs  (split entry, 12 steps up to kitchen)   Transportation Anticipated family or friend will provide   Living Environment Comments Pt I with ADL's and functional mobility prior   Self-Care   Equipment Currently Used at Home none   Fall history within last six months no   Activity/Exercise/Self-Care Comment tub/shower combo and comfort height toilet   Instrumental Activities of Daily Living (IADL)   Previous Responsibilities ;finances;medication management;yardwork;shopping;laundry;housekeeping;meal prep  (does n't drive, dtr drives and will get groceries. doesnt' drive due to L field from first surgery.)   General Information   Onset of Illness/Injury or Date of Surgery 05/19/25   Referring Physician Janette Camargo PA-C   Patient/Family Therapy Goal Statement (OT) home   Additional Occupational Profile Info/Pertinent History of Current Problem Glioblastoma multiforme of occipital lobe (H) (C71.4)  Vasogenic cerebral edema (H) (G93.6). Right stealth redo craniotomy and tumor resection with intraoperative fluorescence POD #1   Existing Precautions/Restrictions fall;other (see comments)  (post crani precautions.  or below, HOB 30 degrees or higher)   Cognitive Status Examination   Orientation Status orientation to person, place and time   Affect/Mental Status (Cognitive) WFL   Follows Commands WFL   Visual Perception   Visual Impairment/Limitations WFL  (Doesn't wear glasses)   Visual Field Deficit    (baseline L field cut from last surgery approx 1 year ago.)   Sensory   Sensory Comments tip ofTongue numbness,new since surgery. reports wasnumb after last surgery 1  year ago and eventually went away.   Pain Assessment   Patient Currently in Pain Yes, see Vital Sign flowsheet  (when not leaning onto head no pain. if lean back onto incision 4/10, back of L knee pain as tripped on her puppy at home. 7/10 when walking and 6/10 head/neck pain when up, RN notified.)   Range of Motion Comprehensive   Comment, General Range of Motion B UE AROM WFL   Strength Comprehensive (MMT)   Comment, General Manual Muscle Testing (MMT) Assessment R shoulder has rotator cuff injury, so has decreased strength. L UE WFL   Coordination   Upper Extremity Coordination No deficits were identified   Bed Mobility   Scooting/Bridging [place] (Bed Mobility) modified independence   Supine-Sit [place] (Bed Mobility)   (SBA)   Transfer Skill: Bed to Chair/Chair to Bed   Bed-Chair [place] (Transfers) contact guard   Sit-Stand Transfer   Sit-Stand [place] (Transfers) contact guard  (SBA)   Toilet Transfer   [place] Level (Toilet Transfer)   (SBA)   Upper Body Dressing Assessment/Training   [place] Level (Upper Body Dressing) supervision   Lower Body Dressing Assessment/Training   [place] Level (Lower Body Dressing) contact guard assist   Eating/Self Feeding   [place] Level (Feeding) independent   Toileting   [place] Level (Toileting) independent   Clinical Impression   Criteria for Skilled Therapeutic Interventions Met (OT) Yes, treatment indicated   OT Diagnosis impaired I with ADL's and functional mobility.   OT Problem List-Impairments impacting ADL problems related to;activity tolerance impaired;balance;cognition;strength   Assessment of Occupational Performance 3-5 Performance Deficits   Identified Performance Deficits impaired I with dressing, showering, etc.   Planned Therapy Interventions (OT)  ADL retraining;cognition;transfer training;home program guidelines;progressive activity/exercise;visual perception  (post crani ed)   Clinical Decision Making Complexity (OT) detailed assessment/moderate complexity   Risk & Benefits of therapy have been explained evaluation/treatment results reviewed;care plan/treatment goals reviewed;risks/benefits reviewed;current/potential barriers reviewed;participants voiced agreement with care plan;participants included;patient   OT Total Evaluation Time   OT Eval, Moderate Complexity Minutes (78412) 10   OT Goals   Therapy Frequency (OT) Daily   OT Predicted Duration/Target Date for Goal Attainment 05/23/25   OT Goals Hygiene/Grooming;Upper Body Dressing;Lower Body Dressing;Toilet Transfer/Toileting;Cognition   OT: Hygiene/Grooming independent;modified independent;while standing  (compensating for L field cut 100% of the time)   OT: Upper Body Dressing Modified independent;within precautions   OT: Lower Body Dressing Modified independent;within precautions;including set-up/clothing retrieval   OT: Toilet Transfer/Toileting Modified independent;Independent;toilet transfer;cleaning and garment management;using adaptive equipment;within precautions   OT: Cognitive Patient/caregiver will verbalize understanding of cognitive assessment results/recommendations as needed for safe discharge planning  (if needed)   Self-Care/Home Management   Self-Care/Home Mgmt/ADL, Compensatory, Meal Prep Minutes (47349) 24   Symptoms Noted During/After Treatment (Meal Preparation/Planning Training) fatigue;increased pain   Treatment Detail/Skilled Intervention OT: pt ed in post crani precautions with handout, pt receptive to ed. Pt alert and oriented x 4, denies any cognitive impairments. pt ed in figure 4 tech for fermin/doff socks due to crani precautions and pt able to complete long sitting on bed with S/mod I. pt able to stand ta sink to complete oral hygiene and wash hands with SBA/S, no LOB,  reports head/neck apin   Therapeutic Activities   Therapeutic Activity Minutes (77712) 12   Symptoms noted during/after treatment fatigue;increased pain   Treatment Detail/Skilled Intervention OT: pt seated at EOB independent, sit <> stand with SBA/CGA initially reports Lknee pain from fall before surgery and ambualtes without AD to BR and back with SBA/CGA to insure safety and A with lines, completes toilet transfer with SBA/S. pt reports back of knee pain subsides when sitting but did increase to 8/10 when standing or walking. VS monitored throughout and appears stable.denies dizziness, etc.   OT Discharge Planning   OT Plan OT plan; pt has baseline L field cut after last crani approx 1 year ago. walk into BR for toilet transfer and g/h at sink, place ADL items on L side or TB dress. monitor cognition, appears WFL, but will cont to monitor and screen if needed   OT Discharge Recommendation (DC Rec) home with assist   OT Rationale for DC Rec Pt lives in a house with her adult dtr and has adopted 3 of her grandkids ages 10, 7 and 3 yo. pt independent with ADL/IADL's and functional mobility and takes care of grandkids. pt does not drive due to baseline L field cut following last crani approx 1 year ago. Pt currently limited due to head/neck pain when up, L knee pain post fall tripped on puppy before surgery and impaired activity tolerance and baseline L field cut. Anticipate with medical mgmt and cont'd therapy 1-2 more sessions, recommend home with family A with strenuous IADL's ie yard work, heavier cleaning vacuuming, mopping, heavier laundry, etc. cont to monitor for recs and safe discharge.   OT Brief overview of current status Goals of therapy will be to address safe mobility and make recs for d/c to next level of care. Pt and RN will continue to follow all falls risk precautions as documented by RN staff while hospitalized. pt SBA to CGA for functional mobility, supervison with toilet transfer, g/h at sink SBA  and LE dress SBA.   OT Total Distance Amb During Session (feet) 15   Total Session Time   Timed Code Treatment Minutes 36   Total Session Time (sum of timed and untimed services) 46

## 2025-05-20 NOTE — PLAN OF CARE
Goal Outcome Evaluation:      Plan of Care Reviewed With: patient    Overall Patient Progress: improvingOverall Patient Progress: improving    Outcome Evaluation: Assumed care [id number]  Drowsy, open eyes to voice, Ox4, PERRLA, baseline L field cut, all extremities moves purposefully and follow command. Maintained SBP<150 and on RA. C/o incisional pain managed w/ PRN oxycodone. Tele SR. LS clear w/ infrequent congested cough. Passed bedside swallow and advance diet to clear and then to regular. Swallow meds one at a time. R  posterior head incision sutures, CARRINGTON. Scattered scabs. R radial A-line. NS at 75ml/hr. Brain MRI done this morning.  Problem: Craniotomy/Craniectomy/Cranioplasty  Goal: Optimal Functional Ability  Intervention: Optimize Functional Ability  Recent Flowsheet Documentation  Taken 5/20/2025 0000 by Kimani Machuca RN  Activity Management: bedrest  Taken 5/19/2025 2320 by Kimani Machuca RN  Activity Management: bedrest  Goal: Anesthesia/Sedation Recovery  Intervention: Optimize Anesthesia Recovery  Recent Flowsheet Documentation  Taken 5/20/2025 0000 by Kimani Machuca RN  Safety Promotion/Fall Prevention:   clutter free environment maintained   lighting adjusted   room door open   room near nurse's station   safety round/check completed  Taken 5/19/2025 2320 by Kimani Machuca RN  Safety Promotion/Fall Prevention:   clutter free environment maintained   lighting adjusted   room door open   room near nurse's station   safety round/check completed  Goal: Effective Oxygenation and Ventilation  Intervention: Optimize Oxygenation and Ventilation  Recent Flowsheet Documentation  Taken 5/20/2025 0200 by Kimani Machuca RN  Head of Bed (HOB) Positioning: HOB at 30 degrees  Taken 5/20/2025 0000 by Kimani Machuca RN  Head of Bed (HOB) Positioning: HOB at 30 degrees  Taken 5/19/2025 2320 by Kimani Machuca RN  Head of Bed (HOB) Positioning: HOB at 30 degrees

## 2025-05-20 NOTE — OP NOTE
May 19, 2025    [facility]   Operative Note    Pre-operative diagnosis: Glioblastoma multiforme of occipital lobe (H) [C71.4]  Vasogenic cerebral edema (H) [G93.6]   Post-operative diagnosis Same   Procedure: Procedure(s):  Right stealth redo craniotomy and tumor resection with intraoperative fluorescence    Surgeon(s): Surgeons and Role:     * Mendoza Steele MD - Primary   Estimated blood loss:  100ml   Specimens: ID Type Source Tests Collected by Time Destination   1 : Recurrent Right Brain Tumor Tissue Brain SURGICAL PATHOLOGY EXAM Mendoza Steele MD 5/19/2025  9:03 PM       Findings: Redo right occipital craniotomy performed.  Intraoperative fluorescence noted and resected.         Indications: Patient is a 60-year-old female with a known history of glioblastoma is noted to have worsening enhancement in the right occipital lobe.  She is brought for redo craniotomy with intraoperative fluorescence.    Description of procedure: Patient was brought to the operating room.  General endotracheal anesthesia was induced.  Patient was placed in the Scott head saul and rolled into the prone position on gel rolls.  The Stealth neuronavigation system was registered and used to plan the craniotomy and incision.  Previous incision was prepped and draped in sterile fashion.  The linear incision over the right occipital region was opened sharply and the previous bone flap exposed.  The retaining devices were removed and the bone flap was able to be elevated.  The microscope was sterilely draped and brought to the field and then the dura was opened.  A corticotomy was made in the right occipital lobe and then using blunt and sharp dissection as well as sono PET aspiration the tumor was resected.  Intermittent use of the 5ALA fluorescence allowed for resection of the tumor.  The occipital horn of the ventricle was identified.  Once there was minimal residual fluorescence then hemostasis  was achieved.  A piece of DuraGen was placed over the dural defect.  The bone flap was plated back into place with a Synthes low-profile cranial plating system.  The galea was closed with 2-0 Vicryl.  3-0 nylon was used for skin.

## 2025-05-20 NOTE — ANESTHESIA CARE TRANSFER NOTE
Patient: Pamella Jaimes    Procedure: Procedure(s):  Right stealth redo craniotomy and tumor resection with intraoperative fluorescence       Diagnosis: Glioblastoma multiforme of occipital lobe (H) [C71.4]  Vasogenic cerebral edema (H) [G93.6]  Diagnosis Additional Information: No value filed.    Anesthesia Type:   General     Note:    Oropharynx: oropharynx clear of all foreign objects and spontaneously breathing  Level of Consciousness: drowsy  Oxygen Supplementation: face mask    Independent Airway: airway patency satisfactory and stable  Dentition: dentition unchanged  Vital Signs Stable: post-procedure vital signs reviewed and stable  Report to RN Given: handoff report given  Patient transferred to: PACU    Handoff Report: Identifed the Patient, Identified the Reponsible Provider, Reviewed the pertinent medical history, Discussed the surgical course, Reviewed Intra-OP anesthesia mangement and issues during anesthesia, Set expectations for post-procedure period and Allowed opportunity for questions and acknowledgement of understanding      Vitals:  Vitals Value Taken Time   /76 05/19/25 21:47   Temp     Pulse 82 05/19/25 21:53   Resp 17 05/19/25 21:53   SpO2 97 % 05/19/25 21:53   Vitals shown include unfiled device data.    Electronically Signed By: FATEMEH Ambrose CRNA  May 19, 2025  9:55 PM

## 2025-05-21 ENCOUNTER — APPOINTMENT (OUTPATIENT)
Dept: OCCUPATIONAL THERAPY | Facility: CLINIC | Age: 60
End: 2025-05-21
Attending: NEUROLOGICAL SURGERY
Payer: COMMERCIAL

## 2025-05-21 LAB
GLUCOSE BLDC GLUCOMTR-MCNC: 139 MG/DL (ref 70–99)
PATH REPORT.COMMENTS IMP SPEC: ABNORMAL
PATH REPORT.COMMENTS IMP SPEC: YES
PATH REPORT.FINAL DX SPEC: ABNORMAL
PATH REPORT.GROSS SPEC: ABNORMAL
PATH REPORT.MICROSCOPIC SPEC OTHER STN: ABNORMAL
PATH REPORT.RELEVANT HX SPEC: ABNORMAL
PHOTO IMAGE: ABNORMAL

## 2025-05-21 PROCEDURE — 999N000147 HC STATISTIC PT IP EVAL DEFER: Performed by: PHYSICAL THERAPIST

## 2025-05-21 PROCEDURE — 97535 SELF CARE MNGMENT TRAINING: CPT | Mod: GO

## 2025-05-21 PROCEDURE — 120N000001 HC R&B MED SURG/OB

## 2025-05-21 PROCEDURE — 97530 THERAPEUTIC ACTIVITIES: CPT | Mod: GO

## 2025-05-21 PROCEDURE — 250N000012 HC RX MED GY IP 250 OP 636 PS 637: Performed by: NURSE PRACTITIONER

## 2025-05-21 PROCEDURE — 250N000013 HC RX MED GY IP 250 OP 250 PS 637: Performed by: PHYSICIAN ASSISTANT

## 2025-05-21 PROCEDURE — 250N000013 HC RX MED GY IP 250 OP 250 PS 637: Performed by: NURSE PRACTITIONER

## 2025-05-21 RX ADMIN — ACETAMINOPHEN 650 MG: 325 TABLET, FILM COATED ORAL at 10:52

## 2025-05-21 RX ADMIN — POLYETHYLENE GLYCOL 3350 17 G: 17 POWDER, FOR SOLUTION ORAL at 10:53

## 2025-05-21 RX ADMIN — SENNOSIDES AND DOCUSATE SODIUM 1 TABLET: 50; 8.6 TABLET ORAL at 10:52

## 2025-05-21 RX ADMIN — CETIRIZINE HYDROCHLORIDE 10 MG: 10 TABLET, FILM COATED ORAL at 10:52

## 2025-05-21 RX ADMIN — ACETAMINOPHEN 650 MG: 325 TABLET, FILM COATED ORAL at 02:17

## 2025-05-21 RX ADMIN — DEXAMETHASONE 4 MG: 4 TABLET ORAL at 10:53

## 2025-05-21 RX ADMIN — SENNOSIDES AND DOCUSATE SODIUM 1 TABLET: 50; 8.6 TABLET ORAL at 21:19

## 2025-05-21 RX ADMIN — ACETAMINOPHEN 650 MG: 325 TABLET, FILM COATED ORAL at 17:09

## 2025-05-21 RX ADMIN — DEXAMETHASONE 4 MG: 4 TABLET ORAL at 21:19

## 2025-05-21 RX ADMIN — LEVETIRACETAM 1000 MG: 500 TABLET, FILM COATED ORAL at 10:52

## 2025-05-21 RX ADMIN — ACETAMINOPHEN 650 MG: 325 TABLET, FILM COATED ORAL at 22:56

## 2025-05-21 RX ADMIN — ACETAMINOPHEN 650 MG: 325 TABLET, FILM COATED ORAL at 06:18

## 2025-05-21 RX ADMIN — LEVETIRACETAM 1000 MG: 500 TABLET, FILM COATED ORAL at 21:19

## 2025-05-21 NOTE — PLAN OF CARE
Goal Outcome Evaluation:         Reason for Admission: POD#2 R stealth redo crani and tumor resection    Cognitive/Mentation: A/Ox 4  Neuros/CMS: Intact ex L field cut- baseline since last brain surgery in 2024, numbness on the tip of tongue- says this also happened after previous brain surgery  VS: VSS on RA, SBP < 150.   Tele: n/a.  /GI: Continent. Last BM PTA.   Pulmonary: LS clear.  Pain: L knee pain, incisional pain- tylenol given x2.     Drains/Lines: PIV SL  Skin: crani site with sutures CARRINGTON, R forearm scab, left knee swelling  Activity: SBA  Diet: regular with thin liquids. Takes pills whole.     Therapies recs: pending  Discharge: pending    Aggression Stoplight Tool: green    End of shift summary: arrived from ICU around 2100 last night. Keep room dim 48hr post op

## 2025-05-21 NOTE — PLAN OF CARE
RN to RN report called to Station 73. Questions answered. Patient ready to be moved to 1712.

## 2025-05-21 NOTE — PROGRESS NOTES
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 Denies any new symptoms or concerns. Neuro exam stable. Incision CDI. Post op MRI completed:     MR BRAIN W/O and W CONTRAST  LOCATION: [facility]  DATE: 5/20/2025                                IMPRESSION:  1. Recent postsurgical changes of redo right occipital craniotomy and right occipital mass resection. There is residual enhancing tissue along the lateral and superior margins of the resection cavity, possibly representing residual tumor or   posttreatment-related inflammation. Recommend continued attention on follow-up.  2. Stable vasogenic edema throughout the right occipitotemporal region. No midline shift, herniation or hydrocephalus.  3. New small volume layering intraventricular hemorrhage in the occipital horn of the left lateral ventricle.  4. Thin rim of restricted diffusion along the margins of the resection cavity, thickest along the inferolateral margin.    Plan   -Discharge plan: Therapy recommends discharge to home with outpatient OT. Patient would like to discharge tomorrow, family is able to help with transportation at that time.   -Dark room x 48 hours post op   -Hold xarelto x 72 hours post op   -Decadron taper as ordered   -Continue PTA keppra   -Monitor neuro exam   -Pain control measures as needed   -Routine wound care, suture / staple removal at 2 weeks post op   -Advance activity as tolerated  -PT/OT  -Follow up with NSGY clinic in 2 weeks   -Appreciate assistance from specialties     Discussed with Dr. Cedric Nicole, CNP  [facility] Neurosurgery  Clinic  phone number: [phone]  Securely message or page via Epic Secure Chat, Notifo, or Peerless Network     Physical Exam   Temp: 99.1  F (37.3  C) Temp src: Oral BP: 126/83 Pulse: 86   Resp: 15 SpO2: 95 % O2 Device: None (Room air)    Vitals:    05/19/25 1552 05/20/25 0200   Weight: 101 kg (222 lb 11.2 oz) 105 kg (231 lb 7.7 oz)       Mental status:  Alert and oriented x 3, speech is fluent, following commands  Cranial nerves:  II-XII intact.   Motor:  Moves all extremities with appropriate strength.   Sensation:  Intact to light touch   Coordination:  Smooth finger to nose testing.   Negative pronator drift.    Incision: CDI

## 2025-05-21 NOTE — PLAN OF CARE
Physical Therapy: Orders received. Chart reviewed and discussed with evaluating OT. The pt is back to baseline with functional mobility post crani. Able to ambulate in gurrola and negotiate stairs without difficulty or assist.? Physical Therapy not indicated due to no acute skilled PT needs identified.? Defer discharge recommendations to Occupational Therapy.? Will complete PT order.

## 2025-05-21 NOTE — PLAN OF CARE
Goal Outcome Evaluation:      Plan of Care Reviewed With: patient, child    Overall Patient Progress: improvingOverall Patient Progress: improving    Outcome Evaluation: The patient is alert and oriented. The only noticeable neuro deficit is her left visual field cut which she states has been chronic since 2024. Vitals stable on room air. Up to bathroom with SBA. Good apetite. Left knee pain/swelling related to a fall at home prior to admission. Transfer orders for 7th floor neuro unit.    Rafi Wharton RN 7:58 PM 05/20/25

## 2025-05-21 NOTE — PROGRESS NOTES
"Occupational Therapy Discharge Summary    Reason for therapy discharge:    All goals and outcomes met, no further needs identified.    Progress towards therapy goal(s). See goals on Care Plan in [facility] electronic health record for goal details.  Goals met    Therapy recommendation(s):    Continued therapy is recommended.  Rationale/Recommendations:  outpatient driving evaluation.       05/21/25 0856   Appointment Info   Signing Clinician's Name / Credentials (OT) RONALDO Garcia   Self-Care/Home Management   Self-Care/Home Mgmt/ADL, Compensatory, Meal Prep Minutes (97708) 10   Treatment Detail/Skilled Intervention Dons alleycs IND. Discussed home safety, pt has assist from daughter at d/c. Pt verbalizes ability to call for help, get groceries delivered, have others assist with IADLs. Reviewed hand out of crani precautions. Verbalizes understanding.   Therapeutic Activities   Therapeutic Activity Minutes (73254) 14   Treatment Detail/Skilled Intervention Ambulates in hallway, SBA initially, pt seemed unsteady d/t L knee pain. But pt reports after a few steps she \"worked it out\" and not limping any more. Mod I with no device. Completes 8 stairs up/down. No concerns.   OT Discharge Planning   OT Plan d/c   OT Discharge Recommendation (DC Rec) home with assist;home with outpatient occupational therapy   OT Rationale for DC Rec Pt lives in a house with her adult dtr and has adopted 3 of her grandkids ages 10, 7 and 3 yo. pt independent with ADL/IADL's and functional mobility and takes care of grandkids. pt does not drive due to baseline L field cut following last crani approx 1 year ago, but states she would like to, outpatient OT ordered for assessment. Pt currently limited due to head/neck pain when up, L knee pain post fall tripped on puppy before surgery and impaired activity tolerance and baseline L field cut. recommend home with family A with strenuous IADL's ie yard work, heavier cleaning vacuuming, mopping, " heavier laundry, etc.   OT Brief overview of current status mod I with all mobility and self cares

## 2025-05-22 VITALS
DIASTOLIC BLOOD PRESSURE: 88 MMHG | TEMPERATURE: 98 F | OXYGEN SATURATION: 97 % | SYSTOLIC BLOOD PRESSURE: 115 MMHG | RESPIRATION RATE: 16 BRPM | BODY MASS INDEX: 35.08 KG/M2 | WEIGHT: 231.48 LBS | HEART RATE: 88 BPM | HEIGHT: 68 IN

## 2025-05-22 PROCEDURE — 250N000013 HC RX MED GY IP 250 OP 250 PS 637: Performed by: PHYSICIAN ASSISTANT

## 2025-05-22 PROCEDURE — 250N000012 HC RX MED GY IP 250 OP 636 PS 637: Performed by: NURSE PRACTITIONER

## 2025-05-22 PROCEDURE — 250N000013 HC RX MED GY IP 250 OP 250 PS 637: Performed by: NURSE PRACTITIONER

## 2025-05-22 RX ORDER — OXYCODONE HYDROCHLORIDE 5 MG/1
5 TABLET ORAL EVERY 4 HOURS PRN
Qty: 40 TABLET | Refills: 0 | Status: SHIPPED | OUTPATIENT
Start: 2025-05-22 | End: 2025-05-22

## 2025-05-22 RX ORDER — AMOXICILLIN 250 MG
1 CAPSULE ORAL 2 TIMES DAILY
Qty: 40 TABLET | Refills: 0 | Status: SHIPPED | OUTPATIENT
Start: 2025-05-22

## 2025-05-22 RX ORDER — OXYCODONE HYDROCHLORIDE 5 MG/1
5 TABLET ORAL EVERY 4 HOURS PRN
Qty: 20 TABLET | Refills: 0 | Status: SHIPPED | OUTPATIENT
Start: 2025-05-22

## 2025-05-22 RX ORDER — ACETAMINOPHEN 500 MG
500-1000 TABLET ORAL EVERY 8 HOURS PRN
Qty: 90 TABLET | Refills: 2 | Status: SHIPPED | OUTPATIENT
Start: 2025-05-22

## 2025-05-22 RX ORDER — POLYETHYLENE GLYCOL 3350 17 G/17G
17 POWDER, FOR SOLUTION ORAL DAILY
Qty: 510 G | Refills: 0 | Status: SHIPPED | OUTPATIENT
Start: 2025-05-22

## 2025-05-22 RX ORDER — DEXAMETHASONE 1 MG
TABLET ORAL
Qty: 21 TABLET | Refills: 0 | Status: SHIPPED | OUTPATIENT
Start: 2025-05-22 | End: 2025-05-28

## 2025-05-22 RX ADMIN — LEVETIRACETAM 1000 MG: 500 TABLET, FILM COATED ORAL at 08:34

## 2025-05-22 RX ADMIN — CETIRIZINE HYDROCHLORIDE 10 MG: 10 TABLET, FILM COATED ORAL at 08:34

## 2025-05-22 RX ADMIN — SENNOSIDES AND DOCUSATE SODIUM 1 TABLET: 50; 8.6 TABLET ORAL at 08:34

## 2025-05-22 RX ADMIN — DEXAMETHASONE 4 MG: 4 TABLET ORAL at 08:34

## 2025-05-22 RX ADMIN — ACETAMINOPHEN 650 MG: 325 TABLET, FILM COATED ORAL at 04:52

## 2025-05-22 ASSESSMENT — ACTIVITIES OF DAILY LIVING (ADL)
ADLS_ACUITY_SCORE: 34
ADLS_ACUITY_SCORE: 35
ADLS_ACUITY_SCORE: 34
ADLS_ACUITY_SCORE: 34
ADLS_ACUITY_SCORE: 35
ADLS_ACUITY_SCORE: 34
ADLS_ACUITY_SCORE: 35
ADLS_ACUITY_SCORE: 35
ADLS_ACUITY_SCORE: 34

## 2025-05-22 NOTE — PLAN OF CARE
Pt POD #2 R stealth redo crani and tumor resection.  A&Ox4. VSS on RA. Keep SBP <150. Neuros intact ex L field cut & numbness on the tip of tongue baseline after previous brain surgery. Continent. IND. Regular diet, thin liquids, takes pills whole. L PIV SL. Skin - crani site with sutures CARRINGTON, R forearm scab, left knee swelling. Tylenol x1 for jaw pain.

## 2025-05-22 NOTE — DISCHARGE SUMMARY
[facility]    Discharge Summary   [facility]    Date of Admission:  5/19/2025  Date of Discharge:  5/22/2025 12:05 PM  Discharging Provider: Tatiana Flower PA-C  Date of Service (when I saw the patient): 05/22/25    Discharge Diagnoses   Active Problems:    Brain mass    History of Present Illness   Pamella Jaimes is a 60-year-old female with a known history of glioblastoma is noted to have worsening enhancement in the right occipital lobe. Surgical intervention was recommended.     Hospital Course   Pamella Jaimes is a 60-year-old female with a known history of glioblastoma is noted to have worsening enhancement in the right occipital lobe. She is brought for redo craniotomy with intraoperative fluorescence with Dr Steele on 5/19/25. Post operatively she recovered well without complications. Pathology confirmed that the tumor was recurrent glioblastoma.     Neuro exam stable. Pt did mention that she has continued L peripheral visual field deficit compared to R but this has not worse since before surgery.  Incision CDI. Tolerating a regular diet. Pain well controlled.   PT/OT recommends discharge to home.   Per chart review and physical exam, pt is safe to discharge from medical standpoint.     Assessment: POD#3 Right stealth redo craniotomy and tumor resection with intraoperative fluorescence.     Plan:   - Discharge to home  - Routine post op care plan  - Routine wound care and activity restrictions  - Pain medications prescribed at discharge  - Decadron taper as ordered  - Xarelto to start 5/23/25  - Follow up with NSGY clinic as scheduled     I have discussed the following assessment and plan with Dr. Steele.     Tatiana Flower PA-C  [facility]  Clinic phone number: [phone]  Securely message or page via Epic Secure Chat, [employer], or [employer]     Pending Results   Unresulted Labs Ordered in the Past 30 Days of this Admission       No orders found  from 4/19/2025 to 5/20/2025.          Code Status   Full Code    Primary Care Physician   Abrahan Bergman MD    Physical Exam   Temp: 98  F (36.7  C) Temp src: Oral BP: 115/88 Pulse: 88   Resp: 16 SpO2: 97 % O2 Device: None (Room air)    Vitals:    05/19/25 1552 05/20/25 0200   Weight: 101 kg (222 lb 11.2 oz) 105 kg (231 lb 7.7 oz)     Physical Exam:   General: NAD, slight central tremor. Sitting up in bed  Mental status:  Awake, AOx4, speech is fluent, following commands  Eyes: reduced peripheral vision on the L compared to R.   Cranial nerves:  II-XII intact.   Motor:  Moves all extremities with appropriate strength.   Shoulder Flexion     Right:  5/5   Left:  5/5  Shoulder Extension Right:  5/5   Left:  5/5  Biceps                      Right:  5/5   Left:  5/5  Triceps                     Right:  5/5   Left:  5/5                            Right:  5/5   Left:  5/5    Hip flexion                Right: 5/5  Left:  5/5  Knee extension         Right:  5/5  Left:  5/5  Knee flexion              Right:  5/5  Left:  5/5  Gastroc Soleus (PF) Right:  5/5  Left:  5/5  Tibialis Ant (DF)        Right:  5/5  Left:  5/5  Sensation: grossly Intact to light touch   Coordination:  Smooth finger to nose testing.   Negative pronator drift.   Incision: posterior R cranial incision is Clean, dry, intact. No swelling, redness, warmth, or drainage.     Time Spent on this Encounter   I, Tatiana Flower PA-C, personally saw the patient today and spent greater than 30 minutes discharging this patient.    Discharge Disposition   Discharged to home  Condition at discharge: Stable    Consultations This Hospital Stay   PHYSICAL THERAPY ADULT IP CONSULT  OCCUPATIONAL THERAPY ADULT IP CONSULT    Discharge Orders      Occupational Therapy  Referral      Reason for your hospital stay    Right stealth redo craniotomy and tumor resection with intraoperative fluorescence (Right: Head) with Mendoza Steele MD     Activity     Your activity upon discharge:  Limit heavy lifting until follow up visit. Ok to walk as tolerated. No high impact activities until follow-up visit.     Follow Up    Follow up in Neurosurgery clinic in 2 weeks for incision check, 6 weeks, and 12 weeks.     Discharge Instructions    Your Neurosurgical follow-up appointments have been scheduled. You may call [phone] to make, confirm or change your appointment dates and/or times.     Wound care and dressings    Keep your incision clean and dry. Okay to remove dressing on post-op day 2 and shower on post-op day 3. Okay for water to run over incision and gently pat dry. Do not scrub incision. No bathing, swimming, or submerging incision under water until follow-up visit. Call clinic or go to the ER with any incision drainage or swelling. Follow-up in clinic at 2 weeks post op for incision check.     Diet    Follow this diet upon discharge: Regular     Discharge Medications   Discharge Medication List as of 5/22/2025 10:25 AM        START taking these medications    Details   dexAMETHasone (DECADRON) 1 MG tablet Take 4 tablets (4 mg) by mouth 2 times daily for 1 day, THEN 2 tablets (2 mg) 2 times daily for 2 days, THEN 1 tablet (1 mg) 2 times daily for 2 days, THEN 1 tablet (1 mg) daily for 1 day., Disp-21 tablet, R-0, E-Prescribe      polyethylene glycol (MIRALAX) 17 GM/Dose powder Take 17 g by mouth daily. Use while taking oxycodone to avoid constipation., Disp-510 g, R-0, E-Prescribe      senna-docusate (SENOKOT-S/PERICOLACE) 8.6-50 MG tablet Take 1 tablet by mouth 2 times daily. Use while taking oxycodone to avoid constipation., Disp-40 tablet, R-0, E-Prescribe           CONTINUE these medications which have CHANGED    Details   acetaminophen (TYLENOL) 500 MG tablet Take 1-2 tablets (500-1,000 mg) by mouth every 8 hours as needed for mild pain., Disp-90 tablet, R-2, E-Prescribe      oxyCODONE (ROXICODONE) 5 MG tablet Take 1 tablet (5 mg) by mouth every 4 hours as  needed for severe pain., Disp-20 tablet, R-0, Local Print      rivaroxaban ANTICOAGULANT (XARELTO) 20 MG TABS tablet Take 1 tablet (20 mg) by mouth daily (with dinner)., Disp-30 tablet, R-11, E-Prescribe           CONTINUE these medications which have NOT CHANGED    Details   carboxymethylcellulose PF (REFRESH PLUS) 0.5 % ophthalmic solution Place 1 drop into both eyes 3 times daily as needed for dry eyes., Historical      cetirizine (ZYRTEC) 10 MG tablet Take 10 mg by mouth daily., Historical      EPINEPHrine (ANY BX GENERIC EQUIV) 0.3 MG/0.3ML injection 2-pack Inject 0.3 mg into the muscle as needed for anaphylaxis. May repeat one time in 5-15 minutes if response to initial dose is inadequate., Historical      fluticasone (FLONASE) 50 MCG/ACT nasal spray SHAKE LIQUID AND USE 1 SPRAY IN EACH NOSTRIL DAILY, Disp-16 g, R-0, E-Prescribe      levETIRAcetam (KEPPRA) 1000 MG tablet Take 1 tablet (1,000 mg) by mouth 2 times daily., Disp-60 tablet, R-1, E-Prescribe      multiple vitamin  tablet TABS Take 1 tablet by mouth daily., Historical           Allergies   Allergies   Allergen Reactions    Morphine     Penicillins Hives

## 2025-05-22 NOTE — PLAN OF CARE
Goal Outcome Evaluation:  Pt here with R stealth redo crani and tumor resection POD#3. A&O x4. Neuros intact ex baseline L field cut and tip of the tongue numbness as result of previous brain surgery in 2004.  VSS. Regular diet, thin liquids. Takes pills whole. Up with SBA. Headache managed with prn tylenol. Crani incision CDI, CARRINGTON. Pt scoring green on the Aggression Stop Light Tool. Plans for discharge pending.

## 2025-05-22 NOTE — PLAN OF CARE
Patient discharged. Discontinued IV. Discharge paperwork reviewed, patient indicates understanding of all follow up instructions and appointments. All questions answered. Patient received new prescriptions from [facility] [facility] and indicates understanding of medication schedule. Patient states they have all belongings. Pt daughter on her way to drive patient home.

## 2025-05-30 NOTE — PROGRESS NOTES
NEURO-ONCOLOGY VISIT  Luis 3, 2025    CHIEF COMPLAINT: Ms. 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 Pamella completed standard chemoradiotherapy on 9/6/2024. Post-radiation imaging demonstrated no new concerns with an initial positive response to treatment.     Pamella initiated adjuvant therapy with temozolomide in 9/2024 and repeat imaging in 11/2024 and again in 1/2025 was largely stable with no significant concerns. She completed the planned 6th cycle of adjuvant temozolomide in 2/2025 and repeat imaging in 3/2025 showed subtle evolving changes of indeterminate significance that require close imaging surveillance. On repeat imaging in 4/2025, there was an increase in contrast enhancement with an associated increase in perfusion, which is concerning for local cancer recurrence.     Pamella underwent a re-do craniotomy on 5/19/2025 and resulting pathology confirmed cancer recurrence.     Pamella presents today in follow-up. She is accompanied by Leanne (daughter) and her grandson.    INTERVAL HISTORY  -Pamella has recovered well from surgery.    She denies pain/ headache.    Denies fatigue, good energy.   -She fell prior to surgery after tripping on her puppy and hurt her left knee.    Her knee continues to be swollen and painful.    Back on anticoagulation.   -Off dexamethasone.   -Mood is good, but she has been feeling bit more anxious recently.  -No new weakness, numbness, vision changes, or changes in cognition.    Denies seizure activity.    -Visual field cut.       MEDICATIONS   Current Outpatient Medications   Medication Sig Dispense Refill    acetaminophen (TYLENOL) 500 MG tablet Take 1-2 tablets (500-1,000 mg) by mouth every 8 hours as needed for mild pain. 90 tablet 2    carboxymethylcellulose PF (REFRESH PLUS) 0.5 % ophthalmic solution Place 1 drop into both eyes 3  times daily as needed for dry eyes.      cetirizine (ZYRTEC) 10 MG tablet Take 10 mg by mouth daily.      EPINEPHrine (ANY BX GENERIC EQUIV) 0.3 MG/0.3ML injection 2-pack Inject 0.3 mg into the muscle as needed for anaphylaxis. May repeat one time in 5-15 minutes if response to initial dose is inadequate.      fluticasone (FLONASE) 50 MCG/ACT nasal spray SHAKE LIQUID AND USE 1 SPRAY IN EACH NOSTRIL DAILY 16 g 0    levETIRAcetam (KEPPRA) 1000 MG tablet Take 1 tablet (1,000 mg) by mouth 2 times daily. 60 tablet 1    multiple vitamin  tablet TABS Take 1 tablet by mouth daily.      polyethylene glycol (MIRALAX) 17 GM/Dose powder Take 17 g by mouth daily. Use while taking oxycodone to avoid constipation. 510 g 0    rivaroxaban ANTICOAGULANT (XARELTO) 20 MG TABS tablet Take 1 tablet (20 mg) by mouth daily (with dinner). 30 tablet 11    senna-docusate (SENOKOT-S/PERICOLACE) 8.6-50 MG tablet Take 1 tablet by mouth 2 times daily. Use while taking oxycodone to avoid constipation. 40 tablet 0     DRUG ALLERGIES   Allergies   Allergen Reactions    Morphine     Penicillins Hives       IMMUNIZATIONS   Immunization History   Administered Date(s) Administered    COVID-19 Monovalent 18+ (Moderna) 05/04/2021, 06/01/2021, 01/28/2022    Influenza Vaccine 18-64 (Flublok) 01/14/2022    TDAP (Adacel,Boostrix) 01/04/2010, 01/04/2016     ONCOLOGIC HISTORY  -PRESENTATION: Neck pain, headache and head tremor.  -3/15/2023 MRI brain imaging with a mild asymmetric T2 hyperintense signal along right hippocampus and adjacent parahippocampal gyrus.    -PRESENTATION: Dizziness and nausea; semiology is concerning for temporal lobe auras. Slurred speech.  -5/27/2024 MR brain imaging with a right posterior temporo-occipital mass. Associated nodular peripheral enhancement and surrounding confluent T2 signal hyperintensity extending along the medial aspect of the right temporal lobe. Mass effect results in regional sulcal effacement, leftward midline  shift, partial effacement the right lateral ventricle and mild right uncal herniation.   -5/28/2024 SURGERY: Craniotomy for mass resection at [facility] with Dr. Brandan Connelly.   No post-operative MR brain imaging performed.   Post-operative CT head imaging from 5/29 status post a right parietal craniotomy with excision of previously demonstrated paramedian parieto-occipital tumor. Decreased focal mass effect and slightly decreased mild midline shift. No significant hemorrhage or additional complicating feature.   PATHOLOGY: Glioblastoma, IDH1-R132H wildtype (CNS WHO Grade 4).    MGMT promoter unmethylated.   -6/25/2024 NEURO-ONC: Recommending chemoradiotherapy with concurrent temozolomide. U/S + for DVT; started anticoagulation.   -7/26/2024 NEURO-ONC: She has started chemoradiotherapy with concurrent temozolomide. Acute visit for concerns; increased Keppra to 750 mg BID.   -7/23 - 9/6/2024 CHEMORT: Complete chemoradiotherapy. 60Gy in 30 fractions with concurrent temozolomide 75mg/m2 (160mg).  -10/1/2024 NEURO-ONC/ MRB/ CHEMO: Clinically well; reduced frequency of auras. Imaging with no concerns, initial positive response to treatment. Starting adjuvant temozolomide 150mg/m2 (320mg), cycle 1 (start date 10/4). Not pursuing Optune.   -10/30//2024 NEURO-ONC/CHEMO: Tolerated temozolomide well. Neurologically stable. Increase adjuvant temozolomide to 200mg/m2 (430mg), cycle 2 (start date 11/1).  -11/26/2024 NEURO-ONC/ MRB/ CHEMO: No new neurological concerns. Itchy skin. Imaging with no new overt concerns. Adjuvant temozolomide 200mg/m2 (430mg), cycle 3.   -12/23/2024 NEURO-ONC/ CHEMO: No new neurological concerns. Itchy skin persists and she did present to the emergency room for itchy skin plus rash.  We reviewed this as above.  It does not seem to be associated with when she is taking temozolomide.  Adjuvant temozolomide 200mg/m2 (430mg), cycle 4.   -1/21/2025 NEURO-ONC/ MRB/ CHEMO: No new neurological  concerns. Itchy skin. Imaging with no new overt concerns. Adjuvant temozolomide 1500mg/m2 (320mg), cycle 5 (dose reduce in the setting of skin rash).   -2/21/2025 NEURO-ONC/ CHEMO: No new neurological concerns.  Adjuvant temozolomide 1500mg/m2 (320mg), cycle 6 (dose reduce in the setting of skin rash).   -3/18/2025 NEURO-ONC/ MRB: No new neurological concerns. Neck muscle soreness. Imaging with subtle evolving changes of indeterminate significance that require close imaging surveillance; repeat imaging in 6 weeks.  -4/29/2025 NEURO-ONC/ MRB: No new neurological concerns; stable field cut. Imaging with an increase in contrast enhancement with an associated increase in perfusion, which is concerning for local cancer recurrence. Referral to neurosurgery; Dr. Steele.  -5/19/2025 SURGERY: Re-do craniotomy with Dr. Steele.   PATHOLOGY: Recurrent glioblastoma, IDH-wildtype (CNS WHO grade 4).   Post-operative MR brain imaging from 5/20 with recent postsurgical changes of redo right occipital craniotomy and right occipital mass resection. There is residual enhancing tissue along the lateral and superior margins of the resection cavity, possibly representing residual tumor or posttreatment-related inflammation. Stable vasogenic edema throughout the right occipitotemporal region. No midline shift, herniation or hydrocephalus. New small volume layering intraventricular hemorrhage in the occipital horn of the left lateral ventricle. Thin rim of restricted diffusion along the margins of the resection cavity, thickest along the inferolateral margin.  -6/3/3025 NEURO-ONC: Discussed post-operative imaging and diagnosis. Plan for next generation sequencing. Starting lomustine.       PHYSICAL EXAMINATION  /66 (BP Location: Left arm, Patient Position: Sitting, Cuff Size: Adult Large)   Pulse 91   Temp 98.5  F (36.9  C) (Oral)   Resp 16   Wt 102 kg (224 lb 14.4 oz)   SpO2 94%   BMI 34.20 kg/m    Wt Readings from Last 2  "Encounters:   06/03/25 102 kg (224 lb 14.4 oz)   05/20/25 105 kg (231 lb 7.7 oz)      Ht Readings from Last 2 Encounters:   05/19/25 1.727 m (5' 8\")   05/14/25 1.702 m (5' 7\")     KPS:     General: Appears well in no acute distress. Excellent energy, not fatigued.   Psych: Affect appropriate. Pleasant  Neurologic:   MENTAL STATUS:     Alert, oriented.    Speech fluent.    Comprehension intact.     CRANIAL NERVES:     Left homonymous hemianopsia   Hearing intact.   No dysarthria.   MOTOR    Mild essential head tremor.   GAIT: Walks without assistance.       MEDICAL RECORDS  Personally reviewed; Hospitalization at [facility], indicated for surgery.     LABS  Personally reviewed; Pathology resutls. CBC from 5/19 with platelets > 200K.     IMAGING  Personally reviewed per- and post-operative MR brain imaging as detailed above.    Imaging was shown to and results were reviewed with Pamella and Leanne.        IMPRESSION  On date of service, 47 minutes was spent in clinic and 22 minutes was spent preparing for the visit through extensive chart review and coordinating care for this high complexity visit. The following is in explanation for the recommendations used to define the plan.       Pamella has recovered well from surgery and has no new neurological concerns. Prior to surgery, she had a mechanical fall and injured her left knee. Today in clinic, her knee/ leg seems slightly swollen. Pamella has resumed her anticoagulation, so I will not evaluate with an U/S at this time. Pamella will continue to monitor her leg, apply supportive management (elevating the leg, icing, etc), and if there is no improvement, can initiate a work-up. Otherwise, Pamella continues to note a visual field cut with no new worsening of her vision. Pamella also denies any recurrent seizure events. She has tapered off dexamethasone.     Today, we reviewed the pathology results from her surgery that confirmed recurrent cancer. Next, we reviewed the " post-operative imaging as detailed above that demonstrated a successful resection. It is my impression that the contrast enhancement medial to the resection cavity that is also DWI+, most likely represents a post-operative changes and is less likely residual cancer. I have discussed Pamella's case and imaging at Brain Tumor Conference.      In the setting of cancer recurrence following surgical resection, we discussed the risks/ side effects and benefits of a repeat course of radiation. However, given that she completed radiotherapy < 1 year ago and had a successful resection, the plan is to hold on this option at this time.      With regard to chemotherapy; Given that Pamella completed cycle 6 of temozolomide in February and radiographic cancer progression was noted 2 months later plus since the cancer is MGMT promoter unmethylated, I would not recommend a retrial of temozolomide. Therefore, for her next line of chemotherapy, we discussed the risks/ side effects of lomustine. Common anticipated chemotherapy-related side effects include nausea, fatigue, and hematologic intolerance. There may be less efficacy given his MGMT promoter unmethylated status, but it remains a good treatment option.     Today, we discussed the risks (lack or actionable mutations, sending pathology off site, out-of-pocket costs, etc) as well as the benefits of next generation sequencing (NGS) through [employer] and Pamella is agreeable to this testing. The purpose of NGS is to evaluate for targetable driving mutations of her cancer and thus, hopefully expand treatment options by way of using targeted therapy. I will have [facility] arrange for pathology to be sent.    PROBLEM LIST  Glioblastoma  Headaches  Homonymous hemianopsia, left-side  Benign essential tremor  Temporal lobe auras    PLAN  -CANCER DIRECTED THERAPY-  -As above; Starting lomustine 110mg/m2 (240mg).    Pharmacy alerted and performed chemotherapy teaching today.    Supportive  medication: Zofran.   Checking CBC at week 4 and 6 of each cycle.   -Next generation sequencing ordered.     -STEROIDS-  -Off dexamethasone.     -SEIZURE MANAGEMENT-  -Stereotyped events that seem consistent with a history of non-dominant temporal lobe auras/ seizures.   Keppra 1000 mg twice daily.    -Quality of life/ MOOD/ FATIGUE-  -History of depression.    Self-discontinued Zoloft.    -VISUAL FIELD CUT-  -Left Homonymous hemianopsia.  -Evaluated by neuro-ophtho for thorough eye examination and baseline visual field testing to determine if there are any driving restrictions.      -LE DVT-  -Lifelong anticoagulation indicated.    On Xarelto; continue as directed.     Return to clinic in 7-8 weeks + imaging and labs.    In the meantime, Pamella knows to call the clinic with any concerns and she can be seen sooner if needed.     The longitudinal plan of care for the diagnosis(es)/condition(s) as documented were addressed during this visit. Due to the added complexity in care, I will continue to support Pamella in the subsequent management and with ongoing continuity of care.     Maribell Irizarry MD  Neuro-oncology

## 2025-06-03 ENCOUNTER — DOCUMENTATION ONLY (OUTPATIENT)
Dept: PHARMACY | Facility: CLINIC | Age: 60
End: 2025-06-03

## 2025-06-03 ENCOUNTER — PATIENT OUTREACH (OUTPATIENT)
Dept: ONCOLOGY | Facility: CLINIC | Age: 60
End: 2025-06-03

## 2025-06-03 ENCOUNTER — TELEPHONE (OUTPATIENT)
Dept: PHARMACY | Facility: CLINIC | Age: 60
End: 2025-06-03

## 2025-06-03 ENCOUNTER — ALLIED HEALTH/NURSE VISIT (OUTPATIENT)
Dept: NEUROSURGERY | Facility: CLINIC | Age: 60
End: 2025-06-03
Payer: COMMERCIAL

## 2025-06-03 ENCOUNTER — ONCOLOGY VISIT (OUTPATIENT)
Dept: ONCOLOGY | Facility: CLINIC | Age: 60
End: 2025-06-03
Attending: PSYCHIATRY & NEUROLOGY
Payer: COMMERCIAL

## 2025-06-03 VITALS
HEART RATE: 91 BPM | BODY MASS INDEX: 34.2 KG/M2 | RESPIRATION RATE: 16 BRPM | SYSTOLIC BLOOD PRESSURE: 105 MMHG | OXYGEN SATURATION: 94 % | WEIGHT: 224.9 LBS | DIASTOLIC BLOOD PRESSURE: 66 MMHG | TEMPERATURE: 98.5 F

## 2025-06-03 DIAGNOSIS — Z98.890 S/P CRANIOTOMY: Primary | ICD-10-CM

## 2025-06-03 DIAGNOSIS — C71.9 GLIOBLASTOMA (H): Primary | ICD-10-CM

## 2025-06-03 PROCEDURE — 99417 PROLNG OP E/M EACH 15 MIN: CPT | Performed by: PSYCHIATRY & NEUROLOGY

## 2025-06-03 PROCEDURE — 99215 OFFICE O/P EST HI 40 MIN: CPT | Mod: 24 | Performed by: PSYCHIATRY & NEUROLOGY

## 2025-06-03 PROCEDURE — 99207 PR NO CHARGE NURSE ONLY: CPT

## 2025-06-03 PROCEDURE — G2211 COMPLEX E/M VISIT ADD ON: HCPCS | Performed by: PSYCHIATRY & NEUROLOGY

## 2025-06-03 PROCEDURE — G0463 HOSPITAL OUTPT CLINIC VISIT: HCPCS | Performed by: PSYCHIATRY & NEUROLOGY

## 2025-06-03 RX ORDER — ONDANSETRON 4 MG/1
4 TABLET, FILM COATED ORAL EVERY 8 HOURS PRN
Qty: 15 TABLET | Refills: 1 | Status: SHIPPED | OUTPATIENT
Start: 2025-06-04

## 2025-06-03 ASSESSMENT — PAIN SCALES - GENERAL: PAINLEVEL_OUTOF10: NO PAIN (0)

## 2025-06-03 NOTE — PATIENT INSTRUCTIONS
Instructions for Patient    Incision  Keep your incision clean and dry at all times.   You may get your incision wet in the shower. Use a gentle shampoo with no fragrance, such as baby shampoo, until incision is completely healed. Allow soap and water to run over the incision and gently pat it dry.   Look at your incision site every day. You  may need a mirror or family member to help you.   Watch for signs of infection  Redness, swelling, warmth, drainage (Green or yellow drainage (pus) from your incision or increased bloody drainage), and fever of 101 degrees or higher  Notify clinic [phone]  No submerging incision in water such as pools, hot tubs, or baths for at least 8 weeks and until the incision is healed  Do not apply lotions or ointments to incision  Avoid coloring your hair or permanent styling until cleared by your surgeon    Activity  Post operative activity limitations for 4 weeks after surgery: No bending forward, no lifting anything greater than 10 pounds (a gallon of milk weighs approximately 8 pounds)  Ok to start driving if it has been 2 weeks since surgery and you have not had seizure activity and you are not taking narcotics.  If you have had a seizure, you may not drive for at least 3 months according to [place] law.  No strenuous activity  We encourage short frequent walks. You may gradually increase the distance as tolerated.    Medications   Refills of pain medication:   Please call the neurosurgery clinic to request 2-3 days before you run out  Weaning from narcotic pain medications  When it is time, start weaning by extending the time between doses.   For example, if you're taking 2 tabs every 4 hours, spread it out to 2 tabs over 4.5, 5, 6 hours. At that point you can certainly cut down to 1 tab, then wean to an as needed basis until completely done with them.Refills: call our clinic 2-3 business days before you are out of medication. A nurse will call you back to obtain a pain  assessment.   Don't take more than 3,000 mg of Acetaminophen in 24 hours  Ok to begin taking Aspirin and NSAIDs (ex: ibuprofen/Advil)   Encouraged icing for at least 3-4 times throughout the day for 20-30 minutes at a time. Avoid heat to the incision area.   Taking stool softeners regularly can reduce constipation commonly caused by narcotic pain medications.    Call the Clinic or go to the Emergency Room  Development of new pain/symptoms or worsening of symptoms  Incision infection (incisional redness, swelling, warmth, drainage, or fever)    Go to the Emergency Room   If sudden onset of Acute changes in the level of consciousness (increased confusion, memory loss, speech abnormalities), Any change in hearing or vision , increased headaches, or New onset of seizures  Chest pain   Trouble breathing     Post-Op Follow Up Appointments  7/1 Neville Velazquez DNP   8/15 Dr. Cedric FERRO [facility]  [facility] - [address] [address]  Telephone:  [phone]        Fax:  [phone]

## 2025-06-03 NOTE — TELEPHONE ENCOUNTER
Prior Authorization Not Needed per Insurance    Medication: GLEOSTINE 100 MG PO CAPS  Insurance Company: [employer] [place] - Phone [phone] Fax [phone]  Expected CoPay: $ 0  Pharmacy Filling the Rx: [facility] - [place], [place] - [address]   Pharmacy Notified: yes, aim to start as soon as she gets the medication. Aiming for 6/5  Patient Notified: yes

## 2025-06-03 NOTE — PROGRESS NOTES
Joann consent signed  Caris req and supporting documents sent via portal    Irene Clayton, RN, BSN  Specialty Care Coordinator  [facility]  [phone]

## 2025-06-03 NOTE — LETTER
6/3/2025      Pamella Jaimes  [address] [address]      Dear Colleague,    Thank you for referring your patient, Pamella Jaimes, to the [facility]. Please see a copy of my visit note below.    NEURO-ONCOLOGY VISIT  Luis 3, 2025    CHIEF COMPLAINT: Ms. 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 Pamella completed standard chemoradiotherapy on 9/6/2024. Post-radiation imaging demonstrated no new concerns with an initial positive response to treatment.     Pamella initiated adjuvant therapy with temozolomide in 9/2024 and repeat imaging in 11/2024 and again in 1/2025 was largely stable with no significant concerns. She completed the planned 6th cycle of adjuvant temozolomide in 2/2025 and repeat imaging in 3/2025 showed subtle evolving changes of indeterminate significance that require close imaging surveillance. On repeat imaging in 4/2025, there was an increase in contrast enhancement with an associated increase in perfusion, which is concerning for local cancer recurrence.     Pamella underwent a re-do craniotomy on 5/19/2025 and resulting pathology confirmed cancer recurrence.     Pamella presents today in follow-up. She is accompanied by Leanne (daughter) and her grandson.    INTERVAL HISTORY  -Pamella has recovered well from surgery.    She denies pain/ headache.    Denies fatigue, good energy.   -She fell prior to surgery after tripping on her puppy and hurt her left knee.    Her knee continues to be swollen and painful.    Back on anticoagulation.   -Off dexamethasone.   -Mood is good, but she has been feeling bit more anxious recently.  -No new weakness, numbness, vision changes, or changes in cognition.    Denies seizure activity.    -Visual field cut.       MEDICATIONS   Current Outpatient Medications   Medication Sig Dispense  Refill     acetaminophen (TYLENOL) 500 MG tablet Take 1-2 tablets (500-1,000 mg) by mouth every 8 hours as needed for mild pain. 90 tablet 2     carboxymethylcellulose PF (REFRESH PLUS) 0.5 % ophthalmic solution Place 1 drop into both eyes 3 times daily as needed for dry eyes.       cetirizine (ZYRTEC) 10 MG tablet Take 10 mg by mouth daily.       EPINEPHrine (ANY BX GENERIC EQUIV) 0.3 MG/0.3ML injection 2-pack Inject 0.3 mg into the muscle as needed for anaphylaxis. May repeat one time in 5-15 minutes if response to initial dose is inadequate.       fluticasone (FLONASE) 50 MCG/ACT nasal spray SHAKE LIQUID AND USE 1 SPRAY IN EACH NOSTRIL DAILY 16 g 0     levETIRAcetam (KEPPRA) 1000 MG tablet Take 1 tablet (1,000 mg) by mouth 2 times daily. 60 tablet 1     multiple vitamin  tablet TABS Take 1 tablet by mouth daily.       polyethylene glycol (MIRALAX) 17 GM/Dose powder Take 17 g by mouth daily. Use while taking oxycodone to avoid constipation. 510 g 0     rivaroxaban ANTICOAGULANT (XARELTO) 20 MG TABS tablet Take 1 tablet (20 mg) by mouth daily (with dinner). 30 tablet 11     senna-docusate (SENOKOT-S/PERICOLACE) 8.6-50 MG tablet Take 1 tablet by mouth 2 times daily. Use while taking oxycodone to avoid constipation. 40 tablet 0     DRUG ALLERGIES   Allergies   Allergen Reactions     Morphine      Penicillins Hives       IMMUNIZATIONS   Immunization History   Administered Date(s) Administered     COVID-19 Monovalent 18+ (Moderna) 05/04/2021, 06/01/2021, 01/28/2022     Influenza Vaccine 18-64 (Flublok) 01/14/2022     TDAP (Adacel,Boostrix) 01/04/2010, 01/04/2016     ONCOLOGIC HISTORY  -PRESENTATION: Neck pain, headache and head tremor.  -3/15/2023 MRI brain imaging with a mild asymmetric T2 hyperintense signal along right hippocampus and adjacent parahippocampal gyrus.    -PRESENTATION: Dizziness and nausea; semiology is concerning for temporal lobe auras. Slurred speech.  -5/27/2024 MR brain imaging with a right  posterior temporo-occipital mass. Associated nodular peripheral enhancement and surrounding confluent T2 signal hyperintensity extending along the medial aspect of the right temporal lobe. Mass effect results in regional sulcal effacement, leftward midline shift, partial effacement the right lateral ventricle and mild right uncal herniation.   -5/28/2024 SURGERY: Craniotomy for mass resection at [facility] with Dr. Brandan Connelly.   No post-operative MR brain imaging performed.   Post-operative CT head imaging from 5/29 status post a right parietal craniotomy with excision of previously demonstrated paramedian parieto-occipital tumor. Decreased focal mass effect and slightly decreased mild midline shift. No significant hemorrhage or additional complicating feature.   PATHOLOGY: Glioblastoma, IDH1-R132H wildtype (CNS WHO Grade 4).    MGMT promoter unmethylated.   -6/25/2024 NEURO-ONC: Recommending chemoradiotherapy with concurrent temozolomide. U/S + for DVT; started anticoagulation.   -7/26/2024 NEURO-ONC: She has started chemoradiotherapy with concurrent temozolomide. Acute visit for concerns; increased Keppra to 750 mg BID.   -7/23 - 9/6/2024 CHEMORT: Complete chemoradiotherapy. 60Gy in 30 fractions with concurrent temozolomide 75mg/m2 (160mg).  -10/1/2024 NEURO-ONC/ MRB/ CHEMO: Clinically well; reduced frequency of auras. Imaging with no concerns, initial positive response to treatment. Starting adjuvant temozolomide 150mg/m2 (320mg), cycle 1 (start date 10/4). Not pursuing Optune.   -10/30//2024 NEURO-ONC/CHEMO: Tolerated temozolomide well. Neurologically stable. Increase adjuvant temozolomide to 200mg/m2 (430mg), cycle 2 (start date 11/1).  -11/26/2024 NEURO-ONC/ MRB/ CHEMO: No new neurological concerns. Itchy skin. Imaging with no new overt concerns. Adjuvant temozolomide 200mg/m2 (430mg), cycle 3.   -12/23/2024 NEURO-ONC/ CHEMO: No new neurological concerns. Itchy skin persists and she did present  to the emergency room for itchy skin plus rash.  We reviewed this as above.  It does not seem to be associated with when she is taking temozolomide.  Adjuvant temozolomide 200mg/m2 (430mg), cycle 4.   -1/21/2025 NEURO-ONC/ MRB/ CHEMO: No new neurological concerns. Itchy skin. Imaging with no new overt concerns. Adjuvant temozolomide 1500mg/m2 (320mg), cycle 5 (dose reduce in the setting of skin rash).   -2/21/2025 NEURO-ONC/ CHEMO: No new neurological concerns.  Adjuvant temozolomide 1500mg/m2 (320mg), cycle 6 (dose reduce in the setting of skin rash).   -3/18/2025 NEURO-ONC/ MRB: No new neurological concerns. Neck muscle soreness. Imaging with subtle evolving changes of indeterminate significance that require close imaging surveillance; repeat imaging in 6 weeks.  -4/29/2025 NEURO-ONC/ MRB: No new neurological concerns; stable field cut. Imaging with an increase in contrast enhancement with an associated increase in perfusion, which is concerning for local cancer recurrence. Referral to neurosurgery; Dr. Steele.  -5/19/2025 SURGERY: Re-do craniotomy with Dr. Steele.   PATHOLOGY: Recurrent glioblastoma, IDH-wildtype (CNS WHO grade 4).   Post-operative MR brain imaging from 5/20 with recent postsurgical changes of redo right occipital craniotomy and right occipital mass resection. There is residual enhancing tissue along the lateral and superior margins of the resection cavity, possibly representing residual tumor or posttreatment-related inflammation. Stable vasogenic edema throughout the right occipitotemporal region. No midline shift, herniation or hydrocephalus. New small volume layering intraventricular hemorrhage in the occipital horn of the left lateral ventricle. Thin rim of restricted diffusion along the margins of the resection cavity, thickest along the inferolateral margin.  -6/3/3025 NEURO-ONC: Discussed post-operative imaging and diagnosis. Plan for next generation sequencing. Starting lomustine.  "      PHYSICAL EXAMINATION  /66 (BP Location: Left arm, Patient Position: Sitting, Cuff Size: Adult Large)   Pulse 91   Temp 98.5  F (36.9  C) (Oral)   Resp 16   Wt 102 kg (224 lb 14.4 oz)   SpO2 94%   BMI 34.20 kg/m    Wt Readings from Last 2 Encounters:   06/03/25 102 kg (224 lb 14.4 oz)   05/20/25 105 kg (231 lb 7.7 oz)      Ht Readings from Last 2 Encounters:   05/19/25 1.727 m (5' 8\")   05/14/25 1.702 m (5' 7\")     KPS:     General: Appears well in no acute distress. Excellent energy, not fatigued.   Psych: Affect appropriate. Pleasant  Neurologic:   MENTAL STATUS:     Alert, oriented.    Speech fluent.    Comprehension intact.     CRANIAL NERVES:     Left homonymous hemianopsia   Hearing intact.   No dysarthria.   MOTOR    Mild essential head tremor.   GAIT: Walks without assistance.       MEDICAL RECORDS  Personally reviewed; Hospitalization at [facility], indicated for surgery.     LABS  Personally reviewed; Pathology resutls. CBC from 5/19 with platelets > 200K.     IMAGING  Personally reviewed per- and post-operative MR brain imaging as detailed above.    Imaging was shown to and results were reviewed with Pamella and Leanne.        IMPRESSION  On date of service, 47 minutes was spent in clinic and 22 minutes was spent preparing for the visit through extensive chart review and coordinating care for this high complexity visit. The following is in explanation for the recommendations used to define the plan.       Pamella has recovered well from surgery and has no new neurological concerns. Prior to surgery, she had a mechanical fall and injured her left knee. Today in clinic, her knee/ leg seems slightly swollen. Pamella has resumed her anticoagulation, so I will not evaluate with an U/S at this time. Pamella will continue to monitor her leg, apply supportive management (elevating the leg, icing, etc), and if there is no improvement, can initiate a work-up. Otherwise, Pamella continues to note a " visual field cut with no new worsening of her vision. Pamella also denies any recurrent seizure events. She has tapered off dexamethasone.     Today, we reviewed the pathology results from her surgery that confirmed recurrent cancer. Next, we reviewed the post-operative imaging as detailed above that demonstrated a successful resection. It is my impression that the contrast enhancement medial to the resection cavity that is also DWI+, most likely represents a post-operative changes and is less likely residual cancer. I have discussed Pamella's case and imaging at Brain Tumor Conference.      In the setting of cancer recurrence following surgical resection, we discussed the risks/ side effects and benefits of a repeat course of radiation. However, given that she completed radiotherapy < 1 year ago and had a successful resection, the plan is to hold on this option at this time.      With regard to chemotherapy; Given that Pamella completed cycle 6 of temozolomide in February and radiographic cancer progression was noted 2 months later plus since the cancer is MGMT promoter unmethylated, I would not recommend a retrial of temozolomide. Therefore, for her next line of chemotherapy, we discussed the risks/ side effects of lomustine. Common anticipated chemotherapy-related side effects include nausea, fatigue, and hematologic intolerance. There may be less efficacy given his MGMT promoter unmethylated status, but it remains a good treatment option.     Today, we discussed the risks (lack or actionable mutations, sending pathology off site, out-of-pocket costs, etc) as well as the benefits of next generation sequencing (NGS) through [employer] and Pamella is agreeable to this testing. The purpose of NGS is to evaluate for targetable driving mutations of her cancer and thus, hopefully expand treatment options by way of using targeted therapy. I will have [facility] arrange for pathology to be sent.    PROBLEM  "LIST  Glioblastoma  Headaches  Homonymous hemianopsia, left-side  Benign essential tremor  Temporal lobe auras    PLAN  -CANCER DIRECTED THERAPY-  -As above; Starting lomustine 110mg/m2 (240mg).    Pharmacy alerted and performed chemotherapy teaching today.    Supportive medication: Zofran.   Checking CBC at week 4 and 6 of each cycle.   -Next generation sequencing ordered.     -STEROIDS-  -Off dexamethasone.     -SEIZURE MANAGEMENT-  -Stereotyped events that seem consistent with a history of non-dominant temporal lobe auras/ seizures.   Keppra 1000 mg twice daily.    -Quality of life/ MOOD/ FATIGUE-  -History of depression.    Self-discontinued Zoloft.    -VISUAL FIELD CUT-  -Left Homonymous hemianopsia.  -Evaluated by neuro-ophtho for thorough eye examination and baseline visual field testing to determine if there are any driving restrictions.      -LE DVT-  -Lifelong anticoagulation indicated.    On Xarelto; continue as directed.     Return to clinic in 7-8 weeks + imaging and labs.    In the meantime, Pamella knows to call the clinic with any concerns and she can be seen sooner if needed.     The longitudinal plan of care for the diagnosis(es)/condition(s) as documented were addressed during this visit. Due to the added complexity in care, I will continue to support Pamella in the subsequent management and with ongoing continuity of care.     Maribell Irizarry MD  Neuro-oncology      Oncology Rooming Note    Jordyn 3, 2025 11:06 AM   Pamella Jaimes is a 60 year old female who presents for:    Chief Complaint   Patient presents with     Oncology Clinic Visit     Glioblastoma (H)       Initial Vitals: /66 (BP Location: Left arm, Patient Position: Sitting, Cuff Size: Adult Large)   Pulse 91   Temp 98.5  F (36.9  C) (Oral)   Resp 16   Wt 102 kg (224 lb 14.4 oz)   SpO2 94%   BMI 34.20 kg/m   Estimated body mass index is 34.2 kg/m  as calculated from the following:    Height as of 5/19/25: 1.727 m (5' 8\").    " Weight as of this encounter: 102 kg (224 lb 14.4 oz). Body surface area is 2.21 meters squared.  Data Unavailable Comment: Data Unavailable   No LMP recorded. Patient is postmenopausal.  Allergies reviewed: Yes  Medications reviewed: Yes    Medications: Medication refills not needed today.  Pharmacy name entered into [facility]:    [id number] - [place], [place] - [address] [place] [id number] - [place], [place] - [address]  [facility] - [place], [place] - [address]    Frailty Screening:   Is the patient here for a new oncology consult visit in cancer care? 2. No    PHQ9:  Did this patient require a PHQ9?: No      Clinical concerns: no     Daria Sullivan CMA                Again, thank you for allowing me to participate in the care of your patient.        Sincerely,        Maribell Irizarry MD    Electronically signed

## 2025-06-03 NOTE — PROGRESS NOTES
Oral Chemotherapy Monitoring Program    Lab Monitoring Plan  CMP with each cycle (6 week cycles)  CBC at week 4 and week 6  *patient likely doing labs at [facility]  Subjective/Objective:  Pamella Jaimes is a 60 year old female seen in clinic for an initial visit for oral chemotherapy education. I visited with Pamella and her daughter Leanne. We discussed Lomustine dosing, possible side effects and the need for lab monitoring. We discussed taking Ondansetron as well. Refill requested to be sent to [place], [place] location. All questions answered.         1/13/2025     9:00 AM 1/21/2025     4:00 PM 1/30/2025    11:00 AM 2/7/2025     1:00 PM 2/21/2025    12:00 PM 3/10/2025    10:00 AM 6/3/2025    12:00 PM   ORAL CHEMOTHERAPY   Assessment Type Lab Monitoring Refill Initial Follow up Lab Monitoring Refill;Chart Review Lab Monitoring New Teach;Initial Work up   Diagnosis Code Brain Cancer - Glioblastoma Brain Cancer - Glioblastoma Brain Cancer - Glioblastoma Brain Cancer - Glioblastoma Brain Cancer - Glioblastoma Brain Cancer - Glioblastoma Brain Cancer - Glioblastoma   Providers Dr. Juan C Irizarry   Clinic Name/Location [facility]   Drug Name Temodar (temozolomide)  Temodar (temozolomide)  Temodar (temozolomide)  Temodar (temozolomide)  Temodar (temozolomide)  Temodar (temozolomide) Gleostine (lomustine)   Dose Other:  320 mg  320 mg  320 mg  320 mg  320 mg  --   Current Schedule Daily Daily Daily Daily Daily Daily Other   Cycle Details 5 days on, 23 days off  5 days on, 23 days off  5 days on, 23 days off  5 days on, 23 days off  5 days on, 23 days off  5 days on, 23 days off Other   Start Date of Last Cycle   1/24/2025 1/24/2025 2/21/2025 2/21/2025    Planned next cycle start date      3/20/2025 6/5/2025   Doses missed in last 2 weeks   0       Adherence Assessment   Adherent       Adverse Effects  "Neutropenia  Rash Neutropenia Neutropenia     Rash   --       Pharmacist Intervention(Rash)   No       Neutropenia Grade 2   Grade 2 Grade 2     Pharmacist Intervention(neutropenia) No   No No     Any new drug interactions?   No    No   Is the dose as ordered appropriate for the patient?       --       Data saved with a previous flowsheet row definition       Last PHQ-2 Score on record:       3/18/2025    12:47 PM 8/22/2024     9:32 AM   PHQ-2 ( 1999 [employer])   Q1: Little interest or pleasure in doing things 0 0   Q2: Feeling down, depressed or hopeless 0 1   PHQ-2 Score 0 1       Vitals:  BP:   BP Readings from Last 1 Encounters:   06/03/25 105/66     Wt Readings from Last 1 Encounters:   06/03/25 102 kg (224 lb 14.4 oz)     Estimated body surface area is 2.21 meters squared as calculated from the following:    Height as of 5/19/25: 1.727 m (5' 8\").    Weight as of an earlier encounter on 6/3/25: 102 kg (224 lb 14.4 oz).    Labs:  _  Result Component Current Result Ref Range   Sodium 138 (5/19/2025) 135 - 145 mmol/L     _  Result Component Current Result Ref Range   Potassium 4.0 (5/19/2025) 3.4 - 5.3 mmol/L     _  Result Component Current Result Ref Range   Calcium 9.1 (5/19/2025) 8.8 - 10.4 mg/dL     No results found for Mag within last 30 days.     No results found for Phos within last 30 days.     No results found for ALBUMIN within last 30 days.     _  Result Component Current Result Ref Range   Urea Nitrogen 12.0 (5/19/2025) 8.0 - 23.0 mg/dL     _  Result Component Current Result Ref Range   Creatinine 0.73 (5/19/2025) 0.51 - 0.95 mg/dL     No results found for AST within last 30 days.     No results found for ALT within last 30 days.     No results found for BILITOTAL within last 30 days.     _  Result Component Current Result Ref Range   WBC Count 7.5 (5/19/2025) 4.0 - 11.0 10e3/uL     _  Result Component Current Result Ref Range   Hemoglobin 13.9 (5/19/2025) 11.7 - 15.7 g/dL     _  Result Component Current " Result Ref Range   Platelet Count 203 (5/19/2025) 150 - 450 10e3/uL     _  Result Component Current Result Ref Range   Absolute Neutrophils 5.5 (5/19/2025) 1.6 - 8.3 10e3/uL     No results found for ANC within last 30 days.        Assessment:  Patient is appropriate to start therapy. Labs ok with Dr. Irizarry form 5/19/25. Ok to start once Lomustine received.     Plan:  Basic chemotherapy teaching was reviewed with the patient and the patient's family including indication, start date of therapy, dose, administration, adverse effects, missed doses, food and drug interactions, monitoring, side effect management, office contact information, and safe handling. Written materials were provided and all questions answered.    Follow-Up:  6/4: look for orders, loop in liaison for coverage, and release Ondansetron to [facility] [place] and Lomustine to specified pharmacy.      Juan C Flores PharmD  [facility]  [phone]  Jordyn 3, 2025

## 2025-06-03 NOTE — PROGRESS NOTES
"Oncology Rooming Note    Jordyn 3, 2025 11:06 AM   Pamella Jaimes is a 60 year old female who presents for:    Chief Complaint   Patient presents with    Oncology Clinic Visit     Glioblastoma (H)       Initial Vitals: /66 (BP Location: Left arm, Patient Position: Sitting, Cuff Size: Adult Large)   Pulse 91   Temp 98.5  F (36.9  C) (Oral)   Resp 16   Wt 102 kg (224 lb 14.4 oz)   SpO2 94%   BMI 34.20 kg/m   Estimated body mass index is 34.2 kg/m  as calculated from the following:    Height as of 5/19/25: 1.727 m (5' 8\").    Weight as of this encounter: 102 kg (224 lb 14.4 oz). Body surface area is 2.21 meters squared.  Data Unavailable Comment: Data Unavailable   No LMP recorded. Patient is postmenopausal.  Allergies reviewed: Yes  Medications reviewed: Yes    Medications: Medication refills not needed today.  Pharmacy name entered into [facility]:    [id number] - [place] - [address] [place] [address] [facility] [id number]  [place] - [address]  [facility] - [place], [place] - [address]     Frailty Screening:   Is the patient here for a new oncology consult visit in cancer care? 2. No    PHQ9:  Did this patient require a PHQ9?: No      Clinical concerns: no     Daria Sullivan CMA              "

## 2025-06-03 NOTE — CONFIDENTIAL NOTE
Post-op Nurse Visit:    Patient presents today s/p right stealth redo craniotomy and tumor resection with intraoperative fluorescence on 5/19/25 per the order of Mendoza Steele MD. Family accompanies patient but waited in the lobby.    Pain  0/10.     Pain Relief Measures:  Oxycodone: None  Tylenol: None  Weaned from steroid?: Yes  Weaned from anti-seizure med?: N/A    Neuro Assessment  Denies new onset of weakness, acute changes in the level of consciousness (increased confusion, memory loss, speech abnormalities), any change in hearing or vision, increased headaches, or new onset of seizures.    Symptoms compared to pre-op: No new symptoms.  Equal strength to bilateral upper extremities  Neurologically intact    Incision  Incision inspected. Edges well-approximated. No redness, swelling, drainage, warmth, or fever noted. Incision prepped with chloraprep and suture(s) removed without difficulty.     Return to work discussed: Not working. Cares for grandchildren.    All of patient's questions addressed today. Patient was instructed to call with any additional questions/concerns.

## 2025-06-04 ENCOUNTER — LAB (OUTPATIENT)
Dept: LAB | Facility: OTHER | Age: 60
End: 2025-06-04
Payer: COMMERCIAL

## 2025-06-04 ENCOUNTER — E-VISIT (OUTPATIENT)
Dept: FAMILY MEDICINE | Facility: OTHER | Age: 60
End: 2025-06-04
Payer: COMMERCIAL

## 2025-06-04 ENCOUNTER — TELEPHONE (OUTPATIENT)
Dept: FAMILY MEDICINE | Facility: OTHER | Age: 60
End: 2025-06-04

## 2025-06-04 DIAGNOSIS — D61.810 ANTINEOPLASTIC CHEMOTHERAPY INDUCED PANCYTOPENIA: ICD-10-CM

## 2025-06-04 DIAGNOSIS — T45.1X5A ANTINEOPLASTIC CHEMOTHERAPY INDUCED PANCYTOPENIA: ICD-10-CM

## 2025-06-04 DIAGNOSIS — T45.1X5A CHEMOTHERAPY-INDUCED NAUSEA AND VOMITING: ICD-10-CM

## 2025-06-04 DIAGNOSIS — R11.2 CHEMOTHERAPY-INDUCED NAUSEA AND VOMITING: ICD-10-CM

## 2025-06-04 DIAGNOSIS — R30.0 DYSURIA: Primary | ICD-10-CM

## 2025-06-04 LAB — HOLD SPECIMEN: NORMAL

## 2025-06-04 PROCEDURE — 99207 PR NON-BILLABLE SERV PER CHARTING: CPT | Performed by: FAMILY MEDICINE

## 2025-06-04 NOTE — TELEPHONE ENCOUNTER
Patient calling wondering about urine sample dropped off.    I don't see orders for a urine today. E visit was done.      Please advise on next steps.    Jb Morgan RN  [facility] [place]  June 4, 2025

## 2025-06-04 NOTE — PATIENT INSTRUCTIONS
Dear Pamella,     After reviewing your responses, I would like you to come in for a urine test to make sure we treat you correctly. This urine test is to evaluate you for a possible urinary tract infection, and should be completed today or tomorrow. Schedule a Lab Only appointment here.     If a Lab appointment is not available, please check this site for lab locations and hours (many labs are closed evenings and weekends). You may walk into any Lab location during their operating hours without an appointment to complete your urine test. Please let the lab staff know that you have had an eVisit and your provider has ordered a urine test.     You will receive instructions with your results in [facility] once they are available.     If your symptoms worsen, you develop pain in your back or stomach, develop fevers, or are not improving in 5 days, please contact your primary care provider for an appointment or visit a Walk-in or Urgent Care Center to be seen.     Thanks again for choosing us as your health care partner,     Kavya Syed MD

## 2025-06-05 NOTE — TELEPHONE ENCOUNTER
Patient did an E-visit, I ordered the UA and sent message for her to get lab done, patient then sent a message that she couldn't come in and didn't want to be charged for the visit, so I canceled the order and no charged the E-visit..  I wasn't here yesterday afternoon, so I'm sure that the urine has now been discarded.  If she wants this done, she should submit another E-visit and an order can be placed and a urine will need to be dropped off.

## 2025-06-05 NOTE — TELEPHONE ENCOUNTER
Called and informed patient. She expressed understanding, but is unable to leave work today to drop off another urine. She states she will go somewhere else to be seen.    Darcie HUGGINSN, RN

## 2025-06-12 ENCOUNTER — TELEPHONE (OUTPATIENT)
Dept: PHARMACY | Facility: CLINIC | Age: 60
End: 2025-06-12
Payer: COMMERCIAL

## 2025-06-12 DIAGNOSIS — I82.5Y2 CHRONIC DEEP VEIN THROMBOSIS (DVT) OF PROXIMAL VEIN OF LEFT LOWER EXTREMITY (H): ICD-10-CM

## 2025-06-12 DIAGNOSIS — C71.9 GLIOBLASTOMA (H): ICD-10-CM

## 2025-06-12 RX ORDER — LEVETIRACETAM 1000 MG/1
1000 TABLET ORAL 2 TIMES DAILY
Qty: 60 TABLET | Refills: 1 | Status: SHIPPED | OUTPATIENT
Start: 2025-06-12

## 2025-06-12 NOTE — ORAL ONC MGMT
Oral Chemotherapy Monitoring Program    Subjective/Objective:  Pamella Jaimes is a 60 year old female contacted by phone for a follow-up visit for oral chemotherapy.  Spoke with Pamella over the phone today. Confirmed that she took her lomustine capsules on 6/5/25. She took her nausea pill about 1 hour prior and had no issues with nausea. She noted a couple of days later she had a little fatigue and took a nap and also food just didn't sound good for a couple of meals. But this has resolved and was not a major issue. Patient reports feeling fine now. Reviewed lab appt with patient scheduled for 7/1.        1/30/2025    11:00 AM 2/7/2025     1:00 PM 2/21/2025    12:00 PM 3/10/2025    10:00 AM 6/3/2025    12:00 PM 6/3/2025     3:00 PM 6/12/2025    12:00 PM   ORAL CHEMOTHERAPY   Assessment Type Initial Follow up Lab Monitoring Refill;Chart Review Lab Monitoring New Teach;Initial Work up New Teach;Initial Work up Initial Follow up   Diagnosis Code Brain Cancer - Glioblastoma Brain Cancer - Glioblastoma Brain Cancer - Glioblastoma Brain Cancer - Glioblastoma Brain Cancer - Glioblastoma Brain Cancer - Glioblastoma    Providers Dr. Juan C Irizarry    Clinic Name/Location [facility]    Drug Name Temodar (temozolomide)  Temodar (temozolomide)  Temodar (temozolomide)  Temodar (temozolomide)  Gleostine (lomustine)  Gleostine (lomustine)  Gleostine (lomustine)   Dose 320 mg  320 mg  320 mg  320 mg  --  240 mg  240 mg   Current Schedule Daily  Daily  Daily  Daily  Other  Other  Other   Cycle Details 5 days on, 23 days off  5 days on, 23 days off  5 days on, 23 days off  5 days on, 23 days off Other Other Other   Start Date of Last Cycle 1/24/2025 1/24/2025 2/21/2025 2/21/2025 6/5/2025 6/5/2025   Planned next cycle start date    3/20/2025 6/5/2025 7/17/2025 7/17/2025   Doses missed in last 2 weeks 0         Adherence Assessment Adherent        "  Adverse Effects Rash Neutropenia Neutropenia       Rash --         Pharmacist Intervention(Rash) No         Neutropenia  Grade 2 Grade 2       Pharmacist Intervention(neutropenia)  No No       Any new drug interactions? No    No No    Is the dose as ordered appropriate for the patient?     -- Yes        Data saved with a previous flowsheet row definition       Last PHQ-2 Score on record:       3/18/2025    12:47 PM 8/22/2024     9:32 AM   PHQ-2 ( 1999 [employer])   Q1: Little interest or pleasure in doing things 0 0   Q2: Feeling down, depressed or hopeless 0 1   PHQ-2 Score 0 1       Vitals:  BP:   BP Readings from Last 1 Encounters:   06/03/25 105/66     Wt Readings from Last 1 Encounters:   06/03/25 102 kg (224 lb 14.4 oz)     Estimated body surface area is 2.21 meters squared as calculated from the following:    Height as of 5/19/25: 1.727 m (5' 8\").    Weight as of 6/3/25: 102 kg (224 lb 14.4 oz).    Labs:  _  Result Component Current Result Ref Range   Sodium 138 (5/19/2025) 135 - 145 mmol/L     _  Result Component Current Result Ref Range   Potassium 4.0 (5/19/2025) 3.4 - 5.3 mmol/L     _  Result Component Current Result Ref Range   Calcium 9.1 (5/19/2025) 8.8 - 10.4 mg/dL     No results found for Mag within last 30 days.     No results found for Phos within last 30 days.     No results found for ALBUMIN within last 30 days.     _  Result Component Current Result Ref Range   Urea Nitrogen 12.0 (5/19/2025) 8.0 - 23.0 mg/dL     _  Result Component Current Result Ref Range   Creatinine 0.73 (5/19/2025) 0.51 - 0.95 mg/dL     No results found for AST within last 30 days.     No results found for ALT within last 30 days.     No results found for BILITOTAL within last 30 days.     _  Result Component Current Result Ref Range   WBC Count 7.5 (5/19/2025) 4.0 - 11.0 10e3/uL     _  Result Component Current Result Ref Range   Hemoglobin 13.9 (5/19/2025) 11.7 - 15.7 g/dL     _  Result Component Current Result Ref Range "   Platelet Count 203 (5/19/2025) 150 - 450 10e3/uL     _  Result Component Current Result Ref Range   Absolute Neutrophils 5.5 (5/19/2025) 1.6 - 8.3 10e3/uL     No results found for ANC within last 30 days.          Assessment/Plan:  Patient tolerating first cycle of lomustine well. Labs on 7/1 (4 week labs). Then at either week 5 or 6 depending on those results.     Follow-Up:  7/1 labs    Refill Due:  7/17? But currently not seeing provider until 7/29. Message out to RN[facility] to confirm.    Venessa Yarbrough PharmD  June 12, 2025

## 2025-06-12 NOTE — TELEPHONE ENCOUNTER
Pending Prescriptions:                       Disp   Refills    levETIRAcetam (KEPPRA) 1000 MG tablet     60 tab*1            Sig: Take 1 tablet (1,000 mg) by mouth 2 times daily.    rivaroxaban ANTICOAGULANT (XARELTO) 20 MG*30 tab*11           Sig: Take 1 tablet (20 mg) by mouth daily (with           dinner).          Last Written Prescription Date:  Keppra last written 4/15/2025; quantity of 60 with 1 refill  Xarelto last written 5/23/2025, quantity of 30 with 11 refills; filled when pt was discharged from the hospital; Rx went to [facility] [place], but pt would like it to go to [facility] in [place] now   Last Office Visit: 6/3/2025 with Dr Irizarry   Future Office visit:   7/29/2025    Routing refill request to provider for review/approval.    Pt states that she took keppra this morning, but is now out of keppra and needs refill today.    Kim Kraft RN on 6/12/2025 at 8:44 AM

## 2025-06-12 NOTE — ORAL ONC MGMT
Oral Chemotherapy Monitoring Program.    Patient currently on lomustine therapy. Call out to patient to confirm start date and see how she tolerated the dose. Also will remind patient about labs that need to start at the 4 week davonte. Unable to reach patient. Voicemail left requesting return call. Pharmacist will attempt patient again in 1-2 days.    Venessa Yarbrough PharmD  June 12, 2025

## 2025-06-16 LAB — LAB ORDER RESULT STATUS: NORMAL

## 2025-07-01 ENCOUNTER — RESULTS FOLLOW-UP (OUTPATIENT)
Dept: FAMILY MEDICINE | Facility: OTHER | Age: 60
End: 2025-07-01

## 2025-07-01 ENCOUNTER — LAB (OUTPATIENT)
Dept: LAB | Facility: CLINIC | Age: 60
End: 2025-07-01
Payer: COMMERCIAL

## 2025-07-01 ENCOUNTER — DOCUMENTATION ONLY (OUTPATIENT)
Dept: PHARMACY | Facility: CLINIC | Age: 60
End: 2025-07-01

## 2025-07-01 ENCOUNTER — OFFICE VISIT (OUTPATIENT)
Dept: NEUROSURGERY | Facility: CLINIC | Age: 60
End: 2025-07-01
Payer: COMMERCIAL

## 2025-07-01 VITALS — SYSTOLIC BLOOD PRESSURE: 108 MMHG | DIASTOLIC BLOOD PRESSURE: 75 MMHG | HEART RATE: 84 BPM | OXYGEN SATURATION: 100 %

## 2025-07-01 DIAGNOSIS — D61.810 ANTINEOPLASTIC CHEMOTHERAPY INDUCED PANCYTOPENIA: ICD-10-CM

## 2025-07-01 DIAGNOSIS — T45.1X5A ANTINEOPLASTIC CHEMOTHERAPY INDUCED PANCYTOPENIA: ICD-10-CM

## 2025-07-01 DIAGNOSIS — R11.2 CHEMOTHERAPY-INDUCED NAUSEA AND VOMITING: ICD-10-CM

## 2025-07-01 DIAGNOSIS — Z13.6 SCREENING FOR CARDIOVASCULAR CONDITION: ICD-10-CM

## 2025-07-01 DIAGNOSIS — T45.1X5A CHEMOTHERAPY-INDUCED NAUSEA AND VOMITING: ICD-10-CM

## 2025-07-01 DIAGNOSIS — Z11.4 SCREENING FOR HIV (HUMAN IMMUNODEFICIENCY VIRUS): Primary | ICD-10-CM

## 2025-07-01 DIAGNOSIS — Z98.890 S/P CRANIOTOMY: Primary | ICD-10-CM

## 2025-07-01 DIAGNOSIS — C71.4 GLIOBLASTOMA MULTIFORME OF OCCIPITAL LOBE (H): ICD-10-CM

## 2025-07-01 LAB
ALBUMIN SERPL BCG-MCNC: 4.2 G/DL (ref 3.5–5.2)
ALP SERPL-CCNC: 80 U/L (ref 40–150)
ALT SERPL W P-5'-P-CCNC: 20 U/L (ref 0–50)
ANION GAP SERPL CALCULATED.3IONS-SCNC: 9 MMOL/L (ref 7–15)
AST SERPL W P-5'-P-CCNC: 24 U/L (ref 0–45)
BASOPHILS # BLD AUTO: 0 10E3/UL (ref 0–0.2)
BASOPHILS NFR BLD AUTO: 1 %
BILIRUB SERPL-MCNC: 0.5 MG/DL
BUN SERPL-MCNC: 9.9 MG/DL (ref 8–23)
CALCIUM SERPL-MCNC: 9.6 MG/DL (ref 8.8–10.4)
CHLORIDE SERPL-SCNC: 108 MMOL/L (ref 98–107)
CHOLEST SERPL-MCNC: 205 MG/DL
CREAT SERPL-MCNC: 0.79 MG/DL (ref 0.51–0.95)
EGFRCR SERPLBLD CKD-EPI 2021: 84 ML/MIN/1.73M2
EOSINOPHIL # BLD AUTO: 0.1 10E3/UL (ref 0–0.7)
EOSINOPHIL NFR BLD AUTO: 4 %
ERYTHROCYTE [DISTWIDTH] IN BLOOD BY AUTOMATED COUNT: 13 % (ref 10–15)
FASTING STATUS PATIENT QL REPORTED: NO
FASTING STATUS PATIENT QL REPORTED: NO
GLUCOSE SERPL-MCNC: 99 MG/DL (ref 70–99)
HCO3 SERPL-SCNC: 26 MMOL/L (ref 22–29)
HCT VFR BLD AUTO: 37 % (ref 35–47)
HDLC SERPL-MCNC: 47 MG/DL
HGB BLD-MCNC: 12.2 G/DL (ref 11.7–15.7)
HIV 1+2 AB+HIV1 P24 AG SERPL QL IA: NONREACTIVE
IMM GRANULOCYTES # BLD: 0 10E3/UL
IMM GRANULOCYTES NFR BLD: 0 %
LDLC SERPL CALC-MCNC: 139 MG/DL
LYMPHOCYTES # BLD AUTO: 0.9 10E3/UL (ref 0.8–5.3)
LYMPHOCYTES NFR BLD AUTO: 38 %
MCH RBC QN AUTO: 30.4 PG (ref 26.5–33)
MCHC RBC AUTO-ENTMCNC: 33 G/DL (ref 31.5–36.5)
MCV RBC AUTO: 92 FL (ref 78–100)
MONOCYTES # BLD AUTO: 0.2 10E3/UL (ref 0–1.3)
MONOCYTES NFR BLD AUTO: 10 %
NEUTROPHILS # BLD AUTO: 1.1 10E3/UL (ref 1.6–8.3)
NEUTROPHILS NFR BLD AUTO: 48 %
NONHDLC SERPL-MCNC: 158 MG/DL
PLATELET # BLD AUTO: 100 10E3/UL (ref 150–450)
POTASSIUM SERPL-SCNC: 3.8 MMOL/L (ref 3.4–5.3)
PROT SERPL-MCNC: 6.8 G/DL (ref 6.4–8.3)
RBC # BLD AUTO: 4.01 10E6/UL (ref 3.8–5.2)
SODIUM SERPL-SCNC: 143 MMOL/L (ref 135–145)
TRIGL SERPL-MCNC: 93 MG/DL
WBC # BLD AUTO: 2.3 10E3/UL (ref 4–11)

## 2025-07-01 PROCEDURE — 80061 LIPID PANEL: CPT

## 2025-07-01 PROCEDURE — 80053 COMPREHEN METABOLIC PANEL: CPT

## 2025-07-01 PROCEDURE — 36415 COLL VENOUS BLD VENIPUNCTURE: CPT

## 2025-07-01 PROCEDURE — 85025 COMPLETE CBC W/AUTO DIFF WBC: CPT

## 2025-07-01 PROCEDURE — 87389 HIV-1 AG W/HIV-1&-2 AB AG IA: CPT

## 2025-07-01 PROCEDURE — 99024 POSTOP FOLLOW-UP VISIT: CPT | Performed by: NURSE PRACTITIONER

## 2025-07-01 ASSESSMENT — PAIN SCALES - GENERAL: PAINLEVEL_OUTOF10: NO PAIN (0)

## 2025-07-01 NOTE — NURSING NOTE
"Pamella Jaimes is a 60 year old female who presents for:  Chief Complaint   Patient presents with    RECHECK     6 wk post op DOS 5/19/25        Initial Vitals:  /75   Pulse 84   SpO2 100%  Estimated body mass index is 34.2 kg/m  as calculated from the following:    Height as of 5/19/25: 5' 8\" (1.727 m).    Weight as of 6/3/25: 224 lb 14.4 oz (102 kg).. There is no height or weight on file to calculate BSA. BP completed using cuff size: large  No Pain (0)    Nursing Comments:       Anitra Joshua    "

## 2025-07-01 NOTE — PROGRESS NOTES
Neurosurgery Clinic 6 weeks follow up    Assessment:  Pamella presents to clinic today status post right Stealth redo craniotomy and tumor resection with intraoperative fluorescence with Dr. Steele on May 19, 2025.  Patient follows with Dr. Irizarry for neuro-oncology chemotherapy.  No scheduled radiation at this time.  Patient thinks that she is doing quite well and is pleasantly surprised that her recovery compared to her initial surgery.  She states that she is able to wear pads and has had minimal swelling or pain associated with her surgical incision.  Patient is on lifelong Xarelto.  She is off Decadron.  Takes Keppra 1 g twice daily    Exam:  Patient is alert and oriented she has 5 out of 5 strength in bilateral upper and lower extremities.  Pupils are equal and reactive.  Denies headache or any incisional pain.  Incision well-healed at this point with minimal scabbing at superior aspect of incision.  Patient endorses her left knee to be swollen and painful after slipping on a wet floor after her dog came in from outside. Also endorsing right buttock pain after fall.  Left homonymous hemianopsia remains.      Plan:  -Plan follow-up in 12-week appointment with Dr. Steele  - Continue to follow-up with Dr. Irizarry's office  - Activity as tolerated      Briefly discussed patient's right buttock pain that is worse when she is sitting.  We will continue to monitor patient's symptoms and if symptoms worsen or do not improve over time she may need additional follow-up in neurosurgery clinic to further evaluate her low back.    Neville Velazquez, AGUEDA  [address] [address]    Tel [phone]    Dragon Disclosure   This was created at least in part with a voice recognition software. Mistakes/typos may be present.

## 2025-07-01 NOTE — PROGRESS NOTES
Oral Chemotherapy Monitoring Program  Lab Follow Up    Reviewed lab results from 7/1/25 2/7/2025     1:00 PM 2/21/2025    12:00 PM 3/10/2025    10:00 AM 6/3/2025    12:00 PM 6/3/2025     3:00 PM 6/12/2025    12:00 PM 7/1/2025     1:00 PM   ORAL CHEMOTHERAPY   Assessment Type Lab Monitoring Refill;Chart Review Lab Monitoring New Teach;Initial Work up New Teach;Initial Work up Initial Follow up Lab Monitoring   Diagnosis Code Brain Cancer - Glioblastoma Brain Cancer - Glioblastoma Brain Cancer - Glioblastoma Brain Cancer - Glioblastoma Brain Cancer - Glioblastoma  Brain Cancer - Glioblastoma   Providers Dr. Juan C Irizarry   Clinic Name/Location [facility]   Drug Name Temodar (temozolomide)  Temodar (temozolomide)  Temodar (temozolomide)  Gleostine (lomustine)  Gleostine (lomustine)  Gleostine (lomustine) Gleostine (lomustine)   Dose 320 mg  320 mg  320 mg  --  240 mg  240 mg 240 mg   Current Schedule Daily  Daily  Daily  Other  Other  Other Other   Cycle Details 5 days on, 23 days off  5 days on, 23 days off  5 days on, 23 days off Other Other Other Other   Start Date of Last Cycle 1/24/2025 2/21/2025 2/21/2025 6/5/2025 6/5/2025    Planned next cycle start date   3/20/2025 6/5/2025 7/17/2025 7/17/2025    Adverse Effects Neutropenia Neutropenia     Neutropenia;Thrombocytopenia   Neutropenia Grade 2 Grade 2     Grade 2   Pharmacist Intervention(neutropenia) No No     No   Thrombocytopenia       Grade 1   Pharmacist Intervention(thrombocytopenia)       No   Any new drug interactions?    No No     Is the dose as ordered appropriate for the patient?    -- Yes         Data saved with a previous flowsheet row definition       Labs:  _  Result Component Current Result Ref Range   Sodium 143 (7/1/2025) 135 - 145 mmol/L     _  Result Component Current Result Ref Range   Potassium 3.8 (7/1/2025) 3.4 - 5.3 mmol/L     _  Result Component  Current Result Ref Range   Calcium 9.6 (7/1/2025) 8.8 - 10.4 mg/dL     No results found for Mag within last 30 days.     No results found for Phos within last 30 days.     _  Result Component Current Result Ref Range   Albumin 4.2 (7/1/2025) 3.5 - 5.2 g/dL     _  Result Component Current Result Ref Range   Urea Nitrogen 9.9 (7/1/2025) 8.0 - 23.0 mg/dL     _  Result Component Current Result Ref Range   Creatinine 0.79 (7/1/2025) 0.51 - 0.95 mg/dL     _  Result Component Current Result Ref Range   AST 24 (7/1/2025) 0 - 45 U/L     _  Result Component Current Result Ref Range   ALT 20 (7/1/2025) 0 - 50 U/L     _  Result Component Current Result Ref Range   Bilirubin Total 0.5 (7/1/2025) <=1.2 mg/dL     _  Result Component Current Result Ref Range   WBC Count 2.3 (L) (7/1/2025) 4.0 - 11.0 10e3/uL     _  Result Component Current Result Ref Range   Hemoglobin 12.2 (7/1/2025) 11.7 - 15.7 g/dL     _  Result Component Current Result Ref Range   Platelet Count 100 (L) (7/1/2025) 150 - 450 10e3/uL     _  Result Component Current Result Ref Range   Absolute Neutrophils 1.1 (L) (7/1/2025) 1.6 - 8.3 10e3/uL     No results found for ANC within last 30 days.        Assessment & Plan:  Results are concerning for trending down lab values: grade 2 neutropenia, grade 1 thrombocytopenia, and grade 2 leukopenia. All could be due to lomustine therapy. IB sent to provider. Will update on response- may need lab redraw next week.       Follow-Up:  IB from Dr. Irizarry- possible lab redraw next week    [facility]  Pharmacy Intern   [facility], [place]   [phone]

## 2025-07-01 NOTE — LETTER
7/1/2025      Pamella Jaimes  [address] [place] [address]      Dear Colleague,    Thank you for referring your patient, Pamella Jaimes, to the [facility]. Please see a copy of my visit note below.    Neurosurgery Clinic 6 weeks follow up    Assessment:  Pamella presents to clinic today status post right Stealth redo craniotomy and tumor resection with intraoperative fluorescence with Dr. Steele on May 19, 2025.  Patient follows with Dr. Irizarry for neuro-oncology chemotherapy.  No scheduled radiation at this time.  Patient thinks that she is doing quite well and is pleasantly surprised that her recovery compared to her initial surgery.  She states that she is able to wear pads and has had minimal swelling or pain associated with her surgical incision.  Patient is on lifelong Xarelto.  She is off Decadron.  Takes Keppra 1 g twice daily    Exam:  Patient is alert and oriented she has 5 out of 5 strength in bilateral upper and lower extremities.  Pupils are equal and reactive.  Denies headache or any incisional pain.  Incision well-healed at this point with minimal scabbing at superior aspect of incision.  Patient endorses her left knee to be swollen and painful after slipping on a wet floor after her dog came in from outside. Also endorsing right buttock pain after fall.  Left homonymous hemianopsia remains.      Plan:  -Plan follow-up in 12-week appointment with Dr. Steele  - Continue to follow-up with Dr. Irizarry's office  - Activity as tolerated      Briefly discussed patient's right buttock pain that is worse when she is sitting.  We will continue to monitor patient's symptoms and if symptoms worsen or do not improve over time she may need additional follow-up in neurosurgery clinic to further evaluate her low back.    Neville Velazquez, AGUEDA  [address]  [address] [place] [id number]    Tel [phone]    Dragon Disclosure   This was created at  least in part with a voice recognition software. Mistakes/typos may be present.        Again, thank you for allowing me to participate in the care of your patient.        Sincerely,        Neville Velazquez CNP    Electronically signed

## 2025-07-02 ENCOUNTER — TELEPHONE (OUTPATIENT)
Dept: NEUROSURGERY | Facility: CLINIC | Age: 60
End: 2025-07-02
Payer: COMMERCIAL

## 2025-07-02 NOTE — TELEPHONE ENCOUNTER
[facility] Call Center    Phone Message    May a detailed message be left on voicemail: yes     Reason for Call: Other: Patient called in and stated that while she was at her appt on 7/1 she forgot to ask questions about her restrictions and if she still has any. Please call to advise.       Action Taken: Message routed to:  [facility] Neurosurgery    Travel Screening: Not Applicable     Date of Service:

## 2025-07-02 NOTE — TELEPHONE ENCOUNTER
Per OV note from yesterday, activity as tolerated.    Called patient to update. Encouraged slow return to activity. Advised if anything is causing pain or symptoms, avoid that activity. Patient voiced agreement and understanding.

## 2025-07-12 ENCOUNTER — PATIENT OUTREACH (OUTPATIENT)
Dept: CARE COORDINATION | Facility: CLINIC | Age: 60
End: 2025-07-12
Payer: COMMERCIAL

## 2025-07-16 NOTE — PROGRESS NOTES
NEURO-ONCOLOGY VISIT  Jul 22, 2025    CHIEF COMPLAINT: Ms. 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 Pamella completed standard chemoradiotherapy on 9/6/2024. Post-radiation imaging demonstrated no new concerns with an initial positive response to treatment.     Pamella initiated adjuvant therapy with temozolomide in 9/2024 and repeat imaging in 11/2024 and again in 1/2025 was largely stable with no significant concerns. She completed the planned 6th cycle of adjuvant temozolomide in 2/2025 and repeat imaging in 3/2025 showed subtle evolving changes of indeterminate significance that require close imaging surveillance. On repeat imaging in 4/2025, there was an increase in contrast enhancement with an associated increase in perfusion, which is concerning for local cancer recurrence.     Pamella underwent a re-do craniotomy on 5/19/2025 and resulting pathology confirmed cancer recurrence. She started next-line cancer-directed therapy with lomustine, but unfortunately, repeat imaging in 7/2025 demonstrated evidence of cancer progression.     Will stop lomustine. The plan is for consideration of repeat radiotherapy plus the concurrent and adjuvant use of niraparib.    Pamella presents today in follow-up. She is accompanied by Leanne (daughter) and her 2 grandsons.    INTERVAL HISTORY  -Pamella tolerated her first cycle of lomustine well.   No nausea. Appetite is good.   -No fatigue.   -Denies pain/ headache.   -Off dexamethasone.   -Mood is good, but she has been feeling bit more anxious recently.  -No new weakness, numbness, or changes in cognition.   -Recurrent seizures/ auras 3 x a week.   -Visual field cut.   -On anticoagulation.       MEDICATIONS   Current Outpatient Medications   Medication Sig Dispense Refill    acetaminophen (TYLENOL) 500 MG tablet Take 1-2 tablets (500-1,000  mg) by mouth every 8 hours as needed for mild pain. 90 tablet 2    carboxymethylcellulose PF (REFRESH PLUS) 0.5 % ophthalmic solution Place 1 drop into both eyes 3 times daily as needed for dry eyes.      cetirizine (ZYRTEC) 10 MG tablet Take 10 mg by mouth daily.      EPINEPHrine (ANY BX GENERIC EQUIV) 0.3 MG/0.3ML injection 2-pack Inject 0.3 mg into the muscle as needed for anaphylaxis. May repeat one time in 5-15 minutes if response to initial dose is inadequate.      fluticasone (FLONASE) 50 MCG/ACT nasal spray SHAKE LIQUID AND USE 1 SPRAY IN EACH NOSTRIL DAILY 16 g 0    levETIRAcetam (KEPPRA) 1000 MG tablet Take 1 tablet (1,000 mg) by mouth 2 times daily. 60 tablet 1    multiple vitamin  tablet TABS Take 1 tablet by mouth daily.      ondansetron (ZOFRAN) 4 MG tablet Take 1 tablet (4 mg) by mouth every 8 hours as needed (Nausea/Vomiting). Take first dose prior to lomustine. 15 tablet 1    polyethylene glycol (MIRALAX) 17 GM/Dose powder Take 17 g by mouth daily. Use while taking oxycodone to avoid constipation. 510 g 0    rivaroxaban ANTICOAGULANT (XARELTO) 20 MG TABS tablet Take 1 tablet (20 mg) by mouth daily (with dinner). 30 tablet 11    senna-docusate (SENOKOT-S/PERICOLACE) 8.6-50 MG tablet Take 1 tablet by mouth 2 times daily. Use while taking oxycodone to avoid constipation. (Patient not taking: Reported on 7/22/2025) 40 tablet 0     DRUG ALLERGIES   Allergies   Allergen Reactions    Morphine     Penicillins Hives       IMMUNIZATIONS   Immunization History   Administered Date(s) Administered    COVID-19 Monovalent 18+ (Moderna) 05/04/2021, 06/01/2021, 01/28/2022    Influenza Vaccine 18-64 (Flublok) 01/14/2022    TDAP (Adacel,Boostrix) 01/04/2010, 01/04/2016     ONCOLOGIC HISTORY  -PRESENTATION: Neck pain, headache and head tremor.  -3/15/2023 MRI brain imaging with a mild asymmetric T2 hyperintense signal along right hippocampus and adjacent parahippocampal gyrus.    -PRESENTATION: Dizziness and nausea;  semiology is concerning for temporal lobe auras. Slurred speech.  -5/27/2024 MR brain imaging with a right posterior temporo-occipital mass. Associated nodular peripheral enhancement and surrounding confluent T2 signal hyperintensity extending along the medial aspect of the right temporal lobe. Mass effect results in regional sulcal effacement, leftward midline shift, partial effacement the right lateral ventricle and mild right uncal herniation.   -5/28/2024 SURGERY: Craniotomy for mass resection at [facility] with Dr. Brandan Connelly.   No post-operative MR brain imaging performed.   Post-operative CT head imaging from 5/29 status post a right parietal craniotomy with excision of previously demonstrated paramedian parieto-occipital tumor. Decreased focal mass effect and slightly decreased mild midline shift. No significant hemorrhage or additional complicating feature.   PATHOLOGY: Glioblastoma, IDH1-R132H wildtype (CNS WHO Grade 4).    MGMT promoter unmethylated.   -6/25/2024 NEURO-ONC: Recommending chemoradiotherapy with concurrent temozolomide. U/S + for DVT; started anticoagulation.   -7/26/2024 NEURO-ONC: She has started chemoradiotherapy with concurrent temozolomide. Acute visit for concerns; increased Keppra to 750 mg BID.   -7/23 - 9/6/2024 CHEMORT: Complete chemoradiotherapy. 60Gy in 30 fractions with concurrent temozolomide 75mg/m2 (160mg).  -10/1/2024 NEURO-ONC/ MRB/ CHEMO: Clinically well; reduced frequency of auras. Imaging with no concerns, initial positive response to treatment. Starting adjuvant temozolomide 150mg/m2 (320mg), cycle 1 (start date 10/4). Not pursuing Optune.   -10/30//2024 NEURO-ONC/CHEMO: Tolerated temozolomide well. Neurologically stable. Increase adjuvant temozolomide to 200mg/m2 (430mg), cycle 2 (start date 11/1).  -11/26/2024 NEURO-ONC/ MRB/ CHEMO: No new neurological concerns. Itchy skin. Imaging with no new overt concerns. Adjuvant temozolomide 200mg/m2 (430mg), cycle  3.   -12/23/2024 NEURO-ONC/ CHEMO: No new neurological concerns. Itchy skin persists and she did present to the emergency room for itchy skin plus rash.  We reviewed this as above.  It does not seem to be associated with when she is taking temozolomide.  Adjuvant temozolomide 200mg/m2 (430mg), cycle 4.   -1/21/2025 NEURO-ONC/ MRB/ CHEMO: No new neurological concerns. Itchy skin. Imaging with no new overt concerns. Adjuvant temozolomide 1500mg/m2 (320mg), cycle 5 (dose reduce in the setting of skin rash).   -2/21/2025 NEURO-ONC/ CHEMO: No new neurological concerns.  Adjuvant temozolomide 1500mg/m2 (320mg), cycle 6 (dose reduce in the setting of skin rash).   -3/18/2025 NEURO-ONC/ MRB: No new neurological concerns. Neck muscle soreness. Imaging with subtle evolving changes of indeterminate significance that require close imaging surveillance; repeat imaging in 6 weeks.  -4/29/2025 NEURO-ONC/ MRB: No new neurological concerns; stable field cut. Imaging with an increase in contrast enhancement with an associated increase in perfusion, which is concerning for local cancer recurrence. Referral to neurosurgery; Dr. Steele.  -5/19/2025 SURGERY: Re-do craniotomy with Dr. Steele.   PATHOLOGY: Recurrent glioblastoma, IDH-wildtype (CNS WHO grade 4).   Next generation sequencing through [employer]  Microsatellite Instability (MSI) Seq DNA-Tumor Stable  Tumor Mutational Newcastle (TMB) Seq DNA-Tumor Result: Low (3).   Genomic Loss of Heterozygosity (JEREMIAH) Seq DNA-Tumor Low (4%)  MGMT Promoter Methylation Pyrosequencing Result: Unmethylated  Genes Tested with Pathogenic or Likely Pathogenic Alterations  CDKN2A CNA-Seq DNA-Tumor Deleted   CDKN2B CNA-Seq DNA-Tumor Deleted  EGFR CNA-Seq DNA-Tumor Amplified   EGFRvIII Seq RNA-Tumor Pathogenic Isoform - 8 - -  MTAP CNA-Seq DNA-Tumor Deleted -  PIK3CA Seq RNA-Tumor Likely Pathogenic Fusion [id number] 2 - -  TERT Seq DNA-Tumor Pathogenic Variant - 0 c.-124C>T 18  Post-operative MR brain  "imaging from 5/20 with recent postsurgical changes of redo right occipital craniotomy and right occipital mass resection. There is residual enhancing tissue along the lateral and superior margins of the resection cavity, possibly representing residual tumor or posttreatment-related inflammation. Stable vasogenic edema throughout the right occipitotemporal region. No midline shift, herniation or hydrocephalus. New small volume layering intraventricular hemorrhage in the occipital horn of the left lateral ventricle. Thin rim of restricted diffusion along the margins of the resection cavity, thickest along the inferolateral margin.  -6/3/3025 NEURO-ONC: Discussed post-operative imaging and diagnosis. Plan for next generation sequencing. Starting lomustine.   -7/22/2025 NEURO-ONC/ MRB/ CHEMO: Increase in seizure/ aura frequency. Reviewed next generation sequencing report. Imaging with evidence of cancer progression. Will stop lomustine. The plan is for consideration of repeat radiotherapy plus the concurrent and adjuvant use of niraparib.      PHYSICAL EXAMINATION  /68 (BP Location: Right arm, Patient Position: Sitting, Cuff Size: Adult Large)   Pulse 78   Temp 97.8  F (36.6  C) (Oral)   Resp 18   Wt 96.9 kg (213 lb 11.2 oz)   SpO2 96%   BMI 32.49 kg/m    Wt Readings from Last 2 Encounters:   07/22/25 96.9 kg (213 lb 11.2 oz)   06/03/25 102 kg (224 lb 14.4 oz)      Ht Readings from Last 2 Encounters:   05/19/25 1.727 m (5' 8\")   05/14/25 1.702 m (5' 7\")     KPS:     General: Appears well in no acute distress. Excellent energy, not fatigued.   Psych: Affect appropriate. Pleasant  Neurologic:   MENTAL STATUS:     Alert, oriented.    Speech fluent.    Comprehension intact.     CRANIAL NERVES:     Left homonymous hemianopsia   Hearing intact.   No dysarthria.   MOTOR    Mild essential head tremor.   GAIT: Walks without assistance.       MEDICAL RECORDS  Personally reviewed; OT notes.     LABS  Personally " "reviewed; CBC (platelets 128) from today.     IMAGING  Personally reviewed MR brain imaging from today and compared to post-surgical imaging. To my eye, there is an increase in mally-cavitary contrast enhancement with an associated increase in edema. While there is limited to no increase in perfusion, the overall findings are concerning for cancer progression.     Formal read; \"Findings as noted on the FLAIR and postcontrast images are concerning for progressive neoplasm with greater nodular enhancement as well as greater FLAIR signal changes around the resection cavity. \"     Imaging was shown to and results were reviewed with Perla.        IMPRESSION  On date of service, 42 minutes was spent in clinic and 13 minutes was spent preparing for the visit through extensive chart review and coordinating care for this high complexity visit. The following is in explanation for the recommendations used to define the plan.       Pamella continues to note a visual field cut, but endorses no new worsening of her vision. She does stated that the frequency of her seizures/ auras has increased. Overall, she tolerated cycle 1 of lomustine well. Labs from today reviewed and were with no concerns.      Unfortunately, imaging as detailed above demonstrated evidence of cancer progression. As a result, will stop lomustine. We discussed the following options for her next line of therapy. Since it has now been ~1 year since her first course of radiotherapy and imaging demonstrates these concerning findings, I recommended a follow-up appointment with Dr. Sanchez to review the risks/ benefits of a repeat course of radiotherapy. Pamella is in agreement with this referral and I have discussed Pamella's case with Dr. Sanchez.     In terms of options of chemotherapy; Next generation sequencing results were reviewed today with Perla and they demonstrated no ideal targetable mutations. There is currently no clinical trial that Pamella " would be a candidate for at this institution. We reviewed the potential role of immunotherapy, but will look to reserve this option. Finally, we discussed the use of niraparib 200mg daily to be taken with and after completion of radiotherapy. This chemotherapy regimen is based on the Phase 3, open-label, randomized 2-arm study comparing the clinical efficacy and safety of niraparib with temozolomide in adult participants with newly-diagnosed, MGMT unmethylated glioblastoma. Analyses from the pivotal study suggested that patients with unmethylated MGMT promoter glioblastoma did not benefit from temozolomide. Therefore, PARP inhibitors and radiotherapy have been hypothesized as effective therapy for patients with MGMT promoter unmethylated cancer. Recently, niraparib has been investigated as monotherapy for treatment of glioblastoma as clinical data suggests that it is uniquely suited to treat MGMT unmethylated cancers. In a Phase 0/2 clinical trial ([id number]), niraparib demonstrated best-in-class tumor PK/PD effects in a population of newly-diagnosed patients. In another Phase 0/2 trial ([id number]), niraparib plus RT was well tolerated for enrolled patients with newly-diagnosed, MGMT unmethylated GBM. Niraparib can be associated with hematological toxicity as well as nausea/ vomiting, changes in bowel movements, decreased appetite and abdominal discomfort, and fatigue. Following our discussion, Pamella is in agreement with starting niraparib.     PROBLEM LIST  Glioblastoma  Headaches  Homonymous hemianopsia, left-side  Benign essential tremor  Temporal lobe auras    PLAN  -CANCER DIRECTED THERAPY-  -As above; Consideration of repeat radiotherapy; referral to Dr. Sanchez.    Prior authorization for concurrent and adjuvant use of niraparib.   Pharmacy performed chemotherapy teach today.     -STEROIDS-  -Off dexamethasone.     -SEIZURE MANAGEMENT-  -Stereotyped events that seem consistent with a history of non-dominant  temporal lobe auras/ seizures.   Keppra 1000 mg twice daily.    -Quality of life/ MOOD/ FATIGUE-  -History of depression.    Self-discontinued Zoloft.    -VISUAL FIELD CUT-  -Left Homonymous hemianopsia.  -Evaluated by neuro-ophtho for thorough eye examination and baseline visual field testing to determine if there are any driving restrictions.      -LE DVT-  -Lifelong anticoagulation indicated.    On Xarelto; continue as directed.     Return to clinic pending discussion with Dr. Sanchez.     In the meantime, Pamella knows to call the clinic with any concerns and she can be seen sooner if needed.     The longitudinal plan of care for the diagnosis(es)/condition(s) as documented were addressed during this visit. Due to the added complexity in care, I will continue to support Pamella in the subsequent management and with ongoing continuity of care.     Maribell Irizarry MD  Neuro-oncology

## 2025-07-22 ENCOUNTER — ONCOLOGY VISIT (OUTPATIENT)
Dept: ONCOLOGY | Facility: CLINIC | Age: 60
End: 2025-07-22
Attending: PSYCHIATRY & NEUROLOGY
Payer: COMMERCIAL

## 2025-07-22 ENCOUNTER — DOCUMENTATION ONLY (OUTPATIENT)
Dept: PHARMACY | Facility: CLINIC | Age: 60
End: 2025-07-22

## 2025-07-22 ENCOUNTER — HOSPITAL ENCOUNTER (OUTPATIENT)
Dept: MRI IMAGING | Facility: CLINIC | Age: 60
Discharge: HOME | End: 2025-07-22
Attending: PSYCHIATRY & NEUROLOGY
Payer: COMMERCIAL

## 2025-07-22 ENCOUNTER — LAB (OUTPATIENT)
Dept: LAB | Facility: CLINIC | Age: 60
End: 2025-07-22
Attending: PSYCHIATRY & NEUROLOGY
Payer: COMMERCIAL

## 2025-07-22 VITALS
TEMPERATURE: 97.8 F | DIASTOLIC BLOOD PRESSURE: 68 MMHG | WEIGHT: 213.7 LBS | HEART RATE: 78 BPM | OXYGEN SATURATION: 96 % | SYSTOLIC BLOOD PRESSURE: 102 MMHG | BODY MASS INDEX: 32.49 KG/M2 | RESPIRATION RATE: 18 BRPM

## 2025-07-22 DIAGNOSIS — T45.1X5A ANTINEOPLASTIC CHEMOTHERAPY INDUCED PANCYTOPENIA: Primary | ICD-10-CM

## 2025-07-22 DIAGNOSIS — D61.810 ANTINEOPLASTIC CHEMOTHERAPY INDUCED PANCYTOPENIA: Primary | ICD-10-CM

## 2025-07-22 DIAGNOSIS — C71.9 GLIOBLASTOMA (H): Primary | ICD-10-CM

## 2025-07-22 DIAGNOSIS — C71.9 GLIOBLASTOMA (H): ICD-10-CM

## 2025-07-22 LAB
BASOPHILS # BLD AUTO: 0 10E3/UL (ref 0–0.2)
BASOPHILS NFR BLD AUTO: 1 %
EOSINOPHIL # BLD AUTO: 0 10E3/UL (ref 0–0.7)
EOSINOPHIL NFR BLD AUTO: 2 %
ERYTHROCYTE [DISTWIDTH] IN BLOOD BY AUTOMATED COUNT: 13.5 % (ref 10–15)
HCT VFR BLD AUTO: 34.1 % (ref 35–47)
HGB BLD-MCNC: 11.7 G/DL (ref 11.7–15.7)
HOLD SPECIMEN: NORMAL
IMM GRANULOCYTES # BLD: 0 10E3/UL
IMM GRANULOCYTES NFR BLD: 0 %
LYMPHOCYTES # BLD AUTO: 0.9 10E3/UL (ref 0.8–5.3)
LYMPHOCYTES NFR BLD AUTO: 42 %
MCH RBC QN AUTO: 31.5 PG (ref 26.5–33)
MCHC RBC AUTO-ENTMCNC: 34.3 G/DL (ref 31.5–36.5)
MCV RBC AUTO: 92 FL (ref 78–100)
MONOCYTES # BLD AUTO: 0.2 10E3/UL (ref 0–1.3)
MONOCYTES NFR BLD AUTO: 9 %
NEUTROPHILS # BLD AUTO: 1 10E3/UL (ref 1.6–8.3)
NEUTROPHILS NFR BLD AUTO: 46 %
NRBC # BLD AUTO: 0 10E3/UL
NRBC BLD AUTO-RTO: 0 /100
PLATELET # BLD AUTO: 128 10E3/UL (ref 150–450)
RBC # BLD AUTO: 3.71 10E6/UL (ref 3.8–5.2)
WBC # BLD AUTO: 2.2 10E3/UL (ref 4–11)

## 2025-07-22 PROCEDURE — 255N000002 HC RX 255 OP 636: Performed by: PSYCHIATRY & NEUROLOGY

## 2025-07-22 PROCEDURE — A9585 GADOBUTROL INJECTION: HCPCS | Performed by: PSYCHIATRY & NEUROLOGY

## 2025-07-22 PROCEDURE — G0463 HOSPITAL OUTPT CLINIC VISIT: HCPCS | Performed by: PSYCHIATRY & NEUROLOGY

## 2025-07-22 PROCEDURE — G2211 COMPLEX E/M VISIT ADD ON: HCPCS | Performed by: PSYCHIATRY & NEUROLOGY

## 2025-07-22 PROCEDURE — 85048 AUTOMATED LEUKOCYTE COUNT: CPT

## 2025-07-22 PROCEDURE — 70553 MRI BRAIN STEM W/O & W/DYE: CPT

## 2025-07-22 PROCEDURE — 36415 COLL VENOUS BLD VENIPUNCTURE: CPT

## 2025-07-22 PROCEDURE — 99215 OFFICE O/P EST HI 40 MIN: CPT | Mod: 24 | Performed by: PSYCHIATRY & NEUROLOGY

## 2025-07-22 PROCEDURE — 99417 PROLNG OP E/M EACH 15 MIN: CPT | Performed by: PSYCHIATRY & NEUROLOGY

## 2025-07-22 RX ORDER — GADOBUTROL 604.72 MG/ML
15 INJECTION INTRAVENOUS ONCE
Status: COMPLETED | OUTPATIENT
Start: 2025-07-22 | End: 2025-07-22

## 2025-07-22 RX ADMIN — GADOBUTROL 15 ML: 604.72 INJECTION INTRAVENOUS at 15:36

## 2025-07-22 NOTE — PROGRESS NOTES
"Oncology Rooming Note    July 22, 2025 3:30 PM   Pamella Jaimes is a 60 year old female who presents for:    Chief Complaint   Patient presents with    Oncology Clinic Visit     Glioblastoma (H)       Initial Vitals: /68 (BP Location: Right arm, Patient Position: Sitting, Cuff Size: Adult Large)   Pulse 78   Temp 97.8  F (36.6  C) (Oral)   Resp 18   Wt 96.9 kg (213 lb 11.2 oz)   SpO2 96%   BMI 32.49 kg/m   Estimated body mass index is 32.49 kg/m  as calculated from the following:    Height as of 5/19/25: 1.727 m (5' 8\").    Weight as of this encounter: 96.9 kg (213 lb 11.2 oz). Body surface area is 2.16 meters squared.  Data Unavailable Comment: Data Unavailable   No LMP recorded. Patient is postmenopausal.  Allergies reviewed: Yes  Medications reviewed: Yes    Medications: Medication refills not needed today.  Pharmacy name entered into [facility]:    [id number] - [place] - [address] [place] [facility] [id number]  [place] - [address]  [facility] - [place], [place] - [address]    PHQ9:  Did this patient require a PHQ9?: No      Clinical concerns: no        Daria Sullivan CMA              "

## 2025-07-22 NOTE — LETTER
7/22/2025      Pamella Jaimes  [address] [facility] - [place] [address]      Dear Colleague,    Thank you for referring your patient, Pamella Jaimes, to the [facility]. Please see a copy of my visit note below.    NEURO-ONCOLOGY VISIT  Jul 22, 2025    CHIEF COMPLAINT: Ms. 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 Pamella completed standard chemoradiotherapy on 9/6/2024. Post-radiation imaging demonstrated no new concerns with an initial positive response to treatment.     Pamella initiated adjuvant therapy with temozolomide in 9/2024 and repeat imaging in 11/2024 and again in 1/2025 was largely stable with no significant concerns. She completed the planned 6th cycle of adjuvant temozolomide in 2/2025 and repeat imaging in 3/2025 showed subtle evolving changes of indeterminate significance that require close imaging surveillance. On repeat imaging in 4/2025, there was an increase in contrast enhancement with an associated increase in perfusion, which is concerning for local cancer recurrence.     Pamella underwent a re-do craniotomy on 5/19/2025 and resulting pathology confirmed cancer recurrence. She started next-line cancer-directed therapy with lomustine, but unfortunately, repeat imaging in 7/2025 demonstrated evidence of cancer progression.     Will stop lomustine. The plan is for consideration of repeat radiotherapy plus the concurrent and adjuvant use of niraparib.    Pamella presents today in follow-up. She is accompanied by Leanne (daughter) and her 2 grandsons.    INTERVAL HISTORY  -Pamella tolerated her first cycle of lomustine well.   No nausea. Appetite is good.   -No fatigue.   -Denies pain/ headache.   -Off dexamethasone.   -Mood is good, but she has been feeling bit more anxious recently.  -No new weakness, numbness, or changes in  cognition.   -Recurrent seizures/ auras 3 x a week.   -Visual field cut.   -On anticoagulation.       MEDICATIONS   Current Outpatient Medications   Medication Sig Dispense Refill     acetaminophen (TYLENOL) 500 MG tablet Take 1-2 tablets (500-1,000 mg) by mouth every 8 hours as needed for mild pain. 90 tablet 2     carboxymethylcellulose PF (REFRESH PLUS) 0.5 % ophthalmic solution Place 1 drop into both eyes 3 times daily as needed for dry eyes.       cetirizine (ZYRTEC) 10 MG tablet Take 10 mg by mouth daily.       EPINEPHrine (ANY BX GENERIC EQUIV) 0.3 MG/0.3ML injection 2-pack Inject 0.3 mg into the muscle as needed for anaphylaxis. May repeat one time in 5-15 minutes if response to initial dose is inadequate.       fluticasone (FLONASE) 50 MCG/ACT nasal spray SHAKE LIQUID AND USE 1 SPRAY IN EACH NOSTRIL DAILY 16 g 0     levETIRAcetam (KEPPRA) 1000 MG tablet Take 1 tablet (1,000 mg) by mouth 2 times daily. 60 tablet 1     multiple vitamin  tablet TABS Take 1 tablet by mouth daily.       ondansetron (ZOFRAN) 4 MG tablet Take 1 tablet (4 mg) by mouth every 8 hours as needed (Nausea/Vomiting). Take first dose prior to lomustine. 15 tablet 1     polyethylene glycol (MIRALAX) 17 GM/Dose powder Take 17 g by mouth daily. Use while taking oxycodone to avoid constipation. 510 g 0     rivaroxaban ANTICOAGULANT (XARELTO) 20 MG TABS tablet Take 1 tablet (20 mg) by mouth daily (with dinner). 30 tablet 11     senna-docusate (SENOKOT-S/PERICOLACE) 8.6-50 MG tablet Take 1 tablet by mouth 2 times daily. Use while taking oxycodone to avoid constipation. (Patient not taking: Reported on 7/22/2025) 40 tablet 0     DRUG ALLERGIES   Allergies   Allergen Reactions     Morphine      Penicillins Hives       IMMUNIZATIONS   Immunization History   Administered Date(s) Administered     COVID-19 Monovalent 18+ (Moderna) 05/04/2021, 06/01/2021, 01/28/2022     Influenza Vaccine 18-64 (Flublok) 01/14/2022     TDAP (Adacel,Boostrix) 01/04/2010,  01/04/2016     ONCOLOGIC HISTORY  -PRESENTATION: Neck pain, headache and head tremor.  -3/15/2023 MRI brain imaging with a mild asymmetric T2 hyperintense signal along right hippocampus and adjacent parahippocampal gyrus.    -PRESENTATION: Dizziness and nausea; semiology is concerning for temporal lobe auras. Slurred speech.  -5/27/2024 MR brain imaging with a right posterior temporo-occipital mass. Associated nodular peripheral enhancement and surrounding confluent T2 signal hyperintensity extending along the medial aspect of the right temporal lobe. Mass effect results in regional sulcal effacement, leftward midline shift, partial effacement the right lateral ventricle and mild right uncal herniation.   -5/28/2024 SURGERY: Craniotomy for mass resection at [facility] with Dr. Brandan Connelly.   No post-operative MR brain imaging performed.   Post-operative CT head imaging from 5/29 status post a right parietal craniotomy with excision of previously demonstrated paramedian parieto-occipital tumor. Decreased focal mass effect and slightly decreased mild midline shift. No significant hemorrhage or additional complicating feature.   PATHOLOGY: Glioblastoma, IDH1-R132H wildtype (CNS WHO Grade 4).    MGMT promoter unmethylated.   -6/25/2024 NEURO-ONC: Recommending chemoradiotherapy with concurrent temozolomide. U/S + for DVT; started anticoagulation.   -7/26/2024 NEURO-ONC: She has started chemoradiotherapy with concurrent temozolomide. Acute visit for concerns; increased Keppra to 750 mg BID.   -7/23 - 9/6/2024 CHEMORT: Complete chemoradiotherapy. 60Gy in 30 fractions with concurrent temozolomide 75mg/m2 (160mg).  -10/1/2024 NEURO-ONC/ MRB/ CHEMO: Clinically well; reduced frequency of auras. Imaging with no concerns, initial positive response to treatment. Starting adjuvant temozolomide 150mg/m2 (320mg), cycle 1 (start date 10/4). Not pursuing Optune.   -10/30//2024 NEURO-ONC/CHEMO: Tolerated temozolomide well.  Neurologically stable. Increase adjuvant temozolomide to 200mg/m2 (430mg), cycle 2 (start date 11/1).  -11/26/2024 NEURO-ONC/ MRB/ CHEMO: No new neurological concerns. Itchy skin. Imaging with no new overt concerns. Adjuvant temozolomide 200mg/m2 (430mg), cycle 3.   -12/23/2024 NEURO-ONC/ CHEMO: No new neurological concerns. Itchy skin persists and she did present to the emergency room for itchy skin plus rash.  We reviewed this as above.  It does not seem to be associated with when she is taking temozolomide.  Adjuvant temozolomide 200mg/m2 (430mg), cycle 4.   -1/21/2025 NEURO-ONC/ MRB/ CHEMO: No new neurological concerns. Itchy skin. Imaging with no new overt concerns. Adjuvant temozolomide 1500mg/m2 (320mg), cycle 5 (dose reduce in the setting of skin rash).   -2/21/2025 NEURO-ONC/ CHEMO: No new neurological concerns.  Adjuvant temozolomide 1500mg/m2 (320mg), cycle 6 (dose reduce in the setting of skin rash).   -3/18/2025 NEURO-ONC/ MRB: No new neurological concerns. Neck muscle soreness. Imaging with subtle evolving changes of indeterminate significance that require close imaging surveillance; repeat imaging in 6 weeks.  -4/29/2025 NEURO-ONC/ MRB: No new neurological concerns; stable field cut. Imaging with an increase in contrast enhancement with an associated increase in perfusion, which is concerning for local cancer recurrence. Referral to neurosurgery; Dr. Steele.  -5/19/2025 SURGERY: Re-do craniotomy with Dr. Steele.   PATHOLOGY: Recurrent glioblastoma, IDH-wildtype (CNS WHO grade 4).   Next generation sequencing through [email or web address]  Microsatellite Instability (MSI) Seq DNA-Tumor Stable  Tumor Mutational [place] (TMB) Seq DNA-Tumor Result: Low (3).   Genomic Loss of Heterozygosity (JEREMIAH) Seq DNA-Tumor Low (4%)  MGMT Promoter Methylation Pyrosequencing Result: Unmethylated  Genes Tested with Pathogenic or Likely Pathogenic Alterations  CDKN2A CNA-Seq DNA-Tumor Deleted   CDKN2B CNA-Seq DNA-Tumor Deleted  EGFR CNA-Seq  "DNA-Tumor Amplified   EGFRvIII Seq RNA-Tumor Pathogenic Isoform - 8 - -  MTAP CNA-Seq DNA-Tumor Deleted -  PIK3CA Seq RNA-Tumor Likely Pathogenic Fusion [id number] 2 - -  TERT Seq DNA-Tumor Pathogenic Variant - 0 c.-124C>T 18  Post-operative MR brain imaging from 5/20 with recent postsurgical changes of redo right occipital craniotomy and right occipital mass resection. There is residual enhancing tissue along the lateral and superior margins of the resection cavity, possibly representing residual tumor or posttreatment-related inflammation. Stable vasogenic edema throughout the right occipitotemporal region. No midline shift, herniation or hydrocephalus. New small volume layering intraventricular hemorrhage in the occipital horn of the left lateral ventricle. Thin rim of restricted diffusion along the margins of the resection cavity, thickest along the inferolateral margin.  -6/3/3025 NEURO-ONC: Discussed post-operative imaging and diagnosis. Plan for next generation sequencing. Starting lomustine.   -7/22/2025 NEURO-ONC/ MRB/ CHEMO: Increase in seizure/ aura frequency. Reviewed next generation sequencing report. Imaging with evidence of cancer progression. Will stop lomustine. The plan is for consideration of repeat radiotherapy plus the concurrent and adjuvant use of niraparib.      PHYSICAL EXAMINATION  /68 (BP Location: Right arm, Patient Position: Sitting, Cuff Size: Adult Large)   Pulse 78   Temp 97.8  F (36.6  C) (Oral)   Resp 18   Wt 96.9 kg (213 lb 11.2 oz)   SpO2 96%   BMI 32.49 kg/m    Wt Readings from Last 2 Encounters:   07/22/25 96.9 kg (213 lb 11.2 oz)   06/03/25 102 kg (224 lb 14.4 oz)      Ht Readings from Last 2 Encounters:   05/19/25 1.727 m (5' 8\")   05/14/25 1.702 m (5' 7\")     KPS:     General: Appears well in no acute distress. Excellent energy, not fatigued.   Psych: Affect appropriate. Pleasant  Neurologic:   MENTAL STATUS:     Alert, oriented.    Speech fluent. " "   Comprehension intact.     CRANIAL NERVES:     Left homonymous hemianopsia   Hearing intact.   No dysarthria.   MOTOR    Mild essential head tremor.   GAIT: Walks without assistance.       MEDICAL RECORDS  Personally reviewed; OT notes.     LABS  Personally reviewed; CBC (platelets 128) from today.     IMAGING  Personally reviewed MR brain imaging from today and compared to post-surgical imaging. To my eye, there is an increase in mally-cavitary contrast enhancement with an associated increase in edema. While there is limited to no increase in perfusion, the overall findings are concerning for cancer progression.     Formal read; \"Findings as noted on the FLAIR and postcontrast images are concerning for progressive neoplasm with greater nodular enhancement as well as greater FLAIR signal changes around the resection cavity. \"     Imaging was shown to and results were reviewed with Pamella and Leanne.        IMPRESSION  On date of service, 42 minutes was spent in clinic and 13 minutes was spent preparing for the visit through extensive chart review and coordinating care for this high complexity visit. The following is in explanation for the recommendations used to define the plan.       Pamella continues to note a visual field cut, but endorses no new worsening of her vision. She does stated that the frequency of her seizures/ auras has increased. Overall, she tolerated cycle 1 of lomustine well. Labs from today reviewed and were with no concerns.      Unfortunately, imaging as detailed above demonstrated evidence of cancer progression. As a result, will stop lomustine. We discussed the following options for her next line of therapy. Since it has now been ~1 year since her first course of radiotherapy and imaging demonstrates these concerning findings, I recommended a follow-up appointment with Dr. Sanchez to review the risks/ benefits of a repeat course of radiotherapy. Pamella is in agreement with this referral and I have " discussed Pamella's case with Dr. Sanchez.     In terms of options of chemotherapy; Next generation sequencing results were reviewed today with Pamella and Leanne and they demonstrated no ideal targetable mutations. There is currently no clinical trial that Pamella would be a candidate for at this institution. We reviewed the potential role of immunotherapy, but will look to reserve this option. Finally, we discussed the use of niraparib 200mg daily to be taken with and after completion of radiotherapy. This chemotherapy regimen is based on the Phase 3, open-label, randomized 2-arm study comparing the clinical efficacy and safety of niraparib with temozolomide in adult participants with newly-diagnosed, MGMT unmethylated glioblastoma. Analyses from the pivotal study suggested that patients with unmethylated MGMT promoter glioblastoma did not benefit from temozolomide. Therefore, PARP inhibitors and radiotherapy have been hypothesized as effective therapy for patients with MGMT promoter unmethylated cancer. Recently, niraparib has been investigated as monotherapy for treatment of glioblastoma as clinical data suggests that it is uniquely suited to treat MGMT unmethylated cancers. In a Phase 0/2 clinical trial ([id number]), niraparib demonstrated best-in-class tumor PK/PD effects in a population of newly-diagnosed patients. In another Phase 0/2 trial ([id number]), niraparib plus RT was well tolerated for enrolled patients with newly-diagnosed, MGMT unmethylated GBM. Niraparib can be associated with hematological toxicity as well as nausea/ vomiting, changes in bowel movements, decreased appetite and abdominal discomfort, and fatigue. Following our discussion, Pamella is in agreement with starting niraparib.     PROBLEM LIST  Glioblastoma  Headaches  Homonymous hemianopsia, left-side  Benign essential tremor  Temporal lobe auras    PLAN  -CANCER DIRECTED THERAPY-  -As above; Consideration of repeat radiotherapy; referral to  "Dr. Sanchez.    Prior authorization for concurrent and adjuvant use of niraparib.   Pharmacy performed chemotherapy teach today.     -STEROIDS-  -Off dexamethasone.     -SEIZURE MANAGEMENT-  -Stereotyped events that seem consistent with a history of non-dominant temporal lobe auras/ seizures.   Keppra 1000 mg twice daily.    -Quality of life/ MOOD/ FATIGUE-  -History of depression.    Self-discontinued Zoloft.    -VISUAL FIELD CUT-  -Left Homonymous hemianopsia.  -Evaluated by neuro-ophtho for thorough eye examination and baseline visual field testing to determine if there are any driving restrictions.      -LE DVT-  -Lifelong anticoagulation indicated.    On Xarelto; continue as directed.     Return to clinic pending discussion with Dr. Sanchez.     In the meantime, Pamella knows to call the clinic with any concerns and she can be seen sooner if needed.     The longitudinal plan of care for the diagnosis(es)/condition(s) as documented were addressed during this visit. Due to the added complexity in care, I will continue to support Pamella in the subsequent management and with ongoing continuity of care.     Maribell Irizarry MD  Neuro-oncology      Oncology Rooming Note    July 22, 2025 3:30 PM   Pamella Jaimes is a 60 year old female who presents for:    Chief Complaint   Patient presents with     Oncology Clinic Visit     Glioblastoma (H)       Initial Vitals: /68 (BP Location: Right arm, Patient Position: Sitting, Cuff Size: Adult Large)   Pulse 78   Temp 97.8  F (36.6  C) (Oral)   Resp 18   Wt 96.9 kg (213 lb 11.2 oz)   SpO2 96%   BMI 32.49 kg/m   Estimated body mass index is 32.49 kg/m  as calculated from the following:    Height as of 5/19/25: 1.727 m (5' 8\").    Weight as of this encounter: 96.9 kg (213 lb 11.2 oz). Body surface area is 2.16 meters squared.  Data Unavailable Comment: Data Unavailable   No LMP recorded. Patient is postmenopausal.  Allergies reviewed: Yes  Medications reviewed: " Yes    Medications: Medication refills not needed today.  Pharmacy name entered into [facility]:    [id number] - [place], [place] - [address] [place] [id number] - [place] - [address]  [facility] - [place], [place] - [address]     PHQ9:  Did this patient require a PHQ9?: No      Clinical concerns: no        Daria Sullivan CMA                Again, thank you for allowing me to participate in the care of your patient.        Sincerely,        Maribell Irizarry MD    Electronically signed

## 2025-07-22 NOTE — PROGRESS NOTES
Oral Chemotherapy Monitoring Program    Lab Monitoring Plan    Subjective/Objective:  Pamella Jaimes is a 60 year old female seen in clinic for an initial visit for oral chemotherapy education. I spoke with Pamella and Pamella's daughter, Leanne, about new oral chemo medication, Niraparib.         2/7/2025     1:00 PM 2/21/2025    12:00 PM 3/10/2025    10:00 AM 6/3/2025    12:00 PM 6/3/2025     3:00 PM 6/12/2025    12:00 PM 7/1/2025     1:00 PM   ORAL CHEMOTHERAPY   Assessment Type Lab Monitoring Refill;Chart Review Lab Monitoring New Teach;Initial Work up New Teach;Initial Work up Initial Follow up Lab Monitoring   Diagnosis Code Brain Cancer - Glioblastoma Brain Cancer - Glioblastoma Brain Cancer - Glioblastoma Brain Cancer - Glioblastoma Brain Cancer - Glioblastoma  Brain Cancer - Glioblastoma   Providers Dr. Juan C Irizarry   Clinic Name/Location [facility]   Drug Name Temodar (temozolomide)  Temodar (temozolomide)  Temodar (temozolomide)  Gleostine (lomustine)  Gleostine (lomustine)  Gleostine (lomustine) Gleostine (lomustine)   Dose 320 mg  320 mg  320 mg  --  240 mg  240 mg 240 mg   Current Schedule Daily  Daily  Daily  Other  Other  Other Other   Cycle Details 5 days on, 23 days off  5 days on, 23 days off  5 days on, 23 days off Other Other Other Other   Start Date of Last Cycle 1/24/2025 2/21/2025 2/21/2025 6/5/2025 6/5/2025    Planned next cycle start date   3/20/2025 6/5/2025 7/17/2025 7/17/2025    Adverse Effects Neutropenia Neutropenia     Neutropenia;Thrombocytopenia   Neutropenia Grade 2 Grade 2     Grade 2   Pharmacist Intervention(neutropenia) No No     No   Thrombocytopenia       Grade 1   Pharmacist Intervention(thrombocytopenia)       No   Any new drug interactions?    No No     Is the dose as ordered appropriate for the patient?    -- Yes         Data saved with a previous flowsheet row definition       Last  "PHQ-2 Score on record:       3/18/2025    12:47 PM 8/22/2024     9:32 AM   PHQ-2 ( 1999 [employer])   Q1: Little interest or pleasure in doing things 0 0   Q2: Feeling down, depressed or hopeless 0 1   PHQ-2 Score 0 1       Vitals:  BP:   BP Readings from Last 1 Encounters:   07/22/25 102/68     Wt Readings from Last 1 Encounters:   07/22/25 96.9 kg (213 lb 11.2 oz)     Estimated body surface area is 2.16 meters squared as calculated from the following:    Height as of 5/19/25: 1.727 m (5' 8\").    Weight as of an earlier encounter on 7/22/25: 96.9 kg (213 lb 11.2 oz).    Labs:  _  Result Component Current Result Ref Range   Sodium 143 (7/1/2025) 135 - 145 mmol/L     _  Result Component Current Result Ref Range   Potassium 3.8 (7/1/2025) 3.4 - 5.3 mmol/L     _  Result Component Current Result Ref Range   Calcium 9.6 (7/1/2025) 8.8 - 10.4 mg/dL     No results found for Mag within last 30 days.     No results found for Phos within last 30 days.     _  Result Component Current Result Ref Range   Albumin 4.2 (7/1/2025) 3.5 - 5.2 g/dL     _  Result Component Current Result Ref Range   Urea Nitrogen 9.9 (7/1/2025) 8.0 - 23.0 mg/dL     _  Result Component Current Result Ref Range   Creatinine 0.79 (7/1/2025) 0.51 - 0.95 mg/dL     _  Result Component Current Result Ref Range   AST 24 (7/1/2025) 0 - 45 U/L     _  Result Component Current Result Ref Range   ALT 20 (7/1/2025) 0 - 50 U/L     _  Result Component Current Result Ref Range   Bilirubin Total 0.5 (7/1/2025) <=1.2 mg/dL     _  Result Component Current Result Ref Range   WBC Count 2.2 (L) (7/22/2025) 4.0 - 11.0 10e3/uL     _  Result Component Current Result Ref Range   Hemoglobin 11.7 (7/22/2025) 11.7 - 15.7 g/dL     _  Result Component Current Result Ref Range   Platelet Count 128 (L) (7/22/2025) 150 - 450 10e3/uL     _  Result Component Current Result Ref Range   Absolute Neutrophils 1.0 (L) (7/22/2025) 1.6 - 8.3 10e3/uL     No results found for ANC within last 30 days. "        Assessment:  Patient is not appropriate to start therapy. Patient needs baseline labs. Awaiting RT start date as well.     Plan:  Basic chemotherapy teaching was reviewed with the patient and the patient's family including indication, start date of therapy, dose, administration, adverse effects, missed doses, food and drug interactions, monitoring, side effect management, office contact information, and safe handling. Written materials were provided and all questions answered.    Follow-Up:  Updates on RT start date   1-week follow-up call      Rafi Triana  Pharmacy Intern   [facility]   [phone]

## 2025-07-23 ENCOUNTER — TELEPHONE (OUTPATIENT)
Dept: PHARMACY | Facility: CLINIC | Age: 60
End: 2025-07-23
Payer: COMMERCIAL

## 2025-07-31 ENCOUNTER — OFFICE VISIT (OUTPATIENT)
Dept: RADIATION ONCOLOGY | Facility: CLINIC | Age: 60
End: 2025-07-31
Attending: STUDENT IN AN ORGANIZED HEALTH CARE EDUCATION/TRAINING PROGRAM
Payer: COMMERCIAL

## 2025-07-31 VITALS
HEART RATE: 58 BPM | WEIGHT: 212.6 LBS | BODY MASS INDEX: 32.33 KG/M2 | OXYGEN SATURATION: 97 % | SYSTOLIC BLOOD PRESSURE: 101 MMHG | DIASTOLIC BLOOD PRESSURE: 66 MMHG

## 2025-07-31 DIAGNOSIS — C71.9 GLIOBLASTOMA (H): Primary | ICD-10-CM

## 2025-07-31 PROCEDURE — G0463 HOSPITAL OUTPT CLINIC VISIT: HCPCS | Performed by: STUDENT IN AN ORGANIZED HEALTH CARE EDUCATION/TRAINING PROGRAM

## 2025-07-31 PROCEDURE — 99214 OFFICE O/P EST MOD 30 MIN: CPT | Mod: GC | Performed by: STUDENT IN AN ORGANIZED HEALTH CARE EDUCATION/TRAINING PROGRAM

## 2025-07-31 ASSESSMENT — PAIN SCALES - GENERAL: PAINLEVEL_OUTOF10: NO PAIN (0)

## 2025-08-01 ENCOUNTER — HOSPITAL ENCOUNTER (OUTPATIENT)
Dept: MRI IMAGING | Facility: CLINIC | Age: 60
Discharge: HOME | End: 2025-08-01
Attending: STUDENT IN AN ORGANIZED HEALTH CARE EDUCATION/TRAINING PROGRAM | Admitting: STUDENT IN AN ORGANIZED HEALTH CARE EDUCATION/TRAINING PROGRAM
Payer: COMMERCIAL

## 2025-08-01 DIAGNOSIS — C71.9 GLIOBLASTOMA (H): ICD-10-CM

## 2025-08-01 PROCEDURE — 70553 MRI BRAIN STEM W/O & W/DYE: CPT

## 2025-08-01 PROCEDURE — 255N000002 HC RX 255 OP 636: Performed by: STUDENT IN AN ORGANIZED HEALTH CARE EDUCATION/TRAINING PROGRAM

## 2025-08-01 PROCEDURE — 70553 MRI BRAIN STEM W/O & W/DYE: CPT | Mod: 26 | Performed by: RADIOLOGY

## 2025-08-01 PROCEDURE — A9585 GADOBUTROL INJECTION: HCPCS | Performed by: STUDENT IN AN ORGANIZED HEALTH CARE EDUCATION/TRAINING PROGRAM

## 2025-08-01 RX ORDER — GADOBUTROL 604.72 MG/ML
0.1 INJECTION INTRAVENOUS ONCE
Status: COMPLETED | OUTPATIENT
Start: 2025-08-01 | End: 2025-08-01

## 2025-08-01 RX ADMIN — GADOBUTROL 10 ML: 604.72 INJECTION INTRAVENOUS at 10:20

## 2025-08-11 ENCOUNTER — APPOINTMENT (OUTPATIENT)
Dept: RADIATION ONCOLOGY | Facility: CLINIC | Age: 60
End: 2025-08-11
Attending: STUDENT IN AN ORGANIZED HEALTH CARE EDUCATION/TRAINING PROGRAM
Payer: COMMERCIAL

## 2025-08-11 ENCOUNTER — PATIENT OUTREACH (OUTPATIENT)
Dept: ONCOLOGY | Facility: CLINIC | Age: 60
End: 2025-08-11
Payer: COMMERCIAL

## 2025-08-11 ENCOUNTER — TELEPHONE (OUTPATIENT)
Dept: ONCOLOGY | Facility: CLINIC | Age: 60
End: 2025-08-11
Payer: COMMERCIAL

## 2025-08-11 DIAGNOSIS — R42 DIZZINESS: Primary | ICD-10-CM

## 2025-08-11 PROCEDURE — 77014 PR CT GUIDE FOR PLACEMENT RADIATION THERAPY FIELDS: CPT | Mod: 26 | Performed by: STUDENT IN AN ORGANIZED HEALTH CARE EDUCATION/TRAINING PROGRAM

## 2025-08-11 PROCEDURE — 77386 HC IMRT TREATMENT DELIVERY, COMPLEX: CPT | Performed by: RADIOLOGY

## 2025-08-12 ENCOUNTER — APPOINTMENT (OUTPATIENT)
Dept: RADIATION ONCOLOGY | Facility: CLINIC | Age: 60
End: 2025-08-12
Attending: STUDENT IN AN ORGANIZED HEALTH CARE EDUCATION/TRAINING PROGRAM
Payer: COMMERCIAL

## 2025-08-12 PROCEDURE — 77014 PR CT GUIDE FOR PLACEMENT RADIATION THERAPY FIELDS: CPT | Mod: 26 | Performed by: STUDENT IN AN ORGANIZED HEALTH CARE EDUCATION/TRAINING PROGRAM

## 2025-08-12 PROCEDURE — 77386 HC IMRT TREATMENT DELIVERY, COMPLEX: CPT | Performed by: STUDENT IN AN ORGANIZED HEALTH CARE EDUCATION/TRAINING PROGRAM

## 2025-08-13 ENCOUNTER — APPOINTMENT (OUTPATIENT)
Dept: RADIATION ONCOLOGY | Facility: CLINIC | Age: 60
End: 2025-08-13
Attending: STUDENT IN AN ORGANIZED HEALTH CARE EDUCATION/TRAINING PROGRAM
Payer: COMMERCIAL

## 2025-08-13 PROCEDURE — 77014 PR CT GUIDE FOR PLACEMENT RADIATION THERAPY FIELDS: CPT | Mod: 26 | Performed by: STUDENT IN AN ORGANIZED HEALTH CARE EDUCATION/TRAINING PROGRAM

## 2025-08-13 PROCEDURE — 77386 HC IMRT TREATMENT DELIVERY, COMPLEX: CPT | Performed by: STUDENT IN AN ORGANIZED HEALTH CARE EDUCATION/TRAINING PROGRAM

## 2025-08-14 ENCOUNTER — APPOINTMENT (OUTPATIENT)
Dept: RADIATION ONCOLOGY | Facility: CLINIC | Age: 60
End: 2025-08-14
Attending: STUDENT IN AN ORGANIZED HEALTH CARE EDUCATION/TRAINING PROGRAM
Payer: COMMERCIAL

## 2025-08-14 VITALS
HEART RATE: 60 BPM | OXYGEN SATURATION: 99 % | BODY MASS INDEX: 32.08 KG/M2 | DIASTOLIC BLOOD PRESSURE: 66 MMHG | WEIGHT: 211 LBS | SYSTOLIC BLOOD PRESSURE: 102 MMHG

## 2025-08-14 DIAGNOSIS — D61.810 ANTINEOPLASTIC CHEMOTHERAPY INDUCED PANCYTOPENIA: ICD-10-CM

## 2025-08-14 DIAGNOSIS — C71.9 GLIOBLASTOMA (H): Primary | ICD-10-CM

## 2025-08-14 DIAGNOSIS — T45.1X5A CHEMOTHERAPY-INDUCED NAUSEA AND VOMITING: ICD-10-CM

## 2025-08-14 DIAGNOSIS — T45.1X5A ANTINEOPLASTIC CHEMOTHERAPY INDUCED PANCYTOPENIA: ICD-10-CM

## 2025-08-14 DIAGNOSIS — C71.9 GLIOBLASTOMA (H): ICD-10-CM

## 2025-08-14 DIAGNOSIS — R11.2 CHEMOTHERAPY-INDUCED NAUSEA AND VOMITING: ICD-10-CM

## 2025-08-14 PROCEDURE — 77386 HC IMRT TREATMENT DELIVERY, COMPLEX: CPT | Performed by: STUDENT IN AN ORGANIZED HEALTH CARE EDUCATION/TRAINING PROGRAM

## 2025-08-14 RX ORDER — LEVETIRACETAM 1000 MG/1
1000 TABLET ORAL 2 TIMES DAILY
Qty: 60 TABLET | Refills: 1 | Status: SHIPPED | OUTPATIENT
Start: 2025-08-14

## 2025-08-14 ASSESSMENT — PAIN SCALES - GENERAL: PAINLEVEL_OUTOF10: NO PAIN (0)

## 2025-08-15 ENCOUNTER — APPOINTMENT (OUTPATIENT)
Dept: RADIATION ONCOLOGY | Facility: CLINIC | Age: 60
End: 2025-08-15
Attending: STUDENT IN AN ORGANIZED HEALTH CARE EDUCATION/TRAINING PROGRAM
Payer: COMMERCIAL

## 2025-08-15 PROCEDURE — 77427 RADIATION TX MANAGEMENT X5: CPT | Mod: GC | Performed by: STUDENT IN AN ORGANIZED HEALTH CARE EDUCATION/TRAINING PROGRAM

## 2025-08-15 PROCEDURE — 77014 PR CT GUIDE FOR PLACEMENT RADIATION THERAPY FIELDS: CPT | Mod: 26 | Performed by: STUDENT IN AN ORGANIZED HEALTH CARE EDUCATION/TRAINING PROGRAM

## 2025-08-15 PROCEDURE — 77336 RADIATION PHYSICS CONSULT: CPT | Performed by: STUDENT IN AN ORGANIZED HEALTH CARE EDUCATION/TRAINING PROGRAM

## 2025-08-15 PROCEDURE — 77386 HC IMRT TREATMENT DELIVERY, COMPLEX: CPT | Performed by: STUDENT IN AN ORGANIZED HEALTH CARE EDUCATION/TRAINING PROGRAM

## 2025-08-16 ENCOUNTER — HEALTH MAINTENANCE LETTER (OUTPATIENT)
Age: 60
End: 2025-08-16

## 2025-08-18 ENCOUNTER — APPOINTMENT (OUTPATIENT)
Dept: RADIATION ONCOLOGY | Facility: CLINIC | Age: 60
End: 2025-08-18
Attending: STUDENT IN AN ORGANIZED HEALTH CARE EDUCATION/TRAINING PROGRAM
Payer: COMMERCIAL

## 2025-08-18 PROCEDURE — 77014 PR CT GUIDE FOR PLACEMENT RADIATION THERAPY FIELDS: CPT | Mod: 26 | Performed by: STUDENT IN AN ORGANIZED HEALTH CARE EDUCATION/TRAINING PROGRAM

## 2025-08-18 PROCEDURE — 77386 HC IMRT TREATMENT DELIVERY, COMPLEX: CPT | Performed by: STUDENT IN AN ORGANIZED HEALTH CARE EDUCATION/TRAINING PROGRAM

## 2025-08-19 ENCOUNTER — APPOINTMENT (OUTPATIENT)
Dept: RADIATION ONCOLOGY | Facility: CLINIC | Age: 60
End: 2025-08-19
Attending: STUDENT IN AN ORGANIZED HEALTH CARE EDUCATION/TRAINING PROGRAM
Payer: COMMERCIAL

## 2025-08-19 PROCEDURE — 77386 HC IMRT TREATMENT DELIVERY, COMPLEX: CPT | Performed by: STUDENT IN AN ORGANIZED HEALTH CARE EDUCATION/TRAINING PROGRAM

## 2025-08-19 PROCEDURE — 77014 PR CT GUIDE FOR PLACEMENT RADIATION THERAPY FIELDS: CPT | Mod: 26 | Performed by: STUDENT IN AN ORGANIZED HEALTH CARE EDUCATION/TRAINING PROGRAM

## 2025-08-20 ENCOUNTER — APPOINTMENT (OUTPATIENT)
Dept: RADIATION ONCOLOGY | Facility: CLINIC | Age: 60
End: 2025-08-20
Attending: STUDENT IN AN ORGANIZED HEALTH CARE EDUCATION/TRAINING PROGRAM
Payer: COMMERCIAL

## 2025-08-20 VITALS
DIASTOLIC BLOOD PRESSURE: 68 MMHG | BODY MASS INDEX: 32.17 KG/M2 | HEART RATE: 64 BPM | OXYGEN SATURATION: 96 % | WEIGHT: 211.6 LBS | SYSTOLIC BLOOD PRESSURE: 102 MMHG

## 2025-08-20 DIAGNOSIS — C71.9 GLIOBLASTOMA (H): Primary | ICD-10-CM

## 2025-08-20 PROCEDURE — 77386 HC IMRT TREATMENT DELIVERY, COMPLEX: CPT | Performed by: STUDENT IN AN ORGANIZED HEALTH CARE EDUCATION/TRAINING PROGRAM

## 2025-08-20 ASSESSMENT — PAIN SCALES - GENERAL: PAINLEVEL_OUTOF10: NO PAIN (0)

## 2025-08-21 ENCOUNTER — APPOINTMENT (OUTPATIENT)
Dept: RADIATION ONCOLOGY | Facility: CLINIC | Age: 60
End: 2025-08-21
Attending: STUDENT IN AN ORGANIZED HEALTH CARE EDUCATION/TRAINING PROGRAM
Payer: COMMERCIAL

## 2025-08-21 PROCEDURE — 77014 PR CT GUIDE FOR PLACEMENT RADIATION THERAPY FIELDS: CPT | Mod: 26 | Performed by: STUDENT IN AN ORGANIZED HEALTH CARE EDUCATION/TRAINING PROGRAM

## 2025-08-21 PROCEDURE — 77386 HC IMRT TREATMENT DELIVERY, COMPLEX: CPT | Performed by: STUDENT IN AN ORGANIZED HEALTH CARE EDUCATION/TRAINING PROGRAM

## 2025-08-22 ENCOUNTER — APPOINTMENT (OUTPATIENT)
Dept: RADIATION ONCOLOGY | Facility: CLINIC | Age: 60
End: 2025-08-22
Attending: STUDENT IN AN ORGANIZED HEALTH CARE EDUCATION/TRAINING PROGRAM
Payer: COMMERCIAL

## 2025-08-22 PROCEDURE — 77336 RADIATION PHYSICS CONSULT: CPT | Performed by: STUDENT IN AN ORGANIZED HEALTH CARE EDUCATION/TRAINING PROGRAM

## 2025-08-22 PROCEDURE — 77014 PR CT GUIDE FOR PLACEMENT RADIATION THERAPY FIELDS: CPT | Mod: 26 | Performed by: RADIOLOGY

## 2025-08-22 PROCEDURE — 77386 HC IMRT TREATMENT DELIVERY, COMPLEX: CPT | Performed by: STUDENT IN AN ORGANIZED HEALTH CARE EDUCATION/TRAINING PROGRAM

## 2025-08-22 PROCEDURE — 77427 RADIATION TX MANAGEMENT X5: CPT | Mod: GC | Performed by: STUDENT IN AN ORGANIZED HEALTH CARE EDUCATION/TRAINING PROGRAM

## 2025-08-25 ENCOUNTER — ONCOLOGY VISIT (OUTPATIENT)
Dept: ONCOLOGY | Facility: CLINIC | Age: 60
End: 2025-08-25
Attending: NURSE PRACTITIONER
Payer: COMMERCIAL

## 2025-08-25 ENCOUNTER — APPOINTMENT (OUTPATIENT)
Dept: RADIATION ONCOLOGY | Facility: CLINIC | Age: 60
End: 2025-08-25
Attending: STUDENT IN AN ORGANIZED HEALTH CARE EDUCATION/TRAINING PROGRAM
Payer: COMMERCIAL

## 2025-08-25 VITALS
SYSTOLIC BLOOD PRESSURE: 102 MMHG | HEART RATE: 81 BPM | BODY MASS INDEX: 31.83 KG/M2 | TEMPERATURE: 96.8 F | OXYGEN SATURATION: 97 % | WEIGHT: 210 LBS | RESPIRATION RATE: 16 BRPM | HEIGHT: 68 IN | DIASTOLIC BLOOD PRESSURE: 72 MMHG

## 2025-08-25 DIAGNOSIS — C71.9 GLIOBLASTOMA (H): Primary | ICD-10-CM

## 2025-08-25 PROCEDURE — 99214 OFFICE O/P EST MOD 30 MIN: CPT | Performed by: NURSE PRACTITIONER

## 2025-08-25 PROCEDURE — G2211 COMPLEX E/M VISIT ADD ON: HCPCS | Performed by: NURSE PRACTITIONER

## 2025-08-25 PROCEDURE — 77386 HC IMRT TREATMENT DELIVERY, COMPLEX: CPT | Performed by: STUDENT IN AN ORGANIZED HEALTH CARE EDUCATION/TRAINING PROGRAM

## 2025-08-25 PROCEDURE — G0463 HOSPITAL OUTPT CLINIC VISIT: HCPCS | Performed by: NURSE PRACTITIONER

## 2025-08-25 PROCEDURE — 77014 PR CT GUIDE FOR PLACEMENT RADIATION THERAPY FIELDS: CPT | Mod: 26 | Performed by: STUDENT IN AN ORGANIZED HEALTH CARE EDUCATION/TRAINING PROGRAM

## 2025-08-25 ASSESSMENT — PAIN SCALES - GENERAL: PAINLEVEL_OUTOF10: NO PAIN (0)

## 2025-08-26 ENCOUNTER — APPOINTMENT (OUTPATIENT)
Dept: RADIATION ONCOLOGY | Facility: CLINIC | Age: 60
End: 2025-08-26
Attending: STUDENT IN AN ORGANIZED HEALTH CARE EDUCATION/TRAINING PROGRAM
Payer: COMMERCIAL

## 2025-08-26 PROCEDURE — 77386 HC IMRT TREATMENT DELIVERY, COMPLEX: CPT | Performed by: STUDENT IN AN ORGANIZED HEALTH CARE EDUCATION/TRAINING PROGRAM

## 2025-08-26 PROCEDURE — 77014 PR CT GUIDE FOR PLACEMENT RADIATION THERAPY FIELDS: CPT | Mod: 26 | Performed by: STUDENT IN AN ORGANIZED HEALTH CARE EDUCATION/TRAINING PROGRAM

## 2025-08-27 ENCOUNTER — APPOINTMENT (OUTPATIENT)
Dept: RADIATION ONCOLOGY | Facility: CLINIC | Age: 60
End: 2025-08-27
Attending: STUDENT IN AN ORGANIZED HEALTH CARE EDUCATION/TRAINING PROGRAM
Payer: COMMERCIAL

## 2025-08-27 PROCEDURE — 77014 PR CT GUIDE FOR PLACEMENT RADIATION THERAPY FIELDS: CPT | Mod: 26 | Performed by: STUDENT IN AN ORGANIZED HEALTH CARE EDUCATION/TRAINING PROGRAM

## 2025-08-27 PROCEDURE — 77386 HC IMRT TREATMENT DELIVERY, COMPLEX: CPT | Performed by: STUDENT IN AN ORGANIZED HEALTH CARE EDUCATION/TRAINING PROGRAM

## 2025-08-28 ENCOUNTER — APPOINTMENT (OUTPATIENT)
Dept: RADIATION ONCOLOGY | Facility: CLINIC | Age: 60
End: 2025-08-28
Attending: STUDENT IN AN ORGANIZED HEALTH CARE EDUCATION/TRAINING PROGRAM
Payer: COMMERCIAL

## 2025-08-28 VITALS
SYSTOLIC BLOOD PRESSURE: 116 MMHG | DIASTOLIC BLOOD PRESSURE: 78 MMHG | WEIGHT: 206.5 LBS | BODY MASS INDEX: 31.4 KG/M2 | OXYGEN SATURATION: 99 % | HEART RATE: 96 BPM

## 2025-08-28 DIAGNOSIS — C71.9 GLIOBLASTOMA (H): Primary | ICD-10-CM

## 2025-08-28 PROCEDURE — 77386 HC IMRT TREATMENT DELIVERY, COMPLEX: CPT | Performed by: STUDENT IN AN ORGANIZED HEALTH CARE EDUCATION/TRAINING PROGRAM

## 2025-08-28 ASSESSMENT — PAIN SCALES - GENERAL: PAINLEVEL_OUTOF10: NO PAIN (0)

## 2025-08-29 ENCOUNTER — APPOINTMENT (OUTPATIENT)
Dept: RADIATION ONCOLOGY | Facility: CLINIC | Age: 60
End: 2025-08-29
Attending: STUDENT IN AN ORGANIZED HEALTH CARE EDUCATION/TRAINING PROGRAM
Payer: COMMERCIAL

## 2025-08-29 ENCOUNTER — TELEPHONE (OUTPATIENT)
Dept: ONCOLOGY | Facility: CLINIC | Age: 60
End: 2025-08-29

## 2025-08-29 PROCEDURE — 77386 HC IMRT TREATMENT DELIVERY, COMPLEX: CPT | Performed by: STUDENT IN AN ORGANIZED HEALTH CARE EDUCATION/TRAINING PROGRAM

## 2025-08-29 PROCEDURE — 77427 RADIATION TX MANAGEMENT X5: CPT | Mod: GC | Performed by: STUDENT IN AN ORGANIZED HEALTH CARE EDUCATION/TRAINING PROGRAM

## 2025-08-29 PROCEDURE — 77336 RADIATION PHYSICS CONSULT: CPT | Performed by: STUDENT IN AN ORGANIZED HEALTH CARE EDUCATION/TRAINING PROGRAM

## 2025-08-29 PROCEDURE — 77014 PR CT GUIDE FOR PLACEMENT RADIATION THERAPY FIELDS: CPT | Mod: 26 | Performed by: STUDENT IN AN ORGANIZED HEALTH CARE EDUCATION/TRAINING PROGRAM

## 2025-09-02 ENCOUNTER — LAB (OUTPATIENT)
Dept: LAB | Facility: CLINIC | Age: 60
End: 2025-09-02
Attending: PSYCHIATRY & NEUROLOGY
Payer: COMMERCIAL

## 2025-09-02 ENCOUNTER — TELEPHONE (OUTPATIENT)
Dept: ONCOLOGY | Facility: CLINIC | Age: 60
End: 2025-09-02

## 2025-09-02 DIAGNOSIS — C71.9 GLIOBLASTOMA (H): ICD-10-CM

## 2025-09-02 LAB
ALBUMIN SERPL BCG-MCNC: 4.5 G/DL (ref 3.5–5.2)
ALP SERPL-CCNC: 74 U/L (ref 40–150)
ALT SERPL W P-5'-P-CCNC: 11 U/L (ref 0–50)
ANION GAP SERPL CALCULATED.3IONS-SCNC: 11 MMOL/L (ref 7–15)
AST SERPL W P-5'-P-CCNC: 23 U/L (ref 0–45)
BASOPHILS # BLD AUTO: <0.03 10E3/UL (ref 0–0.2)
BASOPHILS NFR BLD AUTO: 0.7 %
BILIRUB SERPL-MCNC: 0.5 MG/DL
BUN SERPL-MCNC: 11.1 MG/DL (ref 8–23)
CALCIUM SERPL-MCNC: 9.7 MG/DL (ref 8.8–10.4)
CHLORIDE SERPL-SCNC: 104 MMOL/L (ref 98–107)
CREAT SERPL-MCNC: 1.02 MG/DL (ref 0.51–0.95)
EGFRCR SERPLBLD CKD-EPI 2021: 62 ML/MIN/1.73M2
EOSINOPHIL # BLD AUTO: 0.13 10E3/UL (ref 0–0.7)
EOSINOPHIL NFR BLD AUTO: 4.4 %
ERYTHROCYTE [DISTWIDTH] IN BLOOD BY AUTOMATED COUNT: 12.5 % (ref 10–15)
GLUCOSE SERPL-MCNC: 112 MG/DL (ref 70–99)
HCO3 SERPL-SCNC: 25 MMOL/L (ref 22–29)
HCT VFR BLD AUTO: 37.9 % (ref 35–47)
HGB BLD-MCNC: 12.7 G/DL (ref 11.7–15.7)
IMM GRANULOCYTES # BLD: <0.03 10E3/UL
IMM GRANULOCYTES NFR BLD: 0.3 %
LYMPHOCYTES # BLD AUTO: 0.89 10E3/UL (ref 0.8–5.3)
LYMPHOCYTES NFR BLD AUTO: 30.2 %
MCH RBC QN AUTO: 31.7 PG (ref 26.5–33)
MCHC RBC AUTO-ENTMCNC: 33.5 G/DL (ref 31.5–36.5)
MCV RBC AUTO: 94.5 FL (ref 78–100)
MONOCYTES # BLD AUTO: 0.31 10E3/UL (ref 0–1.3)
MONOCYTES NFR BLD AUTO: 10.5 %
NEUTROPHILS # BLD AUTO: 1.59 10E3/UL (ref 1.6–8.3)
NEUTROPHILS NFR BLD AUTO: 53.9 %
NRBC # BLD AUTO: <0.03 10E3/UL
NRBC BLD AUTO-RTO: 0 /100
PLATELET # BLD AUTO: 227 10E3/UL (ref 150–450)
POTASSIUM SERPL-SCNC: 4.1 MMOL/L (ref 3.4–5.3)
PROT SERPL-MCNC: 7.1 G/DL (ref 6.4–8.3)
RBC # BLD AUTO: 4.01 10E6/UL (ref 3.8–5.2)
SODIUM SERPL-SCNC: 140 MMOL/L (ref 135–145)
WBC # BLD AUTO: 2.95 10E3/UL (ref 4–11)

## 2025-09-02 PROCEDURE — 36415 COLL VENOUS BLD VENIPUNCTURE: CPT

## 2025-09-02 PROCEDURE — 85014 HEMATOCRIT: CPT

## 2025-09-02 PROCEDURE — 84155 ASSAY OF PROTEIN SERUM: CPT

## 2025-09-04 ENCOUNTER — OFFICE VISIT (OUTPATIENT)
Dept: RADIATION ONCOLOGY | Facility: CLINIC | Age: 60
End: 2025-09-04
Attending: STUDENT IN AN ORGANIZED HEALTH CARE EDUCATION/TRAINING PROGRAM
Payer: COMMERCIAL

## 2025-09-04 VITALS
HEART RATE: 78 BPM | BODY MASS INDEX: 30.84 KG/M2 | SYSTOLIC BLOOD PRESSURE: 116 MMHG | DIASTOLIC BLOOD PRESSURE: 85 MMHG | RESPIRATION RATE: 16 BRPM | OXYGEN SATURATION: 98 % | WEIGHT: 202.8 LBS

## 2025-09-04 DIAGNOSIS — C71.9 GLIOBLASTOMA (H): Primary | ICD-10-CM

## 2025-09-04 RX ORDER — ONDANSETRON 8 MG/1
8 TABLET, ORALLY DISINTEGRATING ORAL EVERY 12 HOURS PRN
Qty: 60 TABLET | Refills: 2 | Status: SHIPPED | OUTPATIENT
Start: 2025-09-04

## 2025-09-04 ASSESSMENT — PAIN SCALES - GENERAL: PAINLEVEL_OUTOF10: NO PAIN (0)

## (undated) DEVICE — MARKER SPHERES PASSIVE MEDT PACK 5 8801075

## (undated) DEVICE — TRAY PREP DRY SKIN SCRUB 067

## (undated) DEVICE — SPONGE COTTONOID 1X3" 80-1408

## (undated) DEVICE — SPONGE COTTONOID 1/2X1/2" 80-1400

## (undated) DEVICE — DRAPE MICROSCOPE LEICA 54X150" AR8033650

## (undated) DEVICE — SU ETHILON 3-0 FS-1 18" 669H

## (undated) DEVICE — VIAL DECANTER STERILE WHITE DYNJDEC06

## (undated) DEVICE — DRAPE WARMER 66X44" ORS-300

## (undated) DEVICE — NDL BLUNT 17GA 1.5" 8881202330

## (undated) DEVICE — SOL NACL 0.9% IRRIG 1000ML BOTTLE 2F7124

## (undated) DEVICE — DRAPE POUCH INSTRUMENT 1018

## (undated) DEVICE — LINEN TOWEL PACK X5 5464

## (undated) DEVICE — SU NUROLON 4-0 TF CR 8X18" C584D

## (undated) DEVICE — SOL WATER IRRIG 1000ML BOTTLE 2F7114

## (undated) DEVICE — SPONGE COTTONOID 1X1" 80-1403

## (undated) DEVICE — SURGICEL HEMOSTAT 2X14" 1951

## (undated) DEVICE — SPONGE SURGIFOAM 100 1974

## (undated) DEVICE — PACK UNIVERSAL SPLIT 29131

## (undated) DEVICE — BAG DECANTER STERILE WHITE DYNJDEC09

## (undated) DEVICE — GLOVE PROTEXIS BLUE W/NEU-THERA 8.5  2D73EB85

## (undated) DEVICE — SUCTION MANIFOLD NEPTUNE 2 SYS 4 PORT 0702-020-000

## (undated) DEVICE — GOWN XXL 9575

## (undated) DEVICE — SURGICEL NU-KIT ABSORBABLE HEMOSTAT 1943S

## (undated) DEVICE — TIP ASPIRATOR SONOPET IQ LARGE 5500-25S-308

## (undated) DEVICE — SUCTION CANISTER BEMIS HI FLOW 006772-901

## (undated) DEVICE — DRAPE POUCH IRR 1016

## (undated) DEVICE — CATH TRAY FOLEY SURESTEP 16FR WDRAIN BAG STLK LATEX A300316A

## (undated) DEVICE — TUBING SET IRR MALIS BIPOLAR CORD INTEGRATE 6790-100-003

## (undated) DEVICE — SU VICRYL 2-0 CT-2 CR 8X18" J726D

## (undated) DEVICE — ESU GROUND PAD UNIVERSAL W/O CORD

## (undated) DEVICE — SPONGE COTTONOID 1/4X1/4" 80-1399

## (undated) DEVICE — CASSETTE SONOPET IRRIG SUCTION STRL 5500-573-000

## (undated) DEVICE — RX SURGIFLO HEMOSTATIC MATRIX W/THROMBIN 8ML 2994

## (undated) DEVICE — GLOVE PROTEXIS W/NEU-THERA 7.5  2D73TE75

## (undated) DEVICE — PACK NEURO SNE15SNFSA

## (undated) DEVICE — SPONGE COTTONOID 1/2X3" 80-1407

## (undated) DEVICE — PIN SKULL MAYFIELD ADULT TITANIUM 3/PK A1120

## (undated) DEVICE — TUBING SUCTION MEDI-VAC SOFT 3/16"X20' N520A

## (undated) RX ORDER — HYDROMORPHONE HYDROCHLORIDE 1 MG/ML
INJECTION, SOLUTION INTRAMUSCULAR; INTRAVENOUS; SUBCUTANEOUS
Status: DISPENSED
Start: 2025-05-19

## (undated) RX ORDER — FENTANYL CITRATE 50 UG/ML
INJECTION, SOLUTION INTRAMUSCULAR; INTRAVENOUS
Status: DISPENSED
Start: 2025-05-19

## (undated) RX ORDER — DEXAMETHASONE SODIUM PHOSPHATE 4 MG/ML
INJECTION, SOLUTION INTRA-ARTICULAR; INTRALESIONAL; INTRAMUSCULAR; INTRAVENOUS; SOFT TISSUE
Status: DISPENSED
Start: 2025-05-19

## (undated) RX ORDER — APREPITANT 40 MG/1
CAPSULE ORAL
Status: DISPENSED
Start: 2025-05-19

## (undated) RX ORDER — BUPIVACAINE HYDROCHLORIDE AND EPINEPHRINE 2.5; 5 MG/ML; UG/ML
INJECTION, SOLUTION EPIDURAL; INFILTRATION; INTRACAUDAL; PERINEURAL
Status: DISPENSED
Start: 2025-05-19

## (undated) RX ORDER — ONDANSETRON 2 MG/ML
INJECTION INTRAMUSCULAR; INTRAVENOUS
Status: DISPENSED
Start: 2025-05-19